# Patient Record
Sex: MALE | Race: BLACK OR AFRICAN AMERICAN | NOT HISPANIC OR LATINO | URBAN - METROPOLITAN AREA
[De-identification: names, ages, dates, MRNs, and addresses within clinical notes are randomized per-mention and may not be internally consistent; named-entity substitution may affect disease eponyms.]

---

## 2020-07-10 ENCOUNTER — LAB VISIT (OUTPATIENT)
Dept: LAB | Facility: HOSPITAL | Age: 35
End: 2020-07-10
Attending: INTERNAL MEDICINE
Payer: COMMERCIAL

## 2020-07-10 DIAGNOSIS — L03.116 CELLULITIS OF LEFT FOOT: ICD-10-CM

## 2020-07-10 DIAGNOSIS — E11.69 DIABETES MELLITUS ASSOCIATED WITH HORMONAL ETIOLOGY: Primary | ICD-10-CM

## 2020-07-10 DIAGNOSIS — M86.172 ACUTE OSTEOMYELITIS OF LEFT ANKLE OR FOOT: ICD-10-CM

## 2020-07-10 LAB
BASOPHILS # BLD AUTO: 0.04 K/UL (ref 0–0.2)
BASOPHILS NFR BLD: 0.5 % (ref 0–1.9)
DIFFERENTIAL METHOD: ABNORMAL
EOSINOPHIL # BLD AUTO: 0.3 K/UL (ref 0–0.5)
EOSINOPHIL NFR BLD: 3 % (ref 0–8)
ERYTHROCYTE [DISTWIDTH] IN BLOOD BY AUTOMATED COUNT: 13 % (ref 11.5–14.5)
ERYTHROCYTE [SEDIMENTATION RATE] IN BLOOD BY WESTERGREN METHOD: 48 MM/HR (ref 0–10)
HCT VFR BLD AUTO: 33.1 % (ref 40–54)
HGB BLD-MCNC: 10.2 G/DL (ref 14–18)
IMM GRANULOCYTES # BLD AUTO: 0.03 K/UL (ref 0–0.04)
IMM GRANULOCYTES NFR BLD AUTO: 0.4 % (ref 0–0.5)
LYMPHOCYTES # BLD AUTO: 3.1 K/UL (ref 1–4.8)
LYMPHOCYTES NFR BLD: 37.8 % (ref 18–48)
MCH RBC QN AUTO: 27.6 PG (ref 27–31)
MCHC RBC AUTO-ENTMCNC: 30.8 G/DL (ref 32–36)
MCV RBC AUTO: 90 FL (ref 82–98)
MONOCYTES # BLD AUTO: 0.6 K/UL (ref 0.3–1)
MONOCYTES NFR BLD: 7 % (ref 4–15)
NEUTROPHILS # BLD AUTO: 4.3 K/UL (ref 1.8–7.7)
NEUTROPHILS NFR BLD: 51.3 % (ref 38–73)
NRBC BLD-RTO: 0 /100 WBC
PLATELET # BLD AUTO: 261 K/UL (ref 150–350)
PMV BLD AUTO: 10.7 FL (ref 9.2–12.9)
RBC # BLD AUTO: 3.69 M/UL (ref 4.6–6.2)
WBC # BLD AUTO: 8.28 K/UL (ref 3.9–12.7)

## 2020-07-10 PROCEDURE — 85651 RBC SED RATE NONAUTOMATED: CPT | Mod: PO

## 2020-07-10 PROCEDURE — 85025 COMPLETE CBC W/AUTO DIFF WBC: CPT

## 2020-07-10 PROCEDURE — 80053 COMPREHEN METABOLIC PANEL: CPT

## 2020-07-10 PROCEDURE — 86140 C-REACTIVE PROTEIN: CPT

## 2020-07-11 LAB
ALBUMIN SERPL BCP-MCNC: 3.3 G/DL (ref 3.5–5.2)
ALP SERPL-CCNC: 71 U/L (ref 55–135)
ALT SERPL W/O P-5'-P-CCNC: 16 U/L (ref 10–44)
ANION GAP SERPL CALC-SCNC: 9 MMOL/L (ref 8–16)
ANION GAP SERPL CALC-SCNC: 9 MMOL/L (ref 8–16)
AST SERPL-CCNC: 15 U/L (ref 10–40)
BILIRUB SERPL-MCNC: 0.2 MG/DL (ref 0.1–1)
BUN SERPL-MCNC: 38 MG/DL (ref 6–20)
BUN SERPL-MCNC: 38 MG/DL (ref 6–20)
CALCIUM SERPL-MCNC: 9.3 MG/DL (ref 8.7–10.5)
CALCIUM SERPL-MCNC: 9.3 MG/DL (ref 8.7–10.5)
CHLORIDE SERPL-SCNC: 110 MMOL/L (ref 95–110)
CHLORIDE SERPL-SCNC: 110 MMOL/L (ref 95–110)
CO2 SERPL-SCNC: 18 MMOL/L (ref 23–29)
CO2 SERPL-SCNC: 18 MMOL/L (ref 23–29)
CREAT SERPL-MCNC: 2.5 MG/DL (ref 0.5–1.4)
CREAT SERPL-MCNC: 2.5 MG/DL (ref 0.5–1.4)
CRP SERPL-MCNC: 1.5 MG/L (ref 0–8.2)
EST. GFR  (AFRICAN AMERICAN): 36.8 ML/MIN/1.73 M^2
EST. GFR  (AFRICAN AMERICAN): 36.8 ML/MIN/1.73 M^2
EST. GFR  (NON AFRICAN AMERICAN): 31.8 ML/MIN/1.73 M^2
EST. GFR  (NON AFRICAN AMERICAN): 31.8 ML/MIN/1.73 M^2
GLUCOSE SERPL-MCNC: 146 MG/DL (ref 70–110)
GLUCOSE SERPL-MCNC: 146 MG/DL (ref 70–110)
POTASSIUM SERPL-SCNC: 4.1 MMOL/L (ref 3.5–5.1)
POTASSIUM SERPL-SCNC: 4.1 MMOL/L (ref 3.5–5.1)
PROT SERPL-MCNC: 8.3 G/DL (ref 6–8.4)
SODIUM SERPL-SCNC: 137 MMOL/L (ref 136–145)
SODIUM SERPL-SCNC: 137 MMOL/L (ref 136–145)

## 2020-07-14 ENCOUNTER — LAB VISIT (OUTPATIENT)
Dept: LAB | Facility: HOSPITAL | Age: 35
End: 2020-07-14
Attending: INTERNAL MEDICINE
Payer: COMMERCIAL

## 2020-07-14 DIAGNOSIS — E11.69 DIABETES MELLITUS ASSOCIATED WITH HORMONAL ETIOLOGY: Primary | ICD-10-CM

## 2020-07-14 PROCEDURE — 85027 COMPLETE CBC AUTOMATED: CPT

## 2020-07-14 PROCEDURE — 80053 COMPREHEN METABOLIC PANEL: CPT

## 2020-07-14 PROCEDURE — 86140 C-REACTIVE PROTEIN: CPT

## 2020-07-14 PROCEDURE — 85651 RBC SED RATE NONAUTOMATED: CPT | Mod: PO

## 2020-07-15 LAB
ALBUMIN SERPL BCP-MCNC: 3.3 G/DL (ref 3.5–5.2)
ALP SERPL-CCNC: 76 U/L (ref 55–135)
ALT SERPL W/O P-5'-P-CCNC: 17 U/L (ref 10–44)
ANION GAP SERPL CALC-SCNC: 8 MMOL/L (ref 8–16)
AST SERPL-CCNC: 18 U/L (ref 10–40)
BILIRUB SERPL-MCNC: 0.3 MG/DL (ref 0.1–1)
BUN SERPL-MCNC: 28 MG/DL (ref 6–20)
CALCIUM SERPL-MCNC: 9 MG/DL (ref 8.7–10.5)
CHLORIDE SERPL-SCNC: 110 MMOL/L (ref 95–110)
CO2 SERPL-SCNC: 20 MMOL/L (ref 23–29)
CREAT SERPL-MCNC: 2.4 MG/DL (ref 0.5–1.4)
CRP SERPL-MCNC: 1.3 MG/L (ref 0–8.2)
ERYTHROCYTE [DISTWIDTH] IN BLOOD BY AUTOMATED COUNT: 13.5 % (ref 11.5–14.5)
ERYTHROCYTE [SEDIMENTATION RATE] IN BLOOD BY WESTERGREN METHOD: 35 MM/HR (ref 0–10)
EST. GFR  (AFRICAN AMERICAN): 38.7 ML/MIN/1.73 M^2
EST. GFR  (NON AFRICAN AMERICAN): 33.4 ML/MIN/1.73 M^2
GLUCOSE SERPL-MCNC: 162 MG/DL (ref 70–110)
HCT VFR BLD AUTO: 35.1 % (ref 40–54)
HGB BLD-MCNC: 10.4 G/DL (ref 14–18)
MCH RBC QN AUTO: 27.6 PG (ref 27–31)
MCHC RBC AUTO-ENTMCNC: 29.6 G/DL (ref 32–36)
MCV RBC AUTO: 93 FL (ref 82–98)
PLATELET # BLD AUTO: 292 K/UL (ref 150–350)
PMV BLD AUTO: 11.8 FL (ref 9.2–12.9)
POTASSIUM SERPL-SCNC: 4.3 MMOL/L (ref 3.5–5.1)
PROT SERPL-MCNC: 8.1 G/DL (ref 6–8.4)
RBC # BLD AUTO: 3.77 M/UL (ref 4.6–6.2)
SODIUM SERPL-SCNC: 138 MMOL/L (ref 136–145)
WBC # BLD AUTO: 8.73 K/UL (ref 3.9–12.7)

## 2020-07-17 ENCOUNTER — LAB VISIT (OUTPATIENT)
Dept: LAB | Facility: HOSPITAL | Age: 35
End: 2020-07-17
Attending: INTERNAL MEDICINE
Payer: COMMERCIAL

## 2020-07-17 DIAGNOSIS — E11.69 DIABETES MELLITUS ASSOCIATED WITH HORMONAL ETIOLOGY: Primary | ICD-10-CM

## 2020-07-17 PROCEDURE — 80048 BASIC METABOLIC PNL TOTAL CA: CPT

## 2020-07-18 LAB
ANION GAP SERPL CALC-SCNC: 8 MMOL/L (ref 8–16)
BUN SERPL-MCNC: 33 MG/DL (ref 6–20)
CALCIUM SERPL-MCNC: 8.1 MG/DL (ref 8.7–10.5)
CHLORIDE SERPL-SCNC: 108 MMOL/L (ref 95–110)
CO2 SERPL-SCNC: 22 MMOL/L (ref 23–29)
CREAT SERPL-MCNC: 2.8 MG/DL (ref 0.5–1.4)
EST. GFR  (AFRICAN AMERICAN): 32.1 ML/MIN/1.73 M^2
EST. GFR  (NON AFRICAN AMERICAN): 27.8 ML/MIN/1.73 M^2
GLUCOSE SERPL-MCNC: 200 MG/DL (ref 70–110)
POTASSIUM SERPL-SCNC: 3.6 MMOL/L (ref 3.5–5.1)
SODIUM SERPL-SCNC: 138 MMOL/L (ref 136–145)

## 2020-07-21 ENCOUNTER — LAB VISIT (OUTPATIENT)
Dept: LAB | Facility: HOSPITAL | Age: 35
End: 2020-07-21
Attending: INTERNAL MEDICINE
Payer: COMMERCIAL

## 2020-07-21 DIAGNOSIS — E11.69 DIABETES MELLITUS ASSOCIATED WITH HORMONAL ETIOLOGY: Primary | ICD-10-CM

## 2020-07-21 LAB
BASOPHILS # BLD AUTO: 0.03 K/UL (ref 0–0.2)
BASOPHILS NFR BLD: 0.4 % (ref 0–1.9)
DIFFERENTIAL METHOD: ABNORMAL
EOSINOPHIL # BLD AUTO: 0.1 K/UL (ref 0–0.5)
EOSINOPHIL NFR BLD: 1.9 % (ref 0–8)
ERYTHROCYTE [DISTWIDTH] IN BLOOD BY AUTOMATED COUNT: 13.4 % (ref 11.5–14.5)
ERYTHROCYTE [SEDIMENTATION RATE] IN BLOOD BY WESTERGREN METHOD: 17 MM/HR (ref 0–10)
HCT VFR BLD AUTO: 28.5 % (ref 40–54)
HGB BLD-MCNC: 9.4 G/DL (ref 14–18)
IMM GRANULOCYTES # BLD AUTO: 0.09 K/UL (ref 0–0.04)
IMM GRANULOCYTES NFR BLD AUTO: 1.2 % (ref 0–0.5)
LYMPHOCYTES # BLD AUTO: 2.9 K/UL (ref 1–4.8)
LYMPHOCYTES NFR BLD: 39.3 % (ref 18–48)
MCH RBC QN AUTO: 28.8 PG (ref 27–31)
MCHC RBC AUTO-ENTMCNC: 33 G/DL (ref 32–36)
MCV RBC AUTO: 87 FL (ref 82–98)
MONOCYTES # BLD AUTO: 0.6 K/UL (ref 0.3–1)
MONOCYTES NFR BLD: 8.3 % (ref 4–15)
NEUTROPHILS # BLD AUTO: 3.6 K/UL (ref 1.8–7.7)
NEUTROPHILS NFR BLD: 48.9 % (ref 38–73)
NRBC BLD-RTO: 0 /100 WBC
PLATELET # BLD AUTO: 249 K/UL (ref 150–350)
PMV BLD AUTO: 10.6 FL (ref 9.2–12.9)
RBC # BLD AUTO: 3.26 M/UL (ref 4.6–6.2)
WBC # BLD AUTO: 7.26 K/UL (ref 3.9–12.7)

## 2020-07-21 PROCEDURE — 80053 COMPREHEN METABOLIC PANEL: CPT

## 2020-07-21 PROCEDURE — 85025 COMPLETE CBC W/AUTO DIFF WBC: CPT | Mod: PO

## 2020-07-21 PROCEDURE — 86140 C-REACTIVE PROTEIN: CPT

## 2020-07-21 PROCEDURE — 85651 RBC SED RATE NONAUTOMATED: CPT | Mod: PO

## 2020-07-22 LAB
ALBUMIN SERPL BCP-MCNC: 3 G/DL (ref 3.5–5.2)
ALP SERPL-CCNC: 70 U/L (ref 55–135)
ALT SERPL W/O P-5'-P-CCNC: 19 U/L (ref 10–44)
ANION GAP SERPL CALC-SCNC: 9 MMOL/L (ref 8–16)
AST SERPL-CCNC: 18 U/L (ref 10–40)
BILIRUB SERPL-MCNC: 0.3 MG/DL (ref 0.1–1)
BUN SERPL-MCNC: 26 MG/DL (ref 6–20)
CALCIUM SERPL-MCNC: 8.5 MG/DL (ref 8.7–10.5)
CHLORIDE SERPL-SCNC: 107 MMOL/L (ref 95–110)
CO2 SERPL-SCNC: 22 MMOL/L (ref 23–29)
CREAT SERPL-MCNC: 2.4 MG/DL (ref 0.5–1.4)
CRP SERPL-MCNC: 1 MG/L (ref 0–8.2)
EST. GFR  (AFRICAN AMERICAN): 38.7 ML/MIN/1.73 M^2
EST. GFR  (NON AFRICAN AMERICAN): 33.4 ML/MIN/1.73 M^2
GLUCOSE SERPL-MCNC: 270 MG/DL (ref 70–110)
POTASSIUM SERPL-SCNC: 3.4 MMOL/L (ref 3.5–5.1)
PROT SERPL-MCNC: 6.9 G/DL (ref 6–8.4)
SODIUM SERPL-SCNC: 138 MMOL/L (ref 136–145)

## 2020-07-28 ENCOUNTER — LAB VISIT (OUTPATIENT)
Dept: LAB | Facility: HOSPITAL | Age: 35
End: 2020-07-28
Attending: INTERNAL MEDICINE
Payer: COMMERCIAL

## 2020-07-28 DIAGNOSIS — E11.69 DIABETES MELLITUS ASSOCIATED WITH HORMONAL ETIOLOGY: Primary | ICD-10-CM

## 2020-07-28 LAB
ERYTHROCYTE [DISTWIDTH] IN BLOOD BY AUTOMATED COUNT: 13.5 % (ref 11.5–14.5)
ERYTHROCYTE [SEDIMENTATION RATE] IN BLOOD BY WESTERGREN METHOD: 15 MM/HR (ref 0–10)
HCT VFR BLD AUTO: 30.5 % (ref 40–54)
HGB BLD-MCNC: 9.8 G/DL (ref 14–18)
MCH RBC QN AUTO: 28.4 PG (ref 27–31)
MCHC RBC AUTO-ENTMCNC: 32.1 G/DL (ref 32–36)
MCV RBC AUTO: 88 FL (ref 82–98)
PLATELET # BLD AUTO: 242 K/UL (ref 150–350)
PMV BLD AUTO: 11.5 FL (ref 9.2–12.9)
RBC # BLD AUTO: 3.45 M/UL (ref 4.6–6.2)
WBC # BLD AUTO: 7.36 K/UL (ref 3.9–12.7)

## 2020-07-28 PROCEDURE — 80053 COMPREHEN METABOLIC PANEL: CPT

## 2020-07-28 PROCEDURE — 85651 RBC SED RATE NONAUTOMATED: CPT | Mod: PO

## 2020-07-28 PROCEDURE — 85027 COMPLETE CBC AUTOMATED: CPT | Mod: PO

## 2020-07-28 PROCEDURE — 86140 C-REACTIVE PROTEIN: CPT

## 2020-07-29 LAB
ALBUMIN SERPL BCP-MCNC: 3.1 G/DL (ref 3.5–5.2)
ALP SERPL-CCNC: 76 U/L (ref 55–135)
ALT SERPL W/O P-5'-P-CCNC: 26 U/L (ref 10–44)
ANION GAP SERPL CALC-SCNC: 14 MMOL/L (ref 8–16)
AST SERPL-CCNC: 22 U/L (ref 10–40)
BILIRUB SERPL-MCNC: 0.4 MG/DL (ref 0.1–1)
BUN SERPL-MCNC: 27 MG/DL (ref 6–20)
CALCIUM SERPL-MCNC: 7.9 MG/DL (ref 8.7–10.5)
CHLORIDE SERPL-SCNC: 105 MMOL/L (ref 95–110)
CO2 SERPL-SCNC: 19 MMOL/L (ref 23–29)
CREAT SERPL-MCNC: 2.4 MG/DL (ref 0.5–1.4)
CRP SERPL-MCNC: 2.2 MG/L (ref 0–8.2)
EST. GFR  (AFRICAN AMERICAN): 38.7 ML/MIN/1.73 M^2
EST. GFR  (NON AFRICAN AMERICAN): 33.4 ML/MIN/1.73 M^2
GLUCOSE SERPL-MCNC: 258 MG/DL (ref 70–110)
POTASSIUM SERPL-SCNC: 3.9 MMOL/L (ref 3.5–5.1)
PROT SERPL-MCNC: 7.2 G/DL (ref 6–8.4)
SODIUM SERPL-SCNC: 138 MMOL/L (ref 136–145)

## 2020-07-31 ENCOUNTER — LAB VISIT (OUTPATIENT)
Dept: LAB | Facility: HOSPITAL | Age: 35
End: 2020-07-31
Attending: INTERNAL MEDICINE
Payer: COMMERCIAL

## 2020-07-31 DIAGNOSIS — M86.172 ACUTE OSTEOMYELITIS OF LEFT ANKLE OR FOOT: ICD-10-CM

## 2020-07-31 DIAGNOSIS — E11.69 DIABETES MELLITUS ASSOCIATED WITH HORMONAL ETIOLOGY: Primary | ICD-10-CM

## 2020-07-31 LAB
ANION GAP SERPL CALC-SCNC: 8 MMOL/L (ref 8–16)
BUN SERPL-MCNC: 23 MG/DL (ref 6–20)
CALCIUM SERPL-MCNC: 7.8 MG/DL (ref 8.7–10.5)
CHLORIDE SERPL-SCNC: 109 MMOL/L (ref 95–110)
CO2 SERPL-SCNC: 22 MMOL/L (ref 23–29)
CREAT SERPL-MCNC: 2.8 MG/DL (ref 0.5–1.4)
EST. GFR  (AFRICAN AMERICAN): 32.1 ML/MIN/1.73 M^2
EST. GFR  (NON AFRICAN AMERICAN): 27.8 ML/MIN/1.73 M^2
GLUCOSE SERPL-MCNC: 165 MG/DL (ref 70–110)
POTASSIUM SERPL-SCNC: 4.2 MMOL/L (ref 3.5–5.1)
SODIUM SERPL-SCNC: 139 MMOL/L (ref 136–145)

## 2020-07-31 PROCEDURE — 80048 BASIC METABOLIC PNL TOTAL CA: CPT

## 2020-08-04 ENCOUNTER — LAB VISIT (OUTPATIENT)
Dept: LAB | Facility: HOSPITAL | Age: 35
End: 2020-08-04
Attending: INTERNAL MEDICINE
Payer: COMMERCIAL

## 2020-08-04 DIAGNOSIS — E11.69 DIABETES MELLITUS ASSOCIATED WITH HORMONAL ETIOLOGY: Primary | ICD-10-CM

## 2020-08-04 LAB
ERYTHROCYTE [DISTWIDTH] IN BLOOD BY AUTOMATED COUNT: 13.8 % (ref 11.5–14.5)
ERYTHROCYTE [SEDIMENTATION RATE] IN BLOOD BY WESTERGREN METHOD: 23 MM/HR (ref 0–10)
HCT VFR BLD AUTO: 30.3 % (ref 40–54)
HGB BLD-MCNC: 9.3 G/DL (ref 14–18)
MCH RBC QN AUTO: 28.1 PG (ref 27–31)
MCHC RBC AUTO-ENTMCNC: 30.7 G/DL (ref 32–36)
MCV RBC AUTO: 92 FL (ref 82–98)
PLATELET # BLD AUTO: 216 K/UL (ref 150–350)
PMV BLD AUTO: 11.3 FL (ref 9.2–12.9)
RBC # BLD AUTO: 3.31 M/UL (ref 4.6–6.2)
WBC # BLD AUTO: 8 K/UL (ref 3.9–12.7)

## 2020-08-04 PROCEDURE — 80053 COMPREHEN METABOLIC PANEL: CPT

## 2020-08-04 PROCEDURE — 85651 RBC SED RATE NONAUTOMATED: CPT | Mod: PO

## 2020-08-04 PROCEDURE — 86140 C-REACTIVE PROTEIN: CPT

## 2020-08-04 PROCEDURE — 85027 COMPLETE CBC AUTOMATED: CPT

## 2020-08-05 LAB
ALBUMIN SERPL BCP-MCNC: 2.9 G/DL (ref 3.5–5.2)
ALP SERPL-CCNC: 82 U/L (ref 55–135)
ALT SERPL W/O P-5'-P-CCNC: 22 U/L (ref 10–44)
ANION GAP SERPL CALC-SCNC: 9 MMOL/L (ref 8–16)
AST SERPL-CCNC: 16 U/L (ref 10–40)
BILIRUB SERPL-MCNC: 0.4 MG/DL (ref 0.1–1)
BUN SERPL-MCNC: 28 MG/DL (ref 6–20)
CALCIUM SERPL-MCNC: 8.4 MG/DL (ref 8.7–10.5)
CHLORIDE SERPL-SCNC: 105 MMOL/L (ref 95–110)
CO2 SERPL-SCNC: 21 MMOL/L (ref 23–29)
CREAT SERPL-MCNC: 3.1 MG/DL (ref 0.5–1.4)
CRP SERPL-MCNC: 2.4 MG/L (ref 0–8.2)
EST. GFR  (AFRICAN AMERICAN): 28.4 ML/MIN/1.73 M^2
EST. GFR  (NON AFRICAN AMERICAN): 24.5 ML/MIN/1.73 M^2
GLUCOSE SERPL-MCNC: 341 MG/DL (ref 70–110)
POTASSIUM SERPL-SCNC: 3.8 MMOL/L (ref 3.5–5.1)
PROT SERPL-MCNC: 6.9 G/DL (ref 6–8.4)
SODIUM SERPL-SCNC: 135 MMOL/L (ref 136–145)

## 2020-08-07 ENCOUNTER — LAB VISIT (OUTPATIENT)
Dept: LAB | Facility: HOSPITAL | Age: 35
End: 2020-08-07
Attending: INTERNAL MEDICINE
Payer: COMMERCIAL

## 2020-08-07 DIAGNOSIS — E11.69 DIABETES MELLITUS ASSOCIATED WITH HORMONAL ETIOLOGY: Primary | ICD-10-CM

## 2020-08-07 LAB
ANION GAP SERPL CALC-SCNC: 12 MMOL/L (ref 8–16)
BUN SERPL-MCNC: 34 MG/DL (ref 6–20)
CALCIUM SERPL-MCNC: 8.4 MG/DL (ref 8.7–10.5)
CHLORIDE SERPL-SCNC: 108 MMOL/L (ref 95–110)
CO2 SERPL-SCNC: 18 MMOL/L (ref 23–29)
CREAT SERPL-MCNC: 3.3 MG/DL (ref 0.5–1.4)
EST. GFR  (AFRICAN AMERICAN): 26.3 ML/MIN/1.73 M^2
EST. GFR  (NON AFRICAN AMERICAN): 22.8 ML/MIN/1.73 M^2
GLUCOSE SERPL-MCNC: 319 MG/DL (ref 70–110)
POTASSIUM SERPL-SCNC: 4.2 MMOL/L (ref 3.5–5.1)
SODIUM SERPL-SCNC: 138 MMOL/L (ref 136–145)

## 2020-08-07 PROCEDURE — 80048 BASIC METABOLIC PNL TOTAL CA: CPT

## 2020-08-14 ENCOUNTER — DOCUMENT SCAN (OUTPATIENT)
Dept: HOME HEALTH SERVICES | Facility: HOSPITAL | Age: 35
End: 2020-08-14

## 2020-08-18 ENCOUNTER — DOCUMENT SCAN (OUTPATIENT)
Dept: HOME HEALTH SERVICES | Facility: HOSPITAL | Age: 35
End: 2020-08-18

## 2020-08-27 ENCOUNTER — DOCUMENT SCAN (OUTPATIENT)
Dept: HOME HEALTH SERVICES | Facility: HOSPITAL | Age: 35
End: 2020-08-27

## 2021-02-23 ENCOUNTER — TELEPHONE (OUTPATIENT)
Dept: PODIATRY | Facility: CLINIC | Age: 36
End: 2021-02-23

## 2022-12-27 ENCOUNTER — CLINICAL SUPPORT (OUTPATIENT)
Dept: PRIMARY CARE CLINIC | Facility: CLINIC | Age: 37
End: 2022-12-27

## 2022-12-27 DIAGNOSIS — Z02.4 ENCOUNTER FOR COMMERCIAL DRIVING LICENSE (CDL) EXAM: Primary | ICD-10-CM

## 2022-12-27 PROCEDURE — 99499 UNLISTED E&M SERVICE: CPT | Mod: ,,, | Performed by: NURSE PRACTITIONER

## 2022-12-27 PROCEDURE — 99499 PR PHYSICAL - DOT/CDL: ICD-10-PCS | Mod: ,,, | Performed by: NURSE PRACTITIONER

## 2024-09-01 ENCOUNTER — HOSPITAL ENCOUNTER (INPATIENT)
Facility: HOSPITAL | Age: 39
LOS: 1 days | Discharge: HOME | DRG: 304 | End: 2024-09-02
Attending: EMERGENCY MEDICINE | Admitting: EMERGENCY MEDICINE
Payer: MEDICARE

## 2024-09-01 DIAGNOSIS — I16.1 HYPERTENSIVE EMERGENCY: ICD-10-CM

## 2024-09-01 DIAGNOSIS — J90 BILATERAL PLEURAL EFFUSION: ICD-10-CM

## 2024-09-01 DIAGNOSIS — R06.02 SHORTNESS OF BREATH: Primary | ICD-10-CM

## 2024-09-01 DIAGNOSIS — E87.5 HYPERKALEMIA: ICD-10-CM

## 2024-09-01 DIAGNOSIS — R09.02 HYPOXIA: ICD-10-CM

## 2024-09-01 DIAGNOSIS — R79.89 ELEVATED TROPONIN: ICD-10-CM

## 2024-09-01 DIAGNOSIS — S91.309A WOUND OF FOOT: ICD-10-CM

## 2024-09-01 DIAGNOSIS — I16.0 HYPERTENSIVE URGENCY: ICD-10-CM

## 2024-09-01 DIAGNOSIS — N18.6 ESRD ON HEMODIALYSIS: ICD-10-CM

## 2024-09-01 DIAGNOSIS — Z99.2 ESRD ON HEMODIALYSIS: ICD-10-CM

## 2024-09-01 DIAGNOSIS — J81.0 ACUTE PULMONARY EDEMA: ICD-10-CM

## 2024-09-01 DIAGNOSIS — E87.70 HYPERVOLEMIA, UNSPECIFIED HYPERVOLEMIA TYPE: ICD-10-CM

## 2024-09-01 PROBLEM — N18.9 ANEMIA IN CHRONIC KIDNEY DISEASE: Chronic | Status: ACTIVE | Noted: 2021-03-12

## 2024-09-01 PROBLEM — I10 ESSENTIAL HYPERTENSION: Status: ACTIVE | Noted: 2021-01-27

## 2024-09-01 PROBLEM — R06.03 ACUTE RESPIRATORY DISTRESS: Status: ACTIVE | Noted: 2024-09-01

## 2024-09-01 PROBLEM — E11.621 TYPE 2 DIABETES MELLITUS WITH FOOT ULCER: Status: ACTIVE | Noted: 2024-06-25

## 2024-09-01 PROBLEM — L97.509 FOOT ULCER: Chronic | Status: ACTIVE | Noted: 2024-09-01

## 2024-09-01 PROBLEM — D63.1 ANEMIA IN CHRONIC KIDNEY DISEASE: Chronic | Status: ACTIVE | Noted: 2021-03-12

## 2024-09-01 PROBLEM — L97.509 TYPE 2 DIABETES MELLITUS WITH FOOT ULCER: Status: ACTIVE | Noted: 2024-06-25

## 2024-09-01 LAB
ABO + RH BLD: NORMAL
ALBUMIN SERPL BCP-MCNC: 3.8 G/DL (ref 3.5–5.2)
ALP SERPL-CCNC: 64 U/L (ref 55–135)
ALT SERPL W/O P-5'-P-CCNC: 28 U/L (ref 10–44)
ANION GAP SERPL CALC-SCNC: 12 MMOL/L (ref 8–16)
ANION GAP SERPL CALC-SCNC: 18 MMOL/L (ref 8–16)
APTT PPP: 27.3 SEC (ref 21–32)
AST SERPL-CCNC: 26 U/L (ref 10–40)
B-OH-BUTYR BLD STRIP-SCNC: 0.1 MMOL/L (ref 0–0.5)
BASOPHILS # BLD AUTO: 0.04 K/UL (ref 0–0.2)
BASOPHILS NFR BLD: 0.4 % (ref 0–1.9)
BILIRUB SERPL-MCNC: 0.6 MG/DL (ref 0.1–1)
BLD GP AB SCN CELLS X3 SERPL QL: NORMAL
BNP SERPL-MCNC: 1884 PG/ML (ref 0–99)
BUN SERPL-MCNC: 47 MG/DL (ref 6–20)
BUN SERPL-MCNC: 94 MG/DL (ref 6–20)
CALCIUM SERPL-MCNC: 8.9 MG/DL (ref 8.7–10.5)
CALCIUM SERPL-MCNC: 9.5 MG/DL (ref 8.7–10.5)
CHLORIDE SERPL-SCNC: 98 MMOL/L (ref 95–110)
CHLORIDE SERPL-SCNC: 99 MMOL/L (ref 95–110)
CO2 SERPL-SCNC: 21 MMOL/L (ref 23–29)
CO2 SERPL-SCNC: 25 MMOL/L (ref 23–29)
CREAT SERPL-MCNC: 11.2 MG/DL (ref 0.5–1.4)
CREAT SERPL-MCNC: 18.2 MG/DL (ref 0.5–1.4)
CRP SERPL-MCNC: 5 MG/L (ref 0–8.2)
DIFFERENTIAL METHOD BLD: ABNORMAL
EOSINOPHIL # BLD AUTO: 0.3 K/UL (ref 0–0.5)
EOSINOPHIL NFR BLD: 2.9 % (ref 0–8)
ERYTHROCYTE [DISTWIDTH] IN BLOOD BY AUTOMATED COUNT: 17 % (ref 11.5–14.5)
ERYTHROCYTE [SEDIMENTATION RATE] IN BLOOD BY PHOTOMETRIC METHOD: 30 MM/HR (ref 0–23)
EST. GFR  (NO RACE VARIABLE): 3 ML/MIN/1.73 M^2
EST. GFR  (NO RACE VARIABLE): 5 ML/MIN/1.73 M^2
GLUCOSE SERPL-MCNC: 87 MG/DL (ref 70–110)
GLUCOSE SERPL-MCNC: 90 MG/DL (ref 70–110)
HCT VFR BLD AUTO: 27.4 % (ref 40–54)
HGB BLD-MCNC: 8.8 G/DL (ref 14–18)
IMM GRANULOCYTES # BLD AUTO: 0.03 K/UL (ref 0–0.04)
IMM GRANULOCYTES NFR BLD AUTO: 0.3 % (ref 0–0.5)
INR PPP: 1 (ref 0.8–1.2)
LYMPHOCYTES # BLD AUTO: 2.4 K/UL (ref 1–4.8)
LYMPHOCYTES NFR BLD: 23.2 % (ref 18–48)
MAGNESIUM SERPL-MCNC: 2 MG/DL (ref 1.6–2.6)
MCH RBC QN AUTO: 28.9 PG (ref 27–31)
MCHC RBC AUTO-ENTMCNC: 32.1 G/DL (ref 32–36)
MCV RBC AUTO: 90 FL (ref 82–98)
MONOCYTES # BLD AUTO: 1.1 K/UL (ref 0.3–1)
MONOCYTES NFR BLD: 10.3 % (ref 4–15)
NEUTROPHILS # BLD AUTO: 6.4 K/UL (ref 1.8–7.7)
NEUTROPHILS NFR BLD: 62.9 % (ref 38–73)
NRBC BLD-RTO: 0 /100 WBC
PHOSPHATE SERPL-MCNC: 4.7 MG/DL (ref 2.7–4.5)
PLATELET # BLD AUTO: 235 K/UL (ref 150–450)
PMV BLD AUTO: 9.9 FL (ref 9.2–12.9)
POCT GLUCOSE: 57 MG/DL (ref 70–110)
POCT GLUCOSE: 89 MG/DL (ref 70–110)
POCT GLUCOSE: 91 MG/DL (ref 70–110)
POCT GLUCOSE: 91 MG/DL (ref 70–110)
POCT GLUCOSE: 93 MG/DL (ref 70–110)
POCT GLUCOSE: 96 MG/DL (ref 70–110)
POCT GLUCOSE: 96 MG/DL (ref 70–110)
POTASSIUM SERPL-SCNC: 6.2 MMOL/L (ref 3.5–5.1)
POTASSIUM SERPL-SCNC: 6.5 MMOL/L (ref 3.5–5.1)
PROT SERPL-MCNC: 8.3 G/DL (ref 6–8.4)
PROTHROMBIN TIME: 11.3 SEC (ref 9–12.5)
RBC # BLD AUTO: 3.05 M/UL (ref 4.6–6.2)
SODIUM SERPL-SCNC: 135 MMOL/L (ref 136–145)
SODIUM SERPL-SCNC: 138 MMOL/L (ref 136–145)
SPECIMEN OUTDATE: NORMAL
TROPONIN I SERPL DL<=0.01 NG/ML-MCNC: 0.12 NG/ML (ref 0–0.03)
TROPONIN I SERPL DL<=0.01 NG/ML-MCNC: 0.18 NG/ML (ref 0–0.03)
WBC # BLD AUTO: 10.22 K/UL (ref 3.9–12.7)

## 2024-09-01 PROCEDURE — 99285 EMERGENCY DEPT VISIT HI MDM: CPT | Mod: 25

## 2024-09-01 PROCEDURE — 86900 BLOOD TYPING SEROLOGIC ABO: CPT | Performed by: EMERGENCY MEDICINE

## 2024-09-01 PROCEDURE — 36415 COLL VENOUS BLD VENIPUNCTURE: CPT | Performed by: STUDENT IN AN ORGANIZED HEALTH CARE EDUCATION/TRAINING PROGRAM

## 2024-09-01 PROCEDURE — 93010 ELECTROCARDIOGRAM REPORT: CPT | Mod: ,,, | Performed by: INTERNAL MEDICINE

## 2024-09-01 PROCEDURE — 84484 ASSAY OF TROPONIN QUANT: CPT | Mod: 91 | Performed by: STUDENT IN AN ORGANIZED HEALTH CARE EDUCATION/TRAINING PROGRAM

## 2024-09-01 PROCEDURE — 83880 ASSAY OF NATRIURETIC PEPTIDE: CPT | Performed by: PHYSICIAN ASSISTANT

## 2024-09-01 PROCEDURE — 83735 ASSAY OF MAGNESIUM: CPT | Performed by: PHYSICIAN ASSISTANT

## 2024-09-01 PROCEDURE — 25000003 PHARM REV CODE 250: Performed by: STUDENT IN AN ORGANIZED HEALTH CARE EDUCATION/TRAINING PROGRAM

## 2024-09-01 PROCEDURE — 85025 COMPLETE CBC W/AUTO DIFF WBC: CPT | Performed by: PHYSICIAN ASSISTANT

## 2024-09-01 PROCEDURE — 99291 CRITICAL CARE FIRST HOUR: CPT | Mod: ,,, | Performed by: INTERNAL MEDICINE

## 2024-09-01 PROCEDURE — 93005 ELECTROCARDIOGRAM TRACING: CPT

## 2024-09-01 PROCEDURE — 11000001 HC ACUTE MED/SURG PRIVATE ROOM

## 2024-09-01 PROCEDURE — 25000003 PHARM REV CODE 250: Performed by: EMERGENCY MEDICINE

## 2024-09-01 PROCEDURE — 96374 THER/PROPH/DIAG INJ IV PUSH: CPT

## 2024-09-01 PROCEDURE — 84100 ASSAY OF PHOSPHORUS: CPT | Performed by: PHYSICIAN ASSISTANT

## 2024-09-01 PROCEDURE — 63600175 PHARM REV CODE 636 W HCPCS: Performed by: PHYSICIAN ASSISTANT

## 2024-09-01 PROCEDURE — 80100014 HC HEMODIALYSIS 1:1

## 2024-09-01 PROCEDURE — 96375 TX/PRO/DX INJ NEW DRUG ADDON: CPT

## 2024-09-01 PROCEDURE — 80048 BASIC METABOLIC PNL TOTAL CA: CPT | Mod: XB | Performed by: STUDENT IN AN ORGANIZED HEALTH CARE EDUCATION/TRAINING PROGRAM

## 2024-09-01 PROCEDURE — 85610 PROTHROMBIN TIME: CPT | Performed by: PHYSICIAN ASSISTANT

## 2024-09-01 PROCEDURE — 99900035 HC TECH TIME PER 15 MIN (STAT)

## 2024-09-01 PROCEDURE — 85652 RBC SED RATE AUTOMATED: CPT | Performed by: EMERGENCY MEDICINE

## 2024-09-01 PROCEDURE — 94640 AIRWAY INHALATION TREATMENT: CPT

## 2024-09-01 PROCEDURE — 84484 ASSAY OF TROPONIN QUANT: CPT | Performed by: PHYSICIAN ASSISTANT

## 2024-09-01 PROCEDURE — 85730 THROMBOPLASTIN TIME PARTIAL: CPT | Performed by: PHYSICIAN ASSISTANT

## 2024-09-01 PROCEDURE — 86901 BLOOD TYPING SEROLOGIC RH(D): CPT | Performed by: EMERGENCY MEDICINE

## 2024-09-01 PROCEDURE — 80053 COMPREHEN METABOLIC PANEL: CPT | Performed by: PHYSICIAN ASSISTANT

## 2024-09-01 PROCEDURE — 99223 1ST HOSP IP/OBS HIGH 75: CPT | Mod: ,,, | Performed by: PODIATRIST

## 2024-09-01 PROCEDURE — 82010 KETONE BODYS QUAN: CPT | Performed by: EMERGENCY MEDICINE

## 2024-09-01 PROCEDURE — 63600175 PHARM REV CODE 636 W HCPCS: Mod: JZ,JG | Performed by: EMERGENCY MEDICINE

## 2024-09-01 PROCEDURE — 82962 GLUCOSE BLOOD TEST: CPT

## 2024-09-01 PROCEDURE — 86140 C-REACTIVE PROTEIN: CPT | Performed by: EMERGENCY MEDICINE

## 2024-09-01 PROCEDURE — 5A1D70Z PERFORMANCE OF URINARY FILTRATION, INTERMITTENT, LESS THAN 6 HOURS PER DAY: ICD-10-PCS | Performed by: INTERNAL MEDICINE

## 2024-09-01 PROCEDURE — 63600175 PHARM REV CODE 636 W HCPCS: Performed by: STUDENT IN AN ORGANIZED HEALTH CARE EDUCATION/TRAINING PROGRAM

## 2024-09-01 PROCEDURE — 25000242 PHARM REV CODE 250 ALT 637 W/ HCPCS: Performed by: EMERGENCY MEDICINE

## 2024-09-01 PROCEDURE — 94761 N-INVAS EAR/PLS OXIMETRY MLT: CPT

## 2024-09-01 RX ORDER — IBUPROFEN 200 MG
24 TABLET ORAL
Status: DISCONTINUED | OUTPATIENT
Start: 2024-09-01 | End: 2024-09-02 | Stop reason: HOSPADM

## 2024-09-01 RX ORDER — INSULIN ASPART 100 [IU]/ML
1-10 INJECTION, SOLUTION INTRAVENOUS; SUBCUTANEOUS
Status: DISCONTINUED | OUTPATIENT
Start: 2024-09-01 | End: 2024-09-02 | Stop reason: HOSPADM

## 2024-09-01 RX ORDER — BENZONATATE 100 MG/1
200 CAPSULE ORAL 3 TIMES DAILY PRN
Status: DISCONTINUED | OUTPATIENT
Start: 2024-09-01 | End: 2024-09-02 | Stop reason: HOSPADM

## 2024-09-01 RX ORDER — GUAIFENESIN 100 MG/5ML
200 SOLUTION ORAL EVERY 4 HOURS PRN
Status: DISCONTINUED | OUTPATIENT
Start: 2024-09-01 | End: 2024-09-02 | Stop reason: HOSPADM

## 2024-09-01 RX ORDER — NICARDIPINE HYDROCHLORIDE 0.2 MG/ML
0-15 INJECTION INTRAVENOUS CONTINUOUS
Status: DISCONTINUED | OUTPATIENT
Start: 2024-09-01 | End: 2024-09-01

## 2024-09-01 RX ORDER — CARVEDILOL 25 MG/1
1 TABLET ORAL 2 TIMES DAILY
COMMUNITY
Start: 2024-04-25

## 2024-09-01 RX ORDER — IPRATROPIUM BROMIDE AND ALBUTEROL SULFATE 2.5; .5 MG/3ML; MG/3ML
3 SOLUTION RESPIRATORY (INHALATION) EVERY 4 HOURS PRN
Status: DISCONTINUED | OUTPATIENT
Start: 2024-09-01 | End: 2024-09-02 | Stop reason: HOSPADM

## 2024-09-01 RX ORDER — ONDANSETRON 8 MG/1
8 TABLET, ORALLY DISINTEGRATING ORAL EVERY 8 HOURS PRN
Status: DISCONTINUED | OUTPATIENT
Start: 2024-09-01 | End: 2024-09-02 | Stop reason: HOSPADM

## 2024-09-01 RX ORDER — NIFEDIPINE 30 MG/1
90 TABLET, EXTENDED RELEASE ORAL DAILY
Status: DISCONTINUED | OUTPATIENT
Start: 2024-09-01 | End: 2024-09-02 | Stop reason: HOSPADM

## 2024-09-01 RX ORDER — GLUCAGON 1 MG
1 KIT INJECTION
Status: DISCONTINUED | OUTPATIENT
Start: 2024-09-01 | End: 2024-09-02 | Stop reason: HOSPADM

## 2024-09-01 RX ORDER — DEXTROSE 50 % IN WATER (D50W) INTRAVENOUS SYRINGE
25
Status: COMPLETED | OUTPATIENT
Start: 2024-09-01 | End: 2024-09-01

## 2024-09-01 RX ORDER — ACETAMINOPHEN 325 MG/1
650 TABLET ORAL EVERY 4 HOURS PRN
Status: DISCONTINUED | OUTPATIENT
Start: 2024-09-01 | End: 2024-09-02 | Stop reason: HOSPADM

## 2024-09-01 RX ORDER — SODIUM CHLORIDE 0.9 % (FLUSH) 0.9 %
10 SYRINGE (ML) INJECTION
Status: DISCONTINUED | OUTPATIENT
Start: 2024-09-01 | End: 2024-09-02 | Stop reason: HOSPADM

## 2024-09-01 RX ORDER — FUROSEMIDE 10 MG/ML
80 INJECTION INTRAMUSCULAR; INTRAVENOUS
Status: COMPLETED | OUTPATIENT
Start: 2024-09-01 | End: 2024-09-01

## 2024-09-01 RX ORDER — CALCIUM GLUCONATE 98 MG/ML
1 INJECTION, SOLUTION INTRAVENOUS
Status: COMPLETED | OUTPATIENT
Start: 2024-09-01 | End: 2024-09-01

## 2024-09-01 RX ORDER — LISINOPRIL 20 MG/1
20 TABLET ORAL DAILY
Status: ON HOLD | COMMUNITY
End: 2024-09-02 | Stop reason: HOSPADM

## 2024-09-01 RX ORDER — IBUPROFEN 200 MG
16 TABLET ORAL
Status: DISCONTINUED | OUTPATIENT
Start: 2024-09-01 | End: 2024-09-02 | Stop reason: HOSPADM

## 2024-09-01 RX ORDER — CARVEDILOL 12.5 MG/1
25 TABLET ORAL 2 TIMES DAILY
Status: DISCONTINUED | OUTPATIENT
Start: 2024-09-01 | End: 2024-09-02 | Stop reason: HOSPADM

## 2024-09-01 RX ORDER — HYDRALAZINE HYDROCHLORIDE 20 MG/ML
10 INJECTION INTRAMUSCULAR; INTRAVENOUS EVERY 6 HOURS PRN
Status: DISCONTINUED | OUTPATIENT
Start: 2024-09-01 | End: 2024-09-02 | Stop reason: HOSPADM

## 2024-09-01 RX ORDER — HYDRALAZINE HYDROCHLORIDE 20 MG/ML
10 INJECTION INTRAMUSCULAR; INTRAVENOUS
Status: COMPLETED | OUTPATIENT
Start: 2024-09-01 | End: 2024-09-01

## 2024-09-01 RX ORDER — HYDRALAZINE HYDROCHLORIDE 50 MG/1
1 TABLET, FILM COATED ORAL 3 TIMES DAILY
COMMUNITY
Start: 2024-02-01

## 2024-09-01 RX ORDER — INDOMETHACIN 25 MG/1
50 CAPSULE ORAL
Status: COMPLETED | OUTPATIENT
Start: 2024-09-01 | End: 2024-09-01

## 2024-09-01 RX ORDER — HYDRALAZINE HYDROCHLORIDE 25 MG/1
50 TABLET, FILM COATED ORAL 3 TIMES DAILY
Status: DISCONTINUED | OUTPATIENT
Start: 2024-09-01 | End: 2024-09-02 | Stop reason: HOSPADM

## 2024-09-01 RX ORDER — ALBUTEROL SULFATE 2.5 MG/.5ML
10 SOLUTION RESPIRATORY (INHALATION)
Status: COMPLETED | OUTPATIENT
Start: 2024-09-01 | End: 2024-09-01

## 2024-09-01 RX ORDER — NIFEDIPINE 90 MG/1
90 TABLET, EXTENDED RELEASE ORAL DAILY
COMMUNITY
Start: 2024-07-30

## 2024-09-01 RX ORDER — ALBUTEROL SULFATE 2.5 MG/.5ML
SOLUTION RESPIRATORY (INHALATION)
Status: DISPENSED
Start: 2024-09-01 | End: 2024-09-01

## 2024-09-01 RX ORDER — SODIUM CHLORIDE 9 MG/ML
INJECTION, SOLUTION INTRAVENOUS
Status: CANCELLED | OUTPATIENT
Start: 2024-09-01

## 2024-09-01 RX ORDER — MUPIROCIN 20 MG/G
OINTMENT TOPICAL 2 TIMES DAILY
Status: DISCONTINUED | OUTPATIENT
Start: 2024-09-01 | End: 2024-09-02 | Stop reason: HOSPADM

## 2024-09-01 RX ADMIN — HYDRALAZINE HYDROCHLORIDE 50 MG: 25 TABLET ORAL at 02:09

## 2024-09-01 RX ADMIN — NICARDIPINE HYDROCHLORIDE 2.5 MG/HR: 0.2 INJECTION, SOLUTION INTRAVENOUS at 07:09

## 2024-09-01 RX ADMIN — HYDRALAZINE HYDROCHLORIDE 50 MG: 25 TABLET ORAL at 08:09

## 2024-09-01 RX ADMIN — INSULIN HUMAN 10 UNITS: 100 INJECTION, SOLUTION PARENTERAL at 06:09

## 2024-09-01 RX ADMIN — MUPIROCIN: 20 OINTMENT TOPICAL at 08:09

## 2024-09-01 RX ADMIN — GUAIFENESIN 200 MG: 200 SOLUTION ORAL at 08:09

## 2024-09-01 RX ADMIN — CALCIUM GLUCONATE 1 G: 98 INJECTION, SOLUTION INTRAVENOUS at 06:09

## 2024-09-01 RX ADMIN — ONDANSETRON 8 MG: 8 TABLET, ORALLY DISINTEGRATING ORAL at 07:09

## 2024-09-01 RX ADMIN — FUROSEMIDE 80 MG: 10 INJECTION, SOLUTION INTRAMUSCULAR; INTRAVENOUS at 05:09

## 2024-09-01 RX ADMIN — SODIUM ZIRCONIUM CYCLOSILICATE 10 G: 10 POWDER, FOR SUSPENSION ORAL at 06:09

## 2024-09-01 RX ADMIN — ALBUTEROL SULFATE 10 MG: 2.5 SOLUTION RESPIRATORY (INHALATION) at 06:09

## 2024-09-01 RX ADMIN — HYDRALAZINE HYDROCHLORIDE 10 MG: 20 INJECTION INTRAMUSCULAR; INTRAVENOUS at 06:09

## 2024-09-01 RX ADMIN — CARVEDILOL 25 MG: 12.5 TABLET, FILM COATED ORAL at 08:09

## 2024-09-01 RX ADMIN — CARVEDILOL 25 MG: 12.5 TABLET, FILM COATED ORAL at 12:09

## 2024-09-01 RX ADMIN — SODIUM BICARBONATE 50 MEQ: 84 INJECTION, SOLUTION INTRAVENOUS at 06:09

## 2024-09-01 RX ADMIN — HYDRALAZINE HYDROCHLORIDE 10 MG: 20 INJECTION INTRAMUSCULAR; INTRAVENOUS at 05:09

## 2024-09-01 RX ADMIN — BENZONATATE 200 MG: 100 CAPSULE ORAL at 08:09

## 2024-09-01 RX ADMIN — NIFEDIPINE 90 MG: 30 TABLET, FILM COATED, EXTENDED RELEASE ORAL at 02:09

## 2024-09-01 RX ADMIN — GUAIFENESIN 200 MG: 200 SOLUTION ORAL at 04:09

## 2024-09-01 RX ADMIN — DEXTROSE MONOHYDRATE 25 G: 25 INJECTION, SOLUTION INTRAVENOUS at 06:09

## 2024-09-01 NOTE — ASSESSMENT & PLAN NOTE
Patient's FSGs are controlled on current medication regimen.  Last A1c reviewed-   Lab Results   Component Value Date    HGBA1C 4.9 08/20/2024     Most recent fingerstick glucose reviewed-   Recent Labs   Lab 09/01/24  0547   POCTGLUCOSE 96     Current correctional scale  Medium  Maintain anti-hyperglycemic dose as follows-   Antihyperglycemics (From admission, onward)      Start     Stop Route Frequency Ordered    09/01/24 0828  insulin aspart U-100 pen 1-10 Units         -- SubQ Before meals & nightly PRN 09/01/24 0728          Hold Oral hypoglycemics while patient is in the hospital.

## 2024-09-01 NOTE — ASSESSMENT & PLAN NOTE
Patient has a current diagnosis of hypertensive urgency (without evidence of end organ damage) which is uncontrolled.  Latest blood pressure and vitals reviewed-   Temp:  [98.9 °F (37.2 °C)]   Pulse:  [105-122]   Resp:  [20-23]   BP: (207-243)/(115-156)   SpO2:  [92 %-100 %] .   Patient currently on IV antihypertensives.   Home meds for hypertension were reviewed and noted below.   Hypertension Medications               carvediloL (COREG) 25 MG tablet Take 1 tablet by mouth 2 (two) times daily.    hydrALAZINE (APRESOLINE) 50 MG tablet Take 1 tablet by mouth 3 (three) times daily.    NIFEdipine (PROCARDIA-XL) 90 MG (OSM) 24 hr tablet Take 90 mg by mouth once daily.    lisinopriL (PRINIVIL,ZESTRIL) 20 MG tablet Take 20 mg by mouth once daily.            Medication adjustment for hospital antihypertensives is as follows-   Restart nifedipine, carvedilol and hydralazine while on IV cardene. Will titrate off as tolerated. Goal is  or less for now.    Will aim for controlled BP reduction by medications noted above. Monitor and mitigate end organ damage as indicated.

## 2024-09-01 NOTE — SUBJECTIVE & OBJECTIVE
Past Medical History:   Diagnosis Date    Diabetes mellitus     Hypertension     Renal disorder        Past Surgical History:   Procedure Laterality Date    FOOT AMPUTATION Right        Review of patient's allergies indicates:  No Known Allergies    No current facility-administered medications on file prior to encounter.     Current Outpatient Medications on File Prior to Encounter   Medication Sig    carvediloL (COREG) 25 MG tablet Take 1 tablet by mouth 2 (two) times daily.    hydrALAZINE (APRESOLINE) 50 MG tablet Take 1 tablet by mouth 3 (three) times daily.    NIFEdipine (PROCARDIA-XL) 90 MG (OSM) 24 hr tablet Take 90 mg by mouth once daily.    lisinopriL (PRINIVIL,ZESTRIL) 20 MG tablet Take 20 mg by mouth once daily.     Family History    None       Tobacco Use    Smoking status: Never    Smokeless tobacco: Never   Substance and Sexual Activity    Alcohol use: Never    Drug use: Never    Sexual activity: Not on file     Review of Systems  Objective:     Vital Signs (Most Recent):  Temp: 98.9 °F (37.2 °C) (09/01/24 0504)  Pulse: (!) 116 (09/01/24 0703)  Resp: 20 (09/01/24 0634)  BP: (!) 215/115 (09/01/24 0703)  SpO2: 100 % (09/01/24 0703) Vital Signs (24h Range):  Temp:  [98.9 °F (37.2 °C)] 98.9 °F (37.2 °C)  Pulse:  [105-116] 116  Resp:  [20-23] 20  SpO2:  [92 %-100 %] 100 %  BP: (207-243)/(115-156) 215/115     Weight: 102.1 kg (225 lb)  Body mass index is 28.89 kg/m².     Physical Exam  Vitals and nursing note reviewed.   Constitutional:       Appearance: He is ill-appearing.   Pulmonary:      Effort: Respiratory distress present.      Breath sounds: No wheezing or rales.   Abdominal:      General: Bowel sounds are normal. There is no distension.   Musculoskeletal:         General: No swelling.      Comments: Foot amputation noted on the right, left foot ulcer   Neurological:      General: No focal deficit present.      Mental Status: He is alert and oriented to person, place, and time.   Psychiatric:          Mood and Affect: Mood normal.         Thought Content: Thought content normal.                Significant Labs: All pertinent labs within the past 24 hours have been reviewed.    Significant Imaging: I have reviewed all pertinent imaging results/findings within the past 24 hours.   : Yes

## 2024-09-01 NOTE — CONSULTS
[facility] - Intensive Care  Nephrology  Consult Note    Patient Name: Emmanuel Morgan  MRN: [id number]  Admission Date: 9/1/2024  Hospital Length of Stay: 0 days  Attending Provider: Prince Madrigal MD   Primary Care Physician: Laurence, Primary Doctor  Principal Problem:Hypertensive urgency    Inpatient consult to Nephrology  Consult performed by: Laurie Root MD  Consult ordered by: Su Hylton MD  Reason for consult: ESRD        Subjective:     HPI: Mr. Morgan is a 40 yo male with HTN, T2DM, and ESRD who presented to the ED this morning with SOB after missing dialysis on Thursday. Workup consistent with hypervolemia and hyperkalemia. Nephrology consulted for emergent dialysis. Prior records obtained and reviewed. Patient receives nocturnal hemodialysis via [facility] on Tues-Thurs-Sun at [facility] under the c/o Dr. Orozco. Treatment duration: 5 hours. His SpO2 was 87% on arrival with SBP > 220. He was started on cardene gtt. He received shifting agents and Lokelma for his hyperkalemia. Emergent dialysis was ordered. Patient seen and examined during hemodialysis this afternoon. He reports to be feeling significantly improved.     Past Medical History:   Diagnosis Date    Diabetes mellitus     Hypertension     Renal disorder        Past Surgical History:   Procedure Laterality Date    FOOT AMPUTATION Right        Review of patient's allergies indicates:  No Known Allergies  Current Facility-Administered Medications   Medication Frequency    acetaminophen tablet 650 mg Q4H PRN    albuterol sulfate 2.5 mg/0.5 mL nebulizer solution     carvediloL tablet 25 mg BID    dextrose 10% bolus 125 mL 125 mL PRN    dextrose 10% bolus 250 mL 250 mL PRN    glucagon (human recombinant) injection 1 mg PRN    glucose chewable tablet 16 g PRN    glucose chewable tablet 24 g PRN    hydrALAZINE tablet 50 mg TID    insulin aspart U-100 pen 1-10 Units QID (AC + HS) PRN    niCARdipine 40 mg/200 mL (0.2 mg/mL) infusion  Continuous    NIFEdipine 24 hr tablet 90 mg Daily    ondansetron disintegrating tablet 8 mg Q8H PRN    sodium chloride 0.9% flush 10 mL PRN     Family History    None       Tobacco Use    Smoking status: Never    Smokeless tobacco: Never   Substance and Sexual Activity    Alcohol use: Never    Drug use: Never    Sexual activity: Not on file     Review of Systems   All other systems reviewed and are negative.    Objective:     Vital Signs (Most Recent):  Temp: 97.9 °F (36.6 °C) (09/01/24 1115)  Pulse: 94 (09/01/24 1320)  Resp: (!) 30 (09/01/24 1320)  BP: (!) 142/82 (09/01/24 1320)  SpO2: 100 % (09/01/24 1320) Vital Signs (24h Range):  Temp:  [97.9 °F (36.6 °C)-98.9 °F (37.2 °C)] 97.9 °F (36.6 °C)  Pulse:  [] 94  Resp:  [18-36] 30  SpO2:  [92 %-100 %] 100 %  BP: (141-243)/() 142/82     Weight: 99.2 kg (218 lb 11.1 oz) (09/01/24 0830)  Body mass index is 28.08 kg/m².  Body surface area is 2.28 meters squared.    No intake/output data recorded.     Physical Exam  Vitals and nursing note reviewed.   Constitutional:       General: He is awake. He is not in acute distress.     Appearance: Normal appearance. He is well-developed.   HENT:      Head: Normocephalic and atraumatic.      Nose: Nose normal.      Mouth/Throat:      Mouth: Mucous membranes are moist.   Eyes:      Extraocular Movements: Extraocular movements intact.      Conjunctiva/sclera: Conjunctivae normal.   Cardiovascular:      Rate and Rhythm: Normal rate and regular rhythm.   Pulmonary:      Effort: Pulmonary effort is normal.   Abdominal:      General: There is no distension.      Palpations: Abdomen is soft.   Musculoskeletal:         General: No tenderness or signs of injury.      Right lower leg: No edema.      Left lower leg: No edema.   Skin:     General: Skin is warm and dry.      Findings: No erythema or rash.   Neurological:      General: No focal deficit present.      Mental Status: He is alert. Mental status is at baseline.    Psychiatric:         Mood and Affect: Mood normal.         Behavior: Behavior normal.          Significant Labs:  CBC:   Recent Labs   Lab 09/01/24  0536   WBC 10.22   RBC 3.05*   HGB 8.8*   HCT 27.4*      MCV 90   MCH 28.9   MCHC 32.1     CMP:   Recent Labs   Lab 09/01/24  0536   GLU 87   CALCIUM 9.5   ALBUMIN 3.8   PROT 8.3      K 6.5*   CO2 21*   CL 99   BUN 94*   CREATININE 18.2*   ALKPHOS 64   ALT 28   AST 26   BILITOT 0.6     All labs within the past 24 hours have been reviewed.    Significant Imaging:  Labs: Reviewed  ECG: Reviewed    Assessment/Plan:     ESRD  - receives nocturnal HD [facility] at [facility]  - presented this AM in respiratory distress and with hyperkalemia  - emergent dialysis ordered. HD x 4 hours; UF goal 4L  - will reassess HD needs tomorrow; though next HD tentatively planned for Tuesday    Hyperkalemia  - K 6.5 on arrival  - due to missed HD + dual RAAS blockade  - recommend stopping lisinopril  - okay to restart olmesartan once K < 5    Anemia of CKD  - hgb below goal  - holding IKKI for now in setting of hypertensive emergency    Secondary HPTH  - CCa and phos normal  - renal diet    Critical care time spent on the evaluation and treatment of severe organ dysfunction, review of pertinent labs and imaging studies, discussions with Dr. Hylton, Dr. Madrigal, and nursing staff, and discussions with patient/family: 40 minutes.      Thank you for your consult. I will follow-up with patient. Please contact us if you have any additional questions.    Laurie Root MD  Nephrology  [facility] - [place], [place] - Intensive Care

## 2024-09-01 NOTE — PROGRESS NOTES
Pt arrived to ICU from ER AAOx4. No c/o pain @ this time. Cardene drip infusing @ this time. Sacral skin intact. L plantar foot with wound. R foot old partial amputation, prosthesis @ bs. Call light in hand, bed in low position. SR up x2. No distress noted. Will continue to monitor.

## 2024-09-01 NOTE — ED PROVIDER NOTES
"Encounter Date: 9/1/2024       History     Chief Complaint   Patient presents with    Shortness of Breath     Pt goes to dialysis Tues, Thurs, & Sun but missed Thurs treatment due to bad weather; pt c/o SOB due to "fluid build up" starting last night; pt reports his dialysis isn't until 5pm in [place] and he doesn't think he can wait that long; pt appears short winded with exertion and initial Sp02 87% when first sitting down, now 92%     39-year-old male with ESRD on HD presents via sister to [facility] ER with acute severe shortness of breath acute onset last night several hours prior to presentation.  Patient goes to dialysis Tuesday Thursday Sunday in [place] where he works.  Patient missed Thursday dialysis due to bad weather.  His dialysis is scheduled for today Sunday 9/1/24 at 5:00 p.m. (about 12 hours from ER presentation) but patient does not think he can wait that long.  No chest pain.    Patient's dialysis access is a left chest wall tunneled IJ.      Patient still makes some urine.      Patient was previously on insulin, but has not been taking it regularly since he started dialysis in March 2024, about 6 months ago.    Patient also has a wound to his left foot which has been present for some time.  He is status post right midfoot amputation.    Patient's only meds are Nifedipine and Lisinopril, but he has not taken them in 2 days.        Review of patient's allergies indicates:  No Known Allergies  Past Medical History:   Diagnosis Date    Diabetes mellitus     Hypertension     Renal disorder      Past Surgical History:   Procedure Laterality Date    FOOT AMPUTATION Right      No family history on file.  Social History     Tobacco Use    Smoking status: Never    Smokeless tobacco: Never   Substance Use Topics    Alcohol use: Never    Drug use: Never     Review of Systems   Constitutional:  Negative for diaphoresis.   HENT:  Negative for sore throat.    Eyes:  Negative for photophobia. "   Respiratory:  Positive for shortness of breath.    Cardiovascular:  Negative for chest pain and leg swelling.   Gastrointestinal:  Negative for abdominal pain.   Genitourinary:  Positive for difficulty urinating (still makes some urine).   Musculoskeletal:  Negative for neck stiffness.   Skin:  Positive for wound (left foot).   Neurological:  Negative for syncope.       Physical Exam     Initial Vitals [09/01/24 0504]   BP Pulse Resp Temp SpO2   (!) 231/135 108 (!) 22 98.9 °F (37.2 °C) (!) 92 %      MAP       --         Physical Exam    Nursing note and vitals reviewed.  Constitutional: He appears well-developed and well-nourished. He is not diaphoretic. He appears distressed.   Awake, alert, ill-appearing, looks older than stated age, tachypneic, tripoding, able to speak in short sentences.   HENT:   Head: Normocephalic and atraumatic.   Eyes: Conjunctivae and EOM are normal. Pupils are equal, round, and reactive to light.   Neck: Neck supple.   Normal range of motion.  Cardiovascular:  Normal rate, regular rhythm and intact distal pulses.           Pulmonary/Chest: He is in respiratory distress. He has no wheezes. He has no rhonchi. He has rales.   Left chest wall tunneled IJ catheter.  Dressing is displaced.  No active purulence at site.   Abdominal: Abdomen is soft. There is no abdominal tenderness.   Musculoskeletal:         General: No tenderness. Normal range of motion.      Cervical back: Normal range of motion and neck supple.      Comments: S/p R midfoot amputation.  S/p partial amputation of L 4th and 5th toes.     Neurological: He is alert and oriented to person, place, and time.   Moving all extremities   Skin: Skin is warm and dry. There is pallor.   3-4 cm circular wound to plantar surface of left foot overlying 1st MTP joint.   Psychiatric: He has a normal mood and affect.         ED Course   Critical Care    Date/Time: 9/1/2024 7:00 AM    Performed by: Su Hylton MD  Authorized by:  Su Hylton MD  Direct patient critical care time: 15 minutes  Additional history critical care time: 8 minutes  Ordering / reviewing critical care time: 7 minutes  Documentation critical care time: 8 minutes  Consulting other physicians critical care time: 8 minutes  Total critical care time (exclusive of procedural time) : 46 minutes        Labs Reviewed   CBC W/ AUTO DIFFERENTIAL - Abnormal       Result Value    WBC 10.22      RBC 3.05 (*)     Hemoglobin 8.8 (*)     Hematocrit 27.4 (*)     MCV 90      MCH 28.9      MCHC 32.1      RDW 17.0 (*)     Platelets 235      MPV 9.9      Immature Granulocytes 0.3      Gran # (ANC) 6.4      Immature Grans (Abs) 0.03      Lymph # 2.4      Mono # 1.1 (*)     Eos # 0.3      Baso # 0.04      nRBC 0      Gran % 62.9      Lymph % 23.2      Mono % 10.3      Eosinophil % 2.9      Basophil % 0.4      Differential Method Automated     COMPREHENSIVE METABOLIC PANEL - Abnormal    Sodium 138      Potassium 6.5 (*)     Chloride 99      CO2 21 (*)     Glucose 87      BUN 94 (*)     Creatinine 18.2 (*)     Calcium 9.5      Total Protein 8.3      Albumin 3.8      Total Bilirubin 0.6      Alkaline Phosphatase 64      AST 26      ALT 28      eGFR 3 (*)     Anion Gap 18 (*)     Narrative:     Potassium critical result(s) called and verbal readback obtained from   Bel Arias @6:22Am; 9/1/24 by HOSSEIN 09/01/2024 06:22   TROPONIN I - Abnormal    Troponin I 0.119 (*)    B-TYPE NATRIURETIC PEPTIDE - Abnormal    BNP 1,884 (*)    PHOSPHORUS - Abnormal    Phosphorus 4.7 (*)    SEDIMENTATION RATE - Abnormal    Sed Rate 30 (*)    MAGNESIUM    Magnesium 2.0     PROTIME-INR    Prothrombin Time 11.3      INR 1.0     APTT    aPTT 27.3     BETA - HYDROXYBUTYRATE, SERUM    Beta-Hydroxybutyrate 0.1     C-REACTIVE PROTEIN    CRP 5.0     TYPE & SCREEN    Group & Rh B POS      Indirect Alexadnra NEG      Specimen Outdate 09/04/2024 23:59     POCT GLUCOSE    POCT Glucose 96     POCT GLUCOSE MONITORING  "CONTINUOUS   POCT GLUCOSE MONITORING CONTINUOUS     EKG Readings: (Independently Interpreted)   05:43: Sinus tach, . Normal axis. TWI in I, aVL. Mild ST depression in I, aVL, II. Mildly peaked T waves. No ectopy. No STEMI.        Imaging Results              X-Ray Chest AP Portable (Final result)  Result time 09/01/24 07:09:58      Final result by Delfin Reed MD (09/01/24 07:09:58)                   Impression:      Borderline cardiomegaly with loculated right pleural effusion and probable small left pleural effusion.  Superimposed bibasilar ground-glass opacities suggestive of passive atelectasis versus interstitial edema or infectious/inflammatory process.  Further evaluation/follow-up as warranted clinically.      Electronically signed by: Delfin Reed MD  Date:    09/01/2024  Time:    07:09               Narrative:    EXAMINATION:  XR CHEST AP PORTABLE    CLINICAL HISTORY:  Provided history is "SOB;  ".    TECHNIQUE:  One view of the chest.    COMPARISON:  None.    FINDINGS:  Cardiac wires overlie the chest.  Left-sided central venous catheter overlies the cavoatrial junction.  Cardiac silhouette is magnified by portable technique and likely at the upper limit of normal in size.  Bilateral interstitial and ground-glass opacities in the mid and lower lung zones, potentially passive atelectasis.  Probable loculated right-sided pleural effusion and small left pleural effusion.  Ground-glass densities in the lung bases could be related to passive atelectasis versus influx/inflammatory process or interstitial edema.  Upper lungs are relatively clear.  No distinct pneumothorax.  Calcified hilar lymph nodes suggestive of prior granulomatous exposure.                                       X-Ray Foot Complete Left (Final result)  Result time 09/01/24 07:28:34      Final result by Delfin Reed MD (09/01/24 07:28:34)                   Impression:      Abnormal appearance of the left foot with soft " tissue edema, possible subcutaneous emphysema/open wound, and abnormal appearance of the bones in the forefoot.  Recent or remote osteomyelitis would be difficult to exclude without prior imaging for comparison.  Consider MRI follow-up if there is concern for osteomyelitis.    Additional findings discussed in the body of the report.      Electronically signed by: Delfin Reed MD  Date:    09/01/2024  Time:    07:28               Narrative:    EXAMINATION:  XR FOOT COMPLETE 3 VIEW LEFT    CLINICAL HISTORY:  Unspecified open wound, unspecified foot, initial encounter    TECHNIQUE:  Three views of the left foot.    COMPARISON:  None    FINDINGS:  Absence of prior imaging limits evaluation.  Multifocal irregularity involving the left toes including partial absence of the 4th and 5th toes with presumed inferior subluxation of the 5th phalanges with respect to the metatarsal bone.  Probable hammertoe deformities involving the 2nd and 3rd toes.  Destructive and erosive changes involving the 1st metatarsophalangeal and interphalangeal joint, age indeterminate.  Partial fusion of the joints in the 1st toe.  There is deformity of the 2nd metatarsophalangeal joint with abnormal alignment.  Abnormal cortical thickening and irregular appearance of the 2nd metatarsal possibly related to prior infection or injury.  Borderline widening of the space between the 1st and 2nd toes, which could be chronic.  Extensive soft tissue edema along the dorsal and plantar aspect of the forefoot with possible underlying subcutaneous emphysema in this patient with history of open wound.  Underlying acute or chronic osteomyelitis in the metatarsals and phalanges would be difficult to exclude.  Consider short-term MRI follow-up.  Plantar calcaneal spur.  No unexpected radiopaque foreign body.                                       Medications   albuterol sulfate 2.5 mg/0.5 mL nebulizer solution (has no administration in time range)   niCARdipine  40 mg/200 mL (0.2 mg/mL) infusion (2.5 mg/hr Intravenous Verify Only 9/1/24 0819)   sodium chloride 0.9% flush 10 mL (has no administration in time range)   acetaminophen tablet 650 mg (has no administration in time range)   ondansetron disintegrating tablet 8 mg (has no administration in time range)   carvediloL tablet 25 mg (has no administration in time range)   hydrALAZINE tablet 50 mg (has no administration in time range)   NIFEdipine 24 hr tablet 90 mg (has no administration in time range)   insulin aspart U-100 pen 1-10 Units (has no administration in time range)   glucose chewable tablet 16 g (has no administration in time range)   glucose chewable tablet 24 g (has no administration in time range)   glucagon (human recombinant) injection 1 mg (has no administration in time range)   dextrose 10% bolus 125 mL 125 mL (has no administration in time range)   dextrose 10% bolus 250 mL 250 mL (has no administration in time range)   hydrALAZINE injection 10 mg (10 mg Intravenous Given 9/1/24 0537)   furosemide injection 80 mg (80 mg Intravenous Given 9/1/24 0537)   hydrALAZINE injection 10 mg (10 mg Intravenous Given 9/1/24 0645)   albuterol sulfate nebulizer solution 10 mg (10 mg Nebulization Given 9/1/24 0634)   calcium gluconate 100 mg/mL (10%) injection 1 g (1 g Intravenous Given 9/1/24 0638)   insulin regular injection 10 Units 0.1 mL (10 Units Intravenous Given 9/1/24 0638)   dextrose 50 % in water (D50W) injection 25 g (25 g Intravenous Given 9/1/24 0638)   sodium bicarbonate solution 50 mEq (50 mEq Intravenous Given 9/1/24 0638)   sodium zirconium cyclosilicate packet 10 g (10 g Oral Given 9/1/24 0647)     Medical Decision Making  39-year-old male with ESRD on HD now with acute severe shortness of breath status post 1 missed HD.    Differential includes fluid overload, hypertensive emergency, symptomatic anemia, ACS, other.    EKG shows sinus tach, some ST depression, and mildly peaked T waves.  No STEMI.   "    CXR: "Borderline cardiomegaly with loculated right pleural effusion and probable small left pleural effusion.  Superimposed bibasilar ground-glass opacities suggestive of passive atelectasis versus interstitial edema or infectious/inflammatory process."    L foot XR abnormal as well -- see report.  Osteomyelitis difficult to exclude.  Significant degenerative changes at 1st MTP joint which is location of plantar wound.    Labs: Hgb 8.8; was 7.6 on 8/27 at outside facility.  K 6.5, no hemolysis.  BUN/creatinine c/w ESRD.  Troponin 0.119.  BNP 1884.     I suspect elevated troponin and ST depressions on EKG are related to ESRD + HTN emergency rather than ACS.      In ER patient received IV hydralazine 10mg x 2 and IV furosemide 80mg x 1, though BP remained significantly elevated.  He received nebulized albuterol 10mg, IV calcium gluconate 1g, IV insulin 10u, IV D50 25g, IV sodium bicarb 50mEq, and PO sodium zirconium 10g for hyperkalemia.    Patient requires ICU admission for close monitoring of respiratory status.  He declined BIPAP in ER, stating that the mask made him anxious; his sats were reassuring on NC at 4L and later on room air, though he remained tachypneic and visibly short of breath.  He will require emergent dialysis.  He will also require evaluation by podiatry for left foot wound.    I discussed patient via [employer] Secure Chat with hospitalist Dr. Prince Madrigal who accepted patient onto her service.  I also discussed patient with nephrologist on call Dr. Jenny Root who will arrange for emergent HD.    I updated patient as to diagnosis and plan.      Amount and/or Complexity of Data Reviewed  Labs: ordered.  Radiology: ordered.    Risk  OTC drugs.  Prescription drug management.  Decision regarding hospitalization.                                      Clinical Impression:  Final diagnoses:  [R06.02] Shortness of breath (Primary)  [S91.309A] Wound of foot  [E87.5] Hyperkalemia  [N18.6, Z99.2] ESRD on " hemodialysis  [E87.70] Hypervolemia, unspecified hypervolemia type  [J81.0] Acute pulmonary edema  [J90] Bilateral pleural effusion  [R09.02] Hypoxia  [I16.1] Hypertensive emergency  [R79.89] Elevated troponin          ED Disposition Condition    Admit Stable                Su Hylton MD  09/01/24 0840       Su Hylton MD  09/01/24 0841

## 2024-09-01 NOTE — ED TRIAGE NOTES
Emmanuel Morgan is a 39 y.o male with PMHx of DM, HTN, ESRD, dialysis pt (Tu,Th,and Su).  Last dialyzed Tuesday 8/27/24.  Missed dialysis on Thursday 8/29/24 due to rainy weather.  C/o SOB x1 day.  He is scheduled for dialysis at 1700 today in [place].  Increased work of breathing,  tachypnea and sitting in tripod position. RA O2 sat 92%.  Denies any current pain.

## 2024-09-01 NOTE — ED NOTES
Pt requesting to use restroom.  Urinal and bedside commode provided.  Pt refusing to use commode.  Removed oxygen and monitoring equipment and walked to restroom.

## 2024-09-01 NOTE — H&P
"  [facility] Medicine  History & Physical    Patient Name: Emmanuel Morgan  MRN: [id number]  Patient Class: IP- Inpatient  Admission Date: 9/1/2024  Attending Physician: Prince Madrigal MD   Primary Care Provider: Laurence Primary Doctor         Patient information was obtained from patient, EMS personnel, past medical records, and ER records.     Subjective:     Principal Problem:Hypertensive urgency    Chief Complaint:   Chief Complaint   Patient presents with    Shortness of Breath     Pt goes to dialysis Tues, Thurs, & Sun but missed Thurs treatment due to bad weather; pt c/o SOB due to "fluid build up" starting last night; pt reports his dialysis isn't until 5pm in [place] and he doesn't think he can wait that long; pt appears short winded with exertion and initial Sp02 87% when first sitting down, now 92%        HPI: Mr. Morgan is a 39-year-old male with past medical history of ESRD on HD, malignant hypertension, right foot amputation who presents to the emergency department for shortness of breath.  He reports it got worse last night and he was unable to asleep.  He was unable to lie flat due to shortness of breath.  He was on dialysis Tuesday, Thursday and Saturday.  His last session was this past Tuesday as he missed this last Thursday session.  He takes multiple blood pressure medications but did not take any this morning as he was in the ER.  He denies chest pain.  In the emergency department, he had increased work of breathing with a blood pressure of 231/135 saturating 92% on room air.  Afebrile with a pulse of 108.  Labs remarkable for a potassium of 6.5 and creatinine of 18.2.  BNP 1000 884 and troponin 0.119.  He was given multiple doses of IV blood pressure control and IV Lasix.  Nephrology consulted for urgent dialysis.  We will start p.o. home medications as well as Cardene drip to help with blood pressure control at this time.  He will be admitted to ICU for further " management.    Past Medical History:   Diagnosis Date    Diabetes mellitus     Hypertension     Renal disorder        Past Surgical History:   Procedure Laterality Date    FOOT AMPUTATION Right        Review of patient's allergies indicates:  No Known Allergies    No current facility-administered medications on file prior to encounter.     Current Outpatient Medications on File Prior to Encounter   Medication Sig    carvediloL (COREG) 25 MG tablet Take 1 tablet by mouth 2 (two) times daily.    hydrALAZINE (APRESOLINE) 50 MG tablet Take 1 tablet by mouth 3 (three) times daily.    NIFEdipine (PROCARDIA-XL) 90 MG (OSM) 24 hr tablet Take 90 mg by mouth once daily.    lisinopriL (PRINIVIL,ZESTRIL) 20 MG tablet Take 20 mg by mouth once daily.     Family History    None       Tobacco Use    Smoking status: Never    Smokeless tobacco: Never   Substance and Sexual Activity    Alcohol use: Never    Drug use: Never    Sexual activity: Not on file     Review of Systems  Objective:     Vital Signs (Most Recent):  Temp: 98.9 °F (37.2 °C) (09/01/24 0504)  Pulse: (!) 116 (09/01/24 0703)  Resp: 20 (09/01/24 0634)  BP: (!) 215/115 (09/01/24 0703)  SpO2: 100 % (09/01/24 0703) Vital Signs (24h Range):  Temp:  [98.9 °F (37.2 °C)] 98.9 °F (37.2 °C)  Pulse:  [105-116] 116  Resp:  [20-23] 20  SpO2:  [92 %-100 %] 100 %  BP: (207-243)/(115-156) 215/115     Weight: 102.1 kg (225 lb)  Body mass index is 28.89 kg/m².     Physical Exam  Vitals and nursing note reviewed.   Constitutional:       Appearance: He is ill-appearing.   Pulmonary:      Effort: Respiratory distress present.      Breath sounds: No wheezing or rales.   Abdominal:      General: Bowel sounds are normal. There is no distension.   Musculoskeletal:         General: No swelling.      Comments: Foot amputation noted on the right, left foot ulcer   Neurological:      General: No focal deficit present.      Mental Status: He is alert and oriented to person, place, and time.    Psychiatric:         Mood and Affect: Mood normal.         Thought Content: Thought content normal.                Significant Labs: All pertinent labs within the past 24 hours have been reviewed.    Significant Imaging: I have reviewed all pertinent imaging results/findings within the past 24 hours.  Assessment/Plan:     * Hypertensive urgency  Patient has a current diagnosis of hypertensive urgency (without evidence of end organ damage) which is uncontrolled.  Latest blood pressure and vitals reviewed-   Temp:  [98.9 °F (37.2 °C)]   Pulse:  [105-122]   Resp:  [20-23]   BP: (207-243)/(115-156)   SpO2:  [92 %-100 %] .   Patient currently on IV antihypertensives.   Home meds for hypertension were reviewed and noted below.   Hypertension Medications               carvediloL (COREG) 25 MG tablet Take 1 tablet by mouth 2 (two) times daily.    hydrALAZINE (APRESOLINE) 50 MG tablet Take 1 tablet by mouth 3 (three) times daily.    NIFEdipine (PROCARDIA-XL) 90 MG (OSM) 24 hr tablet Take 90 mg by mouth once daily.    lisinopriL (PRINIVIL,ZESTRIL) 20 MG tablet Take 20 mg by mouth once daily.            Medication adjustment for hospital antihypertensives is as follows-   Restart nifedipine, carvedilol and hydralazine while on IV cardene. Will titrate off as tolerated. Goal is  or less for now.    Will aim for controlled BP reduction by medications noted above. Monitor and mitigate end organ damage as indicated.    Acute respiratory distress  Presents with shortness of breath, tachycardia and tachypnea due to volume overload/uncontrolled hypertension. He is not severely hypoxic, but he is tachpneic. Reports feeling better. Refuses bipap for support. Treat underlying causes.      Type 2 diabetes mellitus with foot ulcer  Patient's FSGs are controlled on current medication regimen.  Last A1c reviewed-   Lab Results   Component Value Date    HGBA1C 4.9 08/20/2024     Most recent fingerstick glucose reviewed-   Recent  Labs   Lab 09/01/24  0547   POCTGLUCOSE 96     Current correctional scale  Medium  Maintain anti-hyperglycemic dose as follows-   Antihyperglycemics (From admission, onward)      Start     Stop Route Frequency Ordered    09/01/24 0828  insulin aspart U-100 pen 1-10 Units         -- SubQ Before meals & nightly PRN 09/01/24 0728          Hold Oral hypoglycemics while patient is in the hospital.    Hyperkalemia  Hyperkalemia is likely due to ESRD.The patients most recent potassium results are listed below.  Recent Labs     09/01/24  0536   K 6.5*     Plan  - Monitor for arrhythmias with EKG and/or continuous telemetry.   - Treat the hyperkalemia with Potassium Binders, Calcium gluconate, Nebulized albuterol sulfate, Furosemide, and HD orders placed .   - Monitor potassium: Daily  - The patient's hyperkalemia is stable            Essential hypertension  See hypertensive urgency      End-stage renal disease  ESRD on HD. Missed last HD session and presents with severe hypertension and shortness of breath. Refused bipap. Nephrology consulted. - Urgent HD orders placed.    Anemia in chronic kidney disease  Anemia is likely due to chronic disease due to ESRD. Most recent hemoglobin and hematocrit are listed below.  Recent Labs     09/01/24  0536   HGB 8.8*   HCT 27.4*     Plan  - Monitor serial CBC: Daily  - Transfuse PRBC if patient becomes hemodynamically unstable, symptomatic or H/H drops below 7/21.  - Patient has not received any PRBC transfusions to date  - Patient's anemia is currently stable        VTE Risk Mitigation (From admission, onward)           Ordered     Place sequential compression device  Until discontinued         09/01/24 0705     IP VTE LOW RISK PATIENT  Once         09/01/24 0705                                    Prince Madrigal MD  Department of Hospital Medicine  [facility] - Emergency Dept

## 2024-09-01 NOTE — SUBJECTIVE & OBJECTIVE
Past Medical History:   Diagnosis Date    Diabetes mellitus     Hypertension     Renal disorder        Past Surgical History:   Procedure Laterality Date    FOOT AMPUTATION Right        Review of patient's allergies indicates:  No Known Allergies  Current Facility-Administered Medications   Medication Frequency    acetaminophen tablet 650 mg Q4H PRN    albuterol sulfate 2.5 mg/0.5 mL nebulizer solution     carvediloL tablet 25 mg BID    dextrose 10% bolus 125 mL 125 mL PRN    dextrose 10% bolus 250 mL 250 mL PRN    glucagon (human recombinant) injection 1 mg PRN    glucose chewable tablet 16 g PRN    glucose chewable tablet 24 g PRN    hydrALAZINE tablet 50 mg TID    insulin aspart U-100 pen 1-10 Units QID (AC + HS) PRN    niCARdipine 40 mg/200 mL (0.2 mg/mL) infusion Continuous    NIFEdipine 24 hr tablet 90 mg Daily    ondansetron disintegrating tablet 8 mg Q8H PRN    sodium chloride 0.9% flush 10 mL PRN     Family History    None       Tobacco Use    Smoking status: Never    Smokeless tobacco: Never   Substance and Sexual Activity    Alcohol use: Never    Drug use: Never    Sexual activity: Not on file     Review of Systems   All other systems reviewed and are negative.    Objective:     Vital Signs (Most Recent):  Temp: 97.9 °F (36.6 °C) (09/01/24 1115)  Pulse: 94 (09/01/24 1320)  Resp: (!) 30 (09/01/24 1320)  BP: (!) 142/82 (09/01/24 1320)  SpO2: 100 % (09/01/24 1320) Vital Signs (24h Range):  Temp:  [97.9 °F (36.6 °C)-98.9 °F (37.2 °C)] 97.9 °F (36.6 °C)  Pulse:  [] 94  Resp:  [18-36] 30  SpO2:  [92 %-100 %] 100 %  BP: (141-243)/() 142/82     Weight: 99.2 kg (218 lb 11.1 oz) (09/01/24 0830)  Body mass index is 28.08 kg/m².  Body surface area is 2.28 meters squared.    No intake/output data recorded.     Physical Exam  Vitals and nursing note reviewed.   Constitutional:       General: He is awake. He is not in acute distress.     Appearance: Normal appearance. He is well-developed.   HENT:      Head:  Normocephalic and atraumatic.      Nose: Nose normal.      Mouth/Throat:      Mouth: Mucous membranes are moist.   Eyes:      Extraocular Movements: Extraocular movements intact.      Conjunctiva/sclera: Conjunctivae normal.   Cardiovascular:      Rate and Rhythm: Normal rate and regular rhythm.   Pulmonary:      Effort: Pulmonary effort is normal.   Abdominal:      General: There is no distension.      Palpations: Abdomen is soft.   Musculoskeletal:         General: No tenderness or signs of injury.      Right lower leg: No edema.      Left lower leg: No edema.   Skin:     General: Skin is warm and dry.      Findings: No erythema or rash.   Neurological:      General: No focal deficit present.      Mental Status: He is alert. Mental status is at baseline.   Psychiatric:         Mood and Affect: Mood normal.         Behavior: Behavior normal.          Significant Labs:  CBC:   Recent Labs   Lab 09/01/24  0536   WBC 10.22   RBC 3.05*   HGB 8.8*   HCT 27.4*      MCV 90   MCH 28.9   MCHC 32.1     CMP:   Recent Labs   Lab 09/01/24  0536   GLU 87   CALCIUM 9.5   ALBUMIN 3.8   PROT 8.3      K 6.5*   CO2 21*   CL 99   BUN 94*   CREATININE 18.2*   ALKPHOS 64   ALT 28   AST 26   BILITOT 0.6     All labs within the past 24 hours have been reviewed.    Significant Imaging:  Labs: Reviewed  ECG: Reviewed

## 2024-09-01 NOTE — HPI
Mr. Morgan is a 39-year-old male with past medical history of ESRD on HD, malignant hypertension, right foot amputation who presents to the emergency department for shortness of breath.  He reports it got worse last night and he was unable to asleep.  He was unable to lie flat due to shortness of breath.  He was on dialysis Tuesday, Thursday and Saturday.  His last session was this past Tuesday as he missed this last Thursday session.  He takes multiple blood pressure medications but did not take any this morning as he was in the ER.  He denies chest pain.  In the emergency department, he had increased work of breathing with a blood pressure of 231/135 saturating 92% on room air.  Afebrile with a pulse of 108.  Labs remarkable for a potassium of 6.5 and creatinine of 18.2.  BNP 1000 884 and troponin 0.119.  He was given multiple doses of IV blood pressure control and IV Lasix.  Nephrology consulted for urgent dialysis.  We will start p.o. home medications as well as Cardene drip to help with blood pressure control at this time.  He will be admitted to ICU for further management.

## 2024-09-01 NOTE — PLAN OF CARE
Case Management Assessment     [facility]   Pharmacy:   [id number]  [place] - [address] [place] [address] [address] AT [facility]  [address] [facility] [place] [address]  Phone: [phone] Fax: [phone]       Patient Arrived From: Home  Existing Help at Home: Pt is independent.     Barriers to Discharge: None    Discharge Plan:    A. Home    B. Home       CM met with pt at bedside to discuss discharge planning. Pt resides alone and does not use any DME. Pt attends [employer].     Pt will drive home upon discharge.      09/01/24 1234   Discharge Assessment   Assessment Type Discharge Planning Assessment   Confirmed/corrected address, phone number and insurance Yes   Confirmed Demographics Correct on Facesheet   Source of Information patient   When was your last doctors appointment? 03/28/24   Communicated LULÚ with patient/caregiver Date not available/Unable to determine   Reason For Admission Hypertensive Urgency   People in Home alone   Facility Arrived From: Home   Do you expect to return to your current living situation? Yes   Do you have help at home or someone to help you manage your care at home? No   Prior to hospitilization cognitive status: Alert/Oriented   Current cognitive status: Alert/Oriented   Walking or Climbing Stairs Difficulty no   Dressing/Bathing Difficulty no   Equipment Currently Used at Home none   Readmission within 30 days? No   Patient currently being followed by outpatient case management? No   Do you currently have service(s) that help you manage your care at home? No   Do you take prescription medications? Yes   Do you have prescription coverage? Yes   Coverage Medicare Part A & B   Do you have any problems affording any of your prescribed medications? No   Is the patient taking medications as prescribed? yes   Who is going to help you get home at discharge? Pt stated that he will drive home.   How do you get to  doctors appointments? car, drives self   Are you on dialysis? Yes   Dialysis Name and Scheduled days [facility]   Do you take coumadin? No   Discharge Plan A Home   Discharge Plan B Home   DME Needed Upon Discharge  none   Discharge Plan discussed with: Patient   Transition of Care Barriers None   SDOH   (None)   Physical Activity   On average, how many days per week do you engage in moderate to strenuous exercise (like a brisk walk)? 3 days   On average, how many minutes do you engage in exercise at this level? 60 min   Financial Resource Strain   How hard is it for you to pay for the very basics like food, housing, medical care, and heating? Not hard   Housing Stability   In the last 12 months, was there a time when you were not able to pay the mortgage or rent on time? N   At any time in the past 12 months, were you homeless or living in a shelter (including now)? N   Transportation Needs   Has the lack of transportation kept you from medical appointments, meetings, work or from getting things needed for daily living? No   Food Insecurity   Within the past 12 months, you worried that your food would run out before you got the money to buy more. Never true   Within the past 12 months, the food you bought just didn't last and you didn't have money to get more. Never true   Stress   Do you feel stress - tense, restless, nervous, or anxious, or unable to sleep at night because your mind is troubled all the time - these days? Not at all   Social Isolation   How often do you feel lonely or isolated from those around you?  Never   Alcohol Use   Q1: How often do you have a drink containing alcohol? Never   Q2: How many drinks containing alcohol do you have on a typical day when you are drinking? None   Q3: How often do you have six or more drinks on one occasion? Never   Utilities   In the past 12 months has the electric, gas, oil, or water company threatened to shut off services in your home? No    Health Literacy   How often do you need to have someone help you when you read instructions, pamphlets, or other written material from your doctor or pharmacy? Never

## 2024-09-01 NOTE — Clinical Note
Diagnosis: Hyperkalemia [833513]   Future Attending Provider: KEV SMITH [[id number]]   Reason for IP Medical Treatment  (Clinical interventions that can only be accomplished in the IP setting? ) :: hyperkalemia, fluid overload, HTN emergency, L foot wound   Plans for Post-Acute care--if anticipated (pick the single best option):: A. No post acute care anticipated at this time   Special Needs:: Dialysis - Hemodialysis [12]

## 2024-09-01 NOTE — ASSESSMENT & PLAN NOTE
Hyperkalemia is likely due to ESRD.The patients most recent potassium results are listed below.  Recent Labs     09/01/24  0536   K 6.5*     Plan  - Monitor for arrhythmias with EKG and/or continuous telemetry.   - Treat the hyperkalemia with Potassium Binders, Calcium gluconate, Nebulized albuterol sulfate, Furosemide, and HD orders placed .   - Monitor potassium: Daily  - The patient's hyperkalemia is stable

## 2024-09-01 NOTE — ASSESSMENT & PLAN NOTE
Anemia is likely due to chronic disease due to ESRD. Most recent hemoglobin and hematocrit are listed below.  Recent Labs     09/01/24  0536   HGB 8.8*   HCT 27.4*     Plan  - Monitor serial CBC: Daily  - Transfuse PRBC if patient becomes hemodynamically unstable, symptomatic or H/H drops below 7/21.  - Patient has not received any PRBC transfusions to date  - Patient's anemia is currently stable

## 2024-09-01 NOTE — PLAN OF CARE
Problem: Adult Inpatient Plan of Care  Goal: Plan of Care Review  Outcome: Progressing  Goal: Patient-Specific Goal (Individualized)  Outcome: Progressing  Goal: Absence of Hospital-Acquired Illness or Injury  Outcome: Progressing  Goal: Optimal Comfort and Wellbeing  Outcome: Progressing  Goal: Readiness for Transition of Care  Outcome: Progressing     Problem: Hemodialysis  Goal: Safe, Effective Therapy Delivery  Outcome: Progressing  Goal: Effective Tissue Perfusion  Outcome: Progressing  Goal: Absence of Infection Signs and Symptoms  Outcome: Progressing     Problem: Diabetes Comorbidity  Goal: Blood Glucose Level Within Targeted Range  Outcome: Progressing

## 2024-09-01 NOTE — ASSESSMENT & PLAN NOTE
ESRD on HD. Missed last HD session and presents with severe hypertension and shortness of breath. Refused bipap. Nephrology consulted. - Urgent HD orders placed.

## 2024-09-01 NOTE — PROGRESS NOTES
[facility] [place]  Nurses Note -- 4 Eyes      9/1/2024       Skin assessed on: Admit      [x] No Pressure Injuries Present    [x]Prevention Measures Documented    [] Yes LDA  for Pressure Injury Previously documented     [] Yes New Pressure Injury Discovered   [] LDA for New Pressure Injury Added      Attending RN:  Melba Robb RN     Second RN:  britton Bethea        Sacral skin intact. Plantar L foot with wound present.

## 2024-09-01 NOTE — HPI
Mr. Morgan is a 40 yo male with HTN, T2DM, and ESRD who presented to the ED this morning with SOB after missing dialysis on Thursday. Workup consistent with hypervolemia and hyperkalemia. Nephrology consulted for emergent dialysis. Prior records obtained and reviewed. Patient receives nocturnal hemodialysis via [facility] on Tues-Thurs-Sun at [facility] under the c/o Dr. Orozco. Treatment duration: 5 hours. His SpO2 was 87% on arrival with SBP > 220. He was started on cardene gtt. He received shifting agents and Lokelma for his hyperkalemia. Emergent dialysis was ordered. Patient seen and examined during hemodialysis this afternoon. He reports to be feeling significantly improved.

## 2024-09-01 NOTE — ASSESSMENT & PLAN NOTE
Presents with shortness of breath, tachycardia and tachypnea due to volume overload/uncontrolled hypertension. He is not severely hypoxic, but he is tachpneic. Reports feeling better. Refuses bipap for support. Treat underlying causes.

## 2024-09-02 VITALS
HEART RATE: 87 BPM | OXYGEN SATURATION: 98 % | RESPIRATION RATE: 20 BRPM | HEIGHT: 74 IN | BODY MASS INDEX: 27.22 KG/M2 | TEMPERATURE: 98 F | DIASTOLIC BLOOD PRESSURE: 79 MMHG | SYSTOLIC BLOOD PRESSURE: 127 MMHG | WEIGHT: 212.06 LBS

## 2024-09-02 LAB
ALBUMIN SERPL BCP-MCNC: 3.3 G/DL (ref 3.5–5.2)
ALP SERPL-CCNC: 51 U/L (ref 55–135)
ALT SERPL W/O P-5'-P-CCNC: 18 U/L (ref 10–44)
ANION GAP SERPL CALC-SCNC: 12 MMOL/L (ref 8–16)
AST SERPL-CCNC: 14 U/L (ref 10–40)
BASOPHILS # BLD AUTO: 0.04 K/UL (ref 0–0.2)
BASOPHILS NFR BLD: 0.6 % (ref 0–1.9)
BILIRUB SERPL-MCNC: 0.6 MG/DL (ref 0.1–1)
BUN SERPL-MCNC: 51 MG/DL (ref 6–20)
CALCIUM SERPL-MCNC: 8.8 MG/DL (ref 8.7–10.5)
CHLORIDE SERPL-SCNC: 97 MMOL/L (ref 95–110)
CO2 SERPL-SCNC: 26 MMOL/L (ref 23–29)
CREAT SERPL-MCNC: 11.9 MG/DL (ref 0.5–1.4)
DIFFERENTIAL METHOD BLD: ABNORMAL
EOSINOPHIL # BLD AUTO: 0.2 K/UL (ref 0–0.5)
EOSINOPHIL NFR BLD: 3.5 % (ref 0–8)
ERYTHROCYTE [DISTWIDTH] IN BLOOD BY AUTOMATED COUNT: 16.9 % (ref 11.5–14.5)
EST. GFR  (NO RACE VARIABLE): 5 ML/MIN/1.73 M^2
GLUCOSE SERPL-MCNC: 83 MG/DL (ref 70–110)
HCT VFR BLD AUTO: 28.9 % (ref 40–54)
HGB BLD-MCNC: 9.3 G/DL (ref 14–18)
IMM GRANULOCYTES # BLD AUTO: 0.01 K/UL (ref 0–0.04)
IMM GRANULOCYTES NFR BLD AUTO: 0.2 % (ref 0–0.5)
LYMPHOCYTES # BLD AUTO: 2.1 K/UL (ref 1–4.8)
LYMPHOCYTES NFR BLD: 33.4 % (ref 18–48)
MAGNESIUM SERPL-MCNC: 1.9 MG/DL (ref 1.6–2.6)
MCH RBC QN AUTO: 28.9 PG (ref 27–31)
MCHC RBC AUTO-ENTMCNC: 32.2 G/DL (ref 32–36)
MCV RBC AUTO: 90 FL (ref 82–98)
MONOCYTES # BLD AUTO: 0.8 K/UL (ref 0.3–1)
MONOCYTES NFR BLD: 11.8 % (ref 4–15)
NEUTROPHILS # BLD AUTO: 3.2 K/UL (ref 1.8–7.7)
NEUTROPHILS NFR BLD: 50.5 % (ref 38–73)
NRBC BLD-RTO: 0 /100 WBC
PHOSPHATE SERPL-MCNC: 4.4 MG/DL (ref 2.7–4.5)
PLATELET # BLD AUTO: 243 K/UL (ref 150–450)
PMV BLD AUTO: 10.2 FL (ref 9.2–12.9)
POCT GLUCOSE: 85 MG/DL (ref 70–110)
POCT GLUCOSE: 94 MG/DL (ref 70–110)
POTASSIUM SERPL-SCNC: 6.1 MMOL/L (ref 3.5–5.1)
PROT SERPL-MCNC: 7.4 G/DL (ref 6–8.4)
RBC # BLD AUTO: 3.22 M/UL (ref 4.6–6.2)
SODIUM SERPL-SCNC: 135 MMOL/L (ref 136–145)
WBC # BLD AUTO: 6.37 K/UL (ref 3.9–12.7)

## 2024-09-02 PROCEDURE — 83735 ASSAY OF MAGNESIUM: CPT | Performed by: STUDENT IN AN ORGANIZED HEALTH CARE EDUCATION/TRAINING PROGRAM

## 2024-09-02 PROCEDURE — 99900035 HC TECH TIME PER 15 MIN (STAT)

## 2024-09-02 PROCEDURE — 85025 COMPLETE CBC W/AUTO DIFF WBC: CPT | Performed by: INTERNAL MEDICINE

## 2024-09-02 PROCEDURE — 80053 COMPREHEN METABOLIC PANEL: CPT | Performed by: STUDENT IN AN ORGANIZED HEALTH CARE EDUCATION/TRAINING PROGRAM

## 2024-09-02 PROCEDURE — 94761 N-INVAS EAR/PLS OXIMETRY MLT: CPT

## 2024-09-02 PROCEDURE — 84100 ASSAY OF PHOSPHORUS: CPT | Performed by: STUDENT IN AN ORGANIZED HEALTH CARE EDUCATION/TRAINING PROGRAM

## 2024-09-02 PROCEDURE — 90935 HEMODIALYSIS ONE EVALUATION: CPT

## 2024-09-02 PROCEDURE — 36415 COLL VENOUS BLD VENIPUNCTURE: CPT | Performed by: STUDENT IN AN ORGANIZED HEALTH CARE EDUCATION/TRAINING PROGRAM

## 2024-09-02 PROCEDURE — 99232 SBSQ HOSP IP/OBS MODERATE 35: CPT | Mod: ,,, | Performed by: INTERNAL MEDICINE

## 2024-09-02 PROCEDURE — 25000003 PHARM REV CODE 250: Performed by: STUDENT IN AN ORGANIZED HEALTH CARE EDUCATION/TRAINING PROGRAM

## 2024-09-02 RX ADMIN — NIFEDIPINE 90 MG: 30 TABLET, FILM COATED, EXTENDED RELEASE ORAL at 09:09

## 2024-09-02 RX ADMIN — CARVEDILOL 25 MG: 12.5 TABLET, FILM COATED ORAL at 09:09

## 2024-09-02 RX ADMIN — HYDRALAZINE HYDROCHLORIDE 50 MG: 25 TABLET ORAL at 09:09

## 2024-09-02 RX ADMIN — SODIUM ZIRCONIUM CYCLOSILICATE 10 G: 10 POWDER, FOR SUSPENSION ORAL at 02:09

## 2024-09-02 RX ADMIN — MUPIROCIN: 20 OINTMENT TOPICAL at 09:09

## 2024-09-02 NOTE — NURSING TRANSFER
Nursing Transfer Note      9/1/2024   11:09 PM    Nurse giving handoff:Mahi  Nurse receiving handoff:Brenda    Reason patient is being transferred: Patient requires less critical care.    Transfer To: 332    Transfer via wheelchair    Transfer with cardiac monitoring    Transported by ICU RN and Transportation    Transfer Vital Signs:  Blood Pressure:134/75   Heart Rate:98  O2:96  Temperature:99.1  Respirations:19    Telemetry: Rhythm NSR  Order for Tele Monitor? Yes      Medicines sent: None    Any special needs or follow-up needed: 9/1    Patient belongings transferred with patient: Yes    Chart send with patient: Yes    Patient reassessed at: 9/1/2024, 2313    Upon arrival to floor: patient oriented to room, call bell in reach, and bed in lowest position with personal belongings at bedside including cell phone, cell phone , wireless ear buds, and prosthesis.

## 2024-09-02 NOTE — ASSESSMENT & PLAN NOTE
Mr. Morgan Emmanuel 40 y/o Male with ESRD admitted for acute SOB. Incidental finding of Chronic Left foot ulceration. Wound is stable, no cardinal signs of infection. WBC wnl. Afebrile.    Plan:  - No need for surgical intervention at this time.   - Wound care initiated: Dressed with betadine, gauze, kerlix, ace wrap. Recommend every other day wound dressing change  - Patient to follow up with podiatry outpatient for routine wound care for Chronic ulceration  - Podiatry will sign off at this time  - Patient seen and discussed with podiatry staff

## 2024-09-02 NOTE — PLAN OF CARE
Problem: Adult Inpatient Plan of Care  Goal: Plan of Care Review  Outcome: Progressing  Goal: Patient-Specific Goal (Individualized)  Outcome: Progressing  Goal: Absence of Hospital-Acquired Illness or Injury  Outcome: Progressing  Goal: Optimal Comfort and Wellbeing  Outcome: Progressing  Goal: Readiness for Transition of Care  Outcome: Progressing     Problem: Hemodialysis  Goal: Safe, Effective Therapy Delivery  Outcome: Progressing  Goal: Effective Tissue Perfusion  Outcome: Progressing  Goal: Absence of Infection Signs and Symptoms  Outcome: Progressing     Problem: Diabetes Comorbidity  Goal: Blood Glucose Level Within Targeted Range  Outcome: Progressing     Problem: Wound  Goal: Optimal Coping  Outcome: Progressing  Goal: Optimal Functional Ability  Outcome: Progressing  Goal: Absence of Infection Signs and Symptoms  Outcome: Progressing  Goal: Improved Oral Intake  Outcome: Progressing  Goal: Optimal Pain Control and Function  Outcome: Progressing  Goal: Skin Health and Integrity  Outcome: Progressing  Goal: Optimal Wound Healing  Outcome: Progressing

## 2024-09-02 NOTE — SUBJECTIVE & OBJECTIVE
Scheduled Meds:   carvediloL  25 mg Oral BID    hydrALAZINE  50 mg Oral TID    mupirocin   Nasal BID    NIFEdipine  90 mg Oral Daily     Continuous Infusions:  PRN Meds:  Current Facility-Administered Medications:     acetaminophen, 650 mg, Oral, Q4H PRN    dextrose 10%, 12.5 g, Intravenous, PRN    dextrose 10%, 25 g, Intravenous, PRN    glucagon (human recombinant), 1 mg, Intramuscular, PRN    glucose, 16 g, Oral, PRN    glucose, 24 g, Oral, PRN    guaiFENesin 100 mg/5 ml, 200 mg, Oral, Q4H PRN    hydrALAZINE, 10 mg, Intravenous, Q6H PRN    insulin aspart U-100, 1-10 Units, Subcutaneous, QID (AC + HS) PRN    ondansetron, 8 mg, Oral, Q8H PRN    sodium chloride 0.9%, 10 mL, Intravenous, PRN    Review of patient's allergies indicates:  No Known Allergies     Past Medical History:   Diagnosis Date    Diabetes mellitus     Hypertension     Renal disorder      Past Surgical History:   Procedure Laterality Date    FOOT AMPUTATION Right        Family History    None       Tobacco Use    Smoking status: Never    Smokeless tobacco: Never   Substance and Sexual Activity    Alcohol use: Never    Drug use: Never    Sexual activity: Not on file     Review of Systems   Constitutional:  Negative for chills and fever.   Respiratory:  Positive for shortness of breath (per HPI).    Cardiovascular:  Negative for chest pain.   Gastrointestinal:  Negative for diarrhea, nausea and vomiting.   Skin:  Positive for wound.   Neurological:  Negative for headaches.     Objective:     Vital Signs (Most Recent):  Temp: 99.1 °F (37.3 °C) (09/01/24 1901)  Pulse: 92 (09/01/24 1800)  Resp: 20 (09/01/24 1800)  BP: (!) 154/92 (09/01/24 1901)  SpO2: 96 % (09/01/24 1901) Vital Signs (24h Range):  Temp:  [97.9 °F (36.6 °C)-99.1 °F (37.3 °C)] 99.1 °F (37.3 °C)  Pulse:  [] 92  Resp:  [18-36] 20  SpO2:  [92 %-100 %] 96 %  BP: (141-243)/() 154/92     Weight: 99.2 kg (218 lb 11.1 oz)  Body mass index is 28.08 kg/m².    Foot  "Exam    General  Orientation: alert and oriented to person, place, and time   Affect: appropriate       Right Foot/Ankle     Inspection and Palpation  Ecchymosis: none  Tenderness: none   Swelling: none   Skin Exam: skin intact; no maceration, no cellulitis, no fissure and no ulcer     Neurovascular  Posterior tibial: 2+  Saphenous nerve sensation: diminished  Tibial nerve sensation: diminished  Superficial peroneal nerve sensation: diminished  Sural nerve sensation: diminished    Comments  Right foot with TMA. No wounds or ulcerations noted.     Left Foot/Ankle      Inspection and Palpation  Ecchymosis: none  Skin Exam: callus, drainage and ulcer; skin not intact     Neurovascular  Dorsalis pedis: 1+  Posterior tibial: 1+  Saphenous nerve sensation: diminished  Tibial nerve sensation: diminished  Superficial peroneal nerve sensation: diminished  Deep peroneal nerve sensation: diminished  Sural nerve sensation: diminished    Muscle Strength  Ankle dorsiflexion: 5  Ankle plantar flexion: 5  Ankle inversion: 5  Ankle eversion: 5  Great toe extension: 5  Great toe flexion: 5    Comments  Left plantar sub 1st met head ulceration with healthy granular wound base, with dariel wound callus. Without slough, eschar, bleeding, drainage, purulence, malodor. Does not Probe to bone.       Laboratory:  BMP:   Recent Labs   Lab 09/01/24  0536   GLU 87      K 6.5*   CL 99   CO2 21*   BUN 94*   CREATININE 18.2*   CALCIUM 9.5   MG 2.0     CBC:   Recent Labs   Lab 09/01/24  0536   WBC 10.22   RBC 3.05*   HGB 8.8*   HCT 27.4*      MCV 90   MCH 28.9   MCHC 32.1     CRP:   Recent Labs   Lab 09/01/24  0536   CRP 5.0     Wound Cultures: No results for input(s): "LABAERO" in the last 4320 hours.    Diagnostic Results:  I have reviewed all pertinent imaging results/findings within the past 24 hours. Multifocal Radiograph of Left foot: Abnormal appearance of the left foot with soft tissue edema, possible subcutaneous emphysema/open " wound, and abnormal appearance of the bones in the forefoot.  Recent or remote osteomyelitis would be difficult to exclude without prior imaging for comparison.  Consider MRI follow-up if there is concern for osteomyelitis.     Clinical Findings:  There was moderate an ulceration at the plantar sub 1st metatarsal head.

## 2024-09-02 NOTE — HOSPITAL COURSE
Mr. Morgan is a 39-year-old male who was admitted for hypertensive urgency, acute volume overload with shortness of breath due to missed dialysis sessions.  He was also found to be hyperkalemic.  He was started on Cardene drip and placed in ICU.  Refused BiPAP.  Nephrology consulted for urgent dialysis.  He underwent dialysis with improvement of his symptoms.  Restarted on his home blood pressure medications.  Repeat labs in the morning showed persistent hyperkalemia.  He underwent dialysis once again.  Blood pressure well controlled.  Stable for discharge after dialysis session.  He will continue his usual regimen of Tuesday Thursday Saturday starting tomorrow.  He will need to follow up with his home nephrologist.  Of note, he was left plantar foot wound that he will need to follow up with outpatient Podiatry.

## 2024-09-02 NOTE — PLAN OF CARE
Case Management Final Discharge Note      Discharge Disposition: home     New DME ordered / company name: none    Relevant SDOH / Transition of Care Barriers:  none    Primary Caretaker and contact information: Olivier Estrella (Brother)  [phone] (Mobile)     Scheduled followup appointment: Patient to schedule hospital follow-up with his PCP    Referrals placed: none    Transportation: family    Patient and family educated on discharge services and updated on DC plan. Bedside RN notified, patient clear to discharge from Case Management Perspective.      09/02/24 1607   Final Note   Assessment Type Final Discharge Note   Anticipated Discharge Disposition Home   What phone number can be called within the next 1-3 days to see how you are doing after discharge? [phone]   Hospital Resources/Appts/Education Provided Post-Acute resouces added to AVS;Appointments scheduled and added to AVS

## 2024-09-02 NOTE — HPI
"Mr. Morgan 39-year-old male with ESRD on HD admitted for severe shortness of breath acute onset last night. Podiatry consulted for incidental finding of Left forefoot ulceration plantar L 1st metatarsal. States the ulcer is chronic, and that it "comes and goes". With this new episode of ulceration occurring 2 months ago. Regularly followed a vascular surgeon who has done re vascularization and wound debridements on L foot, but does not f/u anymore due to moving cities, last seen 3 months ago. Has prior Hx of Right foot TMA and osteomyelitis. Denies pain, drainage, bleeding from L foot ulceration. Denies N/V/F/C. Denies recent trauma. Patient would like to establish care with a podiatrist in [facility].   "

## 2024-09-02 NOTE — CONSULTS
"[facility] - Intensive Care  Podiatry  Consult Note    Patient Name: Emmanuel Morgan  MRN: [id number]  Admission Date: 9/1/2024  Hospital Length of Stay: 0 days  Attending Physician: Prince Madrigal MD  Primary Care Provider: Laurence, Primary Doctor     Inpatient consult to Podiatry  Consult performed by: Jana Jesus MD  Consult ordered by: Su Hylton MD        Subjective:     History of Present Illness:  Mr. Morgan 39-year-old male with ESRD on HD admitted for severe shortness of breath acute onset last night. Podiatry consulted for incidental finding of Left forefoot ulceration plantar L 1st metatarsal. States the ulcer is chronic, and that it "comes and goes". With this new episode of ulceration occurring 2 months ago. Regularly followed a vascular surgeon who has done re vascularization and wound debridements on L foot, but does not f/u anymore due to moving cities, last seen 3 months ago. Has prior Hx of Right foot TMA. Denies pain, drainage, bleeding from L foot ulceration. Denies N/V/F/C. Denies recent trauma. Patient would like to establish care with a podiatrist in [facility].     Scheduled Meds:   carvediloL  25 mg Oral BID    hydrALAZINE  50 mg Oral TID    mupirocin   Nasal BID    NIFEdipine  90 mg Oral Daily     Continuous Infusions:  PRN Meds:  Current Facility-Administered Medications:     acetaminophen, 650 mg, Oral, Q4H PRN    dextrose 10%, 12.5 g, Intravenous, PRN    dextrose 10%, 25 g, Intravenous, PRN    glucagon (human recombinant), 1 mg, Intramuscular, PRN    glucose, 16 g, Oral, PRN    glucose, 24 g, Oral, PRN    guaiFENesin 100 mg/5 ml, 200 mg, Oral, Q4H PRN    hydrALAZINE, 10 mg, Intravenous, Q6H PRN    insulin aspart U-100, 1-10 Units, Subcutaneous, QID (AC + HS) PRN    ondansetron, 8 mg, Oral, Q8H PRN    sodium chloride 0.9%, 10 mL, Intravenous, PRN    Review of patient's allergies indicates:  No Known Allergies     Past Medical History:   Diagnosis Date    Diabetes " mellitus     Hypertension     Renal disorder      Past Surgical History:   Procedure Laterality Date    FOOT AMPUTATION Right        Family History    None       Tobacco Use    Smoking status: Never    Smokeless tobacco: Never   Substance and Sexual Activity    Alcohol use: Never    Drug use: Never    Sexual activity: Not on file     Review of Systems   Constitutional:  Negative for chills and fever.   Respiratory:  Positive for shortness of breath (per HPI).    Cardiovascular:  Negative for chest pain.   Gastrointestinal:  Negative for diarrhea, nausea and vomiting.   Skin:  Positive for wound.   Neurological:  Negative for headaches.     Objective:     Vital Signs (Most Recent):  Temp: 99.1 °F (37.3 °C) (09/01/24 1901)  Pulse: 92 (09/01/24 1800)  Resp: 20 (09/01/24 1800)  BP: (!) 154/92 (09/01/24 1901)  SpO2: 96 % (09/01/24 1901) Vital Signs (24h Range):  Temp:  [97.9 °F (36.6 °C)-99.1 °F (37.3 °C)] 99.1 °F (37.3 °C)  Pulse:  [] 92  Resp:  [18-36] 20  SpO2:  [92 %-100 %] 96 %  BP: (141-243)/() 154/92     Weight: 99.2 kg (218 lb 11.1 oz)  Body mass index is 28.08 kg/m².    Foot Exam    General  Orientation: alert and oriented to person, place, and time   Affect: appropriate       Right Foot/Ankle     Inspection and Palpation  Ecchymosis: none  Tenderness: none   Swelling: none   Skin Exam: skin intact; no maceration, no cellulitis, no fissure and no ulcer     Neurovascular  Posterior tibial: 2+  Saphenous nerve sensation: diminished  Tibial nerve sensation: diminished  Superficial peroneal nerve sensation: diminished  Sural nerve sensation: diminished    Comments  Right foot with TMA. No wounds or ulcerations noted.     Left Foot/Ankle      Inspection and Palpation  Ecchymosis: none  Skin Exam: callus, drainage and ulcer; skin not intact     Neurovascular  Dorsalis pedis: 1+  Posterior tibial: 1+  Saphenous nerve sensation: diminished  Tibial nerve sensation: diminished  Superficial peroneal nerve  "sensation: diminished  Deep peroneal nerve sensation: diminished  Sural nerve sensation: diminished    Muscle Strength  Ankle dorsiflexion: 5  Ankle plantar flexion: 5  Ankle inversion: 5  Ankle eversion: 5  Great toe extension: 5  Great toe flexion: 5    Comments  Left plantar sub 1st met head ulceration with healthy granular wound base, with dariel wound callus. Without slough, eschar, bleeding, drainage, purulence, malodor. Does not Probe to bone. 1.0 x 1.0 x 0.4 cm.     Clinical media:    Left foot:              Right foot:            Laboratory:  BMP:   Recent Labs   Lab 09/01/24  0536   GLU 87      K 6.5*   CL 99   CO2 21*   BUN 94*   CREATININE 18.2*   CALCIUM 9.5   MG 2.0     CBC:   Recent Labs   Lab 09/01/24  0536   WBC 10.22   RBC 3.05*   HGB 8.8*   HCT 27.4*      MCV 90   MCH 28.9   MCHC 32.1     CRP:   Recent Labs   Lab 09/01/24 0536   CRP 5.0     Wound Cultures: No results for input(s): "LABAERO" in the last 4320 hours.    Diagnostic Results:  I have reviewed all pertinent imaging results/findings within the past 24 hours. Multifocal Radiograph of Left foot: Abnormal appearance of the left foot with soft tissue edema, possible subcutaneous emphysema/open wound, and abnormal appearance of the bones in the forefoot.  Recent or remote osteomyelitis would be difficult to exclude without prior imaging for comparison.  Consider MRI follow-up if there is concern for osteomyelitis.     Clinical Findings:  There was moderate an ulceration at the plantar sub 1st metatarsal head.  Assessment/Plan:     Orthopedic  Foot ulcer  Emmanuel Mehta 40 y/o Male with ESRD admitted for acute SOB. Incidental finding of Chronic Left foot ulceration. Wound is stable, no cardinal signs of infection. WBC wnl. Afebrile.    Plan:  - No need for surgical intervention at this time.   - Wound care initiated: Dressed with betadine, gauze, kerlix, ace wrap. Recommend every other day wound dressing change  - Patient to " follow up with podiatry outpatient for routine wound care for Chronic ulceration  - Ordered Darco wedge shoe for off loading  - Podiatry will sign off at this time  - Patient seen and discussed with podiatry staff        Thank you for your consult. I will sign off. Please contact us if you have any additional questions.    Jana Jesus MD  Podiatry  [facility] - Intensive Care

## 2024-09-02 NOTE — PROGRESS NOTES
[facility] [place] [facility]  Nephrology  Progress Note    Patient Name: Emmanuel Morgan  MRN: [id number]  Admission Date: 9/1/2024  Hospital Length of Stay: 1 days  Attending Provider: Prince Madrigal MD   Primary Care Physician: Laurence, Primary Doctor  Principal Problem:Hypertensive urgency    Subjective:     HPI: Mr. Morgan is a 40 yo male with HTN, T2DM, and ESRD who presented to the ED this morning with SOB after missing dialysis on Thursday. Workup consistent with hypervolemia and hyperkalemia. Nephrology consulted for emergent dialysis. Prior records obtained and reviewed. Patient receives nocturnal hemodialysis via [facility] on Tues-Thurs-Sun at [facility] under the c/o Dr. Orozco. Treatment duration: 5 hours. His SpO2 was 87% on arrival with SBP > 220. He was started on cardene gtt. He received shifting agents and Lokelma for his hyperkalemia. Emergent dialysis was ordered. Patient seen and examined during hemodialysis this afternoon. He reports to be feeling significantly improved.     Interval History: +hypoxia overnight; improved with supplemental O2. No events overnight. Doing well this afternoon. On room air. Eating lunch. Denies intake of any high K foods. Agreeable to additional HD treatment today prior to discharge.      Review of patient's allergies indicates:  No Known Allergies  Current Facility-Administered Medications   Medication Frequency    acetaminophen tablet 650 mg Q4H PRN    albuterol-ipratropium 2.5 mg-0.5 mg/3 mL nebulizer solution 3 mL Q4H PRN    benzonatate capsule 200 mg TID PRN    carvediloL tablet 25 mg BID    dextrose 10% bolus 125 mL 125 mL PRN    dextrose 10% bolus 250 mL 250 mL PRN    glucagon (human recombinant) injection 1 mg PRN    glucose chewable tablet 16 g PRN    glucose chewable tablet 24 g PRN    guaiFENesin 100 mg/5 ml syrup 200 mg Q4H PRN    hydrALAZINE injection 10 mg Q6H PRN    hydrALAZINE tablet 50 mg TID    insulin aspart U-100 pen 1-10 Units QID (AC + HS) PRN     mupirocin 2 % ointment BID    NIFEdipine 24 hr tablet 90 mg Daily    ondansetron disintegrating tablet 8 mg Q8H PRN    sodium chloride 0.9% flush 10 mL PRN       Objective:     Vital Signs (Most Recent):  Temp: 98 °F (36.7 °C) (09/02/24 1108)  Pulse: 72 (09/02/24 1108)  Resp: 19 (09/02/24 1108)  BP: 131/84 (09/02/24 1108)  SpO2: 98 % (09/02/24 1112) Vital Signs (24h Range):  Temp:  [98 °F (36.7 °C)-99.1 °F (37.3 °C)] 98 °F (36.7 °C)  Pulse:  [] 72  Resp:  [18-29] 19  SpO2:  [87 %-100 %] 98 %  BP: (121-188)/() 131/84     Weight: 96.2 kg (212 lb 1.3 oz) (09/01/24 2313)  Body mass index is 27.23 kg/m².  Body surface area is 2.24 meters squared.    I/O last 3 completed shifts:  In: 1405.1 [P.O.:800; I.V.:105.1; Other:500]  Out: 4900 [Urine:400; Other:4500]     Physical Exam  Vitals and nursing note reviewed.   Constitutional:       General: He is awake. He is not in acute distress.     Appearance: Normal appearance. He is well-developed.   HENT:      Head: Normocephalic and atraumatic.      Nose: Nose normal.      Mouth/Throat:      Mouth: Mucous membranes are moist.   Eyes:      Extraocular Movements: Extraocular movements intact.      Conjunctiva/sclera: Conjunctivae normal.   Cardiovascular:      Rate and Rhythm: Normal rate and regular rhythm.      Comments: [facility]  Pulmonary:      Effort: Pulmonary effort is normal.      Breath sounds: Normal breath sounds. No rales.   Abdominal:      General: There is no distension.      Palpations: Abdomen is soft.   Musculoskeletal:         General: No tenderness or signs of injury.      Right lower leg: No edema.      Left lower leg: No edema.   Skin:     General: Skin is warm and dry.      Findings: No erythema or rash.   Neurological:      General: No focal deficit present.      Mental Status: He is alert. Mental status is at baseline.   Psychiatric:         Mood and Affect: Mood normal.         Behavior: Behavior normal.          Significant Labs:  CBC:   Recent  Labs   Lab 09/02/24  0510   WBC 6.37   RBC 3.22*   HGB 9.3*   HCT 28.9*      MCV 90   MCH 28.9   MCHC 32.2     CMP:   Recent Labs   Lab 09/02/24  0510   GLU 83   CALCIUM 8.8   ALBUMIN 3.3*   PROT 7.4   *   K 6.1*   CO2 26   CL 97   BUN 51*   CREATININE 11.9*   ALKPHOS 51*   ALT 18   AST 14   BILITOT 0.6     All labs within the past 24 hours have been reviewed.     Significant Imaging:  Labs: Reviewed    Assessment/Plan:     ESRD  - receives nocturnal HD [place] [facility]  - admitted 9/1/24 with respiratory distress  - s/p HD yesterday with 4L removed  - remains with hyperkalemia this AM. HD today x 4 hours with UF goal 3-4L as tolerated   - okay to discharge home after HD    Hyperkalemia  - K 6.5 on arrival --> only improved to 6.2 after dialysis yesterday. Lack of improvement possibly related to K-shifting agents that were given prior to dialysis; and K later shifted back out  - K 6.1 today  - HD x 4 hours  - discontinue RAAS blockade for now; can be restarted outpatient by his nephrologist    Anemia of CKD  - hgb below goal  - holding KIKI for now in setting of hypertensive emergency; can be resumed outpatient    Secondary HPTH  - CCa and phos normal  - renal diet    Thank you for your consult. I will follow-up with patient. Please contact us if you have any additional questions.    Laurie Root MD  Nephrology  [facility] - Observation

## 2024-09-02 NOTE — DISCHARGE SUMMARY
[facility] Medicine  Discharge Summary      Patient Name: Emmanuel Morgan  MRN: [id number]  [facility]: [id number]  Patient Class: IP- Inpatient  Admission Date: 9/1/2024  Hospital Length of Stay: 1 days  Discharge Date and Time: No discharge date for patient encounter.  Attending Physician: Prince Madrigal MD   Discharging Provider: Prince Madrigal MD  Primary Care Provider: Laurence, Primary Doctor    Primary Care Team: Networked reference to record PCT     HPI:   Mr. Morgan is a 39-year-old male with past medical history of ESRD on HD, malignant hypertension, right foot amputation who presents to the emergency department for shortness of breath.  He reports it got worse last night and he was unable to asleep.  He was unable to lie flat due to shortness of breath.  He was on dialysis Tuesday, Thursday and Saturday.  His last session was this past Tuesday as he missed this last Thursday session.  He takes multiple blood pressure medications but did not take any this morning as he was in the ER.  He denies chest pain.  In the emergency department, he had increased work of breathing with a blood pressure of 231/135 saturating 92% on room air.  Afebrile with a pulse of 108.  Labs remarkable for a potassium of 6.5 and creatinine of 18.2.  BNP 1000 884 and troponin 0.119.  He was given multiple doses of IV blood pressure control and IV Lasix.  Nephrology consulted for urgent dialysis.  We will start p.o. home medications as well as Cardene drip to help with blood pressure control at this time.  He will be admitted to ICU for further management.    * No surgery found *      Hospital Course:   Mr. Moragn is a 39-year-old male who was admitted for hypertensive urgency, acute volume overload with shortness of breath due to missed dialysis sessions.  He was also found to be hyperkalemic.  He was started on Cardene drip and placed in ICU.  Refused BiPAP.  Nephrology consulted for urgent dialysis.  He underwent  dialysis with improvement of his symptoms.  Restarted on his home blood pressure medications.  Repeat labs in the morning showed persistent hyperkalemia.  He underwent dialysis once again.  Blood pressure well controlled.  Stable for discharge after dialysis session.  He will continue his usual regimen of Tuesday Thursday Saturday starting tomorrow.  He will need to follow up with his home nephrologist.  Of note, he was left plantar foot wound that he will need to follow up with outpatient Podiatry.     Goals of Care Treatment Preferences:  Code Status: Full Code      SDOH Screening:  The patient was screened for utility difficulties, food insecurity, transport difficulties, housing insecurity, and interpersonal safety and there were no concerns identified this admission.     Consults:   Consults (From admission, onward)          Status Ordering Provider     Inpatient consult to Podiatry  Once        Provider:  Jana Jesus MD    Completed RILEY NELSON     Inpatient consult to Nephrology  Once        Provider:  Laurie Root MD    Completed RILEY NELSON            No new Assessment & Plan notes have been filed under this hospital service since the last note was generated.  Service: Hospital Medicine    Final Active Diagnoses:    Diagnosis Date Noted POA    PRINCIPAL PROBLEM:  Hypertensive urgency [I16.0] 09/01/2024 Yes    Acute respiratory distress [R06.03] 09/01/2024 Yes    Foot ulcer [L97.509] 09/01/2024 Yes     Chronic    Type 2 diabetes mellitus with foot ulcer [E11.621, L97.509] 06/25/2024 Yes    End-stage renal disease [N18.6] 03/21/2024 Yes    Hyperkalemia [E87.5] 07/19/2021 Yes     Chronic    Anemia in chronic kidney disease [N18.9, D63.1] 03/12/2021 Yes     Chronic    Essential hypertension [I10] 01/27/2021 Yes      Problems Resolved During this Admission:       Discharged Condition: stable    Disposition: Home or Self Care    Follow Up:   Follow-up Information       Eliezer EDWARDS  [facility] Follow up.    Why: Resume dialysis  Contact information:  [address] [place] [address] [phone]             No, Primary Doctor. Schedule an appointment as soon as possible for a visit in 1 week(s).                           Patient Instructions:      Diet renal     Notify your health care provider if you experience any of the following:  temperature >100.4     Notify your health care provider if you experience any of the following:  persistent nausea and vomiting or diarrhea     Notify your health care provider if you experience any of the following:  severe uncontrolled pain     Notify your health care provider if you experience any of the following:  difficulty breathing or increased cough     Notify your health care provider if you experience any of the following:  severe persistent headache     Notify your health care provider if you experience any of the following:  worsening rash     Notify your health care provider if you experience any of the following:  persistent dizziness, light-headedness, or visual disturbances     Notify your health care provider if you experience any of the following:  increased confusion or weakness     Activity as tolerated       Significant Diagnostic Studies: Labs: All labs within the past 24 hours have been reviewed    Pending Diagnostic Studies:       Procedure Component Value Units Date/Time    Hepatitis B surface antibody [[id number]]     Order Status: Sent Lab Status: No result     Specimen: Blood     Hepatitis B surface antigen [[id number]]     Order Status: Sent Lab Status: No result     Specimen: Blood            Medications:  Reconciled Home Medications:      Medication List        CONTINUE taking these medications      carvediloL 25 MG tablet  Commonly known as: COREG  Take 1 tablet by mouth 2 (two) times daily.     hydrALAZINE 50 MG tablet  Commonly known as: APRESOLINE  Take 1 tablet by mouth 3 (three) times daily.      NIFEdipine 90 MG (OSM) 24 hr tablet  Commonly known as: PROCARDIA-XL  Take 90 mg by mouth once daily.            STOP taking these medications      lisinopriL 20 MG tablet  Commonly known as: PRINIVIL,ZESTRIL              Indwelling Lines/Drains at time of discharge:   Lines/Drains/Airways       None                   Time spent on the discharge of patient: >35 minutes         Prince Madrigal MD  Department of Hospital Medicine  [facility] - Observation

## 2024-09-02 NOTE — PROGRESS NOTES
Pt arrive to the unit, accompanied by ICU nurse Mahi, assisted pt to bed. VS obtained and recorded. Tele monitoring initiated.  Admit assessment initiated.  Pt is AAO X 4.  NAD noted. Denies any pain at this time.      Bed placed low in ht, wheels locked, side rails up X 3, call light with in reach.  Pt's prosthesis placed next to the bed and informed to call for assistance, pt verbalize understanding.  Plan of care reviewed with the pt.        [facility]  Nurses Note -- 4 Eyes      9/2/2024       Skin assessed on: Transfer      [] No Pressure Injuries Present    [x]Prevention Measures Documented  Pt has a left foot wound, covered in a bandage    [] Yes LDA  for Pressure Injury Previously documented     [x] Yes New Pressure Injury Discovered   [x] LDA for New Pressure Injury Added      Attending RN:  Brenda Ceballos, ALPHONSE     Second RN:  ALPHONSE Carr

## 2024-09-02 NOTE — PROGRESS NOTES
[facility], [facility]  Nurses Note -- 4 Eyes      9/2/2024       Skin assessed on: Q Shift      [x] No Pressure Injuries Present    []Prevention Measures Documented    [] Yes LDA  for Pressure Injury Previously documented     [] Yes New Pressure Injury Discovered   [] LDA for New Pressure Injury Added      Attending RN:  Saurav Redd RN     Second RN:  Olga Guerra RN

## 2024-09-02 NOTE — SUBJECTIVE & OBJECTIVE
Interval History: +hypoxia overnight; improved with supplemental O2. No events overnight. Doing well this afternoon. On room air. Eating lunch. Denies intake of any high K foods. Agreeable to additional HD treatment today prior to discharge.      Review of patient's allergies indicates:  No Known Allergies  Current Facility-Administered Medications   Medication Frequency    acetaminophen tablet 650 mg Q4H PRN    albuterol-ipratropium 2.5 mg-0.5 mg/3 mL nebulizer solution 3 mL Q4H PRN    benzonatate capsule 200 mg TID PRN    carvediloL tablet 25 mg BID    dextrose 10% bolus 125 mL 125 mL PRN    dextrose 10% bolus 250 mL 250 mL PRN    glucagon (human recombinant) injection 1 mg PRN    glucose chewable tablet 16 g PRN    glucose chewable tablet 24 g PRN    guaiFENesin 100 mg/5 ml syrup 200 mg Q4H PRN    hydrALAZINE injection 10 mg Q6H PRN    hydrALAZINE tablet 50 mg TID    insulin aspart U-100 pen 1-10 Units QID (AC + HS) PRN    mupirocin 2 % ointment BID    NIFEdipine 24 hr tablet 90 mg Daily    ondansetron disintegrating tablet 8 mg Q8H PRN    sodium chloride 0.9% flush 10 mL PRN       Objective:     Vital Signs (Most Recent):  Temp: 98 °F (36.7 °C) (09/02/24 1108)  Pulse: 72 (09/02/24 1108)  Resp: 19 (09/02/24 1108)  BP: 131/84 (09/02/24 1108)  SpO2: 98 % (09/02/24 1112) Vital Signs (24h Range):  Temp:  [98 °F (36.7 °C)-99.1 °F (37.3 °C)] 98 °F (36.7 °C)  Pulse:  [] 72  Resp:  [18-29] 19  SpO2:  [87 %-100 %] 98 %  BP: (121-188)/() 131/84     Weight: 96.2 kg (212 lb 1.3 oz) (09/01/24 2313)  Body mass index is 27.23 kg/m².  Body surface area is 2.24 meters squared.    I/O last 3 completed shifts:  In: 1405.1 [P.O.:800; I.V.:105.1; Other:500]  Out: 4900 [Urine:400; Other:4500]     Physical Exam  Vitals and nursing note reviewed.   Constitutional:       General: He is awake. He is not in acute distress.     Appearance: Normal appearance. He is well-developed.   HENT:      Head: Normocephalic and atraumatic.       Nose: Nose normal.      Mouth/Throat:      Mouth: Mucous membranes are moist.   Eyes:      Extraocular Movements: Extraocular movements intact.      Conjunctiva/sclera: Conjunctivae normal.   Cardiovascular:      Rate and Rhythm: Normal rate and regular rhythm.      Comments: J TDC  Pulmonary:      Effort: Pulmonary effort is normal.      Breath sounds: Normal breath sounds. No rales.   Abdominal:      General: There is no distension.      Palpations: Abdomen is soft.   Musculoskeletal:         General: No tenderness or signs of injury.      Right lower leg: No edema.      Left lower leg: No edema.   Skin:     General: Skin is warm and dry.      Findings: No erythema or rash.   Neurological:      General: No focal deficit present.      Mental Status: He is alert. Mental status is at baseline.   Psychiatric:         Mood and Affect: Mood normal.         Behavior: Behavior normal.          Significant Labs:  CBC:   Recent Labs   Lab 09/02/24  0510   WBC 6.37   RBC 3.22*   HGB 9.3*   HCT 28.9*      MCV 90   MCH 28.9   MCHC 32.2     CMP:   Recent Labs   Lab 09/02/24  0510   GLU 83   CALCIUM 8.8   ALBUMIN 3.3*   PROT 7.4   *   K 6.1*   CO2 26   CL 97   BUN 51*   CREATININE 11.9*   ALKPHOS 51*   ALT 18   AST 14   BILITOT 0.6     All labs within the past 24 hours have been reviewed.     Significant Imaging:  Labs: Reviewed

## 2024-09-02 NOTE — NURSING
[facility]  Nurses Note -- 4 Eyes      9/1/2024       Skin assessed on: Q Shift      [x] No Pressure Injuries Present    [x]Prevention Measures Documented    [] Yes LDA  for Pressure Injury Previously documented     [] Yes New Pressure Injury Discovered   [] LDA for New Pressure Injury Added      Attending RN:  Mahi Clark RN     Second RN:  Melba Robb RN

## 2024-09-02 NOTE — PROGRESS NOTES
Tele bunker notified nurse about SPO2 being low to 88- 87% on monitor.  On assessment, pt sleeping, easy to arouse, NAD noted but coughing in between, denies SOB, distress at this time.  Pt placed on 2L O2 via NC, SATs 95%  Pt resting well.  Plan of care ongoing.

## 2024-09-03 LAB
OHS QRS DURATION: 112 MS
OHS QTC CALCULATION: 509 MS

## 2024-09-06 DIAGNOSIS — L97.509 ULCER OF FOOT, UNSPECIFIED LATERALITY, UNSPECIFIED ULCER STAGE: Primary | Chronic | ICD-10-CM

## 2024-09-24 ENCOUNTER — TELEPHONE (OUTPATIENT)
Dept: WOUND CARE | Facility: HOSPITAL | Age: 39
End: 2024-09-24
Payer: MEDICARE

## 2024-09-24 NOTE — TELEPHONE ENCOUNTER
Called pt to discuss 8 AM appointment time. LVM advising patient to call back to reschedule appointment due to tardiness or to talk about what time he could be seen.

## 2024-10-07 ENCOUNTER — TELEPHONE (OUTPATIENT)
Dept: WOUND CARE | Facility: HOSPITAL | Age: 39
End: 2024-10-07
Payer: MEDICARE

## 2024-10-07 NOTE — TELEPHONE ENCOUNTER
Call placed to Mr. Beckett about his missed  wound care appointment this am. Spoke with him who states he forgot about appointment and wound like to reschedule his appointment.

## 2024-10-15 ENCOUNTER — TELEPHONE (OUTPATIENT)
Dept: WOUND CARE | Facility: HOSPITAL | Age: 39
End: 2024-10-15
Payer: MEDICARE

## 2025-03-31 ENCOUNTER — HOSPITAL ENCOUNTER (INPATIENT)
Facility: HOSPITAL | Age: 40
LOS: 15 days | Discharge: HOME | DRG: 623 | End: 2025-04-15
Attending: EMERGENCY MEDICINE | Admitting: STUDENT IN AN ORGANIZED HEALTH CARE EDUCATION/TRAINING PROGRAM
Payer: MEDICARE

## 2025-03-31 DIAGNOSIS — L08.9 FOOT INFECTION: Primary | ICD-10-CM

## 2025-03-31 DIAGNOSIS — L98.499 INFECTED ULCER OF SKIN: ICD-10-CM

## 2025-03-31 DIAGNOSIS — R05.3 CHRONIC COUGH: ICD-10-CM

## 2025-03-31 DIAGNOSIS — M79.673 FOOT PAIN: ICD-10-CM

## 2025-03-31 DIAGNOSIS — N18.6 ESRD (END STAGE RENAL DISEASE) ON DIALYSIS: ICD-10-CM

## 2025-03-31 DIAGNOSIS — R07.9 CHEST PAIN: ICD-10-CM

## 2025-03-31 DIAGNOSIS — S91.301D OPEN WOUND OF FOOT, RIGHT, SUBSEQUENT ENCOUNTER: ICD-10-CM

## 2025-03-31 DIAGNOSIS — R05.3 PERSISTENT COUGH: ICD-10-CM

## 2025-03-31 DIAGNOSIS — E87.5 HYPERKALEMIA: ICD-10-CM

## 2025-03-31 DIAGNOSIS — M86.172 OTHER ACUTE OSTEOMYELITIS OF LEFT FOOT: ICD-10-CM

## 2025-03-31 DIAGNOSIS — L08.9 INFECTED ULCER OF SKIN: ICD-10-CM

## 2025-03-31 DIAGNOSIS — L97.502 FOOT ULCER WITH FAT LAYER EXPOSED, UNSPECIFIED LATERALITY: ICD-10-CM

## 2025-03-31 DIAGNOSIS — N18.6 END STAGE RENAL DISEASE: ICD-10-CM

## 2025-03-31 DIAGNOSIS — Z99.2 ESRD (END STAGE RENAL DISEASE) ON DIALYSIS: ICD-10-CM

## 2025-03-31 LAB
ABSOLUTE EOSINOPHIL (OHS): 0.11 K/UL
ABSOLUTE MONOCYTE (OHS): 3 K/UL (ref 0.3–1)
ABSOLUTE NEUTROPHIL COUNT (OHS): 16.63 K/UL (ref 1.8–7.7)
ALBUMIN SERPL BCP-MCNC: 3.5 G/DL (ref 3.5–5.2)
ALP SERPL-CCNC: 57 UNIT/L (ref 40–150)
ALT SERPL W/O P-5'-P-CCNC: 9 UNIT/L (ref 10–44)
ANION GAP (OHS): 18 MMOL/L (ref 8–16)
ANION GAP (OHS): 18 MMOL/L (ref 8–16)
AST SERPL-CCNC: 11 UNIT/L (ref 11–45)
BASOPHILS # BLD AUTO: 0.04 K/UL
BASOPHILS NFR BLD AUTO: 0.2 %
BILIRUB SERPL-MCNC: 0.5 MG/DL (ref 0.1–1)
BUN SERPL-MCNC: 73 MG/DL (ref 6–20)
BUN SERPL-MCNC: 76 MG/DL (ref 6–20)
CALCIUM SERPL-MCNC: 10.2 MG/DL (ref 8.7–10.5)
CALCIUM SERPL-MCNC: 9.7 MG/DL (ref 8.7–10.5)
CHLORIDE SERPL-SCNC: 90 MMOL/L (ref 95–110)
CHLORIDE SERPL-SCNC: 91 MMOL/L (ref 95–110)
CO2 SERPL-SCNC: 25 MMOL/L (ref 23–29)
CO2 SERPL-SCNC: 26 MMOL/L (ref 23–29)
CREAT SERPL-MCNC: 17.4 MG/DL (ref 0.5–1.4)
CREAT SERPL-MCNC: 17.9 MG/DL (ref 0.5–1.4)
CRP SERPL-MCNC: 153.3 MG/L
ERYTHROCYTE [DISTWIDTH] IN BLOOD BY AUTOMATED COUNT: 16.7 % (ref 11.5–14.5)
ERYTHROCYTE [SEDIMENTATION RATE] IN BLOOD BY PHOTOMETRIC METHOD: 120 MM/HR
GFR SERPLBLD CREATININE-BSD FMLA CKD-EPI: 3 ML/MIN/1.73/M2
GFR SERPLBLD CREATININE-BSD FMLA CKD-EPI: 3 ML/MIN/1.73/M2
GLUCOSE SERPL-MCNC: 95 MG/DL (ref 70–110)
GLUCOSE SERPL-MCNC: 96 MG/DL (ref 70–110)
HCT VFR BLD AUTO: 35.2 % (ref 40–54)
HGB BLD-MCNC: 11 GM/DL (ref 14–18)
IMM GRANULOCYTES # BLD AUTO: 0.16 K/UL (ref 0–0.04)
IMM GRANULOCYTES NFR BLD AUTO: 0.7 % (ref 0–0.5)
LYMPHOCYTES # BLD AUTO: 2.58 K/UL (ref 1–4.8)
MCH RBC QN AUTO: 28.9 PG (ref 27–31)
MCHC RBC AUTO-ENTMCNC: 31.3 G/DL (ref 32–36)
MCV RBC AUTO: 93 FL (ref 82–98)
NUCLEATED RBC (/100WBC) (OHS): 0 /100 WBC
PLATELET # BLD AUTO: 335 K/UL (ref 150–450)
PMV BLD AUTO: 10.5 FL (ref 9.2–12.9)
POCT GLUCOSE: 120 MG/DL (ref 70–110)
POTASSIUM SERPL-SCNC: 5.2 MMOL/L (ref 3.5–5.1)
POTASSIUM SERPL-SCNC: 5.6 MMOL/L (ref 3.5–5.1)
PROT SERPL-MCNC: 9.6 GM/DL (ref 6–8.4)
RBC # BLD AUTO: 3.8 M/UL (ref 4.6–6.2)
RELATIVE EOSINOPHIL (OHS): 0.5 %
RELATIVE LYMPHOCYTE (OHS): 11.5 % (ref 18–48)
RELATIVE MONOCYTE (OHS): 13.3 % (ref 4–15)
RELATIVE NEUTROPHIL (OHS): 73.8 % (ref 38–73)
SODIUM SERPL-SCNC: 133 MMOL/L (ref 136–145)
SODIUM SERPL-SCNC: 135 MMOL/L (ref 136–145)
WBC # BLD AUTO: 22.52 K/UL (ref 3.9–12.7)

## 2025-03-31 PROCEDURE — 82962 GLUCOSE BLOOD TEST: CPT

## 2025-03-31 PROCEDURE — 86140 C-REACTIVE PROTEIN: CPT

## 2025-03-31 PROCEDURE — 80053 COMPREHEN METABOLIC PANEL: CPT

## 2025-03-31 PROCEDURE — 25000003 PHARM REV CODE 250: Performed by: INTERNAL MEDICINE

## 2025-03-31 PROCEDURE — 85025 COMPLETE CBC W/AUTO DIFF WBC: CPT

## 2025-03-31 PROCEDURE — 96365 THER/PROPH/DIAG IV INF INIT: CPT

## 2025-03-31 PROCEDURE — 87040 BLOOD CULTURE FOR BACTERIA: CPT

## 2025-03-31 PROCEDURE — 63600175 PHARM REV CODE 636 W HCPCS

## 2025-03-31 PROCEDURE — 99291 CRITICAL CARE FIRST HOUR: CPT

## 2025-03-31 PROCEDURE — 93005 ELECTROCARDIOGRAM TRACING: CPT

## 2025-03-31 PROCEDURE — 36415 COLL VENOUS BLD VENIPUNCTURE: CPT | Performed by: INTERNAL MEDICINE

## 2025-03-31 PROCEDURE — 93010 ELECTROCARDIOGRAM REPORT: CPT | Mod: ,,, | Performed by: INTERNAL MEDICINE

## 2025-03-31 PROCEDURE — 85652 RBC SED RATE AUTOMATED: CPT

## 2025-03-31 PROCEDURE — 12000002 HC ACUTE/MED SURGE SEMI-PRIVATE ROOM

## 2025-03-31 PROCEDURE — 82310 ASSAY OF CALCIUM: CPT | Performed by: INTERNAL MEDICINE

## 2025-03-31 PROCEDURE — 25000003 PHARM REV CODE 250

## 2025-03-31 RX ORDER — NALOXONE HCL 0.4 MG/ML
0.02 VIAL (ML) INJECTION
Status: DISCONTINUED | OUTPATIENT
Start: 2025-03-31 | End: 2025-04-15 | Stop reason: HOSPADM

## 2025-03-31 RX ORDER — ONDANSETRON HYDROCHLORIDE 2 MG/ML
4 INJECTION, SOLUTION INTRAVENOUS EVERY 6 HOURS PRN
Status: DISCONTINUED | OUTPATIENT
Start: 2025-03-31 | End: 2025-04-15 | Stop reason: HOSPADM

## 2025-03-31 RX ORDER — INSULIN ASPART 100 [IU]/ML
0-5 INJECTION, SOLUTION INTRAVENOUS; SUBCUTANEOUS
Status: DISCONTINUED | OUTPATIENT
Start: 2025-03-31 | End: 2025-04-15 | Stop reason: HOSPADM

## 2025-03-31 RX ORDER — METRONIDAZOLE 500 MG/100ML
500 INJECTION, SOLUTION INTRAVENOUS
Status: DISCONTINUED | OUTPATIENT
Start: 2025-04-01 | End: 2025-04-05

## 2025-03-31 RX ORDER — ACETAMINOPHEN 500 MG
1000 TABLET ORAL
Status: COMPLETED | OUTPATIENT
Start: 2025-03-31 | End: 2025-03-31

## 2025-03-31 RX ORDER — TALC
6 POWDER (GRAM) TOPICAL NIGHTLY PRN
Status: DISCONTINUED | OUTPATIENT
Start: 2025-03-31 | End: 2025-04-15 | Stop reason: HOSPADM

## 2025-03-31 RX ORDER — CEFEPIME HYDROCHLORIDE 1 G/1
1 INJECTION, POWDER, FOR SOLUTION INTRAMUSCULAR; INTRAVENOUS DAILY
Status: DISCONTINUED | OUTPATIENT
Start: 2025-04-01 | End: 2025-04-02

## 2025-03-31 RX ORDER — ACETAMINOPHEN 325 MG/1
650 TABLET ORAL EVERY 8 HOURS PRN
Status: DISCONTINUED | OUTPATIENT
Start: 2025-03-31 | End: 2025-04-03

## 2025-03-31 RX ORDER — ALUMINUM HYDROXIDE, MAGNESIUM HYDROXIDE, AND SIMETHICONE 1200; 120; 1200 MG/30ML; MG/30ML; MG/30ML
30 SUSPENSION ORAL 4 TIMES DAILY PRN
Status: DISCONTINUED | OUTPATIENT
Start: 2025-03-31 | End: 2025-04-15 | Stop reason: HOSPADM

## 2025-03-31 RX ORDER — ACETAMINOPHEN 325 MG/1
650 TABLET ORAL EVERY 4 HOURS PRN
Status: DISCONTINUED | OUTPATIENT
Start: 2025-03-31 | End: 2025-04-03

## 2025-03-31 RX ORDER — HYDRALAZINE HYDROCHLORIDE 25 MG/1
50 TABLET, FILM COATED ORAL 3 TIMES DAILY
Status: DISCONTINUED | OUTPATIENT
Start: 2025-04-01 | End: 2025-04-06

## 2025-03-31 RX ORDER — GLUCAGON 1 MG
1 KIT INJECTION
Status: DISCONTINUED | OUTPATIENT
Start: 2025-03-31 | End: 2025-04-15 | Stop reason: HOSPADM

## 2025-03-31 RX ORDER — AMOXICILLIN 250 MG
1 CAPSULE ORAL 2 TIMES DAILY PRN
Status: DISCONTINUED | OUTPATIENT
Start: 2025-03-31 | End: 2025-04-15 | Stop reason: HOSPADM

## 2025-03-31 RX ORDER — LISINOPRIL 40 MG/1
1 TABLET ORAL DAILY
Status: ON HOLD | COMMUNITY
Start: 2024-12-08 | End: 2025-04-14 | Stop reason: HOSPADM

## 2025-03-31 RX ORDER — IBUPROFEN 200 MG
16 TABLET ORAL
Status: DISCONTINUED | OUTPATIENT
Start: 2025-03-31 | End: 2025-04-15 | Stop reason: HOSPADM

## 2025-03-31 RX ORDER — NIFEDIPINE 30 MG/1
90 TABLET, EXTENDED RELEASE ORAL DAILY
Status: DISCONTINUED | OUTPATIENT
Start: 2025-04-01 | End: 2025-04-15 | Stop reason: HOSPADM

## 2025-03-31 RX ORDER — IBUPROFEN 200 MG
24 TABLET ORAL
Status: DISCONTINUED | OUTPATIENT
Start: 2025-03-31 | End: 2025-04-15 | Stop reason: HOSPADM

## 2025-03-31 RX ORDER — CARVEDILOL 12.5 MG/1
25 TABLET ORAL 2 TIMES DAILY
Status: DISCONTINUED | OUTPATIENT
Start: 2025-03-31 | End: 2025-04-06

## 2025-03-31 RX ORDER — OXYCODONE HYDROCHLORIDE 5 MG/1
5 TABLET ORAL EVERY 4 HOURS PRN
Refills: 0 | Status: DISCONTINUED | OUTPATIENT
Start: 2025-03-31 | End: 2025-04-03

## 2025-03-31 RX ADMIN — PIPERACILLIN SODIUM AND TAZOBACTAM SODIUM 4.5 G: 4; .5 INJECTION, POWDER, FOR SOLUTION INTRAVENOUS at 07:03

## 2025-03-31 RX ADMIN — CARVEDILOL 25 MG: 12.5 TABLET, FILM COATED ORAL at 11:03

## 2025-03-31 RX ADMIN — SODIUM ZIRCONIUM CYCLOSILICATE 10 G: 10 POWDER, FOR SUSPENSION ORAL at 10:03

## 2025-03-31 RX ADMIN — VANCOMYCIN HYDROCHLORIDE 2000 MG: 2 INJECTION, POWDER, LYOPHILIZED, FOR SOLUTION INTRAVENOUS at 08:03

## 2025-03-31 RX ADMIN — SODIUM CHLORIDE 500 ML: 9 INJECTION, SOLUTION INTRAVENOUS at 07:03

## 2025-03-31 RX ADMIN — ACETAMINOPHEN 1000 MG: 500 TABLET ORAL at 07:03

## 2025-03-31 NOTE — ED PROVIDER NOTES
Encounter Date: 3/31/2025       History     Chief Complaint   Patient presents with    Leg Pain     Sent from [facility] for increased pain to right foot     Emmanuel Morgan is a 39-year-old male with past medical history of ESRD on dialysis MWF (missed dialysis today due to being in the emergency department), hypertension, and type 2 diabetes complicated by osteomyelitis with bilateral foot amputations including amputation of the right foot to the mid foot who presents to the emergency department for evaluation of right foot wound.  Patient continues to walk on what remains of his 2 ft.  Has had a pressure injury on the right lower extremity that has been followed by wound care.  Wound care referred him to the emergency department today due to purulent discharge and worsening appearance of the wound.  Patient reports mild pain.  He denies fever, fatigue, nausea, vomiting, malaise.    The history is provided by the patient and medical records. No  was used.     Review of patient's allergies indicates:  No Known Allergies  Past Medical History:   Diagnosis Date    Diabetes mellitus     Hypertension     Renal disorder      Past Surgical History:   Procedure Laterality Date    FOOT AMPUTATION Right      No family history on file.  Social History[1]  Review of Systems   Constitutional:  Negative for appetite change, chills, fatigue and fever.   HENT:  Negative for sore throat.    Respiratory:  Negative for shortness of breath.    Cardiovascular:  Negative for chest pain.   Gastrointestinal:  Negative for abdominal pain, nausea and vomiting.   Genitourinary:  Negative for dysuria.   Musculoskeletal:  Negative for back pain.   Skin:  Positive for wound. Negative for rash.   Neurological:  Negative for weakness and headaches.   Hematological:  Does not bruise/bleed easily.       Physical Exam     Initial Vitals [03/31/25 1238]   BP Pulse Resp Temp SpO2   134/81 (!) 125 20 99.6 °F (37.6 °C) 97 %      MAP        --         Physical Exam    Nursing note and vitals reviewed.  Constitutional: Vital signs are normal. He appears well-developed and well-nourished. He is cooperative. He does not appear ill. No distress.   Well-appearing.  No acute distress.  Alert and interactive.   HENT:   Head: Normocephalic and atraumatic.   Right Ear: External ear normal.   Left Ear: External ear normal.   Nose: Nose normal.   Eyes: Conjunctivae and EOM are normal.   Neck: Phonation normal.   Normal range of motion.  Cardiovascular:  Normal rate, regular rhythm, S1 normal, S2 normal and normal heart sounds.           No murmur heard.  Tachycardic, heart rate 106-109 beats per minute on the heart monitor during my exam.  No murmur or friction rub.  Warm and well-perfused.   Pulmonary/Chest: Effort normal. No respiratory distress.   Respirations even and unlabored.  No adventitious sounds of breathing.   Abdominal: Abdomen is flat. He exhibits no distension. There is no abdominal tenderness.   Musculoskeletal:      Cervical back: Normal range of motion.      Comments: Wound of the right lower extremity as photographed below.  Malodorous.  Surrounding erythema, edema, and warmth.  Warmth extends up the leg.     Neurological: He is alert and oriented to person, place, and time. GCS eye subscore is 4. GCS verbal subscore is 5. GCS motor subscore is 6.   Skin: Skin is warm and dry. Capillary refill takes less than 2 seconds. No rash noted.               ED Course   Procedures  Labs Reviewed   COMPREHENSIVE METABOLIC PANEL - Abnormal       Result Value    Sodium 133 (*)     Potassium 5.6 (*)     Chloride 90 (*)     CO2 25      Glucose 95      BUN 73 (*)     Creatinine 17.4 (*)     Calcium 10.2      Protein Total 9.6 (*)     Albumin 3.5      Bilirubin Total 0.5      ALP 57      AST 11      ALT 9 (*)     Anion Gap 18 (*)     eGFR 3 (*)    C-REACTIVE PROTEIN - Abnormal    .3 (*)    CBC WITH DIFFERENTIAL - Abnormal    WBC 22.52 (*)     RBC 3.80  (*)     HGB 11.0 (*)     HCT 35.2 (*)     MCV 93      MCH 28.9      MCHC 31.3 (*)     RDW 16.7 (*)     Platelet Count 335      MPV 10.5      Nucleated RBC 0      Neut % 73.8 (*)     Lymph % 11.5 (*)     Mono % 13.3      Eos % 0.5      Basophil % 0.2      Imm Grans % 0.7 (*)     Neut # 16.63 (*)     Lymph # 2.58      Mono # 3.00 (*)     Eos # 0.11      Baso # 0.04      Imm Grans # 0.16 (*)    SEDIMENTATION RATE - Abnormal    Sed Rate 120 (*)    POCT GLUCOSE - Abnormal    POCT Glucose 120 (*)    CULTURE, BLOOD   CULTURE, BLOOD   CBC W/ AUTO DIFFERENTIAL    Narrative:     The following orders were created for panel order CBC auto differential.  Procedure                               Abnormality         Status                     ---------                               -----------         ------                     CBC with Differential[[id number]]       Abnormal            Final result                 Please view results for these tests on the individual orders.   POCT GLUCOSE MONITORING CONTINUOUS     EKG Readings: (Independently Interpreted)   EKG with sinus tachycardia, ventricular rate 108 beats per minute.  Intervals are within normal limits.  T-wave inversion in aVL.  Question ST depression in lead II.  No STEMI.  When compared with most recent EKG dated September 1, 2024, heart rate has remained the same.       Imaging Results              US Lower Extremity Arteries Right (In process)                      X-Ray Foot Complete Right (Final result)  Result time 03/31/25 13:14:02      Final result by Alli Henriquez MD (03/31/25 13:14:02)                   Impression:      Please see above.      Electronically signed by: Alli Henriquez  Date:    03/31/2025  Time:    13:14               Narrative:    EXAMINATION:  XR FOOT COMPLETE 3 VIEW RIGHT    CLINICAL HISTORY:  . Pain in unspecified foot    TECHNIQUE:  AP, lateral, and oblique views of the right foot were performed.    COMPARISON:  None.    FINDINGS:  Extensive  postoperative change including amputation to the level of the midfoot structures.  Remainder of the visualized osseous structures of the mid and hindfoot demonstrate diffuse degenerative change in osteopenia with surrounding diffuse soft tissue swelling.  Additional diffuse osseous destruction and degenerative change about the partially visualized ankle.  Scattered vascular calcification as well as plantar calcaneal spur.  No obvious significant osseous erosive change, noting limitations from postoperative change and surrounding soft tissue swelling.  If there is continued clinical concern for osteomyelitis, consider MRI of the foot for further evaluation.                                       X-Ray Chest AP Portable (Final result)  Result time 03/31/25 13:15:24      Final result by Alli Henriquez MD (03/31/25 13:15:24)                   Impression:      Please see above.      Electronically signed by: Alli Henriquez  Date:    03/31/2025  Time:    13:15               Narrative:    EXAMINATION:  XR CHEST AP PORTABLE    CLINICAL HISTORY:  Chronic cough    TECHNIQUE:  Single frontal view of the chest was performed.    COMPARISON:  Chest radiograph 09/01/2024    FINDINGS:  Left-sided central venous catheter with tip overlying the in expected region of the cavoatrial junction.  Cardiomediastinal silhouette is unchanged in size.  Patient is rotated, limiting evaluation of the mediastinal structures.    Chronic right effusion with probable superimposed volume loss about the right lung base.  Superimposed infectious or non infectious inflammatory infiltrate cannot be excluded.  Remainder of the aerated portions of the lungs are clear.  No pneumothorax.    Osseous structures demonstrate no evidence for acute fracture or osseous destructive lesion.  Soft tissues are unremarkable.                                       Medications   vancomycin 2,000 mg in 0.9% NaCl 500 mL IVPB (admixture device) (has no administration in time  range)   sodium zirconium cyclosilicate packet 10 g (has no administration in time range)   piperacillin-tazobactam (ZOSYN) 4.5 g in D5W 100 mL IVPB (MB+) (4.5 g Intravenous New Bag 3/31/25 1931)   acetaminophen tablet 1,000 mg (1,000 mg Oral Given 3/31/25 1930)   sodium chloride 0.9% bolus 500 mL 500 mL (500 mLs Intravenous New Bag 3/31/25 1930)     Medical Decision Making  39-year-old male with history of type 2 diabetes (complications including bilateral foot amputations), hypertension, end-stage renal disease on MWF dialysis (missed dialysis today because he was checked into the ER) who presents to the emergency department from Wound Care for evaluation of worsening right lower extremity wound.  Patient reports mild pain.  On exam, he is well-appearing and in no acute distress.  He is tachycardic, heart rate in the high 100s, low 110s.  Temperature on the high end of the normal range.  Right lower extremity wound as photographed above.  Surrounding soft tissue is warm to the touch with warmth extending up the leg, edematous, and erythematous.  The remainder of the physical exam is without concerning findings.      Differential diagnosis includes but is not limited to osteomyelitis, cellulitis, abscess, electrolyte abnormality, ESRD.      CBC with leukocytosis of 22.5 K.  Hemoglobin 11, hematocrit 35.2%.  CMP with creatinine 17.4, potassium 5.6, anion gap of 18.  .  .  Point of care glucose 120.  X-ray of the right foot with chronic changes, unable to exclude any acute changes. Given patient's history, most concerning for osteomyelitis.  MRI ordered.  Discussed with podiatry over secure chat, they recommend arterial ultrasound of the affected extremity as well.  This was ordered.  500 mL IV fluids, vanc, Zosyn, Tylenol administered in the emergency department.  [facility] for hyperkalemia.  No peaked T-waves on EKG.  No STEMI.    Patient would benefit from admission to the hospital for IV antibiotics,  MRI, evaluation by Podiatry and Nephrology for dialysis.  Discussed this with Nephrology PA Aby Nelson recommends Lokelma t.i.d. and dialysis tomorrow morning.    Accepted for admission to the hospital by Dr. Felton.  Dr. Jude Pham, future attending physician.    Patient was also discussed with Dr. Rosas, ED attending physician who agrees with this plan.    Please put in 35 minutes of critical care due to patient having a high risk of metabolic failure due to end-stage renal disease, circulatory failure secondary to sepsis.   Separate from teaching and exclusive of procedure and ekg time  Includes:  Time at bedside  Time reviewing test results  Time discussing case with staff  Time documenting the medical record  Time spent with family members  Time spent with consults  Management     Amount and/or Complexity of Data Reviewed  Radiology: ordered.    Risk  OTC drugs.  Prescription drug management.  Decision regarding hospitalization.               ED Course as of 03/31/25 2034   Mon Mar 31, 2025   1950 Pt with ESRD, DM, and osteo, here with worsening wound to rt foot (stump)  +warm, swollen. Pulses intact. []   2001 Independent interpretation of cardiac monitoring.  Heart rate 108 beats per minute on the monitor.  Sinus tachycardia.  No ectopy.  No arrhythmia. [TL]      ED Course User Index  [MH] Nile Rosas MD  [TL] Abran Resterpo, PA-C                           Clinical Impression:  Final diagnoses:  [M79.673] Foot pain  [R05.3] Persistent cough  [S91.301D] Open wound of foot, right, subsequent encounter  [E87.5] Hyperkalemia  [N18.6] End stage renal disease          ED Disposition Condition    Admit                   [1]   Social History  Tobacco Use    Smoking status: Never    Smokeless tobacco: Never   Substance Use Topics    Alcohol use: Never    Drug use: Never        Abran Restrepo, SUELLEN  03/31/25 2034

## 2025-03-31 NOTE — Clinical Note
Left: Neck.   Scrubbed with Chlorhexidine/Alcohol.    Hair: N/A.  Skin prep dry before draping.  Prepped by: Lindsay Santos NP 4/14/2025 3:41 PM.

## 2025-03-31 NOTE — ED TRIAGE NOTES
Pt presents to ED via POV with c/o pain and wound to RIGHT foot. Was seen by wound care today and instructed to come to ED for further eval. Denies fever. No current. Pt states he is supposed to see wound care weekly for BLE wounds, but has not been in quite some time. Pt is dialysis M,W,F. Was last dialyzed Friday.

## 2025-04-01 ENCOUNTER — ANESTHESIA (OUTPATIENT)
Dept: SURGERY | Facility: HOSPITAL | Age: 40
End: 2025-04-01
Payer: MEDICARE

## 2025-04-01 ENCOUNTER — ANESTHESIA EVENT (OUTPATIENT)
Dept: SURGERY | Facility: HOSPITAL | Age: 40
End: 2025-04-01
Payer: MEDICARE

## 2025-04-01 PROBLEM — L97.502: Status: ACTIVE | Noted: 2024-09-01

## 2025-04-01 LAB
ABSOLUTE EOSINOPHIL (OHS): 0.44 K/UL
ABSOLUTE MONOCYTE (OHS): 2.51 K/UL (ref 0.3–1)
ABSOLUTE NEUTROPHIL COUNT (OHS): 13.81 K/UL (ref 1.8–7.7)
ALBUMIN SERPL BCP-MCNC: 2.9 G/DL (ref 3.5–5.2)
ALP SERPL-CCNC: 52 UNIT/L (ref 40–150)
ALT SERPL W/O P-5'-P-CCNC: 7 UNIT/L (ref 10–44)
ANION GAP (OHS): 16 MMOL/L (ref 8–16)
AST SERPL-CCNC: 10 UNIT/L (ref 11–45)
BASOPHILS # BLD AUTO: 0.05 K/UL
BASOPHILS NFR BLD AUTO: 0.3 %
BILIRUB SERPL-MCNC: 0.4 MG/DL (ref 0.1–1)
BUN SERPL-MCNC: 82 MG/DL (ref 6–20)
CALCIUM SERPL-MCNC: 9.5 MG/DL (ref 8.7–10.5)
CHLORIDE SERPL-SCNC: 92 MMOL/L (ref 95–110)
CO2 SERPL-SCNC: 26 MMOL/L (ref 23–29)
CREAT SERPL-MCNC: 18.1 MG/DL (ref 0.5–1.4)
ERYTHROCYTE [DISTWIDTH] IN BLOOD BY AUTOMATED COUNT: 16.4 % (ref 11.5–14.5)
GFR SERPLBLD CREATININE-BSD FMLA CKD-EPI: 3 ML/MIN/1.73/M2
GLUCOSE SERPL-MCNC: 103 MG/DL (ref 70–110)
HCT VFR BLD AUTO: 32.3 % (ref 40–54)
HGB BLD-MCNC: 10.5 GM/DL (ref 14–18)
IMM GRANULOCYTES # BLD AUTO: 0.08 K/UL (ref 0–0.04)
IMM GRANULOCYTES NFR BLD AUTO: 0.4 % (ref 0–0.5)
LYMPHOCYTES # BLD AUTO: 2.04 K/UL (ref 1–4.8)
MAGNESIUM SERPL-MCNC: 2.1 MG/DL (ref 1.6–2.6)
MCH RBC QN AUTO: 29.7 PG (ref 27–31)
MCHC RBC AUTO-ENTMCNC: 32.5 G/DL (ref 32–36)
MCV RBC AUTO: 92 FL (ref 82–98)
NUCLEATED RBC (/100WBC) (OHS): 0 /100 WBC
OHS QRS DURATION: 86 MS
OHS QTC CALCULATION: 458 MS
PHOSPHATE SERPL-MCNC: 5.5 MG/DL (ref 2.7–4.5)
PLATELET # BLD AUTO: 308 K/UL (ref 150–450)
PMV BLD AUTO: 9.6 FL (ref 9.2–12.9)
POCT GLUCOSE: 109 MG/DL (ref 70–110)
POCT GLUCOSE: 129 MG/DL (ref 70–110)
POCT GLUCOSE: 95 MG/DL (ref 70–110)
POCT GLUCOSE: 97 MG/DL (ref 70–110)
POTASSIUM SERPL-SCNC: 5.4 MMOL/L (ref 3.5–5.1)
PROT SERPL-MCNC: 8.1 GM/DL (ref 6–8.4)
RBC # BLD AUTO: 3.53 M/UL (ref 4.6–6.2)
RELATIVE EOSINOPHIL (OHS): 2.3 %
RELATIVE LYMPHOCYTE (OHS): 10.8 % (ref 18–48)
RELATIVE MONOCYTE (OHS): 13.3 % (ref 4–15)
RELATIVE NEUTROPHIL (OHS): 72.9 % (ref 38–73)
SODIUM SERPL-SCNC: 134 MMOL/L (ref 136–145)
VANCOMYCIN SERPL-MCNC: 32.1 UG/ML (ref ?–80)
WBC # BLD AUTO: 18.93 K/UL (ref 3.9–12.7)

## 2025-04-01 PROCEDURE — 25000003 PHARM REV CODE 250: Performed by: INTERNAL MEDICINE

## 2025-04-01 PROCEDURE — 99223 1ST HOSP IP/OBS HIGH 75: CPT | Mod: ,,, | Performed by: STUDENT IN AN ORGANIZED HEALTH CARE EDUCATION/TRAINING PROGRAM

## 2025-04-01 PROCEDURE — 36000707: Performed by: PODIATRIST

## 2025-04-01 PROCEDURE — 20700 MNL PREP&INSJ DP RX DLVR DEV: CPT | Mod: XS,,, | Performed by: PODIATRIST

## 2025-04-01 PROCEDURE — 11046 DBRDMT MUSC&/FSCA EA ADDL: CPT | Mod: ,,, | Performed by: PODIATRIST

## 2025-04-01 PROCEDURE — 90935 HEMODIALYSIS ONE EVALUATION: CPT

## 2025-04-01 PROCEDURE — 99223 1ST HOSP IP/OBS HIGH 75: CPT | Mod: 57,,, | Performed by: PODIATRIST

## 2025-04-01 PROCEDURE — 88311 DECALCIFY TISSUE: CPT | Mod: TC | Performed by: PODIATRIST

## 2025-04-01 PROCEDURE — 25000003 PHARM REV CODE 250: Performed by: PODIATRIST

## 2025-04-01 PROCEDURE — 80053 COMPREHEN METABOLIC PANEL: CPT | Performed by: INTERNAL MEDICINE

## 2025-04-01 PROCEDURE — 37000008 HC ANESTHESIA 1ST 15 MINUTES: Performed by: PODIATRIST

## 2025-04-01 PROCEDURE — 87070 CULTURE OTHR SPECIMN AEROBIC: CPT | Performed by: PODIATRIST

## 2025-04-01 PROCEDURE — 37000009 HC ANESTHESIA EA ADD 15 MINS: Performed by: PODIATRIST

## 2025-04-01 PROCEDURE — 63600175 PHARM REV CODE 636 W HCPCS: Performed by: PODIATRIST

## 2025-04-01 PROCEDURE — 25000003 PHARM REV CODE 250: Performed by: HOSPITALIST

## 2025-04-01 PROCEDURE — 11000001 HC ACUTE MED/SURG PRIVATE ROOM

## 2025-04-01 PROCEDURE — 63600175 PHARM REV CODE 636 W HCPCS: Performed by: INTERNAL MEDICINE

## 2025-04-01 PROCEDURE — 63600175 PHARM REV CODE 636 W HCPCS: Performed by: NURSE ANESTHETIST, CERTIFIED REGISTERED

## 2025-04-01 PROCEDURE — 5A1D70Z PERFORMANCE OF URINARY FILTRATION, INTERMITTENT, LESS THAN 6 HOURS PER DAY: ICD-10-PCS | Performed by: STUDENT IN AN ORGANIZED HEALTH CARE EDUCATION/TRAINING PROGRAM

## 2025-04-01 PROCEDURE — 25000003 PHARM REV CODE 250: Performed by: NURSE ANESTHETIST, CERTIFIED REGISTERED

## 2025-04-01 PROCEDURE — 87205 SMEAR GRAM STAIN: CPT | Performed by: PODIATRIST

## 2025-04-01 PROCEDURE — 88311 DECALCIFY TISSUE: CPT | Mod: 26,,, | Performed by: PATHOLOGY

## 2025-04-01 PROCEDURE — 11043 DBRDMT MUSC&/FSCA 1ST 20/<: CPT | Mod: ,,, | Performed by: PODIATRIST

## 2025-04-01 PROCEDURE — 27201423 OPTIME MED/SURG SUP & DEVICES STERILE SUPPLY: Performed by: PODIATRIST

## 2025-04-01 PROCEDURE — 87116 MYCOBACTERIA CULTURE: CPT | Performed by: PODIATRIST

## 2025-04-01 PROCEDURE — 36415 COLL VENOUS BLD VENIPUNCTURE: CPT | Performed by: INTERNAL MEDICINE

## 2025-04-01 PROCEDURE — 85025 COMPLETE CBC W/AUTO DIFF WBC: CPT | Performed by: INTERNAL MEDICINE

## 2025-04-01 PROCEDURE — 71000033 HC RECOVERY, INTIAL HOUR: Performed by: PODIATRIST

## 2025-04-01 PROCEDURE — 84100 ASSAY OF PHOSPHORUS: CPT | Performed by: INTERNAL MEDICINE

## 2025-04-01 PROCEDURE — 80202 ASSAY OF VANCOMYCIN: CPT | Performed by: EMERGENCY MEDICINE

## 2025-04-01 PROCEDURE — 36000706: Performed by: PODIATRIST

## 2025-04-01 PROCEDURE — 83735 ASSAY OF MAGNESIUM: CPT | Performed by: INTERNAL MEDICINE

## 2025-04-01 PROCEDURE — 11042 DBRDMT SUBQ TIS 1ST 20SQCM/<: CPT | Mod: XS,,, | Performed by: PODIATRIST

## 2025-04-01 PROCEDURE — 87102 FUNGUS ISOLATION CULTURE: CPT | Performed by: PODIATRIST

## 2025-04-01 PROCEDURE — 87206 SMEAR FLUORESCENT/ACID STAI: CPT | Performed by: PODIATRIST

## 2025-04-01 PROCEDURE — 0JBR0ZZ EXCISION OF LEFT FOOT SUBCUTANEOUS TISSUE AND FASCIA, OPEN APPROACH: ICD-10-PCS | Performed by: PODIATRIST

## 2025-04-01 PROCEDURE — 87075 CULTR BACTERIA EXCEPT BLOOD: CPT | Performed by: PODIATRIST

## 2025-04-01 PROCEDURE — 0QBP0ZX EXCISION OF LEFT METATARSAL, OPEN APPROACH, DIAGNOSTIC: ICD-10-PCS | Performed by: PODIATRIST

## 2025-04-01 PROCEDURE — C1713 ANCHOR/SCREW BN/BN,TIS/BN: HCPCS | Performed by: PODIATRIST

## 2025-04-01 PROCEDURE — 0LBV0ZZ EXCISION OF RIGHT FOOT TENDON, OPEN APPROACH: ICD-10-PCS | Performed by: PODIATRIST

## 2025-04-01 PROCEDURE — 3E0102A INTRODUCTION OF ANTI-INFECTIVE ENVELOPE INTO SUBCUTANEOUS TISSUE, OPEN APPROACH: ICD-10-PCS | Performed by: PODIATRIST

## 2025-04-01 PROCEDURE — 88307 TISSUE EXAM BY PATHOLOGIST: CPT | Mod: 26,,, | Performed by: PATHOLOGY

## 2025-04-01 DEVICE — KIT STIMULAN RAPID CURE 5CC: Type: IMPLANTABLE DEVICE | Site: FOOT | Status: FUNCTIONAL

## 2025-04-01 RX ORDER — MUPIROCIN 20 MG/G
OINTMENT TOPICAL 2 TIMES DAILY
Status: DISCONTINUED | OUTPATIENT
Start: 2025-04-01 | End: 2025-04-02

## 2025-04-01 RX ORDER — SODIUM CHLORIDE 9 MG/ML
INJECTION, SOLUTION INTRAVENOUS ONCE
Status: DISCONTINUED | OUTPATIENT
Start: 2025-04-01 | End: 2025-04-03

## 2025-04-01 RX ORDER — SODIUM CHLORIDE 0.9 % (FLUSH) 0.9 %
10 SYRINGE (ML) INJECTION
Status: DISCONTINUED | OUTPATIENT
Start: 2025-04-01 | End: 2025-04-01 | Stop reason: HOSPADM

## 2025-04-01 RX ORDER — BUPIVACAINE HYDROCHLORIDE 5 MG/ML
INJECTION, SOLUTION PERINEURAL
Status: DISCONTINUED | OUTPATIENT
Start: 2025-04-01 | End: 2025-04-01 | Stop reason: HOSPADM

## 2025-04-01 RX ORDER — HEPARIN SODIUM 1000 [USP'U]/ML
1000 INJECTION, SOLUTION INTRAVENOUS; SUBCUTANEOUS
Status: DISCONTINUED | OUTPATIENT
Start: 2025-04-01 | End: 2025-04-15 | Stop reason: HOSPADM

## 2025-04-01 RX ORDER — GLUCAGON 1 MG
1 KIT INJECTION
Status: DISCONTINUED | OUTPATIENT
Start: 2025-04-01 | End: 2025-04-01 | Stop reason: HOSPADM

## 2025-04-01 RX ORDER — FENTANYL CITRATE 50 UG/ML
INJECTION, SOLUTION INTRAMUSCULAR; INTRAVENOUS
Status: DISCONTINUED | OUTPATIENT
Start: 2025-04-01 | End: 2025-04-01

## 2025-04-01 RX ORDER — ONDANSETRON HYDROCHLORIDE 2 MG/ML
4 INJECTION, SOLUTION INTRAVENOUS DAILY PRN
Status: DISCONTINUED | OUTPATIENT
Start: 2025-04-01 | End: 2025-04-01 | Stop reason: HOSPADM

## 2025-04-01 RX ORDER — MUPIROCIN 20 MG/G
OINTMENT TOPICAL 2 TIMES DAILY
Status: DISPENSED | OUTPATIENT
Start: 2025-04-01 | End: 2025-04-06

## 2025-04-01 RX ORDER — SODIUM CHLORIDE 9 MG/ML
INJECTION, SOLUTION INTRAVENOUS ONCE
Status: DISCONTINUED | OUTPATIENT
Start: 2025-04-02 | End: 2025-04-03

## 2025-04-01 RX ORDER — MIDAZOLAM HYDROCHLORIDE 1 MG/ML
INJECTION INTRAMUSCULAR; INTRAVENOUS
Status: DISCONTINUED | OUTPATIENT
Start: 2025-04-01 | End: 2025-04-01

## 2025-04-01 RX ORDER — SEVELAMER CARBONATE 800 MG/1
800 TABLET, FILM COATED ORAL
Status: DISCONTINUED | OUTPATIENT
Start: 2025-04-01 | End: 2025-04-15 | Stop reason: HOSPADM

## 2025-04-01 RX ORDER — HYDROMORPHONE HYDROCHLORIDE 2 MG/ML
0.2 INJECTION, SOLUTION INTRAMUSCULAR; INTRAVENOUS; SUBCUTANEOUS EVERY 5 MIN PRN
Status: DISCONTINUED | OUTPATIENT
Start: 2025-04-01 | End: 2025-04-01 | Stop reason: HOSPADM

## 2025-04-01 RX ORDER — FENTANYL CITRATE 50 UG/ML
25 INJECTION, SOLUTION INTRAMUSCULAR; INTRAVENOUS EVERY 5 MIN PRN
Status: DISCONTINUED | OUTPATIENT
Start: 2025-04-01 | End: 2025-04-01 | Stop reason: HOSPADM

## 2025-04-01 RX ORDER — LIDOCAINE HYDROCHLORIDE 20 MG/ML
INJECTION, SOLUTION INFILTRATION; PERINEURAL
Status: DISCONTINUED | OUTPATIENT
Start: 2025-04-01 | End: 2025-04-01 | Stop reason: HOSPADM

## 2025-04-01 RX ORDER — KETAMINE HYDROCHLORIDE 100 MG/ML
INJECTION, SOLUTION INTRAMUSCULAR; INTRAVENOUS
Status: DISCONTINUED | OUTPATIENT
Start: 2025-04-01 | End: 2025-04-01

## 2025-04-01 RX ORDER — PROPOFOL 10 MG/ML
VIAL (ML) INTRAVENOUS CONTINUOUS PRN
Status: DISCONTINUED | OUTPATIENT
Start: 2025-04-01 | End: 2025-04-01

## 2025-04-01 RX ORDER — ACETAMINOPHEN 10 MG/ML
1000 INJECTION, SOLUTION INTRAVENOUS ONCE
Status: DISCONTINUED | OUTPATIENT
Start: 2025-04-01 | End: 2025-04-01 | Stop reason: HOSPADM

## 2025-04-01 RX ADMIN — MIDAZOLAM HYDROCHLORIDE 2 MG: 1 INJECTION INTRAMUSCULAR; INTRAVENOUS at 03:04

## 2025-04-01 RX ADMIN — KETAMINE HYDROCHLORIDE 20 MG: 100 INJECTION, SOLUTION, CONCENTRATE INTRAMUSCULAR; INTRAVENOUS at 03:04

## 2025-04-01 RX ADMIN — FENTANYL CITRATE 50 MCG: 50 INJECTION, SOLUTION INTRAMUSCULAR; INTRAVENOUS at 04:04

## 2025-04-01 RX ADMIN — SODIUM CHLORIDE: 0.9 INJECTION, SOLUTION INTRAVENOUS at 03:04

## 2025-04-01 RX ADMIN — METRONIDAZOLE 500 MG: 500 INJECTION, SOLUTION INTRAVENOUS at 08:04

## 2025-04-01 RX ADMIN — SEVELAMER CARBONATE 800 MG: 800 TABLET, FILM COATED ORAL at 06:04

## 2025-04-01 RX ADMIN — CEFEPIME 1 G: 1 INJECTION, POWDER, FOR SOLUTION INTRAMUSCULAR; INTRAVENOUS at 08:04

## 2025-04-01 RX ADMIN — MUPIROCIN: 20 OINTMENT TOPICAL at 01:04

## 2025-04-01 RX ADMIN — HYDRALAZINE HYDROCHLORIDE 50 MG: 25 TABLET ORAL at 08:04

## 2025-04-01 RX ADMIN — CARVEDILOL 25 MG: 12.5 TABLET, FILM COATED ORAL at 08:04

## 2025-04-01 RX ADMIN — FENTANYL CITRATE 50 MCG: 50 INJECTION, SOLUTION INTRAMUSCULAR; INTRAVENOUS at 03:04

## 2025-04-01 RX ADMIN — SODIUM ZIRCONIUM CYCLOSILICATE 10 G: 10 POWDER, FOR SUSPENSION ORAL at 10:04

## 2025-04-01 RX ADMIN — NIFEDIPINE 90 MG: 30 TABLET, FILM COATED, EXTENDED RELEASE ORAL at 08:04

## 2025-04-01 RX ADMIN — SODIUM ZIRCONIUM CYCLOSILICATE 10 G: 10 POWDER, FOR SUSPENSION ORAL at 05:04

## 2025-04-01 RX ADMIN — PROPOFOL 25 MCG/KG/MIN: 10 INJECTION, EMULSION INTRAVENOUS at 03:04

## 2025-04-01 RX ADMIN — METRONIDAZOLE 500 MG: 500 INJECTION, SOLUTION INTRAVENOUS at 04:04

## 2025-04-01 NOTE — ASSESSMENT & PLAN NOTE
Patient's blood pressure range in the last 24 hours was: BP  Min: 126/76  Max: 160/93.The patient's inpatient anti-hypertensive regimen is listed below:  Current Antihypertensives  , Daily, Oral  carvediloL tablet 25 mg, 2 times daily, Oral  hydrALAZINE tablet 50 mg, 3 times daily, Oral  NIFEdipine 24 hr tablet 90 mg, Daily, Oral    Plan  - BP is controlled, no changes needed to their regimen  - lisinopril held due to hyperkalemia.

## 2025-04-01 NOTE — CONSULTS
[facility] Emergency Dept  Podiatry  Consult Note    Patient Name: Emmanuel Morgan  MRN: [id number]  Admission Date: 3/31/2025  Hospital Length of Stay: 1 days  Attending Physician: Eliecer Posey MD  Primary Care Provider: Laurence, Primary Doctor     Inpatient consult to Podiatry  Consult performed by: Bety Faria DPM  Consult ordered by: Negra Felton MD  Reason for consult: b/l foot ulcers, infected, possible osteo.        Subjective:     History of Present Illness: Emmanuel Morgan is a 39 y.o. male with  has a past medical history of Diabetes mellitus, Hypertension, and Renal disorder.  Consult to Podiatry for evaluation and treatment of bilateral foot wounds, most significant to the right Lisfranc stump.  He relates that the left foot wound is chronic in nature and the ulceration to the right foot occurred several weeks ago likely secondary to trauma versus ambulation is inappropriate shoe.  All foot surgeries surgeries done in outside facilities.  He presented to the emergency department on the advice of his home health nurse due to progression of wounds and suspicion of osteomyelitis.      Chief Complaint   Patient presents with    Leg Pain     Sent from [facility] for increased pain to right foot     Scheduled Meds:   carvediloL  25 mg Oral BID    ceFEPime IV (PEDS and ADULTS)  1 g Intravenous Daily    hydrALAZINE  50 mg Oral TID    metroNIDAZOLE IV (PEDS and ADULTS)  500 mg Intravenous Q8H    NIFEdipine  90 mg Oral Daily    sodium zirconium cyclosilicate  10 g Oral TID     Continuous Infusions:  PRN Meds:  Current Facility-Administered Medications:     acetaminophen, 650 mg, Oral, Q8H PRN    acetaminophen, 650 mg, Oral, Q4H PRN    aluminum-magnesium hydroxide-simethicone, 30 mL, Oral, QID PRN    dextrose 50%, 12.5 g, Intravenous, PRN    dextrose 50%, 25 g, Intravenous, PRN    glucagon (human recombinant), 1 mg, Intramuscular, PRN    glucose, 16 g, Oral, PRN    glucose, 24 g, Oral, PRN    insulin aspart  U-100, 0-5 Units, Subcutaneous, QID (AC + HS) PRN    melatonin, 6 mg, Oral, Nightly PRN    naloxone, 0.02 mg, Intravenous, PRN    ondansetron, 4 mg, Intravenous, Q6H PRN    oxyCODONE, 5 mg, Oral, Q4H PRN    senna-docusate 8.6-50 mg, 1 tablet, Oral, BID PRN    Pharmacy to dose Vancomycin consult, , , Once **AND** vancomycin - pharmacy to dose, , Intravenous, pharmacy to manage frequency    Review of patient's allergies indicates:  No Known Allergies     Past Medical History:   Diagnosis Date    Diabetes mellitus     Hypertension     Renal disorder      Past Surgical History:   Procedure Laterality Date    FOOT AMPUTATION Right        Family History    None       Tobacco Use    Smoking status: Never    Smokeless tobacco: Never   Substance and Sexual Activity    Alcohol use: Never    Drug use: Never    Sexual activity: Not on file     Review of Systems   Constitutional:  Negative for appetite change, chills, fatigue and fever.   Respiratory:  Positive for cough. Negative for shortness of breath.    Cardiovascular:  Negative for chest pain and leg swelling.   Gastrointestinal:  Negative for diarrhea, nausea and vomiting.   Musculoskeletal:  Positive for arthralgias and myalgias.   Skin:  Positive for color change and wound.   Neurological:  Positive for numbness. Negative for weakness.        + paresthesia      Objective:     Vital Signs (Most Recent):  Temp: 99 °F (37.2 °C) (03/31/25 1853)  Pulse: 90 (04/01/25 0600)  Resp: 20 (03/31/25 1729)  BP: 127/79 (03/31/25 2300)  SpO2: 97 % (04/01/25 0600) Vital Signs (24h Range):  Temp:  [98.9 °F (37.2 °C)-99.6 °F (37.6 °C)] 99 °F (37.2 °C)  Pulse:  [] 90  Resp:  [20] 20  SpO2:  [92 %-99 %] 97 %  BP: (126-160)/(76-93) 127/79     Weight: 104.3 kg (230 lb)  Body mass index is 29.53 kg/m².    Physical Exam  Vitals and nursing note reviewed.   Constitutional:       General: He is not in acute distress.     Appearance: He is well-developed. He is not toxic-appearing or  diaphoretic.      Comments: alert and oriented x 3.    Cardiovascular:      Pulses:           Dorsalis pedis pulses are 1+ on the right side and 1+ on the left side.        Posterior tibial pulses are 1+ on the left side.   Pulmonary:      Effort: No respiratory distress.   Musculoskeletal:      Right ankle: No tenderness. No lateral malleolus, medial malleolus, AITF ligament, CF ligament or posterior TF ligament tenderness.      Right Achilles Tendon: No defects. Clemetns's test negative.      Left ankle: No tenderness. No lateral malleolus, medial malleolus, AITF ligament, CF ligament or posterior TF ligament tenderness.      Left Achilles Tendon: No defects. Clements's test negative.      Right foot: Swelling and tenderness present. No bony tenderness.      Left foot: Deformity (Appearance of short 4th metatarsal secondary to previous surgery.) and tenderness present. No bony tenderness.      Comments: Muscle strength is 5/5 in all groups bilaterally.              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 (midfoot amp)  Feet:      Right foot:      Skin integrity: Ulcer present.      Left foot:      Skin integrity: Ulcer present.   Lymphadenopathy:      Comments: No lymphatic streaking     Skin:     General: Skin is warm and dry.      Coloration: Skin is not pale.      Findings: No rash.      Nails: There is no clubbing.   Neurological:      Sensory: No sensory deficit.      Motor: No atrophy.      Comments: Light touch present     Psychiatric:         Attention and Perception: He is attentive.         Mood and Affect: Mood is not anxious. Affect is not inappropriate.         Speech: He is communicative. Speech is not slurred.         Behavior: Behavior is not combative.           04/01/2025    Right plantar lateral midfoot with periwound bulla formation and localized edema and erythema.  Exquisitely tender on palpation.  Fibro necrotic wound bed         Left sub 3rd metatarsophalangeal joint with  "deep probe to bone, fibrogranular wound bed with periwound callus.          Laboratory:  A1C: No results for input(s): "HGBA1C" in the last 4320 hours.  CBC:   Recent Labs   Lab 04/01/25 0443   WBC 18.93*   RBC 3.53*   HGB 10.5*   HCT 32.3*      MCV 92   MCH 29.7   MCHC 32.5     CMP:   Recent Labs   Lab 04/01/25 0443   CALCIUM 9.5   ALBUMIN 2.9*   *   K 5.4*   CO2 26   CL 92*   BUN 82*   CREATININE 18.1*   ALKPHOS 52   ALT 7*   AST 10*   BILITOT 0.4     CRP:   Recent Labs   Lab 03/31/25 1827   .3*     ESR:   Recent Labs   Lab 03/31/25 1827   SEDRATE 120*     Microbiology Results (last 7 days)       Procedure Component Value Units Date/Time    Blood Culture #1 **CANNOT BE ORDERED STAT** [[id number]]  (Normal) Collected: 03/31/25 1827    Order Status: Completed Specimen: Blood from Peripheral, Antecubital, Left Updated: 04/01/25 0203     Blood Culture No Growth After 6 Hours    Blood Culture #2 **CANNOT BE ORDERED STAT** [[id number]]  (Normal) Collected: 03/31/25 1845    Order Status: Completed Specimen: Blood from Peripheral, Hand, Left Updated: 04/01/25 0203     Blood Culture No Growth After 6 Hours            Diagnostic Results:  Imaging Results              MRI Hindfoot WO Contrast LT (In process)                      MRI Forefoot WO Contrast LT (In process)  Result time 03/31/25 21:51:48   Procedure changed from MRI Foot Toes WO Contrast DANY                    MRI Hindfoot WO Contrast RT (In process)                      US Lower Extremity Arteries Right (Final result)  Result time 03/31/25 21:32:36      Final result by Michelle Leon MD (03/31/25 21:32:36)                   Impression:      Normal triphasic waveforms throughout the right lower extremity.  No occlusion or stenosis detected.      Electronically signed by: Michelle Leon  Date:    03/31/2025  Time:    21:32               Narrative:    EXAMINATION:  US LOWER EXTREMITY ARTERIES RIGHT    CLINICAL HISTORY:  Unspecified " open wound, right foot, subsequent encounter    TECHNIQUE:  Duplex and color flow Doppler evaluation and graded compression of the right lower extremity arteries was performed.    COMPARISON:  None    FINDINGS:  Peak systolic velocities in the right lower extremity arteries (cm/sec):    CFA: 127, triphasic    DFA: 74, triphasic    Proximal SFA: 103, triphasic    Mid SFA: 120, triphasic    Distal SFA: 109, triphasic    Proximal pop: 84, triphasic    Distal pop: 98, triphasic    LAUREL: 80, triphasic    PTA: 86, triphasic    Peroneal: 111, triphasic    DPA: 75, triphasic                                       X-Ray Foot Complete Right (Final result)  Result time 03/31/25 13:14:02      Final result by Alli Henriquez MD (03/31/25 13:14:02)                   Impression:      Please see above.      Electronically signed by: Alli Henriquez  Date:    03/31/2025  Time:    13:14               Narrative:    EXAMINATION:  XR FOOT COMPLETE 3 VIEW RIGHT    CLINICAL HISTORY:  . Pain in unspecified foot    TECHNIQUE:  AP, lateral, and oblique views of the right foot were performed.    COMPARISON:  None.    FINDINGS:  Extensive postoperative change including amputation to the level of the midfoot structures.  Remainder of the visualized osseous structures of the mid and hindfoot demonstrate diffuse degenerative change in osteopenia with surrounding diffuse soft tissue swelling.  Additional diffuse osseous destruction and degenerative change about the partially visualized ankle.  Scattered vascular calcification as well as plantar calcaneal spur.  No obvious significant osseous erosive change, noting limitations from postoperative change and surrounding soft tissue swelling.  If there is continued clinical concern for osteomyelitis, consider MRI of the foot for further evaluation.                                       X-Ray Chest AP Portable (Final result)  Result time 03/31/25 13:15:24      Final result by Alli Henriquez MD (03/31/25  13:15:24)                   Impression:      Please see above.      Electronically signed by: Alli Florence  Date:    03/31/2025  Time:    13:15               Narrative:    EXAMINATION:  XR CHEST AP PORTABLE    CLINICAL HISTORY:  Chronic cough    TECHNIQUE:  Single frontal view of the chest was performed.    COMPARISON:  Chest radiograph 09/01/2024    FINDINGS:  Left-sided central venous catheter with tip overlying the in expected region of the cavoatrial junction.  Cardiomediastinal silhouette is unchanged in size.  Patient is rotated, limiting evaluation of the mediastinal structures.    Chronic right effusion with probable superimposed volume loss about the right lung base.  Superimposed infectious or non infectious inflammatory infiltrate cannot be excluded.  Remainder of the aerated portions of the lungs are clear.  No pneumothorax.    Osseous structures demonstrate no evidence for acute fracture or osseous destructive lesion.  Soft tissues are unremarkable.                                      Assessment/Plan:     Active Diagnoses:    Diagnosis Date Noted POA    PRINCIPAL PROBLEM:  Infected ulcer of skin [L98.499, L08.9] 03/31/2025 Unknown    Type 2 diabetes mellitus with foot ulcer [E11.621, L97.509] 06/25/2024 Yes    End-stage renal disease [N18.6] 03/21/2024 Yes    Hyperkalemia [E87.5] 07/19/2021 Yes     Chronic    Essential hypertension [I10] 01/27/2021 Yes      Problems Resolved During this Admission:       Education about the prevention of limb loss.    Discussed wound healing cycle, skin integrity, ways to care for skin.Counseled patient on the effects of biomechanical pressure and deformity as well as blood glucose on healing. He verbalizes understanding that it can increase the chances of delayed healing and this prolonged exposure leads to infection or progression of infection which subsequently can result in loss of limb.    Adequate vitamin supplementation, protein intake, and hydration - discussed  with patient    Wounds are cleansed as much as possible and assessed    Imaging ordered to rule out bone involvement or gas in the soft tissues.  MRI read pending at the time of our morning encounter however I have high clinical suspicion of osteomyelitis to the left foot and abscess to the right foot.  Discussed my clinical suspicions in conjunction with his elevated inflammatory markers and in my opinion patient needs to go to surgery for source control.    Discussed options for treatment of suspected osteomyelitis.  Options were discussed with possible side effects of each.  If patient is found to have osteomyelitis to the right foot amputation stump it would be below-knee amputation wears to the left we will be transmetatarsal amputation vs bone biopsy for C&S IV abx for six weeks with aggressive wound care for several months with no guarantee of wound resolution or PO abx (if indicated based on culture) for six weeks with aggressive wound care for several months with no guarantee of wound resolution (discussed regular lab work involved with abx options).      Patient states that he does not want an amputation at any level and would like to attempt salvage of all remaining toes in limb.    Recommend patient be seen by ID for further evaluation and discussion on if he is a candidate for long term abx therapy or if amputation will be more appropriate.     Patient consents to surgery and case placed for this afternoon for irrigation and debridement of bilateral foot ulcers with trocar biopsies of the left foot.    Short-term goals include maintaining good offloading and minimizing bioburden, promoting granulation and epithelialization to healing.  Long-term goals include keeping the wound healed by good offloading and medical management under the direction of internist.      We will continue to follow and work in partnership with treatment team on how to best treat patient concern and diagnosis.        Thank you  for your consult. I will follow-up with patient. Please contact us if you have any additional questions.    Bety Faria DPM  Podiatry  [facility] - Emergency Dept

## 2025-04-01 NOTE — ASSESSMENT & PLAN NOTE
Hyperkalemia is likely due to ESRD.The patients most recent potassium results are listed below.  Recent Labs     03/31/25  1827 03/31/25  2249 04/01/25  0443   K 5.6* 5.2* 5.4*   Treated with Lokelma.  Stable.  Nephrology consulted for HD.

## 2025-04-01 NOTE — ASSESSMENT & PLAN NOTE
Patient's FSGs are controlled on current medication regimen.  Last A1c reviewed-   Lab Results   Component Value Date    HGBA1C 4.9 08/20/2024     Most recent fingerstick glucose reviewed-   Recent Labs   Lab 04/01/25  0807 04/01/25  1224   POCTGLUCOSE 109 97     Current correctional scale  Low  Maintain anti-hyperglycemic dose as follows-   Antihyperglycemics (From admission, onward)      Start     Stop Route Frequency Ordered    03/31/25 2317  insulin aspart U-100 pen 0-5 Units         -- SubQ Before meals & nightly PRN 03/31/25 2218          Hold Oral hypoglycemics while patient is in the hospital.

## 2025-04-01 NOTE — HPI
39-year-old male with ESRD on HD, diabetes not on insulin, prior osteomyelitis requiring right midfoot amputation with a long standing left foot ulcer (months) and a right foot ulcer of his stump which has been present for weeks who was sent to the ER by home health due to progression of wounds with skin and soft tissue infections of possibly both ulcers and possible osteomyelitis, he has been followed up by outpatient wound care agency, and was sent here by home health nurse due to nonhealing of the wound, noted with worsening erythema and purulent discharge.  Missed hemodialysis session on the day of presentation due to presentation in the emergency room.

## 2025-04-01 NOTE — ASSESSMENT & PLAN NOTE
-right stump and left foot ulcers both potentially with skin and soft tissue infection.  Both potentially with osteomyelitis.    -vanco, cefepime, Flagyl ordered.    -MRI ordered   -podiatry consulted.  Wound Care consulted    -NPO at midnight.

## 2025-04-01 NOTE — ASSESSMENT & PLAN NOTE
Patient's FSGs are controlled on current medication regimen.  Last A1c reviewed-   Lab Results   Component Value Date    HGBA1C 4.9 08/20/2024     Most recent fingerstick glucose reviewed-   Recent Labs   Lab 03/31/25  1238   POCTGLUCOSE 120*     Current correctional scale  Low  Maintain anti-hyperglycemic dose as follows-   Antihyperglycemics (From admission, onward)      Start     Stop Route Frequency Ordered    03/31/25 2317  insulin aspart U-100 pen 0-5 Units         -- SubQ Before meals & nightly PRN 03/31/25 2218          Hold Oral hypoglycemics while patient is in the hospital.

## 2025-04-01 NOTE — ASSESSMENT & PLAN NOTE
Patient's blood pressure range in the last 24 hours was: BP  Min: 102/55  Max: 160/93.The patient's inpatient anti-hypertensive regimen is listed below:  Current Antihypertensives  , Daily, Oral  carvediloL tablet 25 mg, 2 times daily, Oral  hydrALAZINE tablet 50 mg, 3 times daily, Oral  NIFEdipine 24 hr tablet 90 mg, Daily, Oral    Plan  - BP is controlled, no changes needed to their regimen  - lisinopril held due to hyperkalemia.

## 2025-04-01 NOTE — ASSESSMENT & PLAN NOTE
Hyperkalemia is likely due to ESRD.The patients most recent potassium results are listed below.  Recent Labs     03/31/25  1827   K 5.6*     Plan  - Monitor for arrhythmias with EKG and/or continuous telemetry.   - Treat the hyperkalemia with Potassium Binders.   - Monitor potassium: Every 6 hours  - The patient's hyperkalemia is stable

## 2025-04-01 NOTE — PLAN OF CARE
Longstanding on Flomax, IPSS score 21 (severe LUTS)  -PVR 2 cc in office today  -Patient reports we will discuss this in the future, medication seems to be working okay for him currently   Patient to Return to floor with orders to ice and elevate surgical limb for the next 24 hours.     He will be nonweightbearing.  When patient does require transfer, he  should have CAM boot to limb with pressure concentrated to the heel.    He will elevate his feet and rest.  Podiatry will round on him tomorrow to change dressing. Dressings are to be left clean, dry, and intact until this time.

## 2025-04-01 NOTE — CONSULTS
[facility] [place] - Surgery  Wound Care  [facility]    Patient Name:  Emmanuel Morgan   MRN:  [id number]  Date: 4/1/2025  Diagnosis: Infected ulcer of skin    History:     Past Medical History:   Diagnosis Date    Diabetes mellitus     Hypertension     Renal disorder        Social History[1]    Precautions:     Allergies as of 03/31/2025    (No Known Allergies)       [facility] Assessment Details/Treatment     Active Problem List with Overview Notes    Diagnosis Date Noted    Infected ulcer of skin 03/31/2025    Hypertensive urgency 09/01/2024    Acute respiratory distress 09/01/2024    Foot ulcer with fat layer exposed, unspecified laterality 09/01/2024    Type 2 diabetes mellitus with foot ulcer 06/25/2024    End-stage renal disease 03/21/2024    Hyperkalemia 07/19/2021    Anemia in chronic kidney disease 03/12/2021    Essential hypertension 01/27/2021      Consulted for bilateral foot ulcers, possibly infected. Podiatry consulted.  A 39 year old male admitted 3/31/25 from home with complaint of leg pain. Sent to ED by  nurse for progression of wounds and suspicion of osteomyelitis.   4/1 WBC 18.93 Hgb 10.5 Hct 32.3 Alb 2.9 Weight 14.3 kg   8/20/24 A1C 4.9  On Isoflex mattress; Raymond score - not available  No 4 Eyes Skin Assessment since admission  4/1 S/P Irrigation and debridement bilateral foot per Dr. Faria  Foot wounds being managed by Podiatry.   Reconsult [facility] nurse as needed.   Recommendations made to primary team. Orders placed.     04/01/2025       [1]   Social History  Socioeconomic History    Marital status: Unknown   Tobacco Use    Smoking status: Never    Smokeless tobacco: Never   Substance and Sexual Activity    Alcohol use: Never    Drug use: Never     Social Drivers of Health     Financial Resource Strain: Low Risk  (9/1/2024)    Overall Financial Resource Strain (CARDIA)     Difficulty of Paying Living Expenses: Not hard at all   Food Insecurity: No Food Insecurity (9/1/2024)    Hunger Vital Sign     Worried  About Running Out of Food in the Last Year: Never true     Ran Out of Food in the Last Year: Never true   Transportation Needs: No Transportation Needs (9/1/2024)    TRANSPORTATION NEEDS     Transportation : No   Physical Activity: Sufficiently Active (9/1/2024)    Exercise Vital Sign     Days of Exercise per Week: 3 days     Minutes of Exercise per Session: 60 min   Stress: No Stress Concern Present (9/1/2024)    [employer] - Occupational Stress Questionnaire     Feeling of Stress : Not at all   Housing Stability: Unknown (9/1/2024)    Housing Stability Vital Sign     Unable to Pay for Housing in the Last Year: No     Homeless in the Last Year: No

## 2025-04-01 NOTE — PROGRESS NOTES
Vancomycin consult follow-up:    Patient reviewed, dialysis not scheduled but Mon, Wed, Fri normally, no new levels, no dose today; Next levels due: random due 4/2/2025 at 0600

## 2025-04-01 NOTE — TRANSFER OF CARE
Anesthesia Transfer of Care Note    Patient: Emmanuel Morgan    Procedure(s) Performed: Procedure(s) (LRB):  IRRIGATION AND DEBRIDEMENT (Bilateral)    Patient location: PACU    Anesthesia Type: general    Transport from OR: Transported from OR on room air with adequate spontaneous ventilation    Post pain: adequate analgesia    Post assessment: no apparent anesthetic complications and tolerated procedure well    Post vital signs: stable    Level of consciousness: awake, alert and oriented    Nausea/Vomiting: no nausea/vomiting    Complications: none    Transfer of care protocol was followed      Last vitals: Visit Vitals  /76 (BP Location: Left arm, Patient Position: Lying)   Pulse 79   Temp 37.2 °C (99 °F) (Oral)   Resp 18   Wt 104.3 kg (230 lb)   SpO2 100%   BMI 29.53 kg/m²

## 2025-04-01 NOTE — ASSESSMENT & PLAN NOTE
Creatine stable for now. BMP reviewed- noted Estimated Creatinine Clearance: 7.3 mL/min (A) (based on SCr of 17.4 mg/dL (H)). according to latest data. Based on current GFR, CKD stage is end stage.  Monitor UOP and serial BMP and adjust therapy as needed. Renally dose meds. Avoid nephrotoxic medications and procedures.    Nephrology with the patient and of hyperkalemia.  They recommended initiation of Lokelma.  He is to be dialyzed on 04/01.    Trend potassium

## 2025-04-01 NOTE — SUBJECTIVE & OBJECTIVE
Interval History: about the same with no new complaints.    Review of Systems   HENT:  Negative for ear discharge and ear pain.    Eyes:  Negative for discharge and itching.   Endocrine: Negative for cold intolerance and heat intolerance.   Neurological:  Negative for seizures and syncope.     Objective:     Vital Signs (Most Recent):  Temp: 99 °F (37.2 °C) (03/31/25 1853)  Pulse: 79 (04/01/25 1400)  Resp: 18 (04/01/25 1300)  BP: 122/76 (04/01/25 1300)  SpO2: 100 % (04/01/25 1400) Vital Signs (24h Range):  Temp:  [98.9 °F (37.2 °C)-99 °F (37.2 °C)] 99 °F (37.2 °C)  Pulse:  [] 79  Resp:  [18-20] 18  SpO2:  [92 %-100 %] 100 %  BP: (102-160)/(55-93) 122/76     Weight: 104.3 kg (230 lb)  Body mass index is 29.53 kg/m².  No intake or output data in the 24 hours ending 04/01/25 1523      Physical Exam  Constitutional:       Appearance: He is not toxic-appearing or diaphoretic.   HENT:      Head: Normocephalic and atraumatic.      Mouth/Throat:      Pharynx: Oropharynx is clear. No oropharyngeal exudate or posterior oropharyngeal erythema.   Cardiovascular:      Rate and Rhythm: Normal rate and regular rhythm.   Pulmonary:      Breath sounds: Normal breath sounds. No wheezing.   Abdominal:      General: Bowel sounds are normal.      Palpations: Abdomen is soft.   Skin:     Comments: Ulcers to left foot and right foot stump.   Neurological:      Mental Status: He is oriented to person, place, and time.      Cranial Nerves: No cranial nerve deficit.               Significant Labs: All pertinent labs within the past 24 hours have been reviewed.  BMP:   Recent Labs   Lab 04/01/25  0443   *   K 5.4*   CL 92*   CO2 26   BUN 82*   CREATININE 18.1*   CALCIUM 9.5   MG 2.1     CBC:   Recent Labs   Lab 03/31/25  1827 04/01/25  0443   WBC 22.52* 18.93*   HGB 11.0* 10.5*   HCT 35.2* 32.3*    308       Significant Imaging: I have reviewed all pertinent imaging results/findings within the past 24 hours.

## 2025-04-01 NOTE — SUBJECTIVE & OBJECTIVE
Past Medical History:   Diagnosis Date    Diabetes mellitus     Hypertension     Renal disorder        Past Surgical History:   Procedure Laterality Date    FOOT AMPUTATION Right        Review of patient's allergies indicates:  No Known Allergies    No current facility-administered medications on file prior to encounter.     Current Outpatient Medications on File Prior to Encounter   Medication Sig    carvediloL (COREG) 25 MG tablet Take 1 tablet by mouth 2 (two) times daily.    hydrALAZINE (APRESOLINE) 50 MG tablet Take 1 tablet by mouth 3 (three) times daily.    lisinopriL (PRINIVIL,ZESTRIL) 40 MG tablet Take 1 tablet by mouth once daily.    NIFEdipine (PROCARDIA-XL) 90 MG (OSM) 24 hr tablet Take 90 mg by mouth once daily.     Family History    None       Tobacco Use    Smoking status: Never    Smokeless tobacco: Never   Substance and Sexual Activity    Alcohol use: Never    Drug use: Never    Sexual activity: Not on file     Review of Systems   Constitutional:  Negative for fever.   Respiratory:  Negative for cough, chest tightness, shortness of breath and wheezing.    Cardiovascular:  Negative for chest pain, palpitations and leg swelling.   Gastrointestinal:  Negative for abdominal pain, diarrhea, nausea and vomiting.   Skin:  Positive for wound.   Neurological:  Negative for tremors, syncope, speech difficulty and headaches.     Objective:     Vital Signs (Most Recent):  Temp: 99 °F (37.2 °C) (03/31/25 1853)  Pulse: 108 (03/31/25 1900)  Resp: 20 (03/31/25 1729)  BP: (!) 160/93 (03/31/25 1900)  SpO2: 97 % (03/31/25 1729) Vital Signs (24h Range):  Temp:  [98.9 °F (37.2 °C)-99.6 °F (37.6 °C)] 99 °F (37.2 °C)  Pulse:  [108-125] 108  Resp:  [20] 20  SpO2:  [97 %] 97 %  BP: (126-160)/(76-93) 160/93     Weight: 104.3 kg (230 lb)  Body mass index is 29.53 kg/m².     Physical Exam         NAD, A/O 3   RRR  CTAB   Soft nontender nondistended, bowel sounds present   No edema  Right stump ulcer resident without  "significant erythema or purulent drainage  Left foot ulcer with foul-smelling and purulent drainage.  It seems the muscle can be seen  Significant Labs: All pertinent labs within the past 24 hours have been reviewed.  CBC:   Recent Labs   Lab 03/31/25  1827   WBC 22.52*   HGB 11.0*   HCT 35.2*        CMP:   Recent Labs   Lab 03/31/25  1827   *   K 5.6*   CL 90*   CO2 25   BUN 73*   CREATININE 17.4*   CALCIUM 10.2   ALBUMIN 3.5   BILITOT 0.5   ALKPHOS 57   AST 11   ALT 9*   ANIONGAP 18*     Magnesium: No results for input(s): "MG" in the last 48 hours.    Significant Imaging: I have reviewed all pertinent imaging results/findings within the past 24 hours.  "

## 2025-04-01 NOTE — HPI
39 year old man with a history of ESRD on HD, DM, hx of osteomyelitis following with wound care presents to the ED at wound care request for evaluation for possible debridement. Podiatry consulted.    Outpatient ESRD  Hemodialysis  Outpatient nephrologist: Dr. Wade  Outpatient center:  [facility]  Dialysis schedule: [facility]  Last treatment: 3/28/2025  Dry weight: 99kg  Access: right forearm AVF

## 2025-04-01 NOTE — CONSULTS
[facility] Emergency Dept  Nephrology  Consult Note    Patient Name: Emmanuel Morgan  MRN: [id number]  Admission Date: 3/31/2025  Hospital Length of Stay: 1 days  Attending Provider: Eliecer Posey MD   Primary Care Physician: Laurence, Primary Doctor  Principal Problem:Infected ulcer of skin    Inpatient consult to Nephrology  Consult performed by: Everett Hirsch MD  Consult ordered by: Abran Restrepo, SUELLEN        Subjective:     HPI: 39 year old man with a history of ESRD on HD, DM, hx of osteomyelitis following with wound care presents to the ED at wound care request for evaluation for possible debridement. Podiatry consulted.    Outpatient ESRD  Hemodialysis  Outpatient nephrologist: Dr. Wade  Outpatient center:  [facility]  Dialysis schedule: MWF  Last treatment: 3/28/2025  Dry weight: 99kg  Access: right forearm AVF      Past Medical History:   Diagnosis Date    Diabetes mellitus     Hypertension     Renal disorder        Past Surgical History:   Procedure Laterality Date    FOOT AMPUTATION Right        Review of patient's allergies indicates:  No Known Allergies  Current Facility-Administered Medications   Medication Frequency    acetaminophen tablet 650 mg Q8H PRN    acetaminophen tablet 650 mg Q4H PRN    aluminum-magnesium hydroxide-simethicone 200-200-20 mg/5 mL suspension 30 mL QID PRN    carvediloL tablet 25 mg BID    ceFEPIme injection 1 g Daily    dextrose 50% injection 12.5 g PRN    dextrose 50% injection 25 g PRN    glucagon (human recombinant) injection 1 mg PRN    glucose chewable tablet 16 g PRN    glucose chewable tablet 24 g PRN    hydrALAZINE tablet 50 mg TID    insulin aspart U-100 pen 0-5 Units QID (AC + HS) PRN    melatonin tablet 6 mg Nightly PRN    metronidazole IVPB 500 mg Q8H    naloxone 0.4 mg/mL injection 0.02 mg PRN    NIFEdipine 24 hr tablet 90 mg Daily    ondansetron injection 4 mg Q6H PRN    oxyCODONE immediate release tablet 5 mg Q4H PRN    senna-docusate 8.6-50 mg per tablet  1 tablet BID PRN    sodium zirconium cyclosilicate packet 10 g TID    vancomycin - pharmacy to dose pharmacy to manage frequency     Current Outpatient Medications   Medication    carvediloL (COREG) 25 MG tablet    hydrALAZINE (APRESOLINE) 50 MG tablet    lisinopriL (PRINIVIL,ZESTRIL) 40 MG tablet    NIFEdipine (PROCARDIA-XL) 90 MG (OSM) 24 hr tablet     Family History    None       Tobacco Use    Smoking status: Never    Smokeless tobacco: Never   Substance and Sexual Activity    Alcohol use: Never    Drug use: Never    Sexual activity: Not on file     Review of Systems   Constitutional:  Negative for chills and fever.   HENT:  Negative for rhinorrhea and sore throat.    Eyes:  Negative for pain and visual disturbance.   Respiratory:  Negative for cough and shortness of breath.    Cardiovascular:  Negative for chest pain and palpitations.   Gastrointestinal:  Negative for abdominal pain, constipation, diarrhea and nausea.   Endocrine: Negative for cold intolerance, heat intolerance and polyuria.   Genitourinary:  Negative for dysuria and flank pain.   Musculoskeletal:  Positive for gait problem. Negative for back pain and joint swelling.        Foot wound   Allergic/Immunologic: Negative for immunocompromised state.   Neurological:  Negative for light-headedness, numbness and headaches.     Objective:     Vital Signs (Most Recent):  Temp: 99 °F (37.2 °C) (03/31/25 1853)  Pulse: 84 (04/01/25 1000)  Resp: 20 (04/01/25 0728)  BP: 126/76 (04/01/25 1000)  SpO2: 100 % (04/01/25 1000) Vital Signs (24h Range):  Temp:  [98.9 °F (37.2 °C)-99.6 °F (37.6 °C)] 99 °F (37.2 °C)  Pulse:  [] 84  Resp:  [20] 20  SpO2:  [92 %-100 %] 100 %  BP: (102-160)/(55-93) 126/76     Weight: 104.3 kg (230 lb) (03/31/25 1238)  Body mass index is 29.53 kg/m².  Body surface area is 2.33 meters squared.    No intake/output data recorded.     Physical Exam  Constitutional:       Appearance: He is well-developed.   HENT:      Head: Normocephalic  and atraumatic.   Eyes:      Pupils: Pupils are equal, round, and reactive to light.   Cardiovascular:      Rate and Rhythm: Normal rate and regular rhythm.      Heart sounds: Normal heart sounds.      Comments: Right forearm AVF with good thrill and hum  Pulmonary:      Effort: Pulmonary effort is normal.      Breath sounds: Normal breath sounds.   Abdominal:      General: Bowel sounds are normal.      Palpations: Abdomen is soft.      Tenderness: There is no abdominal tenderness.   Musculoskeletal:         General: Signs of injury present. Normal range of motion.      Cervical back: Normal range of motion and neck supple.   Skin:     Capillary Refill: Capillary refill takes less than 2 seconds.   Neurological:      Mental Status: He is alert and oriented to person, place, and time.          Significant Labs:  All labs within the past 24 hours have been reviewed.    Significant Imaging:  Labs: Reviewed  Assessment/Plan:     Renal/  End-stage renal disease  ESRD on HD    Missed session 3/31.    Plan/Recommendation  HD today and tomorrow per his usual schedule MWF  -Keep MAP > 65  -Keep hemoglobin > 7  -Strict ins and outs  -Avoid nephrotoxic agents if possible and renally dose medications  -Avoid drastic hemodynamic changes if possible    #Anemia  HGB   Date Value Ref Range Status   04/01/2025 10.5 (L) 14.0 - 18.0 gm/dL Final   Obtain iron and ferritin levels  Hb within goal range    #Secondary hyperparathyroidism  Calcium   Date Value Ref Range Status   04/01/2025 9.5 8.7 - 10.5 mg/dL Final     Phosphorus Level   Date Value Ref Range Status   04/01/2025 5.5 (H) 2.7 - 4.5 mg/dL Final     PTH, Intact   Date Value Ref Range Status   03/10/2025 322 (H) 16 - 80 pg/mL Final   Renal diet when eating, low K and low phos  Renvella with meals 800 TID        Thank you for your consult. I will follow-up with patient. Please contact us if you have any additional questions.    Everett Hirsch MD  Nephrology  [facility] - Emergency  Dept

## 2025-04-01 NOTE — ASSESSMENT & PLAN NOTE
Creatine stable for now. BMP reviewed- noted Estimated Creatinine Clearance: 7 mL/min (A) (based on SCr of 18.1 mg/dL (H)). according to latest data. Based on current GFR, CKD stage is end stage.  Monitor UOP and serial BMP and adjust therapy as needed. Renally dose meds. Avoid nephrotoxic medications and procedures.    Nephrology with the patient and of hyperkalemia.  They recommended initiation of Lokelma.  He is to be dialyzed today.

## 2025-04-01 NOTE — H&P
[facility] Medicine  History & Physical    Patient Name: Emmanuel Morgan  MRN: [id number]  Patient Class: IP- Inpatient  Admission Date: 3/31/2025  Attending Physician: Nile Rosas MD   Primary Care Provider: Laurence Primary Doctor         Patient information was obtained from patient and ER records.     Subjective:     Principal Problem:Infected ulcer of skin    Chief Complaint:   Chief Complaint   Patient presents with    Leg Pain     Sent from [facility] for increased pain to right foot        HPI: 39-year-old male with ESRD on HD, diabetes not on insulin, prior osteomyelitis requiring right midfoot amputation with a long standing left foot ulcer (months) and a right foot ulcer of his stump which has been present for weeks who was sent to the ER by home health due to progression of wounds with skin and soft tissue infections of possibly both ulcers and possible osteomyelitis of either.     The left foot ulcer has been under the care of of agent wound care for months.  It has progressed in today was debrided by the home health nurse by patient report.  It has developed a foul smell with discharge.  It is not painful.    The right foot ulcer was created weeks ago when he accidentally hit his foot.  It is not painful.  This 1 does not have significant surrounding erythema or purulent drainage.      No fever.    He missed dialysis today due to reporting to the ER.      Past Medical History:   Diagnosis Date    Diabetes mellitus     Hypertension     Renal disorder        Past Surgical History:   Procedure Laterality Date    FOOT AMPUTATION Right        Review of patient's allergies indicates:  No Known Allergies    No current facility-administered medications on file prior to encounter.     Current Outpatient Medications on File Prior to Encounter   Medication Sig    carvediloL (COREG) 25 MG tablet Take 1 tablet by mouth 2 (two) times daily.    hydrALAZINE (APRESOLINE) 50 MG tablet Take 1  tablet by mouth 3 (three) times daily.    lisinopriL (PRINIVIL,ZESTRIL) 40 MG tablet Take 1 tablet by mouth once daily.    NIFEdipine (PROCARDIA-XL) 90 MG (OSM) 24 hr tablet Take 90 mg by mouth once daily.     Family History    None       Tobacco Use    Smoking status: Never    Smokeless tobacco: Never   Substance and Sexual Activity    Alcohol use: Never    Drug use: Never    Sexual activity: Not on file     Review of Systems   Constitutional:  Negative for fever.   Respiratory:  Negative for cough, chest tightness, shortness of breath and wheezing.    Cardiovascular:  Negative for chest pain, palpitations and leg swelling.   Gastrointestinal:  Negative for abdominal pain, diarrhea, nausea and vomiting.   Skin:  Positive for wound.   Neurological:  Negative for tremors, syncope, speech difficulty and headaches.     Objective:     Vital Signs (Most Recent):  Temp: 99 °F (37.2 °C) (03/31/25 1853)  Pulse: 108 (03/31/25 1900)  Resp: 20 (03/31/25 1729)  BP: (!) 160/93 (03/31/25 1900)  SpO2: 97 % (03/31/25 1729) Vital Signs (24h Range):  Temp:  [98.9 °F (37.2 °C)-99.6 °F (37.6 °C)] 99 °F (37.2 °C)  Pulse:  [108-125] 108  Resp:  [20] 20  SpO2:  [97 %] 97 %  BP: (126-160)/(76-93) 160/93     Weight: 104.3 kg (230 lb)  Body mass index is 29.53 kg/m².     Physical Exam         NAD, A/O 3   RRR  CTAB   Soft nontender nondistended, bowel sounds present   No edema  Right stump ulcer resident without significant erythema or purulent drainage  Left foot ulcer with foul-smelling and purulent drainage.  It seems the muscle can be seen  Significant Labs: All pertinent labs within the past 24 hours have been reviewed.  CBC:   Recent Labs   Lab 03/31/25  1827   WBC 22.52*   HGB 11.0*   HCT 35.2*        CMP:   Recent Labs   Lab 03/31/25  1827   *   K 5.6*   CL 90*   CO2 25   BUN 73*   CREATININE 17.4*   CALCIUM 10.2   ALBUMIN 3.5   BILITOT 0.5   ALKPHOS 57   AST 11   ALT 9*   ANIONGAP 18*     Magnesium: No results for  "input(s): "MG" in the last 48 hours.    Significant Imaging: I have reviewed all pertinent imaging results/findings within the past 24 hours.  Assessment/Plan:     Assessment & Plan  Infected ulcer of skin    -right stump and left foot ulcers both potentially with skin and soft tissue infection.  Both potentially with osteomyelitis.    -vanco, cefepime, Flagyl ordered.    -MRI ordered   -podiatry consulted.  Wound Care consulted    -NPO at midnight.    End-stage renal disease  Creatine stable for now. BMP reviewed- noted Estimated Creatinine Clearance: 7.3 mL/min (A) (based on SCr of 17.4 mg/dL (H)). according to latest data. Based on current GFR, CKD stage is end stage.  Monitor UOP and serial BMP and adjust therapy as needed. Renally dose meds. Avoid nephrotoxic medications and procedures.    Nephrology with the patient and of hyperkalemia.  They recommended initiation of Lokelma.  He is to be dialyzed on 04/01.    Trend potassium  Essential hypertension  Patient's blood pressure range in the last 24 hours was: BP  Min: 126/76  Max: 160/93.The patient's inpatient anti-hypertensive regimen is listed below:  Current Antihypertensives  , Daily, Oral  carvediloL tablet 25 mg, 2 times daily, Oral  hydrALAZINE tablet 50 mg, 3 times daily, Oral  NIFEdipine 24 hr tablet 90 mg, Daily, Oral    Plan  - BP is controlled, no changes needed to their regimen  - lisinopril held due to hyperkalemia.  Hyperkalemia  Hyperkalemia is likely due to ESRD.The patients most recent potassium results are listed below.  Recent Labs     03/31/25  1827   K 5.6*     Plan  - Monitor for arrhythmias with EKG and/or continuous telemetry.   - Treat the hyperkalemia with Potassium Binders.   - Monitor potassium: Every 6 hours  - The patient's hyperkalemia is stable          Type 2 diabetes mellitus with foot ulcer  Patient's FSGs are controlled on current medication regimen.  Last A1c reviewed-   Lab Results   Component Value Date    HGBA1C 4.9 " 08/20/2024     Most recent fingerstick glucose reviewed-   Recent Labs   Lab 03/31/25  1238   POCTGLUCOSE 120*     Current correctional scale  Low  Maintain anti-hyperglycemic dose as follows-   Antihyperglycemics (From admission, onward)      Start     Stop Route Frequency Ordered    03/31/25 2317  insulin aspart U-100 pen 0-5 Units         -- SubQ Before meals & nightly PRN 03/31/25 2218          Hold Oral hypoglycemics while patient is in the hospital.  VTE Risk Mitigation (From admission, onward)           Ordered     IP VTE LOW RISK PATIENT  Once         03/31/25 2218     Place sequential compression device  Until discontinued         03/31/25 2218                               Pharmacokinetic Initial Assessment: IV Vancomycin    Assessment/Plan:    Initiate intravenous vancomycin with loading dose of 2000 mg once with subsequent doses when random concentrations are less than 20 mcg/mL  Desired empiric serum trough concentration is 10 to 20 mcg/mL  Draw vancomycin random level on 4/1/25 at 0600.  Pharmacy will continue to follow and monitor vancomycin.      Please contact pharmacy at extension [phone] with any questions regarding this assessment.     Thank you for the consult,   Ava Butt       Patient brief summary:  Emmanuel Morgan is a 39 y.o. male initiated on antimicrobial therapy with IV Vancomycin for treatment of suspected bone/joint infection    Drug Allergies:   Review of patient's allergies indicates:  No Known Allergies    Actual Body Weight:   104.3 kg     Renal Function:   Estimated Creatinine Clearance: 7.3 mL/min (A) (based on SCr of 17.4 mg/dL (H)).,     Dialysis Method (if applicable):  intermittent HD MWF     CBC (last 72 hours):  Recent Labs   Lab Result Units 03/31/25  1827   WBC K/uL 22.52*   HGB gm/dL 11.0*   HCT % 35.2*   Platelet Count K/uL 335   Lymph % % 11.5*   Mono % % 13.3   Eos % % 0.5   Basophil % % 0.2       Metabolic Panel (last 72 hours):  Recent Labs   Lab Result Units  "03/31/25 1827   Sodium mmol/L 133*   Potassium mmol/L 5.6*   Chloride mmol/L 90*   CO2 mmol/L 25   Glucose mg/dL 95   BUN mg/dL 73*   Creatinine mg/dL 17.4*   Albumin g/dL 3.5   Bilirubin Total mg/dL 0.5   ALP unit/L 57   AST unit/L 11   ALT unit/L 9*       Drug levels (last 3 results):  No results for input(s): "VANCOMYCINRA", "VANCORANDOM", "VANCOMYCINPE", "VANCOPEAK", "VANCOMYCINTR", "VANCOTROUGH" in the last 72 hours.    Microbiologic Results:  Microbiology Results (last 7 days)       Procedure Component Value Units Date/Time    Blood Culture #2 **CANNOT BE ORDERED STAT** [[id number]] Collected: 03/31/25 1845    Order Status: Resulted Specimen: Blood from Peripheral, Hand, Left Updated: 03/31/25 1852    Blood Culture #1 **CANNOT BE ORDERED STAT** [[id number]] Collected: 03/31/25 1827    Order Status: Resulted Specimen: Blood from Peripheral, Antecubital, Left Updated: 03/31/25 1852              Nerga Felton MD  Department of Hospital Medicine  [facility] [place] - Emergency Dept          "

## 2025-04-01 NOTE — PROGRESS NOTES
[facility] Medicine  Progress Note    Patient Name: Emmanuel Morgan  MRN: [id number]  Patient Class: IP- Inpatient   Admission Date: 3/31/2025  Length of Stay: 1 days  Attending Physician: Eliecer Posey MD  Primary Care Provider: Laurence, Primary Doctor        Subjective     Principal Problem:Infected ulcer of skin        HPI:  39-year-old male with ESRD on HD, diabetes not on insulin, prior osteomyelitis requiring right midfoot amputation with a long standing left foot ulcer (months) and a right foot ulcer of his stump which has been present for weeks who was sent to the ER by home health due to progression of wounds with skin and soft tissue infections of possibly both ulcers and possible osteomyelitis of either.     The left foot ulcer has been under the care of of agent wound care for months.  It has progressed in today was debrided by the home health nurse by patient report.  It has developed a foul smell with discharge.  It is not painful.    The right foot ulcer was created weeks ago when he accidentally hit his foot.  It is not painful.  This 1 does not have significant surrounding erythema or purulent drainage.      No fever.    He missed dialysis today due to reporting to the ER.      Overview/Hospital Course:  39-year-old male with ESRD on HD, DM, prior osteomyelitis requiring right midfoot amputation with a long standing left foot ulcer (months) and a right foot ulcer of his stump which has been present for weeks who was sent to the ER by home health due to progression of wounds with skin and soft tissue infections of possibly both ulcers and possible osteomyelitis of either.  MRI of left foot showing plantar soft tissue ulcer at the level of the 3rd MTP joint with associated osteomyelitis of the distal half of the metatarsal and base of the proximal phalanx.  On ABX's and Podiatry consulted.       Interval History: about the same with no new complaints.    Review of Systems   HENT:   Negative for ear discharge and ear pain.    Eyes:  Negative for discharge and itching.   Endocrine: Negative for cold intolerance and heat intolerance.   Neurological:  Negative for seizures and syncope.     Objective:     Vital Signs (Most Recent):  Temp: 99 °F (37.2 °C) (03/31/25 1853)  Pulse: 79 (04/01/25 1400)  Resp: 18 (04/01/25 1300)  BP: 122/76 (04/01/25 1300)  SpO2: 100 % (04/01/25 1400) Vital Signs (24h Range):  Temp:  [98.9 °F (37.2 °C)-99 °F (37.2 °C)] 99 °F (37.2 °C)  Pulse:  [] 79  Resp:  [18-20] 18  SpO2:  [92 %-100 %] 100 %  BP: (102-160)/(55-93) 122/76     Weight: 104.3 kg (230 lb)  Body mass index is 29.53 kg/m².  No intake or output data in the 24 hours ending 04/01/25 1523      Physical Exam  Constitutional:       Appearance: He is not toxic-appearing or diaphoretic.   HENT:      Head: Normocephalic and atraumatic.      Mouth/Throat:      Pharynx: Oropharynx is clear. No oropharyngeal exudate or posterior oropharyngeal erythema.   Cardiovascular:      Rate and Rhythm: Normal rate and regular rhythm.   Pulmonary:      Breath sounds: Normal breath sounds. No wheezing.   Abdominal:      General: Bowel sounds are normal.      Palpations: Abdomen is soft.   Skin:     Comments: Ulcers to left foot and right foot stump.   Neurological:      Mental Status: He is oriented to person, place, and time.      Cranial Nerves: No cranial nerve deficit.               Significant Labs: All pertinent labs within the past 24 hours have been reviewed.  BMP:   Recent Labs   Lab 04/01/25  0443   *   K 5.4*   CL 92*   CO2 26   BUN 82*   CREATININE 18.1*   CALCIUM 9.5   MG 2.1     CBC:   Recent Labs   Lab 03/31/25  1827 04/01/25  0443   WBC 22.52* 18.93*   HGB 11.0* 10.5*   HCT 35.2* 32.3*    308       Significant Imaging: I have reviewed all pertinent imaging results/findings within the past 24 hours.      Assessment & Plan  Infected ulcer of skin  Patient presents with ulcers to left foot and right  foot stump.  MRI ordered.  No evidence of osteo on right foot stump.  Left foot MRI showing plantar soft tissue ulcer at the level of the 3rd MTP joint with associated osteomyelitis of the distal half of the metatarsal and base of the proximal phalanx.   Started on ABX's.  Podiatry consulted.  Planning I&D today.    End-stage renal disease  Creatine stable for now. BMP reviewed- noted Estimated Creatinine Clearance: 7 mL/min (A) (based on SCr of 18.1 mg/dL (H)). according to latest data. Based on current GFR, CKD stage is end stage.  Monitor UOP and serial BMP and adjust therapy as needed. Renally dose meds. Avoid nephrotoxic medications and procedures.    Nephrology with the patient and of hyperkalemia.  They recommended initiation of Lokelma.  He is to be dialyzed today.    Essential hypertension  Patient's blood pressure range in the last 24 hours was: BP  Min: 102/55  Max: 160/93.The patient's inpatient anti-hypertensive regimen is listed below:  Current Antihypertensives  , Daily, Oral  carvediloL tablet 25 mg, 2 times daily, Oral  hydrALAZINE tablet 50 mg, 3 times daily, Oral  NIFEdipine 24 hr tablet 90 mg, Daily, Oral    Plan  - BP is controlled, no changes needed to their regimen  - lisinopril held due to hyperkalemia.  Hyperkalemia  Hyperkalemia is likely due to ESRD.The patients most recent potassium results are listed below.  Recent Labs     03/31/25  1827 03/31/25  2249 04/01/25  0443   K 5.6* 5.2* 5.4*   Treated with Lokelma.  Stable.  Nephrology consulted for HD.          Type 2 diabetes mellitus with foot ulcer  Patient's FSGs are controlled on current medication regimen.  Last A1c reviewed-   Lab Results   Component Value Date    HGBA1C 4.9 08/20/2024     Most recent fingerstick glucose reviewed-   Recent Labs   Lab 04/01/25  0807 04/01/25  1224   POCTGLUCOSE 109 97     Current correctional scale  Low  Maintain anti-hyperglycemic dose as follows-   Antihyperglycemics (From admission, onward)       Start     Stop Route Frequency Ordered    03/31/25 2317  insulin aspart U-100 pen 0-5 Units         -- SubQ Before meals & nightly PRN 03/31/25 2218          Hold Oral hypoglycemics while patient is in the hospital.  VTE Risk Mitigation (From admission, onward)           Ordered     IP VTE LOW RISK PATIENT  Once         03/31/25 2218     Place sequential compression device  Until discontinued         03/31/25 2218                    Discharge Planning   LULÚ:      Code Status: Full Code   Medical Readiness for Discharge Date:                            Eliecer Posey MD  Department of [facility] Medicine   [facility]

## 2025-04-01 NOTE — ASSESSMENT & PLAN NOTE
ESRD on HD    Missed session 3/31.    Plan/Recommendation  HD today and tomorrow per his usual schedule MWF  -Keep MAP > 65  -Keep hemoglobin > 7  -Strict ins and outs  -Avoid nephrotoxic agents if possible and renally dose medications  -Avoid drastic hemodynamic changes if possible    #Anemia  HGB   Date Value Ref Range Status   04/01/2025 10.5 (L) 14.0 - 18.0 gm/dL Final   Obtain iron and ferritin levels  Hb within goal range    #Secondary hyperparathyroidism  Calcium   Date Value Ref Range Status   04/01/2025 9.5 8.7 - 10.5 mg/dL Final     Phosphorus Level   Date Value Ref Range Status   04/01/2025 5.5 (H) 2.7 - 4.5 mg/dL Final     PTH, Intact   Date Value Ref Range Status   03/10/2025 322 (H) 16 - 80 pg/mL Final   Renal diet when eating, low K and low phos  Renvella with meals 800 TID

## 2025-04-01 NOTE — ASSESSMENT & PLAN NOTE
Patient presents with ulcers to left foot and right foot stump.  MRI ordered.  No evidence of osteo on right foot stump.  Left foot MRI showing plantar soft tissue ulcer at the level of the 3rd MTP joint with associated osteomyelitis of the distal half of the metatarsal and base of the proximal phalanx.   Started on ABX's.  Podiatry consulted.  Planning I&D today.

## 2025-04-01 NOTE — NURSING
Report received from Ute in PACU. Patient to come to room 339. I called the dialysis unit, dialysis will be later this evening.

## 2025-04-01 NOTE — PROGRESS NOTES
"Pharmacokinetic Initial Assessment: IV Vancomycin    Assessment/Plan:    Initiate intravenous vancomycin with loading dose of 2000 mg once with subsequent doses when random concentrations are less than 20 mcg/mL  Desired empiric serum trough concentration is 10 to 20 mcg/mL  Draw vancomycin random level on 4/1/25 at 0600.  Pharmacy will continue to follow and monitor vancomycin.      Please contact pharmacy at extension [phone] with any questions regarding this assessment.     Thank you for the consult,   Ava Butt       Patient brief summary:  Emmanuel Morgan is a 39 y.o. male initiated on antimicrobial therapy with IV Vancomycin for treatment of suspected bone/joint infection    Drug Allergies:   Review of patient's allergies indicates:  No Known Allergies    Actual Body Weight:   104.3 kg     Renal Function:   Estimated Creatinine Clearance: 7.3 mL/min (A) (based on SCr of 17.4 mg/dL (H)).,     Dialysis Method (if applicable):  intermittent HD MWF     CBC (last 72 hours):  Recent Labs   Lab Result Units 03/31/25  1827   WBC K/uL 22.52*   HGB gm/dL 11.0*   HCT % 35.2*   Platelet Count K/uL 335   Lymph % % 11.5*   Mono % % 13.3   Eos % % 0.5   Basophil % % 0.2       Metabolic Panel (last 72 hours):  Recent Labs   Lab Result Units 03/31/25  1827   Sodium mmol/L 133*   Potassium mmol/L 5.6*   Chloride mmol/L 90*   CO2 mmol/L 25   Glucose mg/dL 95   BUN mg/dL 73*   Creatinine mg/dL 17.4*   Albumin g/dL 3.5   Bilirubin Total mg/dL 0.5   ALP unit/L 57   AST unit/L 11   ALT unit/L 9*       Drug levels (last 3 results):  No results for input(s): "VANCOMYCINRA", "VANCORANDOM", "VANCOMYCINPE", "VANCOPEAK", "VANCOMYCINTR", "VANCOTROUGH" in the last 72 hours.    Microbiologic Results:  Microbiology Results (last 7 days)       Procedure Component Value Units Date/Time    Blood Culture #2 **CANNOT BE ORDERED STAT** [[id number]] Collected: 03/31/25 3501    Order Status: Resulted Specimen: Blood from Peripheral, Hand, Left " Updated: 03/31/25 1852    Blood Culture #1 **CANNOT BE ORDERED STAT** [[id number]] Collected: 03/31/25 1827    Order Status: Resulted Specimen: Blood from Peripheral, Antecubital, Left Updated: 03/31/25 1852

## 2025-04-01 NOTE — ANESTHESIA PREPROCEDURE EVALUATION
04/01/2025  Emmanuel Morgan is a 39 y.o., male.      Pre-op Assessment    I have reviewed the Patient Summary Reports.     I have reviewed the Nursing Notes. I have reviewed the NPO Status.   I have reviewed the Medications.     Review of Systems  Anesthesia Hx:  No problems with previous Anesthesia                Social:  Non-Smoker, No Alcohol Use       Hematology/Oncology:       -- Anemia:               Hematology Comments: K 5.4                    Cardiovascular:     Hypertension                                          Pulmonary:  Pulmonary Normal                       Renal/:  Chronic Renal Disease, ESRD, Dialysis                Hepatic/GI:  Hepatic/GI Normal                    Musculoskeletal:     Bilateral feet ulcers            Neurological:  Neurology Normal                                      Endocrine:  Diabetes               Physical Exam  General: Well nourished, Cooperative, Alert and Oriented    Airway:  Mallampati: I   Mouth Opening: Normal  TM Distance: Normal  Tongue: Normal  Neck ROM: Normal ROM    Dental:  Periodontal disease    Chest/Lungs:  Normal Respiratory Rate    Heart:  Rate: Normal  Rhythm: Regular Rhythm      Wt Readings from Last 3 Encounters:   03/31/25 104.3 kg (230 lb)   09/01/24 96.2 kg (212 lb 1.3 oz)     Temp Readings from Last 3 Encounters:   03/31/25 37.2 °C (99 °F) (Oral)   09/02/24 36.7 °C (98 °F)     BP Readings from Last 3 Encounters:   04/01/25 (!) 104/58   09/02/24 127/79     Pulse Readings from Last 3 Encounters:   04/01/25 86   09/02/24 87     Lab Results   Component Value Date    WBC 18.93 (H) 04/01/2025    HGB 10.5 (L) 04/01/2025    HCT 32.3 (L) 04/01/2025    MCV 92 04/01/2025     04/01/2025       CMP  Sodium   Date Value Ref Range Status   04/01/2025 134 (L) 136 - 145 mmol/L Final   09/02/2024 135 (L) 136 - 145 mmol/L Final     Potassium   Date  Value Ref Range Status   04/01/2025 5.4 (H) 3.5 - 5.1 mmol/L Final   11/18/2024 5.3 (H) 3.6 - 5.2 mmol/L Final   09/02/2024 6.1 (H) 3.5 - 5.1 mmol/L Final     Comment:     No visible hemolysis     Chloride   Date Value Ref Range Status   04/01/2025 92 (L) 95 - 110 mmol/L Final   09/02/2024 97 95 - 110 mmol/L Final     CO2   Date Value Ref Range Status   04/01/2025 26 23 - 29 mmol/L Final   09/02/2024 26 23 - 29 mmol/L Final     Glucose   Date Value Ref Range Status   09/02/2024 83 70 - 110 mg/dL Final     BUN   Date Value Ref Range Status   04/01/2025 82 (H) 6 - 20 mg/dL Final     Creatinine   Date Value Ref Range Status   04/01/2025 18.1 (H) 0.5 - 1.4 mg/dL Final     Calcium   Date Value Ref Range Status   04/01/2025 9.5 8.7 - 10.5 mg/dL Final   09/02/2024 8.8 8.7 - 10.5 mg/dL Final     Total Protein   Date Value Ref Range Status   09/02/2024 7.4 6.0 - 8.4 g/dL Final     Albumin   Date Value Ref Range Status   04/01/2025 2.9 (L) 3.5 - 5.2 g/dL Final   09/02/2024 3.3 (L) 3.5 - 5.2 g/dL Final     Total Bilirubin   Date Value Ref Range Status   09/02/2024 0.6 0.1 - 1.0 mg/dL Final     Comment:     For infants and newborns, interpretation of results should be based  on gestational age, weight and in agreement with clinical  observations.    Premature Infant recommended reference ranges:  Up to 24 hours.............<8.0 mg/dL  Up to 48 hours............<12.0 mg/dL  3-5 days..................<15.0 mg/dL  6-29 days.................<15.0 mg/dL       Bilirubin Total   Date Value Ref Range Status   04/01/2025 0.4 0.1 - 1.0 mg/dL Final     Comment:     For infants and newborns, interpretation of results should be based   on gestational age, weight and in agreement with clinical   observations.    Premature Infant recommended reference ranges:   0-24 hours:  <8.0 mg/dL   24-48 hours: <12.0 mg/dL   3-5 days:    <15.0 mg/dL   6-29 days:   <15.0 mg/dL     Alkaline Phosphatase   Date Value Ref Range Status   09/02/2024 51 (L) 55 -  135 U/L Final     ALP   Date Value Ref Range Status   04/01/2025 52 40 - 150 unit/L Final     AST   Date Value Ref Range Status   04/01/2025 10 (L) 11 - 45 unit/L Final   09/02/2024 14 10 - 40 U/L Final     ALT   Date Value Ref Range Status   04/01/2025 7 (L) 10 - 44 unit/L Final   09/02/2024 18 10 - 44 U/L Final     Anion Gap   Date Value Ref Range Status   04/01/2025 16 8 - 16 mmol/L Final     eGFR   Date Value Ref Range Status   04/01/2025 3 (L) >60 mL/min/1.73/m2 Final     Comment:     Estimated GFR calculated using the CKD-EPI creatinine (2021) equation.   09/02/2024 5 (A) >60 mL/min/1.73 m^2 Final         Anesthesia Plan  Type of Anesthesia, risks & benefits discussed:    Anesthesia Type: Gen Natural Airway, MAC, Gen ETT  Intra-op Monitoring Plan: Standard ASA Monitors  Post Op Pain Control Plan: multimodal analgesia  Induction:  IV  Informed Consent: Informed consent signed with the Patient and all parties understand the risks and agree with anesthesia plan.  All questions answered. Patient consented to blood products? No  ASA Score: 3    Ready For Surgery From Anesthesia Perspective.     .

## 2025-04-01 NOTE — SUBJECTIVE & OBJECTIVE
Past Medical History:   Diagnosis Date    Diabetes mellitus     Hypertension     Renal disorder        Past Surgical History:   Procedure Laterality Date    FOOT AMPUTATION Right        Review of patient's allergies indicates:  No Known Allergies  Current Facility-Administered Medications   Medication Frequency    acetaminophen tablet 650 mg Q8H PRN    acetaminophen tablet 650 mg Q4H PRN    aluminum-magnesium hydroxide-simethicone 200-200-20 mg/5 mL suspension 30 mL QID PRN    carvediloL tablet 25 mg BID    ceFEPIme injection 1 g Daily    dextrose 50% injection 12.5 g PRN    dextrose 50% injection 25 g PRN    glucagon (human recombinant) injection 1 mg PRN    glucose chewable tablet 16 g PRN    glucose chewable tablet 24 g PRN    hydrALAZINE tablet 50 mg TID    insulin aspart U-100 pen 0-5 Units QID (AC + HS) PRN    melatonin tablet 6 mg Nightly PRN    metronidazole IVPB 500 mg Q8H    naloxone 0.4 mg/mL injection 0.02 mg PRN    NIFEdipine 24 hr tablet 90 mg Daily    ondansetron injection 4 mg Q6H PRN    oxyCODONE immediate release tablet 5 mg Q4H PRN    senna-docusate 8.6-50 mg per tablet 1 tablet BID PRN    sodium zirconium cyclosilicate packet 10 g TID    vancomycin - pharmacy to dose pharmacy to manage frequency     Current Outpatient Medications   Medication    carvediloL (COREG) 25 MG tablet    hydrALAZINE (APRESOLINE) 50 MG tablet    lisinopriL (PRINIVIL,ZESTRIL) 40 MG tablet    NIFEdipine (PROCARDIA-XL) 90 MG (OSM) 24 hr tablet     Family History    None       Tobacco Use    Smoking status: Never    Smokeless tobacco: Never   Substance and Sexual Activity    Alcohol use: Never    Drug use: Never    Sexual activity: Not on file     Review of Systems   Constitutional:  Negative for chills and fever.   HENT:  Negative for rhinorrhea and sore throat.    Eyes:  Negative for pain and visual disturbance.   Respiratory:  Negative for cough and shortness of breath.    Cardiovascular:  Negative for chest pain and  palpitations.   Gastrointestinal:  Negative for abdominal pain, constipation, diarrhea and nausea.   Endocrine: Negative for cold intolerance, heat intolerance and polyuria.   Genitourinary:  Negative for dysuria and flank pain.   Musculoskeletal:  Positive for gait problem. Negative for back pain and joint swelling.        Foot wound   Allergic/Immunologic: Negative for immunocompromised state.   Neurological:  Negative for light-headedness, numbness and headaches.     Objective:     Vital Signs (Most Recent):  Temp: 99 °F (37.2 °C) (03/31/25 1853)  Pulse: 84 (04/01/25 1000)  Resp: 20 (04/01/25 0728)  BP: 126/76 (04/01/25 1000)  SpO2: 100 % (04/01/25 1000) Vital Signs (24h Range):  Temp:  [98.9 °F (37.2 °C)-99.6 °F (37.6 °C)] 99 °F (37.2 °C)  Pulse:  [] 84  Resp:  [20] 20  SpO2:  [92 %-100 %] 100 %  BP: (102-160)/(55-93) 126/76     Weight: 104.3 kg (230 lb) (03/31/25 1238)  Body mass index is 29.53 kg/m².  Body surface area is 2.33 meters squared.    No intake/output data recorded.     Physical Exam  Constitutional:       Appearance: He is well-developed.   HENT:      Head: Normocephalic and atraumatic.   Eyes:      Pupils: Pupils are equal, round, and reactive to light.   Cardiovascular:      Rate and Rhythm: Normal rate and regular rhythm.      Heart sounds: Normal heart sounds.      Comments: Right forearm AVF with good thrill and hum  Pulmonary:      Effort: Pulmonary effort is normal.      Breath sounds: Normal breath sounds.   Abdominal:      General: Bowel sounds are normal.      Palpations: Abdomen is soft.      Tenderness: There is no abdominal tenderness.   Musculoskeletal:         General: Signs of injury present. Normal range of motion.      Cervical back: Normal range of motion and neck supple.   Skin:     Capillary Refill: Capillary refill takes less than 2 seconds.   Neurological:      Mental Status: He is alert and oriented to person, place, and time.          Significant Labs:  All labs  within the past 24 hours have been reviewed.    Significant Imaging:  Labs: Reviewed

## 2025-04-01 NOTE — OP NOTE
Wound Debridement,   Bilateral Foot    Surgery Date: 4/1/2025     Surgeons and Role:     * Bety Faria DPM - Primary    Assisting Surgeon: None    Pre-op Diagnosis:  Foot infection [L08.9]    Post-op Diagnosis:  Post-Op Diagnosis Codes:     * Foot infection [L08.9]    Procedure(s) (LRB):  IRRIGATION AND DEBRIDEMENT (Bilateral)    Anesthesia: Local MAC    Description of the findings of the procedure: gas in the soft tissues of the plantar right foot and deep probe to bone with soft bone of left foot    Estimated Blood Loss: less than 20 mL         Specimens:   Specimen (24h ago, onward)       Start     Ordered    04/01/25 1615  Specimen to Pathology Other (podiatry)  Once        Comments: Trocar biopsy third metatarsal left foot     References:    Click here for ordering Quick Tip   Question Answer Comment   Service Line: Other podiatry   Specimen Source Foot, Left    Specimen Class: Routine/Screening    Procedure Type: Biopsy    Release to patient Immediate        04/01/25 1615                    Hemostasis: Pneumatic ankle tourniquets, not inflated     Procedure in Detail:  The patient was seen in the Holding Room. The risks, benefits, complications, treatment options, and expected outcomes were discussed with the patient. The risks and potential complications of their problem and purposed treatment include but are not limited to infection, nerve injury, vascular injury, pain, potential skin necrosis, deep vein thrombosis, possible pulmonary embolus, complications of the anesthetics and need for further amputation.  The patient concurred with the proposed plan, giving informed consent.  The site of surgery properly noted/marked. The patient was taken to Operating Room #2 identified as Emmanuel Morgan and the procedure verified as irrigation and debridement of both feet with trocar bone biopsy of the left foot. A Time Out was held and the above information confirmed.    The patient was brought to the operating  room, placed on the operating table in a supine position. Following the successful induction of anesthesia, 10 ccs of a 1:1 mixture of 2% Lidocaine plain and 0.5% Bupivicaine plain was injected as an ankle block to the right foot and 10 ccs of a 1:1 mixture of 2% Lidocaine plain and 0.5% Bupivicaine plain was injected as a forefoot block to the left foot. The feet were then scrubbed, prepped, and draped in the usual aseptic manner.        Wound Debridement    Date/Time: 4/1/2025 5:03 PM    Performed by: Bety Faria DPM  Authorized by: Bety Faria DPM      Wound Details:    Location:  Right foot    Location:  Right Midfoot    Type of Debridement:  Excisional       Length (cm):  8       Width (cm):  3.7       Depth (cm):  0.9       Area (sq cm):  23.25       Percent Debrided (%):  100       Total Area Debrided (sq cm):  23.25    Depth of debridement:  Muscle/fascia/tendon    Tissue debrided:  Dermis, Epidermis, Fascia and Subcutaneous    Devitalized tissue debrided:  Biofilm, Callus, Slough, Necrotic/Eschar and Fibrin    Instruments:  Curette, Scissors, Nippers and Rongeur  Bleeding:  Moderate  Hemostasis Achieved: Yes  Method Used:  Pressure    Additional wounds:  1    2nd Wound Details:     Location:  Left foot    Location:  Left 3rd Metatarsal Head    Location:  Left 3rd Metatarsal Head    Type of Debridement:  Excisional       Length (cm):  2.1       Area (sq cm):  2.47       Width (cm):  1.5       Percent Debrided (%):  100       Depth (cm):  0.7       Total Area Debrided (sq cm):  2.47    Depth of debridement:  Subcutaneous tissue    Tissue debrided:  Epidermis, Dermis and Subcutaneous    Devitalized tissue debrided:  Callus and Fibrin    Instruments:  Curette, Scissors and Forceps  Bleeding:  Minimal  Hemostasis Achieved: Yes    Method Used:  Pressure     Significant purulent drainage, necrosis, gas bubbles noted to the plantar right foot wound.  Deep tissue culture obtained with 15 blade and forceps  sent to microbiology        Attention was directed sub 3rd MTPJ of the left foot where a full thickness ulceration with probe to bone noted.  A Jamshidi needle was utilized to take a plug of bone for culture and pathology. No purulent drainage or abscess was found.       Next,  2 L of betadine impregnated sterile saline were instilled into the right foot wound, 1 L into the left foot wound.    On the back table, stimulan antibiotic bead system mixed with vancomycin and tobramycin powder into medium beads.  Once hardened, absorbable beads placed to each wound bed.    The patient tolerated the above anesthesia and surgery without apparent complications. A standard postoperative dressing was applied consisting of betadine soaked adaptic, gauze, 4x4s, Kerlix, Cast padding and coban to each foot. The patient was transported via cart to Postanesthesia Care Unit with vital signs able and vascular status intact to foot. He will be readmitted to the floor where we will continue to follow . Plan is to continue the antibiotics until further ID recommendations.    He will be nonweightbearing.  He will elevate his feet and rest.  Podiatry will round on him tomorrow to change dressing. Dressings are to be left clean, dry, and intact until this time.           Implants: stimulan           Complications:  None; patient tolerated the procedure well.           Disposition: PACU - hemodynamically stable. Then back to floor           Condition: stable

## 2025-04-01 NOTE — HOSPITAL COURSE
39-year-old male with ESRD on HD, DM, prior osteomyelitis requiring right midfoot amputation with a long standing left foot ulcer (months) and a right foot ulcer of his stump which has been present for weeks who was sent to the ER by home health due to progression of wounds with skin and soft tissue infections of possibly both ulcers and possible osteomyelitis of either.  MRI of left foot showing plantar soft tissue ulcer at the level of the 3rd MTP joint with associated osteomyelitis of the distal half of the metatarsal and base of the proximal phalanx.  On ABX's and Podiatry consulted. Patient underwent irrigation and debridement of bilateral LE's.  Nephrology was consulted for HD while inpatient.  Infectious Disease was consulted with antibiotic recommendation.  Currently  is coordinating antibiotics with HD unit.  Still having some purulent discharge and needed repeat I&D in the OR on 4/8 with some improvement in wound.  Repeat blood cultures Gram-negative.  Pulsavac was ordered for 4/12 to 4/14.  With noted improvement in wound.  Patient was discharged on IV Cefepime to be scheduled with HD till 5/13 and oral metronidazole and outpatient wound care;PT and OT.  All patient's questions were answered to his satisfaction and he verbalized understanding. Patient was asked to follow up with ID and primary care upon discharge; he was also asked to return to the hospital in the event of increased pain, fever or lethargy.  Other chronic medical conditions remained stable throughout the course of hospitalization.

## 2025-04-02 PROBLEM — L08.9 FOOT INFECTION: Status: ACTIVE | Noted: 2025-04-02

## 2025-04-02 LAB
ABSOLUTE EOSINOPHIL (OHS): 0.37 K/UL
ABSOLUTE MONOCYTE (OHS): 1.77 K/UL (ref 0.3–1)
ABSOLUTE NEUTROPHIL COUNT (OHS): 8.19 K/UL (ref 1.8–7.7)
ALBUMIN SERPL BCP-MCNC: 2.8 G/DL (ref 3.5–5.2)
ALP SERPL-CCNC: 62 UNIT/L (ref 40–150)
ALT SERPL W/O P-5'-P-CCNC: 9 UNIT/L (ref 10–44)
ANION GAP (OHS): 17 MMOL/L (ref 8–16)
AST SERPL-CCNC: 16 UNIT/L (ref 11–45)
BASOPHILS # BLD AUTO: 0.05 K/UL
BASOPHILS NFR BLD AUTO: 0.4 %
BILIRUB SERPL-MCNC: 0.3 MG/DL (ref 0.1–1)
BUN SERPL-MCNC: 58 MG/DL (ref 6–20)
CALCIUM SERPL-MCNC: 8.8 MG/DL (ref 8.7–10.5)
CHLORIDE SERPL-SCNC: 97 MMOL/L (ref 95–110)
CO2 SERPL-SCNC: 22 MMOL/L (ref 23–29)
CREAT SERPL-MCNC: 13.4 MG/DL (ref 0.5–1.4)
ERYTHROCYTE [DISTWIDTH] IN BLOOD BY AUTOMATED COUNT: 16.4 % (ref 11.5–14.5)
GFR SERPLBLD CREATININE-BSD FMLA CKD-EPI: 4 ML/MIN/1.73/M2
GLUCOSE SERPL-MCNC: 114 MG/DL (ref 70–110)
GRAM STN SPEC: NORMAL
HCT VFR BLD AUTO: 32.1 % (ref 40–54)
HGB BLD-MCNC: 10.1 GM/DL (ref 14–18)
IMM GRANULOCYTES # BLD AUTO: 0.07 K/UL (ref 0–0.04)
IMM GRANULOCYTES NFR BLD AUTO: 0.6 % (ref 0–0.5)
LYMPHOCYTES # BLD AUTO: 1.67 K/UL (ref 1–4.8)
MAGNESIUM SERPL-MCNC: 2 MG/DL (ref 1.6–2.6)
MCH RBC QN AUTO: 28.9 PG (ref 27–31)
MCHC RBC AUTO-ENTMCNC: 31.5 G/DL (ref 32–36)
MCV RBC AUTO: 92 FL (ref 82–98)
NUCLEATED RBC (/100WBC) (OHS): 0 /100 WBC
PHOSPHATE SERPL-MCNC: 4.3 MG/DL (ref 2.7–4.5)
PLATELET # BLD AUTO: 330 K/UL (ref 150–450)
PMV BLD AUTO: 9.8 FL (ref 9.2–12.9)
POCT GLUCOSE: 120 MG/DL (ref 70–110)
POCT GLUCOSE: 128 MG/DL (ref 70–110)
POCT GLUCOSE: 132 MG/DL (ref 70–110)
POCT GLUCOSE: 162 MG/DL (ref 70–110)
POTASSIUM SERPL-SCNC: 5.4 MMOL/L (ref 3.5–5.1)
PROT SERPL-MCNC: 8.2 GM/DL (ref 6–8.4)
RBC # BLD AUTO: 3.5 M/UL (ref 4.6–6.2)
RELATIVE EOSINOPHIL (OHS): 3.1 %
RELATIVE LYMPHOCYTE (OHS): 13.8 % (ref 18–48)
RELATIVE MONOCYTE (OHS): 14.6 % (ref 4–15)
RELATIVE NEUTROPHIL (OHS): 67.5 % (ref 38–73)
SODIUM SERPL-SCNC: 136 MMOL/L (ref 136–145)
VANCOMYCIN SERPL-MCNC: 22.7 UG/ML (ref ?–80)
WBC # BLD AUTO: 12.12 K/UL (ref 3.9–12.7)

## 2025-04-02 PROCEDURE — 25000003 PHARM REV CODE 250: Performed by: INTERNAL MEDICINE

## 2025-04-02 PROCEDURE — 90935 HEMODIALYSIS ONE EVALUATION: CPT | Mod: ,,, | Performed by: STUDENT IN AN ORGANIZED HEALTH CARE EDUCATION/TRAINING PROGRAM

## 2025-04-02 PROCEDURE — 36415 COLL VENOUS BLD VENIPUNCTURE: CPT | Performed by: PODIATRIST

## 2025-04-02 PROCEDURE — 84100 ASSAY OF PHOSPHORUS: CPT | Performed by: PODIATRIST

## 2025-04-02 PROCEDURE — 25000003 PHARM REV CODE 250: Performed by: HOSPITALIST

## 2025-04-02 PROCEDURE — 25000003 PHARM REV CODE 250: Performed by: STUDENT IN AN ORGANIZED HEALTH CARE EDUCATION/TRAINING PROGRAM

## 2025-04-02 PROCEDURE — 99232 SBSQ HOSP IP/OBS MODERATE 35: CPT | Mod: ,,, | Performed by: PODIATRIST

## 2025-04-02 PROCEDURE — 63600175 PHARM REV CODE 636 W HCPCS: Performed by: PODIATRIST

## 2025-04-02 PROCEDURE — 25000003 PHARM REV CODE 250: Performed by: PODIATRIST

## 2025-04-02 PROCEDURE — 80100016 HC MAINTENANCE HEMODIALYSIS

## 2025-04-02 PROCEDURE — 85025 COMPLETE CBC W/AUTO DIFF WBC: CPT | Performed by: PODIATRIST

## 2025-04-02 PROCEDURE — 83735 ASSAY OF MAGNESIUM: CPT | Performed by: PODIATRIST

## 2025-04-02 PROCEDURE — 11000001 HC ACUTE MED/SURG PRIVATE ROOM

## 2025-04-02 PROCEDURE — 80202 ASSAY OF VANCOMYCIN: CPT | Performed by: PODIATRIST

## 2025-04-02 PROCEDURE — 63600175 PHARM REV CODE 636 W HCPCS: Performed by: HOSPITALIST

## 2025-04-02 PROCEDURE — 80053 COMPREHEN METABOLIC PANEL: CPT | Performed by: PODIATRIST

## 2025-04-02 RX ORDER — BENZONATATE 100 MG/1
100 CAPSULE ORAL 3 TIMES DAILY PRN
Status: DISCONTINUED | OUTPATIENT
Start: 2025-04-02 | End: 2025-04-10

## 2025-04-02 RX ORDER — DIPHENHYDRAMINE HCL 25 MG
25 CAPSULE ORAL EVERY 6 HOURS PRN
Status: DISCONTINUED | OUTPATIENT
Start: 2025-04-02 | End: 2025-04-15 | Stop reason: HOSPADM

## 2025-04-02 RX ORDER — GUAIFENESIN AND DEXTROMETHORPHAN HYDROBROMIDE 10; 100 MG/5ML; MG/5ML
5 SYRUP ORAL EVERY 6 HOURS PRN
Status: DISCONTINUED | OUTPATIENT
Start: 2025-04-02 | End: 2025-04-15 | Stop reason: HOSPADM

## 2025-04-02 RX ORDER — CEFEPIME HYDROCHLORIDE 1 G/1
1 INJECTION, POWDER, FOR SOLUTION INTRAMUSCULAR; INTRAVENOUS DAILY
Status: DISCONTINUED | OUTPATIENT
Start: 2025-04-02 | End: 2025-04-15 | Stop reason: HOSPADM

## 2025-04-02 RX ADMIN — GUAIFENESIN AND DEXTROMETHORPHAN 5 ML: 100; 10 SYRUP ORAL at 09:04

## 2025-04-02 RX ADMIN — CARVEDILOL 25 MG: 12.5 TABLET, FILM COATED ORAL at 09:04

## 2025-04-02 RX ADMIN — METRONIDAZOLE 500 MG: 500 INJECTION, SOLUTION INTRAVENOUS at 02:04

## 2025-04-02 RX ADMIN — MUPIROCIN: 20 OINTMENT TOPICAL at 09:04

## 2025-04-02 RX ADMIN — BENZONATATE 100 MG: 100 CAPSULE ORAL at 10:04

## 2025-04-02 RX ADMIN — SEVELAMER CARBONATE 800 MG: 800 TABLET, FILM COATED ORAL at 02:04

## 2025-04-02 RX ADMIN — METRONIDAZOLE 500 MG: 500 INJECTION, SOLUTION INTRAVENOUS at 03:04

## 2025-04-02 RX ADMIN — DIPHENHYDRAMINE HYDROCHLORIDE 25 MG: 25 CAPSULE ORAL at 02:04

## 2025-04-02 RX ADMIN — CEFEPIME 1 G: 1 INJECTION, POWDER, FOR SOLUTION INTRAMUSCULAR; INTRAVENOUS at 02:04

## 2025-04-02 RX ADMIN — GUAIFENESIN AND DEXTROMETHORPHAN 5 ML: 100; 10 SYRUP ORAL at 05:04

## 2025-04-02 RX ADMIN — METRONIDAZOLE 500 MG: 500 INJECTION, SOLUTION INTRAVENOUS at 09:04

## 2025-04-02 NOTE — ANESTHESIA POSTPROCEDURE EVALUATION
Anesthesia Post Evaluation    Patient: Emmanuel Morgan    Procedure(s) Performed: Procedure(s) (LRB):  IRRIGATION AND DEBRIDEMENT (Bilateral)    Final Anesthesia Type: general      Patient location during evaluation: PACU  Patient participation: Yes- Able to Participate  Level of consciousness: awake and alert and oriented  Post-procedure vital signs: reviewed and stable  Pain management: adequate  Airway patency: patent    PONV status at discharge: No PONV  Anesthetic complications: no      Cardiovascular status: blood pressure returned to baseline and hemodynamically stable  Respiratory status: unassisted, spontaneous ventilation and room air  Hydration status: euvolemic  Follow-up not needed.              Vitals Value Taken Time   /72 04/02/25 02:00   Temp 36.8 °C (98.3 °F) 04/01/25 20:55   Pulse 87 04/02/25 02:00   Resp 18 04/02/25 02:00   SpO2 93 % 04/01/25 20:55         Event Time   Out of Recovery 04/01/2025 18:11:16         Pain/Rodger Score: Rodger Score: 10 (4/1/2025  5:15 PM)

## 2025-04-02 NOTE — ASSESSMENT & PLAN NOTE
Hyperkalemia is likely due to ESRD.The patients most recent potassium results are listed below.  Recent Labs     03/31/25  2249 04/01/25  0443 04/02/25  0658   K 5.2* 5.4* 5.4*   Treated with Lokelma.  Nichloas.  Nephrology consulted for HD.  Treat with HD

## 2025-04-02 NOTE — NURSING
[facility] [place]  Nurses Note -- 4 Eyes      4/2/2025       Skin assessed on: Q Shift      [x] No Pressure Injuries Present    [x]Prevention Measures Documented    [] Yes LDA  for Pressure Injury Previously documented     [] Yes New Pressure Injury Discovered   [] LDA for New Pressure Injury Added      Attending RN:  Gabbie Robb RN     Second RN:  Belen STANLEY RN

## 2025-04-02 NOTE — ASSESSMENT & PLAN NOTE
Patient's FSGs are controlled on current medication regimen.  Last A1c reviewed-   Lab Results   Component Value Date    HGBA1C 4.9 08/20/2024     Most recent fingerstick glucose reviewed-   Recent Labs   Lab 04/01/25  1706 04/01/25 2056 04/02/25  0723   POCTGLUCOSE 95 129* 132*     Current correctional scale  Low  Maintain anti-hyperglycemic dose as follows-   Antihyperglycemics (From admission, onward)      Start     Stop Route Frequency Ordered    03/31/25 2317  insulin aspart U-100 pen 0-5 Units         -- SubQ Before meals & nightly PRN 03/31/25 2218          Hold Oral hypoglycemics while patient is in the hospital.

## 2025-04-02 NOTE — PROGRESS NOTES
[facility]  Progress Note    Patient Name: Emmanuel Morgan  MRN: [id number]  Patient Class: IP- Inpatient   Admission Date: 3/31/2025  Length of Stay: 2 days  Attending Physician: Eliecer Posey MD  Primary Care Provider: Laurence, Primary Doctor        Subjective     Principal Problem:Infected ulcer of skin        HPI:  39-year-old male with ESRD on HD, diabetes not on insulin, prior osteomyelitis requiring right midfoot amputation with a long standing left foot ulcer (months) and a right foot ulcer of his stump which has been present for weeks who was sent to the ER by home health due to progression of wounds with skin and soft tissue infections of possibly both ulcers and possible osteomyelitis of either.     The left foot ulcer has been under the care of of agent wound care for months.  It has progressed in today was debrided by the home health nurse by patient report.  It has developed a foul smell with discharge.  It is not painful.    The right foot ulcer was created weeks ago when he accidentally hit his foot.  It is not painful.  This 1 does not have significant surrounding erythema or purulent drainage.      No fever.    He missed dialysis today due to reporting to the ER.      Overview/Hospital Course:  39-year-old male with ESRD on HD, DM, prior osteomyelitis requiring right midfoot amputation with a long standing left foot ulcer (months) and a right foot ulcer of his stump which has been present for weeks who was sent to the ER by home health due to progression of wounds with skin and soft tissue infections of possibly both ulcers and possible osteomyelitis of either.  MRI of left foot showing plantar soft tissue ulcer at the level of the 3rd MTP joint with associated osteomyelitis of the distal half of the metatarsal and base of the proximal phalanx.  On ABX's and Podiatry consulted.  Patient underwent irrigation and debridement of bilateral LE's.       Interval History:  feeling well with pain well controlled.    Review of Systems   HENT:  Negative for ear discharge and ear pain.    Eyes:  Negative for discharge and itching.   Endocrine: Negative for cold intolerance and heat intolerance.   Neurological:  Negative for seizures and syncope.     Objective:     Vital Signs (Most Recent):  Temp: 98.4 °F (36.9 °C) (04/02/25 0724)  Pulse: 95 (04/02/25 0945)  Resp: 19 (04/02/25 0724)  BP: 126/74 (04/02/25 0945)  SpO2: 96 % (04/02/25 0724) Vital Signs (24h Range):  Temp:  [97.6 °F (36.4 °C)-98.4 °F (36.9 °C)] 98.4 °F (36.9 °C)  Pulse:  [78-95] 95  Resp:  [12-20] 19  SpO2:  [90 %-100 %] 96 %  BP: (109-128)/(62-79) 126/74     Weight: 104.3 kg (229 lb 15 oz)  Body mass index is 29.52 kg/m².    Intake/Output Summary (Last 24 hours) at 4/2/2025 1249  Last data filed at 4/2/2025 0136  Gross per 24 hour   Intake --   Output 2000 ml   Net -2000 ml         Physical Exam  Constitutional:       Appearance: He is not toxic-appearing or diaphoretic.   HENT:      Head: Normocephalic and atraumatic.      Mouth/Throat:      Pharynx: Oropharynx is clear. No oropharyngeal exudate or posterior oropharyngeal erythema.   Cardiovascular:      Rate and Rhythm: Normal rate and regular rhythm.   Pulmonary:      Breath sounds: Normal breath sounds. No wheezing.   Abdominal:      General: Bowel sounds are normal.      Palpations: Abdomen is soft.   Skin:     Comments: Both feet dressed.   Neurological:      Mental Status: He is oriented to person, place, and time.      Cranial Nerves: No cranial nerve deficit.               Significant Labs: All pertinent labs within the past 24 hours have been reviewed.  BMP:   Recent Labs   Lab 04/02/25  0658      K 5.4*   CL 97   CO2 22*   BUN 58*   CREATININE 13.4*   CALCIUM 8.8   MG 2.0     CBC:   Recent Labs   Lab 03/31/25  1827 04/01/25  0443 04/02/25  0658   WBC 22.52* 18.93* 12.12   HGB 11.0* 10.5* 10.1*   HCT 35.2* 32.3* 32.1*    308 330       Significant  Imaging: I have reviewed all pertinent imaging results/findings within the past 24 hours.      Assessment & Plan  Infected ulcer of skin  Patient presents with ulcers to left foot and right foot stump.  MRI ordered.  No evidence of osteo on right foot stump.  Left foot MRI showing plantar soft tissue ulcer at the level of the 3rd MTP joint with associated osteomyelitis of the distal half of the metatarsal and base of the proximal phalanx.   Started on ABX's.  Podiatry consulted. Underwent bilateral irrigation and debridement.  Continue ABX's and await cultures.  ID consult    End-stage renal disease  Creatine stable for now. BMP reviewed- noted Estimated Creatinine Clearance: 9.4 mL/min (A) (based on SCr of 13.4 mg/dL (H)). according to latest data. Based on current GFR, CKD stage is end stage.  Monitor UOP and serial BMP and adjust therapy as needed. Renally dose meds. Avoid nephrotoxic medications and procedures.  Nephrology consulted for HD.    Essential hypertension  Patient's blood pressure range in the last 24 hours was: BP  Min: 109/72  Max: 128/72.The patient's inpatient anti-hypertensive regimen is listed below:  Current Antihypertensives  , Daily, Oral  carvediloL tablet 25 mg, 2 times daily, Oral  hydrALAZINE tablet 50 mg, 3 times daily, Oral  NIFEdipine 24 hr tablet 90 mg, Daily, Oral    Plan  - BP is controlled, no changes needed to their regimen  - lisinopril held due to hyperkalemia.  Hyperkalemia  Hyperkalemia is likely due to ESRD.The patients most recent potassium results are listed below.  Recent Labs     03/31/25  2249 04/01/25  0443 04/02/25  0658   K 5.2* 5.4* 5.4*   Treated with Lokelma.  Stable.  Nephrology consulted for HD.  Treat with HD          Type 2 diabetes mellitus with foot ulcer  Patient's FSGs are controlled on current medication regimen.  Last A1c reviewed-   Lab Results   Component Value Date    HGBA1C 4.9 08/20/2024     Most recent fingerstick glucose reviewed-   Recent Labs    Lab 04/01/25  1706 04/01/25  2056 04/02/25  0723   POCTGLUCOSE 95 129* 132*     Current correctional scale  Low  Maintain anti-hyperglycemic dose as follows-   Antihyperglycemics (From admission, onward)      Start     Stop Route Frequency Ordered    03/31/25 2317  insulin aspart U-100 pen 0-5 Units         -- SubQ Before meals & nightly PRN 03/31/25 2218          Hold Oral hypoglycemics while patient is in the hospital.  Foot ulcer with fat layer exposed, unspecified laterality      VTE Risk Mitigation (From admission, onward)           Ordered     heparin (porcine) injection 1,000 Units  As needed (PRN)         04/01/25 1827     IP VTE LOW RISK PATIENT  Once         03/31/25 2218     Place sequential compression device  Until discontinued         03/31/25 2218                    Discharge Planning   LULÚ:      Code Status: Full Code   Medical Readiness for Discharge Date:   Discharge Plan A: Home                        Eliecer Posey MD  Department of Hospital Medicine   [facility]

## 2025-04-02 NOTE — PLAN OF CARE
Problem: Adult Inpatient Plan of Care  Goal: Plan of Care Review  Outcome: Progressing  Goal: Patient-Specific Goal (Individualized)  Outcome: Progressing  Goal: Absence of Hospital-Acquired Illness or Injury  Outcome: Progressing  Goal: Optimal Comfort and Wellbeing  Outcome: Progressing  Goal: Readiness for Transition of Care  Outcome: Progressing     Problem: Hemodialysis  Goal: Safe, Effective Therapy Delivery  Outcome: Progressing  Goal: Effective Tissue Perfusion  Outcome: Progressing  Goal: Absence of Infection Signs and Symptoms  Outcome: Progressing     Problem: Diabetes Comorbidity  Goal: Blood Glucose Level Within Targeted Range  Outcome: Progressing     Problem: Wound  Goal: Optimal Coping  Outcome: Progressing  Goal: Optimal Functional Ability  Outcome: Progressing  Goal: Absence of Infection Signs and Symptoms  Outcome: Progressing  Goal: Improved Oral Intake  Outcome: Progressing  Goal: Optimal Pain Control and Function  Outcome: Progressing  Goal: Skin Health and Integrity  Outcome: Progressing  Goal: Optimal Wound Healing  Outcome: Progressing     Problem: Infection  Goal: Absence of Infection Signs and Symptoms  Outcome: Progressing

## 2025-04-02 NOTE — PLAN OF CARE
Problem: Adult Inpatient Plan of Care  Goal: Optimal Comfort and Wellbeing  Outcome: Progressing     Problem: Diabetes Comorbidity  Goal: Blood Glucose Level Within Targeted Range  Outcome: Progressing  Intervention: Monitor and Manage Glycemia  Flowsheets (Taken 4/2/2025 1517)  Glycemic Management: blood glucose monitored     Problem: Infection  Goal: Absence of Infection Signs and Symptoms  Outcome: Progressing

## 2025-04-02 NOTE — NURSING
Patient to scheduled procedure (Dialysis) as ordered. Patient awake, alert, oriented. No apparent distress noted.

## 2025-04-02 NOTE — ASSESSMENT & PLAN NOTE
Creatine stable for now. BMP reviewed- noted Estimated Creatinine Clearance: 9.4 mL/min (A) (based on SCr of 13.4 mg/dL (H)). according to latest data. Based on current GFR, CKD stage is end stage.  Monitor UOP and serial BMP and adjust therapy as needed. Renally dose meds. Avoid nephrotoxic medications and procedures.  Nephrology consulted for HD.

## 2025-04-02 NOTE — SUBJECTIVE & OBJECTIVE
Interval History: feeling well with pain well controlled.    Review of Systems   HENT:  Negative for ear discharge and ear pain.    Eyes:  Negative for discharge and itching.   Endocrine: Negative for cold intolerance and heat intolerance.   Neurological:  Negative for seizures and syncope.     Objective:     Vital Signs (Most Recent):  Temp: 98.4 °F (36.9 °C) (04/02/25 0724)  Pulse: 95 (04/02/25 0945)  Resp: 19 (04/02/25 0724)  BP: 126/74 (04/02/25 0945)  SpO2: 96 % (04/02/25 0724) Vital Signs (24h Range):  Temp:  [97.6 °F (36.4 °C)-98.4 °F (36.9 °C)] 98.4 °F (36.9 °C)  Pulse:  [78-95] 95  Resp:  [12-20] 19  SpO2:  [90 %-100 %] 96 %  BP: (109-128)/(62-79) 126/74     Weight: 104.3 kg (229 lb 15 oz)  Body mass index is 29.52 kg/m².    Intake/Output Summary (Last 24 hours) at 4/2/2025 1249  Last data filed at 4/2/2025 0136  Gross per 24 hour   Intake --   Output 2000 ml   Net -2000 ml         Physical Exam  Constitutional:       Appearance: He is not toxic-appearing or diaphoretic.   HENT:      Head: Normocephalic and atraumatic.      Mouth/Throat:      Pharynx: Oropharynx is clear. No oropharyngeal exudate or posterior oropharyngeal erythema.   Cardiovascular:      Rate and Rhythm: Normal rate and regular rhythm.   Pulmonary:      Breath sounds: Normal breath sounds. No wheezing.   Abdominal:      General: Bowel sounds are normal.      Palpations: Abdomen is soft.   Skin:     Comments: Both feet dressed.   Neurological:      Mental Status: He is oriented to person, place, and time.      Cranial Nerves: No cranial nerve deficit.               Significant Labs: All pertinent labs within the past 24 hours have been reviewed.  BMP:   Recent Labs   Lab 04/02/25  0658      K 5.4*   CL 97   CO2 22*   BUN 58*   CREATININE 13.4*   CALCIUM 8.8   MG 2.0     CBC:   Recent Labs   Lab 03/31/25  1827 04/01/25  0443 04/02/25  0658   WBC 22.52* 18.93* 12.12   HGB 11.0* 10.5* 10.1*   HCT 35.2* 32.3* 32.1*    308 330        Significant Imaging: I have reviewed all pertinent imaging results/findings within the past 24 hours.

## 2025-04-02 NOTE — ASSESSMENT & PLAN NOTE
Patient presents with ulcers to left foot and right foot stump.  MRI ordered.  No evidence of osteo on right foot stump.  Left foot MRI showing plantar soft tissue ulcer at the level of the 3rd MTP joint with associated osteomyelitis of the distal half of the metatarsal and base of the proximal phalanx.   Started on ABX's.  Podiatry consulted. Underwent bilateral irrigation and debridement.  Continue ABX's and await cultures.  ID consult

## 2025-04-02 NOTE — NURSING
Pt left for dialysis, NAD noted.     1335- Pt returned from dialysis. Report received 1L removed, pt tolerated well. Dialysis site dressing changed, podiatry completed wound care in dialysis.

## 2025-04-02 NOTE — PROGRESS NOTES
[facility]  [place]  Consult Note    Patient Name: Emmanuel Morgan  MRN: [id number]  Admission Date: 3/31/2025  Hospital Length of Stay: 2 days  Attending Physician: Eliecer Posey MD  Primary Care Provider: Laurence, Primary Doctor     Consults  Subjective:     History of Present Illness: Emmanuel Morgan is a 39 y.o. male with  has a past medical history of Diabetes mellitus, Hypertension, and Renal disorder.  Consult to Podiatry for evaluation and treatment of bilateral foot wounds, most significant to the right Lisfranc stump.  He relates that the left foot wound is chronic in nature and the ulceration to the right foot occurred several weeks ago likely secondary to trauma versus ambulation is inappropriate shoe.  All foot surgeries surgeries done in outside facilities.  He presented to the emergency department on the advice of his home health nurse due to progression of wounds and suspicion of osteomyelitis.      4/2/25: S/p one day I&D B/L per Dr. Faria. Bandage intact no strikethrough noted.     Chief Complaint   Patient presents with    Leg Pain     Sent from [facility] for increased pain to right foot     Scheduled Meds:   0.9% NaCl   Intravenous Once    0.9% NaCl   Intravenous Once    carvediloL  25 mg Oral BID    ceFEPime IV (PEDS and ADULTS)  1 g Intravenous Daily    hydrALAZINE  50 mg Oral TID    metroNIDAZOLE IV (PEDS and ADULTS)  500 mg Intravenous Q8H    mupirocin   Nasal BID    NIFEdipine  90 mg Oral Daily    sevelamer carbonate  800 mg Oral TID WM     Continuous Infusions:  PRN Meds:  Current Facility-Administered Medications:     acetaminophen, 650 mg, Oral, Q8H PRN    acetaminophen, 650 mg, Oral, Q4H PRN    aluminum-magnesium hydroxide-simethicone, 30 mL, Oral, QID PRN    dextromethorphan-guaiFENesin  mg/5 ml, 5 mL, Oral, Q6H PRN    dextrose 50%, 12.5 g, Intravenous, PRN    dextrose 50%, 25 g, Intravenous, PRN    diphenhydrAMINE, 25 mg, Oral, Q6H PRN    glucagon (human recombinant), 1  mg, Intramuscular, PRN    glucose, 16 g, Oral, PRN    glucose, 24 g, Oral, PRN    heparin (porcine), 1,000 Units, Intra-Catheter, PRN    insulin aspart U-100, 0-5 Units, Subcutaneous, QID (AC + HS) PRN    melatonin, 6 mg, Oral, Nightly PRN    naloxone, 0.02 mg, Intravenous, PRN    ondansetron, 4 mg, Intravenous, Q6H PRN    oxyCODONE, 5 mg, Oral, Q4H PRN    senna-docusate, 1 tablet, Oral, BID PRN    sodium chloride 0.9%, 250 mL, Intravenous, PRN    Pharmacy to dose Vancomycin consult, , , Once **AND** vancomycin - pharmacy to dose, , Intravenous, pharmacy to manage frequency    Review of patient's allergies indicates:  No Known Allergies     Past Medical History:   Diagnosis Date    Diabetes mellitus     Hypertension     Renal disorder      Past Surgical History:   Procedure Laterality Date    FOOT AMPUTATION Right     IRRIGATION AND DEBRIDEMENT Bilateral 4/1/2025    Procedure: IRRIGATION AND DEBRIDEMENT;  Surgeon: Bety Faria DPM;  Location: [facility];  Service: Podiatry;  Laterality: Bilateral;  need jamshidi needle available       Family History    None       Tobacco Use    Smoking status: Never    Smokeless tobacco: Never   Substance and Sexual Activity    Alcohol use: Never    Drug use: Never    Sexual activity: Not on file     Review of Systems   Constitutional:  Negative for appetite change, chills, fatigue and fever.   Respiratory:  Positive for cough. Negative for shortness of breath.    Cardiovascular:  Negative for chest pain and leg swelling.   Gastrointestinal:  Negative for diarrhea, nausea and vomiting.   Musculoskeletal:  Positive for arthralgias and myalgias.   Skin:  Positive for color change and wound.   Neurological:  Positive for numbness. Negative for weakness.        + paresthesia      Objective:     Vital Signs (Most Recent):  Temp: 99.3 °F (37.4 °C) (04/02/25 1335)  Pulse: 97 (04/02/25 1335)  Resp: 19 (04/02/25 1335)  BP: 122/75 (04/02/25 1415)  SpO2: 97 % (04/02/25 1335) Vital Signs (24h  Range):  Temp:  [97.6 °F (36.4 °C)-99.3 °F (37.4 °C)] 99.3 °F (37.4 °C)  Pulse:  [79-97] 97  Resp:  [12-20] 19  SpO2:  [90 %-100 %] 97 %  BP: ()/(57-79) 122/75     Weight: 104.3 kg (229 lb 15 oz)  Body mass index is 29.52 kg/m².    Physical Exam  Vitals and nursing note reviewed.   Constitutional:       General: He is not in acute distress.     Appearance: He is well-developed. He is not toxic-appearing or diaphoretic.      Comments: alert and oriented x 3.    Cardiovascular:      Pulses:           Dorsalis pedis pulses are 1+ on the right side and 1+ on the left side.        Posterior tibial pulses are 1+ on the left side.   Pulmonary:      Effort: No respiratory distress.   Musculoskeletal:      Right ankle: No tenderness. No lateral malleolus, medial malleolus, AITF ligament, CF ligament or posterior TF ligament tenderness.      Right Achilles Tendon: No defects. Clements's test negative.      Left ankle: No tenderness. No lateral malleolus, medial malleolus, AITF ligament, CF ligament or posterior TF ligament tenderness.      Left Achilles Tendon: No defects. Clements's test negative.      Right foot: Swelling and tenderness present. No bony tenderness.      Left foot: Deformity (Appearance of short 4th metatarsal secondary to previous surgery.) and tenderness present. No bony tenderness.      Comments: Muscle strength is 5/5 in all groups bilaterally.              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 (midfoot amp)  Feet:      Right foot:      Skin integrity: Ulcer present.      Left foot:      Skin integrity: Ulcer present.   Lymphadenopathy:      Comments: No lymphatic streaking     Skin:     General: Skin is warm and dry.      Coloration: Skin is not pale.      Findings: No rash.      Nails: There is no clubbing.   Neurological:      Sensory: No sensory deficit.      Motor: No atrophy.      Comments: Light touch present     Psychiatric:         Attention and Perception: He is  "attentive.         Mood and Affect: Mood is not anxious. Affect is not inappropriate.         Speech: He is communicative. Speech is not slurred.         Behavior: Behavior is not combative.       4/2/25:  No purulent drainage noted.             04/01/2025    Right plantar lateral midfoot with periwound bulla formation and localized edema and erythema.  Exquisitely tender on palpation.  Fibro necrotic wound bed         Left sub 3rd metatarsophalangeal joint with deep probe to bone, fibrogranular wound bed with periwound callus.          Laboratory:  A1C: No results for input(s): "HGBA1C" in the last 4320 hours.  CBC:   Recent Labs   Lab 04/02/25  0658   WBC 12.12   RBC 3.50*   HGB 10.1*   HCT 32.1*      MCV 92   MCH 28.9   MCHC 31.5*     CMP:   Recent Labs   Lab 04/02/25  0658   CALCIUM 8.8   ALBUMIN 2.8*      K 5.4*   CO2 22*   CL 97   BUN 58*   CREATININE 13.4*   ALKPHOS 62   ALT 9*   AST 16   BILITOT 0.3     CRP:   Recent Labs   Lab 03/31/25  1827   .3*     ESR:   Recent Labs   Lab 03/31/25  1827   SEDRATE 120*     Microbiology Results (last 7 days)       Procedure Component Value Units Date/Time    Gram stain [[id number]] Collected: 04/01/25 1621    Order Status: Completed Specimen: Biopsy from Foot, Left Updated: 04/02/25 1132     GRAM STAIN Rare WBC seen      No organisms seen    Gram stain [[id number]] Collected: 04/01/25 1557    Order Status: Completed Specimen: Wound from Foot, Right Updated: 04/02/25 1127     GRAM STAIN Many WBC seen      Moderate Gram negative diplococci      Few Gram Negative Rods      Rare Gram positive cocci    Blood Culture #1 **CANNOT BE ORDERED STAT** [[id number]]  (Normal) Collected: 03/31/25 1827    Order Status: Completed Specimen: Blood from Peripheral, Antecubital, Left Updated: 04/02/25 0901     Blood Culture No Growth After 36 Hours    Blood Culture #2 **CANNOT BE ORDERED STAT** [[id number]]  (Normal) Collected: 03/31/25 1845    Order Status: Completed " Specimen: Blood from Peripheral, Hand, Left Updated: 04/02/25 0901     Blood Culture No Growth After 36 Hours    Aerobic culture [[id number]] Collected: 04/01/25 1621    Order Status: Completed Specimen: Biopsy from Foot, Left Updated: 04/02/25 0822     CULTURE, AEROBIC No Growth To Date    Aerobic culture [[id number]]  (Abnormal) Collected: 04/01/25 1557    Order Status: Completed Specimen: Wound from Foot, Right Updated: 04/02/25 0820     CULTURE, AEROBIC Moderate Gram-negative Rods    Culture, Anaerobe [[id number]] Collected: 04/01/25 1621    Order Status: Sent Specimen: Biopsy from Foot, Left Updated: 04/01/25 1654    AFB Culture & Smear [[id number]] Collected: 04/01/25 1621    Order Status: Sent Specimen: Biopsy from Foot, Left Updated: 04/01/25 1654    Fungus culture [[id number]] Collected: 04/01/25 1621    Order Status: Sent Specimen: Biopsy from Foot, Left Updated: 04/01/25 1654    Culture, Anaerobe [[id number]] Collected: 04/01/25 1557    Order Status: Sent Specimen: Wound from Foot, Right Updated: 04/01/25 1653    Fungus culture [[id number]] Collected: 04/01/25 1557    Order Status: Sent Specimen: Wound from Foot, Right Updated: 04/01/25 1653    AFB Culture & Smear [[id number]] Collected: 04/01/25 1557    Order Status: Sent Specimen: Wound from Foot, Right Updated: 04/01/25 1653            Diagnostic Results:  Imaging Results               MRI Hindfoot WO Contrast LT (Final result)  Result time 04/01/25 07:47:56      Final result by Seamus Hays MD (04/01/25 07:47:56)                   Impression:      1. Plantar soft tissue ulcer at the level of the 3rd MTP joint with associated osteomyelitis of the distal half of the metatarsal and base of the proximal phalanx.  No abscess.  2. Sequela of chronic osteomyelitis involving the 1st, 2nd and 5th metatarsals and proximal phalanges.  3. Postoperative change of transmetatarsal amputation of the 4th digit.  This report was flagged in Epic as  abnormal.      Electronically signed by: Seamus Hays MD  Date:    04/01/2025  Time:    07:47               Narrative:    EXAMINATION:  MRI HINDFOOT WO CONTRAST LT; MRI FOREFOOT WO CONTRAST LT    CLINICAL HISTORY:  b/l ulcers concern for osteo;; b/l ulcers. Concern for osteo;    TECHNIQUE:  Routine MRI evaluation of the left ankle and forefoot performed without contrast.    COMPARISON:  Radiographs 09/01/2024.    FINDINGS:  Examination degraded by patient motion artifact.    BONES: Postsurgical change of transmetatarsal amputation of the 4th digit.  Osseous erosive changes of the 3rd metatarsal head and proximal phalanx base with associated confluent marrow edema involving the distal half of the metatarsal and proximal half of the proximal phalanx.  There is corresponding T1 medullary hypointensity throughout the distal half of the metatarsal and base of the proximal phalanx.  Chronic osseous erosive changes of the 1st, 2nd and 5th metatarsal heads and proximal phalanges with grossly preserved marrow signal intensity.  Solid periosteal reaction throughout the 2nd metatarsal shaft.  Linear area of edema signal at the posterior subchondral region of the tibial plafond, favored to be degenerative in nature.  No definite acute fractures.  Remote healed fracture of the lateral malleolus.  No avascular necrosis.  No marrow infiltrative process.    JOINTS: 3rd MTP complex joint effusion with surrounding synovitis.  No dislocation.    LIGAMENTS: Chronic sprains of the anterior talofibular, superficial deltoid and deep deltoid ligaments.  Lisfranc ligament appears intact.    TENDONS: Ulceration and disruption of the 3rd flexor tendon at the level of the MTP joint.  Chronic ulceration of the 1st, 2nd and 5th flexor tendons at the level of the MTP joints.  Postoperative changes of the 4th flexor tendon.  Remaining tendons appear grossly intact.    MUSCLES: Edema and severe fatty degeneration of the visualized musculature.   No intramuscular fluid collection.    MISCELLANEOUS: Plantar soft tissue ulcer at the level of the 3rd MTP joint with adjacent fluid and surrounding subcutaneous edema.  No focal fluid collection.  Probable adventitial bursa at the plantar aspect of the hallux MTP joint.  Focal area of signal hypointensity at the level of the heel pad, possibly sequela of scarring.                                        MRI Forefoot WO Contrast LT (Final result)  Result time 04/01/25 07:47:56   Procedure changed from MRI Foot Toes WO Contrast DANY     Final result by Seamus Hays MD (04/01/25 07:47:56)                   Impression:      1. Plantar soft tissue ulcer at the level of the 3rd MTP joint with associated osteomyelitis of the distal half of the metatarsal and base of the proximal phalanx.  No abscess.  2. Sequela of chronic osteomyelitis involving the 1st, 2nd and 5th metatarsals and proximal phalanges.  3. Postoperative change of transmetatarsal amputation of the 4th digit.  This report was flagged in Epic as abnormal.      Electronically signed by: Seamus Hays MD  Date:    04/01/2025  Time:    07:47               Narrative:    EXAMINATION:  MRI HINDFOOT WO CONTRAST LT; MRI FOREFOOT WO CONTRAST LT    CLINICAL HISTORY:  b/l ulcers concern for osteo;; b/l ulcers. Concern for osteo;    TECHNIQUE:  Routine MRI evaluation of the left ankle and forefoot performed without contrast.    COMPARISON:  Radiographs 09/01/2024.    FINDINGS:  Examination degraded by patient motion artifact.    BONES: Postsurgical change of transmetatarsal amputation of the 4th digit.  Osseous erosive changes of the 3rd metatarsal head and proximal phalanx base with associated confluent marrow edema involving the distal half of the metatarsal and proximal half of the proximal phalanx.  There is corresponding T1 medullary hypointensity throughout the distal half of the metatarsal and base of the proximal phalanx.  Chronic osseous erosive changes  of the 1st, 2nd and 5th metatarsal heads and proximal phalanges with grossly preserved marrow signal intensity.  Solid periosteal reaction throughout the 2nd metatarsal shaft.  Linear area of edema signal at the posterior subchondral region of the tibial plafond, favored to be degenerative in nature.  No definite acute fractures.  Remote healed fracture of the lateral malleolus.  No avascular necrosis.  No marrow infiltrative process.    JOINTS: 3rd MTP complex joint effusion with surrounding synovitis.  No dislocation.    LIGAMENTS: Chronic sprains of the anterior talofibular, superficial deltoid and deep deltoid ligaments.  Lisfranc ligament appears intact.    TENDONS: Ulceration and disruption of the 3rd flexor tendon at the level of the MTP joint.  Chronic ulceration of the 1st, 2nd and 5th flexor tendons at the level of the MTP joints.  Postoperative changes of the 4th flexor tendon.  Remaining tendons appear grossly intact.    MUSCLES: Edema and severe fatty degeneration of the visualized musculature.  No intramuscular fluid collection.    MISCELLANEOUS: Plantar soft tissue ulcer at the level of the 3rd MTP joint with adjacent fluid and surrounding subcutaneous edema.  No focal fluid collection.  Probable adventitial bursa at the plantar aspect of the hallux MTP joint.  Focal area of signal hypointensity at the level of the heel pad, possibly sequela of scarring.                                       MRI Hindfoot WO Contrast RT (Final result)  Result time 04/01/25 07:19:00      Final result by Seamus Hays MD (04/01/25 07:19:00)                   Impression:      1. Plantar soft tissue ulcer at the level of the calcaneocuboid joint with surrounding subcutaneous edema and adjacent blistering.  No osteomyelitis.  No abscess.  2. Advanced neuropathic arthropathy of the tibiotalar, subtalar and residual midtarsal joints.      Electronically signed by: Seamus Hays MD  Date:    04/01/2025  Time:    07:19                Narrative:    EXAMINATION:  MRI HINDFOOT WO CONTRAST RT    CLINICAL HISTORY:  Foot swelling, diabetic, osteomyelitis suspected, xray done;    TECHNIQUE:  Routine MRI evaluation of the right ankle performed without contrast.    COMPARISON:  Radiographs 03/31/2025.    FINDINGS:  BONES/JOINTS: Postsurgical changes of midfoot amputation.  Chronic osseous erosive changes centered about the tibiotalar, subtalar and residual midtarsal joints, likely sequela of chronic neuropathic arthropathy.  Intraosseous cyst at the lateral aspect of the distal tibia.  Circumferential solid periosteal reaction about the distal tibia and distal fibula.  No marrow signal abnormality to suggest an infiltrative process.  No significant joint effusion.  No dislocation.    TENDONS: Achilles tendon demonstrates normal morphology and signal intensity.  Residual posterior tibial, flexor digitorum longus, flexor hallucis longus, peroneus longus, peroneus brevis and extensor tendons are intact.    MUSCLES: Edema and fatty degeneration of the visualized musculature.  No intramuscular fluid collection.    MISCELLANEOUS: Plantar soft tissue ulcer at the level of the calcaneocuboid joint with involvement of the plantar aponeurosis, surrounding subcutaneous edema and adjacent blistering.  No fluid collection.                                       US Lower Extremity Arteries Right (Final result)  Result time 03/31/25 21:32:36      Final result by Michelle Leon MD (03/31/25 21:32:36)                   Impression:      Normal triphasic waveforms throughout the right lower extremity.  No occlusion or stenosis detected.      Electronically signed by: Michelle Leon  Date:    03/31/2025  Time:    21:32               Narrative:    EXAMINATION:  US LOWER EXTREMITY ARTERIES RIGHT    CLINICAL HISTORY:  Unspecified open wound, right foot, subsequent encounter    TECHNIQUE:  Duplex and color flow Doppler evaluation and graded compression of  the right lower extremity arteries was performed.    COMPARISON:  None    FINDINGS:  Peak systolic velocities in the right lower extremity arteries (cm/sec):    CFA: 127, triphasic    DFA: 74, triphasic    Proximal SFA: 103, triphasic    Mid SFA: 120, triphasic    Distal SFA: 109, triphasic    Proximal pop: 84, triphasic    Distal pop: 98, triphasic    LAUREL: 80, triphasic    PTA: 86, triphasic    Peroneal: 111, triphasic    DPA: 75, triphasic                                       X-Ray Foot Complete Right (Final result)  Result time 03/31/25 13:14:02      Final result by Alli Henriquez MD (03/31/25 13:14:02)                   Impression:      Please see above.      Electronically signed by: Alli Henriquez  Date:    03/31/2025  Time:    13:14               Narrative:    EXAMINATION:  XR FOOT COMPLETE 3 VIEW RIGHT    CLINICAL HISTORY:  . Pain in unspecified foot    TECHNIQUE:  AP, lateral, and oblique views of the right foot were performed.    COMPARISON:  None.    FINDINGS:  Extensive postoperative change including amputation to the level of the midfoot structures.  Remainder of the visualized osseous structures of the mid and hindfoot demonstrate diffuse degenerative change in osteopenia with surrounding diffuse soft tissue swelling.  Additional diffuse osseous destruction and degenerative change about the partially visualized ankle.  Scattered vascular calcification as well as plantar calcaneal spur.  No obvious significant osseous erosive change, noting limitations from postoperative change and surrounding soft tissue swelling.  If there is continued clinical concern for osteomyelitis, consider MRI of the foot for further evaluation.                                       X-Ray Chest AP Portable (Final result)  Result time 03/31/25 13:15:24      Final result by Alli Henriquez MD (03/31/25 13:15:24)                   Impression:      Please see above.      Electronically signed by: Alli  Florence  Date:    03/31/2025  Time:    13:15               Narrative:    EXAMINATION:  XR CHEST AP PORTABLE    CLINICAL HISTORY:  Chronic cough    TECHNIQUE:  Single frontal view of the chest was performed.    COMPARISON:  Chest radiograph 09/01/2024    FINDINGS:  Left-sided central venous catheter with tip overlying the in expected region of the cavoatrial junction.  Cardiomediastinal silhouette is unchanged in size.  Patient is rotated, limiting evaluation of the mediastinal structures.    Chronic right effusion with probable superimposed volume loss about the right lung base.  Superimposed infectious or non infectious inflammatory infiltrate cannot be excluded.  Remainder of the aerated portions of the lungs are clear.  No pneumothorax.    Osseous structures demonstrate no evidence for acute fracture or osseous destructive lesion.  Soft tissues are unremarkable.                                      Assessment/Plan:     Active Diagnoses:    Diagnosis Date Noted POA    PRINCIPAL PROBLEM:  Infected ulcer of skin [L98.499, L08.9] 03/31/2025 Yes    Foot ulcer with fat layer exposed, unspecified laterality [L97.502] 09/01/2024 Yes    Type 2 diabetes mellitus with foot ulcer [E11.621, L97.509] 06/25/2024 Yes    End-stage renal disease [N18.6] 03/21/2024 Yes    Hyperkalemia [E87.5] 07/19/2021 Yes     Chronic    Essential hypertension [I10] 01/27/2021 Yes      Problems Resolved During this Admission:       Education about the prevention of limb loss.    Discussed wound healing cycle, skin integrity, ways to care for skin.Counseled patient on the effects of biomechanical pressure and deformity as well as blood glucose on healing. He verbalizes understanding that it can increase the chances of delayed healing and this prolonged exposure leads to infection or progression of infection which subsequently can result in loss of limb.    Adequate vitamin supplementation, protein intake, and hydration - discussed with  patient    Wounds are cleansed as much as possible and assessed    DSD applied      Abx per ID    S/p one day I&D B/L , wounds stable    F/u [facility] wound care upon discharge (patient is  already established at this wound care center)    Short-term goals include maintaining good offloading and minimizing bioburden, promoting granulation and epithelialization to healing.  Long-term goals include keeping the wound healed by good offloading and medical management under the direction of internist.      We will continue to follow and work in partnership with treatment team on how to best treat patient concern and diagnosis.        Thank you for your consult. I will follow-up with patient. Please contact us if you have any additional questions.    Michelle Brown DPM  Podiatry  [facility] - Emergency Dept

## 2025-04-02 NOTE — ASSESSMENT & PLAN NOTE
Patient's blood pressure range in the last 24 hours was: BP  Min: 109/72  Max: 128/72.The patient's inpatient anti-hypertensive regimen is listed below:  Current Antihypertensives  , Daily, Oral  carvediloL tablet 25 mg, 2 times daily, Oral  hydrALAZINE tablet 50 mg, 3 times daily, Oral  NIFEdipine 24 hr tablet 90 mg, Daily, Oral    Plan  - BP is controlled, no changes needed to their regimen  - lisinopril held due to hyperkalemia.

## 2025-04-02 NOTE — PLAN OF CARE
Case Management Assessment      [facility]   Pharmacy: [facility] [address]     Patient Arrived From: Home  Existing Help at Home: Pt is independent.      Barriers to Discharge: None     Discharge Plan:               A. Home               B. Home     Patient receives dialysis MWF at [facility]     04/01/25 9552   Discharge Assessment   Assessment Type Discharge Planning Assessment   Confirmed/corrected address, phone number and insurance Yes   Confirmed Demographics Correct on Facesheet   Source of Information patient   Communicated LULÚ with patient/caregiver Date not available/Unable to determine   Reason For Admission Infected ulcer of skin   People in Home alone   Do you expect to return to your current living situation? Yes   Do you have help at home or someone to help you manage your care at home? No   Prior to hospitilization cognitive status: Alert/Oriented   Current cognitive status: Alert/Oriented   Equipment Currently Used at Home none   Readmission within 30 days? No   Patient currently being followed by outpatient case management? No   Do you currently have service(s) that help you manage your care at home? No   Do you take prescription medications? Yes   Do you have prescription coverage? Yes   Coverage Medicare   Do you have any problems affording any of your prescribed medications? No   Is the patient taking medications as prescribed? yes   Who is going to help you get home at discharge? Olivier Estrella (Brother)  [phone] (Mobile)   How do you get to doctors appointments? car, drives self   Are you on dialysis? Yes   Dialysis Name and Scheduled days [facility]   Do you take coumadin? No   Discharge Plan A Home   Discharge Plan B Home   DME Needed Upon Discharge  none   Transition of Care Barriers None

## 2025-04-02 NOTE — PROGRESS NOTES
HEMODIALYSIS NOTE    Emmanuel Morgan is a 39 y.o. male currently on hemodialysis for removal of uremic toxins and volume.   Vitals reviewed      Dialysate bath has been adjusted according to the current labs.  There are no symptoms of hypotension, chest pain, dyspnea, nausea or vomiting.    Labs:      Recent Labs   Lab 03/31/25  2249 04/01/25  0443 04/02/25  0658   * 134* 136   K 5.2* 5.4* 5.4*   CL 91* 92* 97   CO2 26 26 22*   BUN 76* 82* 58*   CREATININE 17.9* 18.1* 13.4*   GLUCOSE 96 103 114*   CALCIUM 9.7 9.5 8.8   PHOS  --  5.5* 4.3       Recent Labs   Lab 03/31/25  1827 04/01/25  0443 04/02/25  0658   WBC 22.52* 18.93* 12.12   HGB 11.0* 10.5* 10.1*   HCT 35.2* 32.3* 32.1*    308 330       Assessment/Plan:  I personally examined the patient on hemodialysis, reviewed the labs and medication.    ESRD - tolerating well, questioned why he was having dialysis again today. Reminded him that he missed his Monday session  Diet changed to low K  Anemia - labs reviewed  SHPT - labs reviewed.

## 2025-04-02 NOTE — PROGRESS NOTES
Pharmacokinetic Assessment Follow Up: IV Vancomycin    Vancomycin serum concentration assessment(s):    The random level was drawn correctly and can be used to guide therapy at this time. The measurement is above the desired definitive target range of 10 to 20 mcg/mL.    Vancomycin Regimen Plan:  No dose on 4/2  Re-dose when the random level is less than 20 mcg/mL, next level to be drawn at 0400 on 4/3    Drug levels (last 3 results):  Recent Labs   Lab Result Units 04/01/25 0443 04/02/25  0658   Vancomycin Random ug/ml 32.1 22.7       Pharmacy will continue to follow and monitor vancomycin.    Please contact pharmacy at extension [phone] for questions regarding this assessment.    Thank you for the consult,   Lynda Ayala       Patient brief summary:  Emmanuel Morgan is a 39 y.o. male initiated on antimicrobial therapy with IV Vancomycin for treatment of bone/joint infection    Drug Allergies:   Review of patient's allergies indicates:  No Known Allergies    Actual Body Weight:   104.3    Renal Function:   Estimated Creatinine Clearance: 7 mL/min (A) (based on SCr of 18.1 mg/dL (H)).,     Dialysis Method (if applicable):  intermittent HD    CBC (last 72 hours):  Recent Labs   Lab Result Units 03/31/25 1827 04/01/25 0443 04/02/25  0658   WBC K/uL 22.52* 18.93* 12.12   HGB gm/dL 11.0* 10.5* 10.1*   HCT % 35.2* 32.3* 32.1*   Platelet Count K/uL 335 308 330   Lymph % % 11.5* 10.8* 13.8*   Mono % % 13.3 13.3 14.6   Eos % % 0.5 2.3 3.1   Basophil % % 0.2 0.3 0.4       Metabolic Panel (last 72 hours):  Recent Labs   Lab Result Units 03/31/25 1827 03/31/25  2249 04/01/25 0443 04/02/25  0658   Sodium mmol/L 133* 135* 134*  --    Potassium mmol/L 5.6* 5.2* 5.4*  --    Chloride mmol/L 90* 91* 92*  --    CO2 mmol/L 25 26 26  --    Glucose mg/dL 95 96 103  --    BUN mg/dL 73* 76* 82*  --    Creatinine mg/dL 17.4* 17.9* 18.1*  --    Albumin g/dL 3.5  --  2.9*  --    Bilirubin Total mg/dL 0.5  --  0.4  --    ALP unit/L 57   --  52  --    AST unit/L 11  --  10*  --    ALT unit/L 9*  --  7*  --    Magnesium  mg/dL  --   --  2.1 2.0   Phosphorus Level mg/dL  --   --  5.5* 4.3       Vancomycin Administrations:  vancomycin given in the last 96 hours                     tobramycin (NEBCIN) 240 mg, vancomycin (VANCOCIN) 1,000 mg (mL) 1,240 mL Given 04/01/25 1609    vancomycin 2,000 mg in 0.9% NaCl 500 mL IVPB (admixture device) (mg) 2,000 mg New Bag 03/31/25 2035                    Microbiologic Results:  Microbiology Results (last 7 days)       Procedure Component Value Units Date/Time    Aerobic culture [[id number]] Collected: 04/01/25 1621    Order Status: Completed Specimen: Biopsy from Foot, Left Updated: 04/02/25 0822     CULTURE, AEROBIC No Growth To Date    Aerobic culture [[id number]]  (Abnormal) Collected: 04/01/25 1557    Order Status: Completed Specimen: Wound from Foot, Right Updated: 04/02/25 0820     CULTURE, AEROBIC Moderate Gram-negative Rods    Blood Culture #1 **CANNOT BE ORDERED STAT** [[id number]]  (Normal) Collected: 03/31/25 1827    Order Status: Completed Specimen: Blood from Peripheral, Antecubital, Left Updated: 04/01/25 2001     Blood Culture No Growth After 24 Hours    Blood Culture #2 **CANNOT BE ORDERED STAT** [[id number]]  (Normal) Collected: 03/31/25 1845    Order Status: Completed Specimen: Blood from Peripheral, Hand, Left Updated: 04/01/25 2001     Blood Culture No Growth After 24 Hours    Culture, Anaerobe [[id number]] Collected: 04/01/25 1621    Order Status: Sent Specimen: Biopsy from Foot, Left Updated: 04/01/25 1654    AFB Culture & Smear [[id number]] Collected: 04/01/25 1621    Order Status: Sent Specimen: Biopsy from Foot, Left Updated: 04/01/25 1654    Gram stain [[id number]] Collected: 04/01/25 1621    Order Status: Sent Specimen: Biopsy from Foot, Left Updated: 04/01/25 1654    Fungus culture [[id number]] Collected: 04/01/25 1621    Order Status: Sent Specimen: Biopsy from Foot, Left Updated:  04/01/25 1654    Culture, Anaerobe [[id number]] Collected: 04/01/25 1557    Order Status: Sent Specimen: Wound from Foot, Right Updated: 04/01/25 1653    Fungus culture [[id number]] Collected: 04/01/25 1557    Order Status: Sent Specimen: Wound from Foot, Right Updated: 04/01/25 1653    Gram stain [[id number]] Collected: 04/01/25 1557    Order Status: Sent Specimen: Wound from Foot, Right Updated: 04/01/25 1653    AFB Culture & Smear [[id number]] Collected: 04/01/25 1557    Order Status: Sent Specimen: Wound from Foot, Right Updated: 04/01/25 1653

## 2025-04-02 NOTE — NURSING
Patient returned to unit from Dialysis. Dialysis Nurse informed she pulled 2L of fluid off.      Patient awake, alert, oriented. Patient without c/o discomfort. No apparent distress noted. Loss IV Access. New IV cannulation 3x unsuccessful. Charge Nurse Amos aware, he will inform House sup for ultrasound guided IV cannulation.

## 2025-04-03 PROBLEM — M86.9 PYOGENIC INFLAMMATION OF BONE: Status: ACTIVE | Noted: 2025-04-03

## 2025-04-03 LAB
ABSOLUTE EOSINOPHIL (OHS): 0.33 K/UL
ABSOLUTE MONOCYTE (OHS): 2.04 K/UL (ref 0.3–1)
ABSOLUTE NEUTROPHIL COUNT (OHS): 9.52 K/UL (ref 1.8–7.7)
ALBUMIN SERPL BCP-MCNC: 2.9 G/DL (ref 3.5–5.2)
ALP SERPL-CCNC: 95 UNIT/L (ref 40–150)
ALT SERPL W/O P-5'-P-CCNC: 9 UNIT/L (ref 10–44)
ANION GAP (OHS): 11 MMOL/L (ref 8–16)
AST SERPL-CCNC: 12 UNIT/L (ref 11–45)
BACTERIA SPEC AEROBE CULT: ABNORMAL
BASOPHILS # BLD AUTO: 0.05 K/UL
BASOPHILS NFR BLD AUTO: 0.3 %
BILIRUB SERPL-MCNC: 0.3 MG/DL (ref 0.1–1)
BUN SERPL-MCNC: 41 MG/DL (ref 6–20)
CALCIUM SERPL-MCNC: 9.4 MG/DL (ref 8.7–10.5)
CHLORIDE SERPL-SCNC: 104 MMOL/L (ref 95–110)
CO2 SERPL-SCNC: 21 MMOL/L (ref 23–29)
CREAT SERPL-MCNC: 12.5 MG/DL (ref 0.5–1.4)
ERYTHROCYTE [DISTWIDTH] IN BLOOD BY AUTOMATED COUNT: 16.5 % (ref 11.5–14.5)
ESTROGEN SERPL-MCNC: NORMAL PG/ML
GFR SERPLBLD CREATININE-BSD FMLA CKD-EPI: 5 ML/MIN/1.73/M2
GLUCOSE SERPL-MCNC: 127 MG/DL (ref 70–110)
HCT VFR BLD AUTO: 31.8 % (ref 40–54)
HGB BLD-MCNC: 9.9 GM/DL (ref 14–18)
IMM GRANULOCYTES # BLD AUTO: 0.12 K/UL (ref 0–0.04)
IMM GRANULOCYTES NFR BLD AUTO: 0.8 % (ref 0–0.5)
INSULIN SERPL-ACNC: NORMAL U[IU]/ML
LAB AP GROSS DESCRIPTION: NORMAL
LAB AP PERFORMING LOCATION(S): NORMAL
LAB AP REPORT FOOTNOTES: NORMAL
LYMPHOCYTES # BLD AUTO: 2.25 K/UL (ref 1–4.8)
MAGNESIUM SERPL-MCNC: 2 MG/DL (ref 1.6–2.6)
MCH RBC QN AUTO: 29.2 PG (ref 27–31)
MCHC RBC AUTO-ENTMCNC: 31.1 G/DL (ref 32–36)
MCV RBC AUTO: 94 FL (ref 82–98)
MYCOBACTERIUM SPEC QL CULT: NORMAL
NUCLEATED RBC (/100WBC) (OHS): 0 /100 WBC
PHOSPHATE SERPL-MCNC: 3.8 MG/DL (ref 2.7–4.5)
PLATELET # BLD AUTO: 323 K/UL (ref 150–450)
PMV BLD AUTO: 9.9 FL (ref 9.2–12.9)
POCT GLUCOSE: 119 MG/DL (ref 70–110)
POCT GLUCOSE: 131 MG/DL (ref 70–110)
POCT GLUCOSE: 131 MG/DL (ref 70–110)
POCT GLUCOSE: 133 MG/DL (ref 70–110)
POTASSIUM SERPL-SCNC: 4.7 MMOL/L (ref 3.5–5.1)
PROT SERPL-MCNC: 8.3 GM/DL (ref 6–8.4)
RBC # BLD AUTO: 3.39 M/UL (ref 4.6–6.2)
RELATIVE EOSINOPHIL (OHS): 2.3 %
RELATIVE LYMPHOCYTE (OHS): 15.7 % (ref 18–48)
RELATIVE MONOCYTE (OHS): 14.3 % (ref 4–15)
RELATIVE NEUTROPHIL (OHS): 66.6 % (ref 38–73)
SODIUM SERPL-SCNC: 136 MMOL/L (ref 136–145)
WBC # BLD AUTO: 14.31 K/UL (ref 3.9–12.7)

## 2025-04-03 PROCEDURE — 83735 ASSAY OF MAGNESIUM: CPT | Performed by: PODIATRIST

## 2025-04-03 PROCEDURE — 25000003 PHARM REV CODE 250: Performed by: PODIATRIST

## 2025-04-03 PROCEDURE — 84100 ASSAY OF PHOSPHORUS: CPT | Performed by: PODIATRIST

## 2025-04-03 PROCEDURE — 80053 COMPREHEN METABOLIC PANEL: CPT | Performed by: PODIATRIST

## 2025-04-03 PROCEDURE — 63600175 PHARM REV CODE 636 W HCPCS: Performed by: PODIATRIST

## 2025-04-03 PROCEDURE — 63600175 PHARM REV CODE 636 W HCPCS: Performed by: HOSPITALIST

## 2025-04-03 PROCEDURE — G0545 PR VISIT INHERENT TO INPT OR OBS CARE, INFECTIOUS DISEASE: HCPCS | Mod: ,,, | Performed by: INTERNAL MEDICINE

## 2025-04-03 PROCEDURE — 11000001 HC ACUTE MED/SURG PRIVATE ROOM

## 2025-04-03 PROCEDURE — 36415 COLL VENOUS BLD VENIPUNCTURE: CPT | Performed by: PODIATRIST

## 2025-04-03 PROCEDURE — 85025 COMPLETE CBC W/AUTO DIFF WBC: CPT | Performed by: PODIATRIST

## 2025-04-03 PROCEDURE — 99223 1ST HOSP IP/OBS HIGH 75: CPT | Mod: ,,, | Performed by: INTERNAL MEDICINE

## 2025-04-03 PROCEDURE — 25000003 PHARM REV CODE 250: Performed by: HOSPITALIST

## 2025-04-03 PROCEDURE — 99232 SBSQ HOSP IP/OBS MODERATE 35: CPT | Mod: ,,, | Performed by: INTERNAL MEDICINE

## 2025-04-03 RX ORDER — ACETAMINOPHEN 325 MG/1
650 TABLET ORAL EVERY 4 HOURS PRN
Status: DISCONTINUED | OUTPATIENT
Start: 2025-04-03 | End: 2025-04-15 | Stop reason: HOSPADM

## 2025-04-03 RX ORDER — OXYCODONE AND ACETAMINOPHEN 10; 325 MG/1; MG/1
1 TABLET ORAL EVERY 4 HOURS PRN
Refills: 0 | Status: DISCONTINUED | OUTPATIENT
Start: 2025-04-03 | End: 2025-04-15 | Stop reason: HOSPADM

## 2025-04-03 RX ORDER — MORPHINE SULFATE 4 MG/ML
4 INJECTION, SOLUTION INTRAMUSCULAR; INTRAVENOUS EVERY 4 HOURS PRN
Status: DISCONTINUED | OUTPATIENT
Start: 2025-04-03 | End: 2025-04-15 | Stop reason: HOSPADM

## 2025-04-03 RX ADMIN — OXYCODONE AND ACETAMINOPHEN 1 TABLET: 10; 325 TABLET ORAL at 10:04

## 2025-04-03 RX ADMIN — SEVELAMER CARBONATE 800 MG: 800 TABLET, FILM COATED ORAL at 09:04

## 2025-04-03 RX ADMIN — HYDRALAZINE HYDROCHLORIDE 50 MG: 25 TABLET ORAL at 09:04

## 2025-04-03 RX ADMIN — GUAIFENESIN AND DEXTROMETHORPHAN 5 ML: 100; 10 SYRUP ORAL at 06:04

## 2025-04-03 RX ADMIN — OXYCODONE 5 MG: 5 TABLET ORAL at 05:04

## 2025-04-03 RX ADMIN — CARVEDILOL 25 MG: 12.5 TABLET, FILM COATED ORAL at 09:04

## 2025-04-03 RX ADMIN — CEFEPIME 1 G: 1 INJECTION, POWDER, FOR SOLUTION INTRAMUSCULAR; INTRAVENOUS at 02:04

## 2025-04-03 RX ADMIN — MUPIROCIN: 20 OINTMENT TOPICAL at 09:04

## 2025-04-03 RX ADMIN — SEVELAMER CARBONATE 800 MG: 800 TABLET, FILM COATED ORAL at 11:04

## 2025-04-03 RX ADMIN — NIFEDIPINE 90 MG: 30 TABLET, FILM COATED, EXTENDED RELEASE ORAL at 09:04

## 2025-04-03 RX ADMIN — GUAIFENESIN AND DEXTROMETHORPHAN 5 ML: 100; 10 SYRUP ORAL at 05:04

## 2025-04-03 RX ADMIN — METRONIDAZOLE 500 MG: 500 INJECTION, SOLUTION INTRAVENOUS at 10:04

## 2025-04-03 RX ADMIN — OXYCODONE AND ACETAMINOPHEN 1 TABLET: 10; 325 TABLET ORAL at 06:04

## 2025-04-03 RX ADMIN — SEVELAMER CARBONATE 800 MG: 800 TABLET, FILM COATED ORAL at 04:04

## 2025-04-03 RX ADMIN — METRONIDAZOLE 500 MG: 500 INJECTION, SOLUTION INTRAVENOUS at 05:04

## 2025-04-03 RX ADMIN — METRONIDAZOLE 500 MG: 500 INJECTION, SOLUTION INTRAVENOUS at 02:04

## 2025-04-03 RX ADMIN — Medication 6 MG: at 09:04

## 2025-04-03 NOTE — HPI
Mr. Morgan is a 40y/o M pt with hx of  ESRD on HD, diabetes, prior osteomyelitis requiring right midfoot amputation with a  chronic left foot ulcer and a right foot ulcer of his stump  presented for worsening wounds and was admitted for infected diabetic ulcer and osteo on 3/31.     Has been following with wound care for a fe w months for his left foot ulcer. He reports it developed a foul smell with discharge.  Reports the R foot ulcer has been present for just a few weeks due to foot trauma. Denies signs or symptoms of systemic infection.     In the ED he was afebrile. Labs with leukocytosis and elevated inflammatory markers. MRI L  foot revealed: 1. Plantar soft tissue ulcer at the level of the 3rd MTP joint with associated osteomyelitis of the distal half of the metatarsal and base of the proximal phalanx.  No abscess.   2. Sequela of chronic osteomyelitis involving the 1st, 2nd and 5th metatarsals and proximal phalanges.     ID consulted for: osteo

## 2025-04-03 NOTE — ASSESSMENT & PLAN NOTE
"Left foot osteomyelitis    40y/o M with ESRD on HD, DM with chronic L foot ulcer c/b osteo and infected R foot ulcer now s/p surgical debridement 4/31 with op findings of: "gas in the soft tissues of the plantar right foot and deep probe to bone with soft bone of left foot."   Wound cx from R foot growing p mirabilis.  QTc 458    Recommendations:  Currently on cefepime and flagyl- continue for now pending final culture data  Anticipate 6 wks abx for L foot osteo. Ideally abx can be arranged with outpatient HD center.  Continue local wound care           "

## 2025-04-03 NOTE — SUBJECTIVE & OBJECTIVE
Interval History: pain to feet worse than yesterday.    Review of Systems   HENT:  Negative for ear discharge and ear pain.    Eyes:  Negative for discharge and itching.   Endocrine: Negative for cold intolerance and heat intolerance.   Neurological:  Negative for seizures and syncope.     Objective:     Vital Signs (Most Recent):  Temp: 97.9 °F (36.6 °C) (04/03/25 1104)  Pulse: 85 (04/03/25 1104)  Resp: 18 (04/03/25 1104)  BP: 118/67 (04/03/25 1104)  SpO2: 96 % (04/03/25 1104) Vital Signs (24h Range):  Temp:  [97.9 °F (36.6 °C)-100.1 °F (37.8 °C)] 97.9 °F (36.6 °C)  Pulse:  [85-96] 85  Resp:  [18-20] 18  SpO2:  [96 %-99 %] 96 %  BP: (118-157)/(67-86) 118/67     Weight: 104.3 kg (229 lb 15 oz)  Body mass index is 29.52 kg/m².    Intake/Output Summary (Last 24 hours) at 4/3/2025 1431  Last data filed at 4/3/2025 1306  Gross per 24 hour   Intake 480 ml   Output --   Net 480 ml         Physical Exam  Constitutional:       Appearance: He is not toxic-appearing or diaphoretic.   HENT:      Head: Normocephalic and atraumatic.      Mouth/Throat:      Pharynx: Oropharynx is clear. No oropharyngeal exudate or posterior oropharyngeal erythema.   Cardiovascular:      Rate and Rhythm: Normal rate and regular rhythm.   Pulmonary:      Breath sounds: Normal breath sounds. No wheezing.   Abdominal:      General: Bowel sounds are normal.      Palpations: Abdomen is soft.   Skin:     Comments: Both feet dressed.   Neurological:      Mental Status: He is oriented to person, place, and time.      Cranial Nerves: No cranial nerve deficit.               Significant Labs: All pertinent labs within the past 24 hours have been reviewed.  BMP:   Recent Labs   Lab 04/03/25  0618      K 4.7      CO2 21*   BUN 41*   CREATININE 12.5*   CALCIUM 9.4   MG 2.0     CBC:   Recent Labs   Lab 04/02/25  0658 04/03/25  0618   WBC 12.12 14.31*   HGB 10.1* 9.9*   HCT 32.1* 31.8*    323       Significant Imaging: I have reviewed all  pertinent imaging results/findings within the past 24 hours.

## 2025-04-03 NOTE — ASSESSMENT & PLAN NOTE
Patient presents with ulcers to left foot and right foot stump.  MRI ordered.  No evidence of osteo on right foot stump.  Left foot MRI showing plantar soft tissue ulcer at the level of the 3rd MTP joint with associated osteomyelitis of the distal half of the metatarsal and base of the proximal phalanx.   Started on ABX's.  Podiatry consulted. Underwent bilateral irrigation and debridement.  Continue ABX's and await cultures.  ID consult.

## 2025-04-03 NOTE — PLAN OF CARE
Problem: Skin Injury Risk Increased  Goal: Skin Health and Integrity  Outcome: Progressing  Intervention: Optimize Skin Protection  Flowsheets (Taken 4/3/2025 1832)  Pressure Reduction Devices:   heel offloading device utilized   positioning supports utilized

## 2025-04-03 NOTE — PROGRESS NOTES
[facility]  Progress Note    Patient Name: Emmanuel Morgan  MRN: [id number]  Patient Class: IP- Inpatient   Admission Date: 3/31/2025  Length of Stay: 3 days  Attending Physician: Eliecer Posey MD  Primary Care Provider: Laurence, Primary Doctor        Subjective     Principal Problem:Infected ulcer of skin        HPI:  39-year-old male with ESRD on HD, diabetes not on insulin, prior osteomyelitis requiring right midfoot amputation with a long standing left foot ulcer (months) and a right foot ulcer of his stump which has been present for weeks who was sent to the ER by home health due to progression of wounds with skin and soft tissue infections of possibly both ulcers and possible osteomyelitis of either.     The left foot ulcer has been under the care of of agent wound care for months.  It has progressed in today was debrided by the home health nurse by patient report.  It has developed a foul smell with discharge.  It is not painful.    The right foot ulcer was created weeks ago when he accidentally hit his foot.  It is not painful.  This 1 does not have significant surrounding erythema or purulent drainage.      No fever.    He missed dialysis today due to reporting to the ER.      Overview/Hospital Course:  39-year-old male with ESRD on HD, DM, prior osteomyelitis requiring right midfoot amputation with a long standing left foot ulcer (months) and a right foot ulcer of his stump which has been present for weeks who was sent to the ER by home health due to progression of wounds with skin and soft tissue infections of possibly both ulcers and possible osteomyelitis of either.  MRI of left foot showing plantar soft tissue ulcer at the level of the 3rd MTP joint with associated osteomyelitis of the distal half of the metatarsal and base of the proximal phalanx.  On ABX's and Podiatry consulted.  Patient underwent irrigation and debridement of bilateral LE's.       Interval History: pain  to feet worse than yesterday.    Review of Systems   HENT:  Negative for ear discharge and ear pain.    Eyes:  Negative for discharge and itching.   Endocrine: Negative for cold intolerance and heat intolerance.   Neurological:  Negative for seizures and syncope.     Objective:     Vital Signs (Most Recent):  Temp: 97.9 °F (36.6 °C) (04/03/25 1104)  Pulse: 85 (04/03/25 1104)  Resp: 18 (04/03/25 1104)  BP: 118/67 (04/03/25 1104)  SpO2: 96 % (04/03/25 1104) Vital Signs (24h Range):  Temp:  [97.9 °F (36.6 °C)-100.1 °F (37.8 °C)] 97.9 °F (36.6 °C)  Pulse:  [85-96] 85  Resp:  [18-20] 18  SpO2:  [96 %-99 %] 96 %  BP: (118-157)/(67-86) 118/67     Weight: 104.3 kg (229 lb 15 oz)  Body mass index is 29.52 kg/m².    Intake/Output Summary (Last 24 hours) at 4/3/2025 1431  Last data filed at 4/3/2025 1306  Gross per 24 hour   Intake 480 ml   Output --   Net 480 ml         Physical Exam  Constitutional:       Appearance: He is not toxic-appearing or diaphoretic.   HENT:      Head: Normocephalic and atraumatic.      Mouth/Throat:      Pharynx: Oropharynx is clear. No oropharyngeal exudate or posterior oropharyngeal erythema.   Cardiovascular:      Rate and Rhythm: Normal rate and regular rhythm.   Pulmonary:      Breath sounds: Normal breath sounds. No wheezing.   Abdominal:      General: Bowel sounds are normal.      Palpations: Abdomen is soft.   Skin:     Comments: Both feet dressed.   Neurological:      Mental Status: He is oriented to person, place, and time.      Cranial Nerves: No cranial nerve deficit.               Significant Labs: All pertinent labs within the past 24 hours have been reviewed.  BMP:   Recent Labs   Lab 04/03/25  0618      K 4.7      CO2 21*   BUN 41*   CREATININE 12.5*   CALCIUM 9.4   MG 2.0     CBC:   Recent Labs   Lab 04/02/25  0658 04/03/25  0618   WBC 12.12 14.31*   HGB 10.1* 9.9*   HCT 32.1* 31.8*    323       Significant Imaging: I have reviewed all pertinent imaging  results/findings within the past 24 hours.      Assessment & Plan  Infected ulcer of skin  Patient presents with ulcers to left foot and right foot stump.  MRI ordered.  No evidence of osteo on right foot stump.  Left foot MRI showing plantar soft tissue ulcer at the level of the 3rd MTP joint with associated osteomyelitis of the distal half of the metatarsal and base of the proximal phalanx.   Started on ABX's.  Podiatry consulted. Underwent bilateral irrigation and debridement.  Continue ABX's and await cultures.  ID consult.    End-stage renal disease  Creatine stable for now. BMP reviewed- noted Estimated Creatinine Clearance: 10.1 mL/min (A) (based on SCr of 12.5 mg/dL (H)). according to latest data. Based on current GFR, CKD stage is end stage.  Monitor UOP and serial BMP and adjust therapy as needed. Renally dose meds. Avoid nephrotoxic medications and procedures.  Nephrology consulted for HD.    Essential hypertension  Patient's blood pressure range in the last 24 hours was: BP  Min: 118/67  Max: 157/72.The patient's inpatient anti-hypertensive regimen is listed below:  Current Antihypertensives  , Daily, Oral  carvediloL tablet 25 mg, 2 times daily, Oral  hydrALAZINE tablet 50 mg, 3 times daily, Oral  NIFEdipine 24 hr tablet 90 mg, Daily, Oral    Plan  - BP is controlled, no changes needed to their regimen  - lisinopril held due to hyperkalemia.  Hyperkalemia  Hyperkalemia is likely due to ESRD.The patients most recent potassium results are listed below.  Recent Labs     04/01/25  0443 04/02/25  0658 04/03/25  0618   K 5.4* 5.4* 4.7   Treated with Lokelma.  Stable.  Nephrology consulted for HD.  Treat with HD  Now resolved.          Type 2 diabetes mellitus with foot ulcer  Patient's FSGs are controlled on current medication regimen.  Last A1c reviewed-   Lab Results   Component Value Date    HGBA1C 4.9 08/20/2024     Most recent fingerstick glucose reviewed-   Recent Labs   Lab 04/02/25  4688  04/02/25  1946 04/03/25  0831 04/03/25  1111   POCTGLUCOSE 128* 120* 119* 131*     Current correctional scale  Low  Maintain anti-hyperglycemic dose as follows-   Antihyperglycemics (From admission, onward)      Start     Stop Route Frequency Ordered    03/31/25 2317  insulin aspart U-100 pen 0-5 Units         -- SubQ Before meals & nightly PRN 03/31/25 2218          Hold Oral hypoglycemics while patient is in the hospital.  Foot ulcer with fat layer exposed, unspecified laterality      Foot infection      VTE Risk Mitigation (From admission, onward)           Ordered     heparin (porcine) injection 1,000 Units  As needed (PRN)         04/01/25 1827     IP VTE LOW RISK PATIENT  Once         03/31/25 2218     Place sequential compression device  Until discontinued         03/31/25 2218                    Discharge Planning   LULÚ: 4/7/2025     Code Status: Full Code   Medical Readiness for Discharge Date:   Discharge Plan A: Home                        Eliecer Posey MD  Department of Hospital Medicine   [facility]

## 2025-04-03 NOTE — NURSING
[facility] [place]es [place] -- 4 [place]      4/3/2025       Skin assessed on: Q Shift      [x] No Pressure Injuries Present    []Prevention Measures Documented    [] Yes LDA  for Pressure Injury Previously documented     [] Yes New Pressure Injury Discovered   [] LDA for New Pressure Injury Added      Attending RN:  Umm Brown RN     Second RN:  ALPHONSE Glover

## 2025-04-03 NOTE — ASSESSMENT & PLAN NOTE
Patient's blood pressure range in the last 24 hours was: BP  Min: 118/67  Max: 157/72.The patient's inpatient anti-hypertensive regimen is listed below:  Current Antihypertensives  , Daily, Oral  carvediloL tablet 25 mg, 2 times daily, Oral  hydrALAZINE tablet 50 mg, 3 times daily, Oral  NIFEdipine 24 hr tablet 90 mg, Daily, Oral    Plan  - BP is controlled, no changes needed to their regimen  - lisinopril held due to hyperkalemia.

## 2025-04-03 NOTE — PROGRESS NOTES
Vancomycin consult follow-up:    Patient reviewed, dialysis scheduled every Mon, Wed, Fri, cultures reviewed, no new levels, no dose today; Next levels due: random due 4/4/2025 at 0400

## 2025-04-03 NOTE — PROGRESS NOTES
[facility]  Rapid Response Nurse Intervention/ Task    Date of Visit: 04/03/2025  Time of Visit: 1300       INTERVENTIONS/ TASK Completed:     IV x1

## 2025-04-03 NOTE — ASSESSMENT & PLAN NOTE
Hyperkalemia is likely due to ESRD.The patients most recent potassium results are listed below.  Recent Labs     04/01/25  0443 04/02/25  0658 04/03/25  0618   K 5.4* 5.4* 4.7   Treated with Lokelma.  Stable.  Nephrology consulted for HD.  Treat with HD  Now resolved.

## 2025-04-03 NOTE — SUBJECTIVE & OBJECTIVE
"Past Medical History:   Diagnosis Date    Diabetes mellitus     Hypertension     Renal disorder        Past Surgical History:   Procedure Laterality Date    FOOT AMPUTATION Right     IRRIGATION AND DEBRIDEMENT Bilateral 4/1/2025    Procedure: IRRIGATION AND DEBRIDEMENT;  Surgeon: Bety Faria DPM;  Location: [facility];  Service: Podiatry;  Laterality: Bilateral;  need jamshidi needle available       Review of patient's allergies indicates:  No Known Allergies    Medications:  Medications Prior to Admission   Medication Sig    carvediloL (COREG) 25 MG tablet Take 1 tablet by mouth 2 (two) times daily.    hydrALAZINE (APRESOLINE) 50 MG tablet Take 1 tablet by mouth 3 (three) times daily.    lisinopriL (PRINIVIL,ZESTRIL) 40 MG tablet Take 1 tablet by mouth once daily.    NIFEdipine (PROCARDIA-XL) 90 MG (OSM) 24 hr tablet Take 90 mg by mouth once daily.     Antibiotics (From admission, onward)      Start     Stop Route Frequency Ordered    04/02/25 1430  ceFEPIme injection 1 g         -- IV Daily 04/02/25 1355    04/01/25 1215  mupirocin 2 % ointment         04/06/25 0859 Nasl 2 times daily 04/01/25 1108    04/01/25 0900  metronidazole IVPB 500 mg         -- IV Every 8 hours (non-standard times) 03/31/25 2118 03/31/25 2217  vancomycin - pharmacy to dose  (vancomycin IVPB (PEDS and ADULTS))        Placed in "And" Linked Group    -- IV pharmacy to manage frequency 03/31/25 2117          Antifungals (From admission, onward)      None          Antivirals (From admission, onward)      None               There is no immunization history on file for this patient.    Family History    None       Social History     Socioeconomic History    Marital status: Unknown   Tobacco Use    Smoking status: Never    Smokeless tobacco: Never   Substance and Sexual Activity    Alcohol use: Never    Drug use: Never     Social Drivers of Health     Financial Resource Strain: Low Risk  (4/1/2025)    Overall Financial Resource Strain (CARDIA) "     Difficulty of Paying Living Expenses: Not hard at all   Food Insecurity: No Food Insecurity (4/1/2025)    Hunger Vital Sign     Worried About Running Out of Food in the Last Year: Never true     Ran Out of Food in the Last Year: Never true   Transportation Needs: No Transportation Needs (4/1/2025)    PRAPARE - Transportation     Lack of Transportation (Medical): No     Lack of Transportation (Non-Medical): No   Physical Activity: Sufficiently Active (9/1/2024)    Exercise Vital Sign     Days of Exercise per Week: 3 days     Minutes of Exercise per Session: 60 min   Stress: No Stress Concern Present (4/1/2025)    Citizen of [place] of Occupational Health - Occupational Stress Questionnaire     Feeling of Stress : Not at all   Housing Stability: Low Risk  (4/1/2025)    Housing Stability Vital Sign     Unable to Pay for Housing in the Last Year: No     Number of Times Moved in the Last Year: 0     Homeless in the Last Year: No     Review of Systems   Constitutional:  Negative for chills, diaphoresis, fatigue and fever.   Gastrointestinal:  Negative for abdominal pain and diarrhea.   Skin:  Positive for wound.   All other systems reviewed and are negative.    Objective:     Vital Signs (Most Recent):  Temp: 97.9 °F (36.6 °C) (04/03/25 1104)  Pulse: 85 (04/03/25 1104)  Resp: 18 (04/03/25 1104)  BP: 118/67 (04/03/25 1104)  SpO2: 96 % (04/03/25 1104) Vital Signs (24h Range):  Temp:  [97.9 °F (36.6 °C)-100.1 °F (37.8 °C)] 97.9 °F (36.6 °C)  Pulse:  [85-96] 85  Resp:  [18-20] 18  SpO2:  [96 %-99 %] 96 %  BP: (118-157)/(67-86) 118/67     Weight: 104.3 kg (229 lb 15 oz)  Body mass index is 29.52 kg/m².    Estimated Creatinine Clearance: 10.1 mL/min (A) (based on SCr of 12.5 mg/dL (H)).     Physical Exam  Vitals and nursing note reviewed.   Constitutional:       Appearance: Normal appearance. He is not ill-appearing.   HENT:      Head: Normocephalic and atraumatic.      Nose: Nose normal. No congestion.      Mouth/Throat:     "  Mouth: Mucous membranes are moist.      Pharynx: Oropharynx is clear.   Eyes:      Conjunctiva/sclera: Conjunctivae normal.      Pupils: Pupils are equal, round, and reactive to light.   Cardiovascular:      Rate and Rhythm: Normal rate and regular rhythm.   Pulmonary:      Effort: Pulmonary effort is normal. No respiratory distress.      Breath sounds: Normal breath sounds.   Abdominal:      General: Abdomen is flat. There is no distension.      Palpations: Abdomen is soft.   Musculoskeletal:         General: No swelling. Normal range of motion.      Cervical back: Normal range of motion and neck supple.      Comments: R foot s/p TMA- wound present on plantar aspect- clean borders, no drainage.  L foot in surgical dressing.   Skin:     General: Skin is warm and dry.   Neurological:      General: No focal deficit present.      Mental Status: He is alert and oriented to person, place, and time.   Psychiatric:         Mood and Affect: Mood normal.         Behavior: Behavior normal.          Significant Labs: Blood Culture: No results for input(s): "LABBLOO" in the last 4320 hours.  Wound Culture: No results for input(s): "LABAERO" in the last 4320 hours.  All pertinent labs within the past 24 hours have been reviewed.    Significant Imaging: I have reviewed all pertinent imaging results/findings within the past 24 hours.  "

## 2025-04-03 NOTE — CONSULTS
"[facility] [place] [facility]  Infectious Disease  Consult Note    Patient Name: Emmanuel Morgan  MRN: [id number]  Admission Date: 3/31/2025  Hospital Length of Stay: 3 days  Attending Physician: Eliecer Posey MD  Primary Care Provider: No, Primary Doctor     Isolation Status: No active isolations    Patient information was obtained from patient, past medical records, and ER records.      Inpatient consult to Infectious Diseases  Consult performed by: Neeru Brand MD  Consult ordered by: Eliecer Posey MD        Assessment/Plan:     Orthopedic  * Infected ulcer of skin  Left foot osteomyelitis    40y/o M with ESRD on HD, DM with chronic L foot ulcer c/b osteo and infected R foot ulcer now s/p surgical debridement 4/31 with op findings of: "gas in the soft tissues of the plantar right foot and deep probe to bone with soft bone of left foot."   Wound cx from R foot growing p mirabilis.  QTc 458    Recommendations:  -stop vanc since no mrsa in cx  -continue cefepime and flagyl- continue for now pending final culture data  -Anticipate 6 wks abx for L foot osteo. -Ideally abx can be arranged with outpatient HD center.  -Continue local wound care                 Thank you for your consult. I will follow-up with patient. Please contact us if you have any additional questions.    Neeru Brand MD  Infectious Disease  [facility] [place] [facility]    Subjective:     Principal Problem: Infected ulcer of skin    HPI: Mr. Morgan is a 40y/o M pt with hx of  ESRD on HD, diabetes, prior osteomyelitis requiring right midfoot amputation with a  chronic left foot ulcer and a right foot ulcer of his stump  presented for worsening wounds and was admitted for infected diabetic ulcer and osteo on 3/31.     Has been following with wound care for a fe w months for his left foot ulcer. He reports it developed a foul smell with discharge.  Reports the R foot ulcer has been present for just a few weeks due to foot trauma. " "Denies signs or symptoms of systemic infection.     In the ED he was afebrile. Labs with leukocytosis and elevated inflammatory markers. MRI L  foot revealed: 1. Plantar soft tissue ulcer at the level of the 3rd MTP joint with associated osteomyelitis of the distal half of the metatarsal and base of the proximal phalanx.  No abscess.   2. Sequela of chronic osteomyelitis involving the 1st, 2nd and 5th metatarsals and proximal phalanges.     ID consulted for: osteo      Past Medical History:   Diagnosis Date    Diabetes mellitus     Hypertension     Renal disorder        Past Surgical History:   Procedure Laterality Date    FOOT AMPUTATION Right     IRRIGATION AND DEBRIDEMENT Bilateral 4/1/2025    Procedure: IRRIGATION AND DEBRIDEMENT;  Surgeon: Bety Faria DPM;  Location: [facility] OR;  Service: Podiatry;  Laterality: Bilateral;  need jamshidi needle available       Review of patient's allergies indicates:  No Known Allergies    Medications:  Medications Prior to Admission   Medication Sig    carvediloL (COREG) 25 MG tablet Take 1 tablet by mouth 2 (two) times daily.    hydrALAZINE (APRESOLINE) 50 MG tablet Take 1 tablet by mouth 3 (three) times daily.    lisinopriL (PRINIVIL,ZESTRIL) 40 MG tablet Take 1 tablet by mouth once daily.    NIFEdipine (PROCARDIA-XL) 90 MG (OSM) 24 hr tablet Take 90 mg by mouth once daily.     Antibiotics (From admission, onward)      Start     Stop Route Frequency Ordered    04/02/25 1430  ceFEPIme injection 1 g         -- IV Daily 04/02/25 1355    04/01/25 1215  mupirocin 2 % ointment         04/06/25 0859 Nasl 2 times daily 04/01/25 1108    04/01/25 0900  metronidazole IVPB 500 mg         -- IV Every 8 hours (non-standard times) 03/31/25 2118 03/31/25 2217  vancomycin - pharmacy to dose  (vancomycin IVPB (PEDS and ADULTS))        Placed in "And" Linked Group    -- IV pharmacy to manage frequency 03/31/25 2117          Antifungals (From admission, onward)      None          Antivirals " (From admission, onward)      None               There is no immunization history on file for this patient.    Family History    None       Social History     Socioeconomic History    Marital status: Unknown   Tobacco Use    Smoking status: Never    Smokeless tobacco: Never   Substance and Sexual Activity    Alcohol use: Never    Drug use: Never     Social Drivers of Health     Financial Resource Strain: Low Risk  (4/1/2025)    Overall Financial Resource Strain (CARDIA)     Difficulty of Paying Living Expenses: Not hard at all   Food Insecurity: No Food Insecurity (4/1/2025)    Hunger Vital Sign     Worried About Running Out of Food in the Last Year: Never true     Ran Out of Food in the Last Year: Never true   Transportation Needs: No Transportation Needs (4/1/2025)    PRAPARE - Transportation     Lack of Transportation (Medical): No     Lack of Transportation (Non-Medical): No   Physical Activity: Sufficiently Active (9/1/2024)    Exercise Vital Sign     Days of Exercise per Week: 3 days     Minutes of Exercise per Session: 60 min   Stress: No Stress Concern Present (4/1/2025)    [place] of Occupational Health - Occupational Stress Questionnaire     Feeling of Stress : Not at all   Housing Stability: Low Risk  (4/1/2025)    Housing Stability Vital Sign     Unable to Pay for Housing in the Last Year: No     Number of Times Moved in the Last Year: 0     Homeless in the Last Year: No     Review of Systems   Constitutional:  Negative for chills, diaphoresis, fatigue and fever.   Gastrointestinal:  Negative for abdominal pain and diarrhea.   Skin:  Positive for wound.   All other systems reviewed and are negative.    Objective:     Vital Signs (Most Recent):  Temp: 97.9 °F (36.6 °C) (04/03/25 1104)  Pulse: 85 (04/03/25 1104)  Resp: 18 (04/03/25 1104)  BP: 118/67 (04/03/25 1104)  SpO2: 96 % (04/03/25 1104) Vital Signs (24h Range):  Temp:  [97.9 °F (36.6 °C)-100.1 °F (37.8 °C)] 97.9 °F (36.6 °C)  Pulse:   "[85-96] 85  Resp:  [18-20] 18  SpO2:  [96 %-99 %] 96 %  BP: (118-157)/(67-86) 118/67     Weight: 104.3 kg (229 lb 15 oz)  Body mass index is 29.52 kg/m².    Estimated Creatinine Clearance: 10.1 mL/min (A) (based on SCr of 12.5 mg/dL (H)).     Physical Exam  Vitals and nursing note reviewed.   Constitutional:       Appearance: Normal appearance. He is not ill-appearing.   HENT:      Head: Normocephalic and atraumatic.      Nose: Nose normal. No congestion.      Mouth/Throat:      Mouth: Mucous membranes are moist.      Pharynx: Oropharynx is clear.   Eyes:      Conjunctiva/sclera: Conjunctivae normal.      Pupils: Pupils are equal, round, and reactive to light.   Cardiovascular:      Rate and Rhythm: Normal rate and regular rhythm.   Pulmonary:      Effort: Pulmonary effort is normal. No respiratory distress.      Breath sounds: Normal breath sounds.   Abdominal:      General: Abdomen is flat. There is no distension.      Palpations: Abdomen is soft.   Musculoskeletal:         General: No swelling. Normal range of motion.      Cervical back: Normal range of motion and neck supple.      Comments: R foot s/p TMA- wound present on plantar aspect- clean borders, no drainage.  L foot in surgical dressing.   Skin:     General: Skin is warm and dry.   Neurological:      General: No focal deficit present.      Mental Status: He is alert and oriented to person, place, and time.   Psychiatric:         Mood and Affect: Mood normal.         Behavior: Behavior normal.          Significant Labs: Blood Culture: No results for input(s): "LABBLOO" in the last 4320 hours.  Wound Culture: No results for input(s): "LABAERO" in the last 4320 hours.  All pertinent labs within the past 24 hours have been reviewed.    Significant Imaging: I have reviewed all pertinent imaging results/findings within the past 24 hours.              "

## 2025-04-03 NOTE — NURSING
PER handoff given by Gabbie CUNHA      Pt resting in bed quietly. NAD noted. No c/o pain. Pt asked for cough medicine. PRN tessalon perle given as ordered.      Fall and safety precautions maintained. Bed alarm activated and audible.. Bed locked in lowest position, with side rails up x2. Call bell and personal items within reach    [facility] [place]  Nurses Note -- 4 Eyes      4/2/2025       Skin assessed on: Q Shift      [x] No Pressure Injuries Present    []Prevention Measures Documented    [] Yes LDA  for Pressure Injury Previously documented     [] Yes New Pressure Injury Discovered   [] LDA for New Pressure Injury Added      Attending RN:  Belen Mariano RN     Second RN:  Gabbie Robb RN

## 2025-04-03 NOTE — SUBJECTIVE & OBJECTIVE
Interval History: s/p HD yesterday with 1.5L removed. No events overnight. Doing okay this morning. Denies SOB or leg swelling.       Review of patient's allergies indicates:  No Known Allergies  Current Facility-Administered Medications   Medication Frequency    0.9% NaCl infusion Once    acetaminophen tablet 650 mg Q4H PRN    aluminum-magnesium hydroxide-simethicone 200-200-20 mg/5 mL suspension 30 mL QID PRN    benzonatate capsule 100 mg TID PRN    carvediloL tablet 25 mg BID    ceFEPIme injection 1 g Daily    dextromethorphan-guaiFENesin  mg/5 ml liquid 5 mL Q6H PRN    dextrose 50% injection 12.5 g PRN    dextrose 50% injection 25 g PRN    diphenhydrAMINE capsule 25 mg Q6H PRN    glucagon (human recombinant) injection 1 mg PRN    glucose chewable tablet 16 g PRN    glucose chewable tablet 24 g PRN    heparin (porcine) injection 1,000 Units PRN    hydrALAZINE tablet 50 mg TID    insulin aspart U-100 pen 0-5 Units QID (AC + HS) PRN    melatonin tablet 6 mg Nightly PRN    metronidazole IVPB 500 mg Q8H    morphine injection 4 mg Q4H PRN    mupirocin 2 % ointment BID    naloxone 0.4 mg/mL injection 0.02 mg PRN    NIFEdipine 24 hr tablet 90 mg Daily    ondansetron injection 4 mg Q6H PRN    oxyCODONE-acetaminophen  mg per tablet 1 tablet Q4H PRN    senna-docusate 8.6-50 mg per tablet 1 tablet BID PRN    sevelamer carbonate tablet 800 mg TID WM    sodium chloride 0.9% bolus 250 mL 250 mL PRN    vancomycin - pharmacy to dose pharmacy to manage frequency       Objective:     Vital Signs (Most Recent):  Temp: 98.2 °F (36.8 °C) (04/03/25 1528)  Pulse: 84 (04/03/25 1528)  Resp: 18 (04/03/25 1528)  BP: 123/81 (04/03/25 1528)  SpO2: 98 % (04/03/25 1528) Vital Signs (24h Range):  Temp:  [97.9 °F (36.6 °C)-100.1 °F (37.8 °C)] 98.2 °F (36.8 °C)  Pulse:  [84-96] 84  Resp:  [18-20] 18  SpO2:  [96 %-99 %] 98 %  BP: (118-157)/(67-86) 123/81     Weight: 104.3 kg (229 lb 15 oz) (04/01/25 2045)  Body mass index is 29.52  kg/m².  Body surface area is 2.33 meters squared.    I/O last 3 completed shifts:  In: 600 [P.O.:600]  Out: 3500 [Other:3500]     Physical Exam  Vitals and nursing note reviewed.   Constitutional:       General: He is awake. He is not in acute distress.     Appearance: Normal appearance. He is well-developed.   HENT:      Head: Normocephalic and atraumatic.      Nose: Nose normal.      Mouth/Throat:      Mouth: Mucous membranes are moist.   Eyes:      Extraocular Movements: Extraocular movements intact.      Conjunctiva/sclera: Conjunctivae normal.   Cardiovascular:      Rate and Rhythm: Normal rate and regular rhythm.   Pulmonary:      Effort: Pulmonary effort is normal.   Abdominal:      Palpations: Abdomen is soft.   Musculoskeletal:         General: No swelling, tenderness or signs of injury.   Skin:     General: Skin is warm and dry.      Findings: No erythema or rash.   Neurological:      General: No focal deficit present.      Mental Status: He is alert. Mental status is at baseline.   Psychiatric:         Mood and Affect: Mood normal.         Behavior: Behavior normal.          Significant Labs:  CBC:   Recent Labs   Lab 04/03/25  0618   WBC 14.31*   RBC 3.39*   HGB 9.9*   HCT 31.8*      MCV 94   MCH 29.2   MCHC 31.1*     CMP:   Recent Labs   Lab 04/03/25  0618   CALCIUM 9.4   ALBUMIN 2.9*      K 4.7   CO2 21*      BUN 41*   CREATININE 12.5*   ALKPHOS 95   ALT 9*   AST 12   BILITOT 0.3     All labs within the past 24 hours have been reviewed.     Significant Imaging:  Labs: Reviewed

## 2025-04-03 NOTE — PROGRESS NOTES
[facility]  Nephrology  Progress Note    Patient Name: Emmanuel Morgan  MRN: [id number]  Admission Date: 3/31/2025  Hospital Length of Stay: 3 days  Attending Provider: Eliecer Posey MD   Primary Care Physician: Laurence, Primary Doctor  Principal Problem:Infected ulcer of skin    Subjective:     HPI: 39 year old man with a history of ESRD on HD, DM, hx of osteomyelitis following with wound care presents to the ED at wound care request for evaluation for possible debridement. Podiatry consulted.    Outpatient ESRD  Hemodialysis  Outpatient nephrologist: Dr. Wade  Outpatient center:  [facility]  Dialysis schedule: MWF  Last treatment: 3/28/2025  Dry weight: 99kg  Access: right forearm AVF      Interval History: s/p HD yesterday with 1.5L removed. No events overnight. Doing okay this morning. Denies SOB or leg swelling.       Review of patient's allergies indicates:  No Known Allergies  Current Facility-Administered Medications   Medication Frequency    0.9% NaCl infusion Once    acetaminophen tablet 650 mg Q4H PRN    aluminum-magnesium hydroxide-simethicone 200-200-20 mg/5 mL suspension 30 mL QID PRN    benzonatate capsule 100 mg TID PRN    carvediloL tablet 25 mg BID    ceFEPIme injection 1 g Daily    dextromethorphan-guaiFENesin  mg/5 ml liquid 5 mL Q6H PRN    dextrose 50% injection 12.5 g PRN    dextrose 50% injection 25 g PRN    diphenhydrAMINE capsule 25 mg Q6H PRN    glucagon (human recombinant) injection 1 mg PRN    glucose chewable tablet 16 g PRN    glucose chewable tablet 24 g PRN    heparin (porcine) injection 1,000 Units PRN    hydrALAZINE tablet 50 mg TID    insulin aspart U-100 pen 0-5 Units QID (AC + HS) PRN    melatonin tablet 6 mg Nightly PRN    metronidazole IVPB 500 mg Q8H    morphine injection 4 mg Q4H PRN    mupirocin 2 % ointment BID    naloxone 0.4 mg/mL injection 0.02 mg PRN    NIFEdipine 24 hr tablet 90 mg Daily    ondansetron injection 4 mg Q6H PRN     oxyCODONE-acetaminophen  mg per tablet 1 tablet Q4H PRN    senna-docusate 8.6-50 mg per tablet 1 tablet BID PRN    sevelamer carbonate tablet 800 mg TID WM    sodium chloride 0.9% bolus 250 mL 250 mL PRN    vancomycin - pharmacy to dose pharmacy to manage frequency       Objective:     Vital Signs (Most Recent):  Temp: 98.2 °F (36.8 °C) (04/03/25 1528)  Pulse: 84 (04/03/25 1528)  Resp: 18 (04/03/25 1528)  BP: 123/81 (04/03/25 1528)  SpO2: 98 % (04/03/25 1528) Vital Signs (24h Range):  Temp:  [97.9 °F (36.6 °C)-100.1 °F (37.8 °C)] 98.2 °F (36.8 °C)  Pulse:  [84-96] 84  Resp:  [18-20] 18  SpO2:  [96 %-99 %] 98 %  BP: (118-157)/(67-86) 123/81     Weight: 104.3 kg (229 lb 15 oz) (04/01/25 2045)  Body mass index is 29.52 kg/m².  Body surface area is 2.33 meters squared.    I/O last 3 completed shifts:  In: 600 [P.O.:600]  Out: 3500 [Other:3500]     Physical Exam  Vitals and nursing note reviewed.   Constitutional:       General: He is awake. He is not in acute distress.     Appearance: Normal appearance. He is well-developed.   HENT:      Head: Normocephalic and atraumatic.      Nose: Nose normal.      Mouth/Throat:      Mouth: Mucous membranes are moist.   Eyes:      Extraocular Movements: Extraocular movements intact.      Conjunctiva/sclera: Conjunctivae normal.   Cardiovascular:      Rate and Rhythm: Normal rate and regular rhythm.   Pulmonary:      Effort: Pulmonary effort is normal.   Abdominal:      Palpations: Abdomen is soft.   Musculoskeletal:         General: No swelling, tenderness or signs of injury.   Skin:     General: Skin is warm and dry.      Findings: No erythema or rash.   Neurological:      General: No focal deficit present.      Mental Status: He is alert. Mental status is at baseline.   Psychiatric:         Mood and Affect: Mood normal.         Behavior: Behavior normal.          Significant Labs:  CBC:   Recent Labs   Lab 04/03/25  0618   WBC 14.31*   RBC 3.39*   HGB 9.9*   HCT 31.8*   PLT  323   MCV 94   MCH 29.2   MCHC 31.1*     CMP:   Recent Labs   Lab 04/03/25  0618   CALCIUM 9.4   ALBUMIN 2.9*      K 4.7   CO2 21*      BUN 41*   CREATININE 12.5*   ALKPHOS 95   ALT 9*   AST 12   BILITOT 0.3     All labs within the past 24 hours have been reviewed.     Significant Imaging:  Labs: Reviewed    Assessment/Plan:     ESRD  - receives HD MWF at [facility] under the c/o Dr. Wade  - s/p HD yesterday; tolerated well  - will plan for HD tomorrow  - labs in AM    Anemia of CKD  - hgb 10.1 --> 9.9; close to goal  - will repeat in AM and start KIKI if < 10    Secondary HPTH  - CCa borderline elevated; phos controlled  - continue sevelamer. Consider renal diet    Hyperkalemia  - improving with low K diet      Thank you for your consult. I will follow-up with patient. Please contact us if you have any additional questions.    Laurie Root MD  Nephrology  [facility] [place] - Telemetry

## 2025-04-03 NOTE — ASSESSMENT & PLAN NOTE
Patient's FSGs are controlled on current medication regimen.  Last A1c reviewed-   Lab Results   Component Value Date    HGBA1C 4.9 08/20/2024     Most recent fingerstick glucose reviewed-   Recent Labs   Lab 04/02/25  1609 04/02/25  1946 04/03/25  0831 04/03/25  1111   POCTGLUCOSE 128* 120* 119* 131*     Current correctional scale  Low  Maintain anti-hyperglycemic dose as follows-   Antihyperglycemics (From admission, onward)      Start     Stop Route Frequency Ordered    03/31/25 2317  insulin aspart U-100 pen 0-5 Units         -- SubQ Before meals & nightly PRN 03/31/25 2218          Hold Oral hypoglycemics while patient is in the hospital.

## 2025-04-03 NOTE — ASSESSMENT & PLAN NOTE
Creatine stable for now. BMP reviewed- noted Estimated Creatinine Clearance: 10.1 mL/min (A) (based on SCr of 12.5 mg/dL (H)). according to latest data. Based on current GFR, CKD stage is end stage.  Monitor UOP and serial BMP and adjust therapy as needed. Renally dose meds. Avoid nephrotoxic medications and procedures.  Nephrology consulted for HD.

## 2025-04-03 NOTE — PLAN OF CARE
04/03/25 1423   Medicare Message   Important Message from Medicare regarding Discharge Appeal Rights Given to patient/caregiver;Explained to patient/caregiver;Signed/date by patient/caregiver   Date IMM was signed 04/10/25   Time IMM was signed 1408

## 2025-04-03 NOTE — PLAN OF CARE
Problem: Diabetes Comorbidity  Goal: Blood Glucose Level Within Targeted Range  Outcome: Progressing  Intervention: Monitor and Manage Glycemia  Flowsheets (Taken 4/3/2025 7796)  Glycemic Management: blood glucose monitored     Problem: Wound  Goal: Optimal Functional Ability  Outcome: Progressing  Goal: Optimal Pain Control and Function  Outcome: Progressing

## 2025-04-04 LAB
ABSOLUTE EOSINOPHIL (OHS): 0.61 K/UL
ABSOLUTE MONOCYTE (OHS): 2.07 K/UL (ref 0.3–1)
ABSOLUTE NEUTROPHIL COUNT (OHS): 10.58 K/UL (ref 1.8–7.7)
ACID FAST MOD KINY STN SPEC: NORMAL
ALBUMIN SERPL BCP-MCNC: 2.8 G/DL (ref 3.5–5.2)
ANION GAP (OHS): 11 MMOL/L (ref 8–16)
BASOPHILS # BLD AUTO: 0.06 K/UL
BASOPHILS NFR BLD AUTO: 0.4 %
BUN SERPL-MCNC: 53 MG/DL (ref 6–20)
CALCIUM SERPL-MCNC: 9.8 MG/DL (ref 8.7–10.5)
CHLORIDE SERPL-SCNC: 104 MMOL/L (ref 95–110)
CO2 SERPL-SCNC: 20 MMOL/L (ref 23–29)
CREAT SERPL-MCNC: 15.1 MG/DL (ref 0.5–1.4)
ERYTHROCYTE [DISTWIDTH] IN BLOOD BY AUTOMATED COUNT: 16.5 % (ref 11.5–14.5)
GFR SERPLBLD CREATININE-BSD FMLA CKD-EPI: 4 ML/MIN/1.73/M2
GLUCOSE SERPL-MCNC: 89 MG/DL (ref 70–110)
HCT VFR BLD AUTO: 35.7 % (ref 40–54)
HGB BLD-MCNC: 11.1 GM/DL (ref 14–18)
IMM GRANULOCYTES # BLD AUTO: 0.28 K/UL (ref 0–0.04)
IMM GRANULOCYTES NFR BLD AUTO: 1.8 % (ref 0–0.5)
LYMPHOCYTES # BLD AUTO: 2.4 K/UL (ref 1–4.8)
MCH RBC QN AUTO: 29.1 PG (ref 27–31)
MCHC RBC AUTO-ENTMCNC: 31.1 G/DL (ref 32–36)
MCV RBC AUTO: 94 FL (ref 82–98)
NUCLEATED RBC (/100WBC) (OHS): 0 /100 WBC
PHOSPHATE SERPL-MCNC: 4.5 MG/DL (ref 2.7–4.5)
PLATELET # BLD AUTO: 356 K/UL (ref 150–450)
PMV BLD AUTO: 9.5 FL (ref 9.2–12.9)
POCT GLUCOSE: 112 MG/DL (ref 70–110)
POCT GLUCOSE: 86 MG/DL (ref 70–110)
POCT GLUCOSE: 87 MG/DL (ref 70–110)
POTASSIUM SERPL-SCNC: 5 MMOL/L (ref 3.5–5.1)
RBC # BLD AUTO: 3.81 M/UL (ref 4.6–6.2)
RELATIVE EOSINOPHIL (OHS): 3.8 %
RELATIVE LYMPHOCYTE (OHS): 15 % (ref 18–48)
RELATIVE MONOCYTE (OHS): 12.9 % (ref 4–15)
RELATIVE NEUTROPHIL (OHS): 66.1 % (ref 38–73)
SODIUM SERPL-SCNC: 135 MMOL/L (ref 136–145)
VANCOMYCIN SERPL-MCNC: 16 UG/ML (ref ?–80)
WBC # BLD AUTO: 16 K/UL (ref 3.9–12.7)

## 2025-04-04 PROCEDURE — 63600175 PHARM REV CODE 636 W HCPCS: Performed by: HOSPITALIST

## 2025-04-04 PROCEDURE — 80202 ASSAY OF VANCOMYCIN: CPT | Performed by: HOSPITALIST

## 2025-04-04 PROCEDURE — 25000003 PHARM REV CODE 250: Performed by: HOSPITALIST

## 2025-04-04 PROCEDURE — 99232 SBSQ HOSP IP/OBS MODERATE 35: CPT | Mod: ,,, | Performed by: INTERNAL MEDICINE

## 2025-04-04 PROCEDURE — 80069 RENAL FUNCTION PANEL: CPT | Performed by: INTERNAL MEDICINE

## 2025-04-04 PROCEDURE — 36415 COLL VENOUS BLD VENIPUNCTURE: CPT | Performed by: INTERNAL MEDICINE

## 2025-04-04 PROCEDURE — G0545 PR VISIT INHERENT TO INPT OR OBS CARE, INFECTIOUS DISEASE: HCPCS | Mod: ,,, | Performed by: INTERNAL MEDICINE

## 2025-04-04 PROCEDURE — 87340 HEPATITIS B SURFACE AG IA: CPT | Performed by: INTERNAL MEDICINE

## 2025-04-04 PROCEDURE — 63600175 PHARM REV CODE 636 W HCPCS: Performed by: PODIATRIST

## 2025-04-04 PROCEDURE — 25000003 PHARM REV CODE 250: Performed by: PODIATRIST

## 2025-04-04 PROCEDURE — 85025 COMPLETE CBC W/AUTO DIFF WBC: CPT | Performed by: INTERNAL MEDICINE

## 2025-04-04 PROCEDURE — 25000003 PHARM REV CODE 250: Performed by: INTERNAL MEDICINE

## 2025-04-04 PROCEDURE — 99233 SBSQ HOSP IP/OBS HIGH 50: CPT | Mod: ,,, | Performed by: INTERNAL MEDICINE

## 2025-04-04 PROCEDURE — 80100016 HC MAINTENANCE HEMODIALYSIS

## 2025-04-04 PROCEDURE — 99232 SBSQ HOSP IP/OBS MODERATE 35: CPT | Mod: 25,,, | Performed by: PODIATRIST

## 2025-04-04 PROCEDURE — 11000001 HC ACUTE MED/SURG PRIVATE ROOM

## 2025-04-04 PROCEDURE — 86706 HEP B SURFACE ANTIBODY: CPT | Performed by: INTERNAL MEDICINE

## 2025-04-04 PROCEDURE — 11042 DBRDMT SUBQ TIS 1ST 20SQCM/<: CPT | Mod: ,,, | Performed by: PODIATRIST

## 2025-04-04 RX ADMIN — MORPHINE SULFATE 4 MG: 4 INJECTION INTRAVENOUS at 04:04

## 2025-04-04 RX ADMIN — CEFEPIME 1 G: 1 INJECTION, POWDER, FOR SOLUTION INTRAMUSCULAR; INTRAVENOUS at 02:04

## 2025-04-04 RX ADMIN — CARVEDILOL 25 MG: 12.5 TABLET, FILM COATED ORAL at 09:04

## 2025-04-04 RX ADMIN — METRONIDAZOLE 500 MG: 500 INJECTION, SOLUTION INTRAVENOUS at 10:04

## 2025-04-04 RX ADMIN — MORPHINE SULFATE 4 MG: 4 INJECTION INTRAVENOUS at 12:04

## 2025-04-04 RX ADMIN — MORPHINE SULFATE 4 MG: 4 INJECTION INTRAVENOUS at 11:04

## 2025-04-04 RX ADMIN — SEVELAMER CARBONATE 800 MG: 800 TABLET, FILM COATED ORAL at 12:04

## 2025-04-04 RX ADMIN — METRONIDAZOLE 500 MG: 500 INJECTION, SOLUTION INTRAVENOUS at 02:04

## 2025-04-04 RX ADMIN — OXYCODONE AND ACETAMINOPHEN 1 TABLET: 10; 325 TABLET ORAL at 03:04

## 2025-04-04 RX ADMIN — OXYCODONE AND ACETAMINOPHEN 1 TABLET: 10; 325 TABLET ORAL at 10:04

## 2025-04-04 RX ADMIN — SEVELAMER CARBONATE 800 MG: 800 TABLET, FILM COATED ORAL at 04:04

## 2025-04-04 RX ADMIN — METRONIDAZOLE 500 MG: 500 INJECTION, SOLUTION INTRAVENOUS at 05:04

## 2025-04-04 RX ADMIN — MUPIROCIN: 20 OINTMENT TOPICAL at 09:04

## 2025-04-04 RX ADMIN — OXYCODONE AND ACETAMINOPHEN 1 TABLET: 10; 325 TABLET ORAL at 11:04

## 2025-04-04 RX ADMIN — SEVELAMER CARBONATE 800 MG: 800 TABLET, FILM COATED ORAL at 07:04

## 2025-04-04 RX ADMIN — DIPHENHYDRAMINE HYDROCHLORIDE 25 MG: 25 CAPSULE ORAL at 03:04

## 2025-04-04 RX ADMIN — HYDRALAZINE HYDROCHLORIDE 50 MG: 25 TABLET ORAL at 10:04

## 2025-04-04 RX ADMIN — CARVEDILOL 25 MG: 12.5 TABLET, FILM COATED ORAL at 10:04

## 2025-04-04 NOTE — PROGRESS NOTES
[facility] Medicine  Progress Note    Patient Name: Emmanuel Morgan  MRN: [id number]  Patient Class: IP- Inpatient   Admission Date: 3/31/2025  Length of Stay: 4 days  Attending Physician: Tony Sainz MD  Primary Care Provider: Laurence, Primary Doctor        Subjective     Principal Problem:Infected ulcer of skin        HPI:  39-year-old male with ESRD on HD, diabetes not on insulin, prior osteomyelitis requiring right midfoot amputation with a long standing left foot ulcer (months) and a right foot ulcer of his stump which has been present for weeks who was sent to the ER by home health due to progression of wounds with skin and soft tissue infections of possibly both ulcers and possible osteomyelitis of either.     The left foot ulcer has been under the care of of agent wound care for months.  It has progressed in today was debrided by the home health nurse by patient report.  It has developed a foul smell with discharge.  It is not painful.    The right foot ulcer was created weeks ago when he accidentally hit his foot.  It is not painful.  This 1 does not have significant surrounding erythema or purulent drainage.      No fever.    He missed dialysis today due to reporting to the ER.      Overview/Hospital Course:  39-year-old male with ESRD on HD, DM, prior osteomyelitis requiring right midfoot amputation with a long standing left foot ulcer (months) and a right foot ulcer of his stump which has been present for weeks who was sent to the ER by home health due to progression of wounds with skin and soft tissue infections of possibly both ulcers and possible osteomyelitis of either.  MRI of left foot showing plantar soft tissue ulcer at the level of the 3rd MTP joint with associated osteomyelitis of the distal half of the metatarsal and base of the proximal phalanx.  On ABX's and Podiatry consulted.  Patient underwent irrigation and debridement of bilateral LE's.       Interval History:  No  acute event overnight.  Noted mild elevation of WBC.  Right foot pouring pus; denied fever chills or shortness of breath    Review of Systems   Constitutional:  Negative for activity change, chills and fatigue.   Respiratory:  Negative for cough and shortness of breath.    Cardiovascular:  Positive for leg swelling.     Objective:     Vital Signs (Most Recent):  Temp: 98.6 °F (37 °C) (04/04/25 1108)  Pulse: 81 (04/04/25 1108)  Resp: 18 (04/04/25 1122)  BP: 127/73 (04/04/25 1108)  SpO2: 98 % (04/04/25 1108) Vital Signs (24h Range):  Temp:  [97.7 °F (36.5 °C)-99.4 °F (37.4 °C)] 98.6 °F (37 °C)  Pulse:  [78-91] 81  Resp:  [18] 18  SpO2:  [96 %-98 %] 98 %  BP: (113-136)/(71-83) 127/73     Weight: 104.3 kg (229 lb 15 oz)  Body mass index is 29.52 kg/m².    Intake/Output Summary (Last 24 hours) at 4/4/2025 1210  Last data filed at 4/3/2025 1730  Gross per 24 hour   Intake 480 ml   Output --   Net 480 ml         Physical Exam  Constitutional:       General: He is in acute distress.      Appearance: He is toxic-appearing. He is not ill-appearing or diaphoretic.   Cardiovascular:      Rate and Rhythm: Normal rate and regular rhythm.      Pulses: Normal pulses.      Heart sounds: Normal heart sounds. No murmur heard.     No friction rub.   Pulmonary:      Effort: Pulmonary effort is normal. No respiratory distress.      Breath sounds: No stridor. No wheezing or rhonchi.   Abdominal:      General: Bowel sounds are normal. There is no distension.      Palpations: Abdomen is soft. There is no mass.   Musculoskeletal:      Comments: Clean surgical dressing on bilateral lower extremities   Neurological:      Mental Status: He is oriented to person, place, and time. Mental status is at baseline.               Significant Labs: All pertinent labs within the past 24 hours have been reviewed.  CBC:   Recent Labs   Lab 04/03/25  0618 04/04/25  0745   WBC 14.31* 16.00*   HGB 9.9* 11.1*   HCT 31.8* 35.7*    356     CMP:   Recent  Labs   Lab 04/03/25  0618 04/04/25  0745    135*   K 4.7 5.0    104   CO2 21* 20*   BUN 41* 53*   CREATININE 12.5* 15.1*   CALCIUM 9.4 9.8   ALBUMIN 2.9* 2.8*   BILITOT 0.3  --    ALKPHOS 95  --    AST 12  --    ALT 9*  --    ANIONGAP 11 11       Significant Imaging:   MRI Hindfoot WO Contrast LT   Final Result   Abnormal      1. Plantar soft tissue ulcer at the level of the 3rd MTP joint with associated osteomyelitis of the distal half of the metatarsal and base of the proximal phalanx.  No abscess.   2. Sequela of chronic osteomyelitis involving the 1st, 2nd and 5th metatarsals and proximal phalanges.   3. Postoperative change of transmetatarsal amputation of the 4th digit.   This report was flagged in Epic as abnormal.         Electronically signed by: Saemus Hays MD   Date:    04/01/2025   Time:    07:47      MRI Forefoot WO Contrast LT   Final Result   Abnormal      1. Plantar soft tissue ulcer at the level of the 3rd MTP joint with associated osteomyelitis of the distal half of the metatarsal and base of the proximal phalanx.  No abscess.   2. Sequela of chronic osteomyelitis involving the 1st, 2nd and 5th metatarsals and proximal phalanges.   3. Postoperative change of transmetatarsal amputation of the 4th digit.   This report was flagged in Epic as abnormal.         Electronically signed by: Seamus Hays MD   Date:    04/01/2025   Time:    07:47      MRI Hindfoot WO Contrast RT   Final Result      1. Plantar soft tissue ulcer at the level of the calcaneocuboid joint with surrounding subcutaneous edema and adjacent blistering.  No osteomyelitis.  No abscess.   2. Advanced neuropathic arthropathy of the tibiotalar, subtalar and residual midtarsal joints.         Electronically signed by: Seamus Hays MD   Date:    04/01/2025   Time:    07:19      US Lower Extremity Arteries Right   Final Result      Normal triphasic waveforms throughout the right lower extremity.  No occlusion or stenosis  detected.         Electronically signed by: Michelle Leon   Date:    03/31/2025   Time:    21:32      X-Ray Foot Complete Right   Final Result      Please see above.         Electronically signed by: Alli Henriquez   Date:    03/31/2025   Time:    13:14      X-Ray Chest AP Portable   Final Result      Please see above.         Electronically signed by: Alli Henriquez   Date:    03/31/2025   Time:    13:15              Assessment & Plan  Infected ulcer of skin  Patient presents with ulcers to left foot and right foot stump.  MRI ordered.  No evidence of osteo on right foot stump.  Left foot MRI showing plantar soft tissue ulcer at the level of the 3rd MTP joint with associated osteomyelitis of the distal half of the metatarsal and base of the proximal phalanx.   Started on ABX's (Cefepime and metronidazole-Day 2); vancomycin discontinued yesterday  Wound cultures positive for Proteus mirabilis  S/p Underwent bilateral irrigation and debridement by Podiatry POD 3  ID following; input appreciated        End stage renal disease  Creatine stable for now. BMP reviewed- noted Estimated Creatinine Clearance: 8.4 mL/min (A) (based on SCr of 15.1 mg/dL (H)). according to latest data. Based on current GFR, CKD stage is end stage.  Monitor UOP and serial BMP and adjust therapy as needed. Renally dose meds. Avoid nephrotoxic medications and procedures.  Nephrology consulted for HD    Essential hypertension  Patient's blood pressure range in the last 24 hours was: BP  Min: 113/71  Max: 136/82.The patient's inpatient anti-hypertensive regimen is listed below:  Current Antihypertensives  , Daily, Oral  carvediloL tablet 25 mg, 2 times daily, Oral  hydrALAZINE tablet 50 mg, 3 times daily, Oral  NIFEdipine 24 hr tablet 90 mg, Daily, Oral    Plan  - BP is controlled, no changes needed to their regimen  - lisinopril held due to hyperkalemia.  Hyperkalemia  Hyperkalemia is likely due to ESRD.The patients most recent potassium results are  listed below.  Recent Labs     04/02/25  0658 04/03/25  0618 04/04/25  0745   K 5.4* 4.7 5.0   Treated with Lokelma.  Nicholas.  Nephrology consulted for HD.  Treat with HD  Now resolved.          Type 2 diabetes mellitus with foot ulcer  Patient's FSGs are controlled on current medication regimen.  Last A1c reviewed-   Lab Results   Component Value Date    HGBA1C 4.9 08/20/2024     Most recent fingerstick glucose reviewed-   Recent Labs   Lab 04/03/25  1529 04/03/25  1954 04/04/25  0808 04/04/25  1107   POCTGLUCOSE 131* 133* 86 87     Current correctional scale  Low  Maintain anti-hyperglycemic dose as follows-   Antihyperglycemics (From admission, onward)      Start     Stop Route Frequency Ordered    03/31/25 2317  insulin aspart U-100 pen 0-5 Units         -- SubQ Before meals & nightly PRN 03/31/25 2218          Hold Oral hypoglycemics while patient is in the hospital.  Foot ulcer with fat layer exposed, unspecified laterality      Foot infection      Pyogenic inflammation of bone      VTE Risk Mitigation (From admission, onward)           Ordered     heparin (porcine) injection 1,000 Units  As needed (PRN)         04/01/25 1827     IP VTE LOW RISK PATIENT  Once         03/31/25 2218     Place sequential compression device  Until discontinued         03/31/25 2218                    Discharge Planning   LULÚ: 4/7/2025     Code Status: Full Code   Medical Readiness for Discharge Date:   Discharge Plan A: Home                        Tony Sainz MD  Department of Hospital Medicine   [facility]

## 2025-04-04 NOTE — PROGRESS NOTES
"[facility]  Infectious Disease  Progress Note    Patient Name: Emmanuel Morgan  MRN: [id number]  Admission Date: 3/31/2025  Length of Stay: 4 days  Attending Physician: Tony Sainz MD  Primary Care Provider: Laurence, Primary Doctor    Isolation Status: No active isolations  Assessment/Plan:      Orthopedic  * Infected ulcer of skin  Left foot osteomyelitis  R foot infected diabetic ulcer    38y/o M with ESRD on HD, DM with chronic L foot ulcer c/b osteo and infected R foot ulcer now s/p surgical debridement 4/31 with op findings of: "gas in the soft tissues of the plantar right foot and deep probe to bone with soft bone of left foot."   Wound cx from R foot growing p mirabilis. L foot cx NGTD. Path L foot with chronic osteo.  QTc 458  WBC uptrending with increased drainage form R foot.     Recommendations:  -Currently on cefepime and flagyl- continue for now pending final culture data  -Anticipate 6 wks abx for L foot chronic osteo. Ideally abx can be arranged with outpatient HD center.  -Continue local wound care  -continue to monitor for improvement.  -f/u final culture data  -if febrile or clinically worsening repeat BCx, CXR, resume vanco        A/P discussed with primary team.      Outpatient Antibiotic Therapy Plan:    Please arrange with patient's outpatient hemodialysis center.    1) Infection: R foot infected diabetic ulcer and L foot chronic osteo    2) Discharge Antibiotics:    Intravenous antibiotics:  Cefepime 2g IV q 24 hours after HD on HD days      Oral antibiotics:  Metronidazole 500mg  PO q 12 hours     3) Therapy Duration:  6 wks    Estimated end date of IV antibiotics: 5/13/25    4) Outpatient Weekly Labs:    Order the following labs to be drawn on Mondays:   CBC  CMP   CRP    5) Fax Lab Results to Infectious Diseases Provider: Neeru Brand     [facility] Fax Number: [phone]    6) Outpatient Infectious Diseases Follow-up    Follow-up appointment will be arranged by " the ID clinic and will be found in the patient's appointments tab.    Prior to discharge, please ensure the patient's follow-up has been scheduled.    If there is still no follow-up scheduled prior to discharge, please send an EPIC message to [facility] Clinical Pool or Call Infectious Diseases Dept.                   Anticipated Disposition: tbd    Thank you for your consult. I will follow-up with patient. Please contact us if you have any additional questions.    Neeru Brand MD  Infectious Disease  [facility] [place] - Tele[facility]    Subjective:     Principal Problem:Infected ulcer of skin    HPI: Mr. Morgan is a 40y/o M pt with hx of  ESRD on HD, diabetes, prior osteomyelitis requiring right midfoot amputation with a  chronic left foot ulcer and a right foot ulcer of his stump  presented for worsening wounds and was admitted for infected diabetic ulcer and osteo on 3/31.     Has been following with wound care for a fe w months for his left foot ulcer. He reports it developed a foul smell with discharge.  Reports the R foot ulcer has been present for just a few weeks due to foot trauma. Denies signs or symptoms of systemic infection.     In the ED he was afebrile. Labs with leukocytosis and elevated inflammatory markers. MRI L  foot revealed: 1. Plantar soft tissue ulcer at the level of the 3rd MTP joint with associated osteomyelitis of the distal half of the metatarsal and base of the proximal phalanx.  No abscess.   2. Sequela of chronic osteomyelitis involving the 1st, 2nd and 5th metatarsals and proximal phalanges.     ID consulted for: osteo    Interval History: Complaining of pain to R foot and drainage. WBC uptrending. No new infectious symptoms reported.    Review of Systems   Constitutional:  Negative for chills, diaphoresis, fatigue and fever.   Gastrointestinal:  Negative for abdominal pain and diarrhea.   Skin:  Positive for wound.   All other systems reviewed and are negative.    Objective:     Vital  "Signs (Most Recent):  Temp: 98.6 °F (37 °C) (04/04/25 1108)  Pulse: 81 (04/04/25 1108)  Resp: 18 (04/04/25 1235)  BP: 127/73 (04/04/25 1108)  SpO2: 98 % (04/04/25 1108) Vital Signs (24h Range):  Temp:  [97.7 °F (36.5 °C)-99.4 °F (37.4 °C)] 98.6 °F (37 °C)  Pulse:  [78-91] 81  Resp:  [18] 18  SpO2:  [96 %-98 %] 98 %  BP: (113-136)/(71-83) 127/73     Weight: 104.3 kg (229 lb 15 oz)  Body mass index is 29.52 kg/m².    Estimated Creatinine Clearance: 8.4 mL/min (A) (based on SCr of 15.1 mg/dL (H)).     Physical Exam  Vitals and nursing note reviewed.   Constitutional:       Appearance: Normal appearance. He is not ill-appearing.   HENT:      Head: Normocephalic and atraumatic.      Nose: Nose normal. No congestion.      Mouth/Throat:      Mouth: Mucous membranes are moist.      Pharynx: Oropharynx is clear.   Eyes:      Conjunctiva/sclera: Conjunctivae normal.      Pupils: Pupils are equal, round, and reactive to light.   Cardiovascular:      Rate and Rhythm: Normal rate and regular rhythm.   Pulmonary:      Effort: Pulmonary effort is normal. No respiratory distress.      Breath sounds: Normal breath sounds.   Abdominal:      General: Abdomen is flat. There is no distension.      Palpations: Abdomen is soft.   Musculoskeletal:         General: No swelling. Normal range of motion.      Cervical back: Normal range of motion and neck supple.      Comments: R foot s/p TMA- wound present on plantar aspect- clean borders, no drainage. Now in clean dressing.  L foot in surgical dressing.   Skin:     General: Skin is warm and dry.   Neurological:      General: No focal deficit present.      Mental Status: He is alert and oriented to person, place, and time.   Psychiatric:         Mood and Affect: Mood normal.         Behavior: Behavior normal.          Significant Labs: Blood Culture: No results for input(s): "LABBLOO" in the last 4320 hours.  Wound Culture: No results for input(s): "LABAERO" in the last 4320 hours.  All " pertinent labs within the past 24 hours have been reviewed.    Significant Imaging: I have reviewed all pertinent imaging results/findings within the past 24 hours.

## 2025-04-04 NOTE — NURSING
[facility] [place]  Nurses Note -- 4 Eyes      4/4/2025       Skin assessed on: Q Shift      [x] No Pressure Injuries Present    [x]Prevention Measures Documented    [] Yes LDA  for Pressure Injury Previously documented     [] Yes New Pressure Injury Discovered   [] LDA for New Pressure Injury Added      Attending RN:  Gabbie Robb, RN     Second RN:  Jennifer WINN RN

## 2025-04-04 NOTE — ASSESSMENT & PLAN NOTE
Patient's FSGs are controlled on current medication regimen.  Last A1c reviewed-   Lab Results   Component Value Date    HGBA1C 4.9 08/20/2024     Most recent fingerstick glucose reviewed-   Recent Labs   Lab 04/03/25  1529 04/03/25  1954 04/04/25  0808 04/04/25  1107   POCTGLUCOSE 131* 133* 86 87     Current correctional scale  Low  Maintain anti-hyperglycemic dose as follows-   Antihyperglycemics (From admission, onward)      Start     Stop Route Frequency Ordered    03/31/25 2317  insulin aspart U-100 pen 0-5 Units         -- SubQ Before meals & nightly PRN 03/31/25 2218          Hold Oral hypoglycemics while patient is in the hospital.

## 2025-04-04 NOTE — SUBJECTIVE & OBJECTIVE
Interval History:  Patient seen during wound care, had no new complaint, denied fever, chills or rigors, said no to chest pain, shortness a breath, orthopnea or PND.   currently working on HD unit to discharge on long-term antibiotics.      Review of Systems   Constitutional:  Positive for fatigue. Negative for appetite change, chills and diaphoresis.   HENT:  Negative for congestion, sore throat and tinnitus.    Eyes:  Negative for pain, discharge and itching.   Respiratory:  Negative for cough, chest tightness, shortness of breath and wheezing.    Cardiovascular:  Positive for leg swelling. Negative for chest pain and palpitations.   Gastrointestinal:  Negative for abdominal distention, abdominal pain, blood in stool, nausea and vomiting.   Endocrine: Negative for cold intolerance, heat intolerance and polydipsia.   Genitourinary:  Negative for difficulty urinating, dysuria, flank pain, frequency and hematuria.   Musculoskeletal:  Positive for gait problem, joint swelling and myalgias. Negative for arthralgias, back pain and neck stiffness.   Skin:  Positive for color change and wound (s/p chopart's foot surgery).   Neurological:  Negative for dizziness, seizures, facial asymmetry, light-headedness, numbness and headaches.   Psychiatric/Behavioral:  Negative for agitation, confusion and hallucinations.      Objective:     Vital Signs (Most Recent):  Temp: 98.7 °F (37.1 °C) (04/04/25 1613)  Pulse: 97 (04/04/25 1613)  Resp: 18 (04/04/25 1635)  BP: 125/70 (04/04/25 1613)  SpO2: 98 % (04/04/25 1613) Vital Signs (24h Range):  Temp:  [97.7 °F (36.5 °C)-99.4 °F (37.4 °C)] 98.7 °F (37.1 °C)  Pulse:  [78-97] 97  Resp:  [18] 18  SpO2:  [96 %-98 %] 98 %  BP: (113-136)/(70-83) 125/70     Weight: 104.3 kg (229 lb 15 oz)  Body mass index is 29.52 kg/m².    Intake/Output Summary (Last 24 hours) at 4/4/2025 1659  Last data filed at 4/4/2025 1629  Gross per 24 hour   Intake 1040.13 ml   Output --   Net 1040.13 ml          Physical Exam  Vitals and nursing note reviewed.   Constitutional:       General: He is not in acute distress.     Appearance: Normal appearance. He is not toxic-appearing.   HENT:      Head: Normocephalic and atraumatic.      Nose: Nose normal. No congestion or rhinorrhea.      Mouth/Throat:      Mouth: Mucous membranes are moist.   Eyes:      General: No scleral icterus.     Extraocular Movements: Extraocular movements intact.      Conjunctiva/sclera: Conjunctivae normal.      Pupils: Pupils are equal, round, and reactive to light.   Cardiovascular:      Rate and Rhythm: Normal rate and regular rhythm.      Pulses: Normal pulses.      Heart sounds: Normal heart sounds.   Pulmonary:      Effort: Pulmonary effort is normal.      Breath sounds: Normal breath sounds.   Abdominal:      General: Abdomen is flat. Bowel sounds are normal. There is no distension.      Palpations: Abdomen is soft.      Tenderness: There is no abdominal tenderness. There is no right CVA tenderness, left CVA tenderness, guarding or rebound.   Musculoskeletal:         General: Deformity (s/p right chopart's surgery) and signs of injury present. No swelling.      Cervical back: Neck supple. No tenderness.   Skin:     General: Skin is warm and dry.      Coloration: Skin is not jaundiced or pale.      Findings: No rash.   Neurological:      General: No focal deficit present.      Mental Status: He is alert and oriented to person, place, and time. Mental status is at baseline.      Cranial Nerves: No cranial nerve deficit.      Sensory: No sensory deficit.   Psychiatric:         Mood and Affect: Mood normal.         Behavior: Behavior normal.         Thought Content: Thought content normal.         Judgment: Judgment normal.               Significant Labs:   CBC:   Recent Labs   Lab 04/04/25  0745   WBC 16.00*   HGB 11.1*   HCT 35.7*        CMP:   Recent Labs   Lab 04/04/25  0745   *   K 5.0      CO2 20*   BUN 53*    CREATININE 15.1*   CALCIUM 9.8   ALBUMIN 2.8*   ANIONGAP 11     Significant Imaging: I have reviewed all pertinent imaging results/findings within the past 24 hours.  Imaging Results               MRI Hindfoot WO Contrast LT (Final result)  Result time 04/01/25 07:47:56      Final result by Seamus Hays MD (04/01/25 07:47:56)                   Impression:      1. Plantar soft tissue ulcer at the level of the 3rd MTP joint with associated osteomyelitis of the distal half of the metatarsal and base of the proximal phalanx.  No abscess.  2. Sequela of chronic osteomyelitis involving the 1st, 2nd and 5th metatarsals and proximal phalanges.  3. Postoperative change of transmetatarsal amputation of the 4th digit.  This report was flagged in Epic as abnormal.      Electronically signed by: Seamus Hays MD  Date:    04/01/2025  Time:    07:47               Narrative:    EXAMINATION:  MRI HINDFOOT WO CONTRAST LT; MRI FOREFOOT WO CONTRAST LT    CLINICAL HISTORY:  b/l ulcers concern for osteo;; b/l ulcers. Concern for osteo;    TECHNIQUE:  Routine MRI evaluation of the left ankle and forefoot performed without contrast.    COMPARISON:  Radiographs 09/01/2024.    FINDINGS:  Examination degraded by patient motion artifact.    BONES: Postsurgical change of transmetatarsal amputation of the 4th digit.  Osseous erosive changes of the 3rd metatarsal head and proximal phalanx base with associated confluent marrow edema involving the distal half of the metatarsal and proximal half of the proximal phalanx.  There is corresponding T1 medullary hypointensity throughout the distal half of the metatarsal and base of the proximal phalanx.  Chronic osseous erosive changes of the 1st, 2nd and 5th metatarsal heads and proximal phalanges with grossly preserved marrow signal intensity.  Solid periosteal reaction throughout the 2nd metatarsal shaft.  Linear area of edema signal at the posterior subchondral region of the tibial  plafond, favored to be degenerative in nature.  No definite acute fractures.  Remote healed fracture of the lateral malleolus.  No avascular necrosis.  No marrow infiltrative process.    JOINTS: 3rd MTP complex joint effusion with surrounding synovitis.  No dislocation.    LIGAMENTS: Chronic sprains of the anterior talofibular, superficial deltoid and deep deltoid ligaments.  Lisfranc ligament appears intact.    TENDONS: Ulceration and disruption of the 3rd flexor tendon at the level of the MTP joint.  Chronic ulceration of the 1st, 2nd and 5th flexor tendons at the level of the MTP joints.  Postoperative changes of the 4th flexor tendon.  Remaining tendons appear grossly intact.    MUSCLES: Edema and severe fatty degeneration of the visualized musculature.  No intramuscular fluid collection.    MISCELLANEOUS: Plantar soft tissue ulcer at the level of the 3rd MTP joint with adjacent fluid and surrounding subcutaneous edema.  No focal fluid collection.  Probable adventitial bursa at the plantar aspect of the hallux MTP joint.  Focal area of signal hypointensity at the level of the heel pad, possibly sequela of scarring.                                        MRI Forefoot WO Contrast LT (Final result)  Result time 04/01/25 07:47:56   Procedure changed from MRI Foot Toes WO Contrast DANY     Final result by Seamus Hays MD (04/01/25 07:47:56)                   Impression:      1. Plantar soft tissue ulcer at the level of the 3rd MTP joint with associated osteomyelitis of the distal half of the metatarsal and base of the proximal phalanx.  No abscess.  2. Sequela of chronic osteomyelitis involving the 1st, 2nd and 5th metatarsals and proximal phalanges.  3. Postoperative change of transmetatarsal amputation of the 4th digit.  This report was flagged in Epic as abnormal.      Electronically signed by: Seamus Hays MD  Date:    04/01/2025  Time:    07:47               Narrative:    EXAMINATION:  MRI HINDFOOT WO  CONTRAST LT; MRI FOREFOOT WO CONTRAST LT    CLINICAL HISTORY:  b/l ulcers concern for osteo;; b/l ulcers. Concern for osteo;    TECHNIQUE:  Routine MRI evaluation of the left ankle and forefoot performed without contrast.    COMPARISON:  Radiographs 09/01/2024.    FINDINGS:  Examination degraded by patient motion artifact.    BONES: Postsurgical change of transmetatarsal amputation of the 4th digit.  Osseous erosive changes of the 3rd metatarsal head and proximal phalanx base with associated confluent marrow edema involving the distal half of the metatarsal and proximal half of the proximal phalanx.  There is corresponding T1 medullary hypointensity throughout the distal half of the metatarsal and base of the proximal phalanx.  Chronic osseous erosive changes of the 1st, 2nd and 5th metatarsal heads and proximal phalanges with grossly preserved marrow signal intensity.  Solid periosteal reaction throughout the 2nd metatarsal shaft.  Linear area of edema signal at the posterior subchondral region of the tibial plafond, favored to be degenerative in nature.  No definite acute fractures.  Remote healed fracture of the lateral malleolus.  No avascular necrosis.  No marrow infiltrative process.    JOINTS: 3rd MTP complex joint effusion with surrounding synovitis.  No dislocation.    LIGAMENTS: Chronic sprains of the anterior talofibular, superficial deltoid and deep deltoid ligaments.  Lisfranc ligament appears intact.    TENDONS: Ulceration and disruption of the 3rd flexor tendon at the level of the MTP joint.  Chronic ulceration of the 1st, 2nd and 5th flexor tendons at the level of the MTP joints.  Postoperative changes of the 4th flexor tendon.  Remaining tendons appear grossly intact.    MUSCLES: Edema and severe fatty degeneration of the visualized musculature.  No intramuscular fluid collection.    MISCELLANEOUS: Plantar soft tissue ulcer at the level of the 3rd MTP joint with adjacent fluid and surrounding  subcutaneous edema.  No focal fluid collection.  Probable adventitial bursa at the plantar aspect of the hallux MTP joint.  Focal area of signal hypointensity at the level of the heel pad, possibly sequela of scarring.                                       MRI Hindfoot WO Contrast RT (Final result)  Result time 04/01/25 07:19:00      Final result by Seamus Hays MD (04/01/25 07:19:00)                   Impression:      1. Plantar soft tissue ulcer at the level of the calcaneocuboid joint with surrounding subcutaneous edema and adjacent blistering.  No osteomyelitis.  No abscess.  2. Advanced neuropathic arthropathy of the tibiotalar, subtalar and residual midtarsal joints.      Electronically signed by: Seamus Hays MD  Date:    04/01/2025  Time:    07:19               Narrative:    EXAMINATION:  MRI HINDFOOT WO CONTRAST RT    CLINICAL HISTORY:  Foot swelling, diabetic, osteomyelitis suspected, xray done;    TECHNIQUE:  Routine MRI evaluation of the right ankle performed without contrast.    COMPARISON:  Radiographs 03/31/2025.    FINDINGS:  BONES/JOINTS: Postsurgical changes of midfoot amputation.  Chronic osseous erosive changes centered about the tibiotalar, subtalar and residual midtarsal joints, likely sequela of chronic neuropathic arthropathy.  Intraosseous cyst at the lateral aspect of the distal tibia.  Circumferential solid periosteal reaction about the distal tibia and distal fibula.  No marrow signal abnormality to suggest an infiltrative process.  No significant joint effusion.  No dislocation.    TENDONS: Achilles tendon demonstrates normal morphology and signal intensity.  Residual posterior tibial, flexor digitorum longus, flexor hallucis longus, peroneus longus, peroneus brevis and extensor tendons are intact.    MUSCLES: Edema and fatty degeneration of the visualized musculature.  No intramuscular fluid collection.    MISCELLANEOUS: Plantar soft tissue ulcer at the level of the  calcaneocuboid joint with involvement of the plantar aponeurosis, surrounding subcutaneous edema and adjacent blistering.  No fluid collection.                                       US Lower Extremity Arteries Right (Final result)  Result time 03/31/25 21:32:36      Final result by Michelle Leon MD (03/31/25 21:32:36)                   Impression:      Normal triphasic waveforms throughout the right lower extremity.  No occlusion or stenosis detected.      Electronically signed by: Michelle Leon  Date:    03/31/2025  Time:    21:32               Narrative:    EXAMINATION:  US LOWER EXTREMITY ARTERIES RIGHT    CLINICAL HISTORY:  Unspecified open wound, right foot, subsequent encounter    TECHNIQUE:  Duplex and color flow Doppler evaluation and graded compression of the right lower extremity arteries was performed.    COMPARISON:  None    FINDINGS:  Peak systolic velocities in the right lower extremity arteries (cm/sec):    CFA: 127, triphasic    DFA: 74, triphasic    Proximal SFA: 103, triphasic    Mid SFA: 120, triphasic    Distal SFA: 109, triphasic    Proximal pop: 84, triphasic    Distal pop: 98, triphasic    LAUREL: 80, triphasic    PTA: 86, triphasic    Peroneal: 111, triphasic    DPA: 75, triphasic                                       X-Ray Foot Complete Right (Final result)  Result time 03/31/25 13:14:02      Final result by Alli Henriquez MD (03/31/25 13:14:02)                   Impression:      Please see above.      Electronically signed by: Alli Henriquez  Date:    03/31/2025  Time:    13:14               Narrative:    EXAMINATION:  XR FOOT COMPLETE 3 VIEW RIGHT    CLINICAL HISTORY:  . Pain in unspecified foot    TECHNIQUE:  AP, lateral, and oblique views of the right foot were performed.    COMPARISON:  None.    FINDINGS:  Extensive postoperative change including amputation to the level of the midfoot structures.  Remainder of the visualized osseous structures of the mid and hindfoot demonstrate  diffuse degenerative change in osteopenia with surrounding diffuse soft tissue swelling.  Additional diffuse osseous destruction and degenerative change about the partially visualized ankle.  Scattered vascular calcification as well as plantar calcaneal spur.  No obvious significant osseous erosive change, noting limitations from postoperative change and surrounding soft tissue swelling.  If there is continued clinical concern for osteomyelitis, consider MRI of the foot for further evaluation.                                       X-Ray Chest AP Portable (Final result)  Result time 03/31/25 13:15:24      Final result by Alli Henriquez MD (03/31/25 13:15:24)                   Impression:      Please see above.      Electronically signed by: Alli Henriquez  Date:    03/31/2025  Time:    13:15               Narrative:    EXAMINATION:  XR CHEST AP PORTABLE    CLINICAL HISTORY:  Chronic cough    TECHNIQUE:  Single frontal view of the chest was performed.    COMPARISON:  Chest radiograph 09/01/2024    FINDINGS:  Left-sided central venous catheter with tip overlying the in expected region of the cavoatrial junction.  Cardiomediastinal silhouette is unchanged in size.  Patient is rotated, limiting evaluation of the mediastinal structures.    Chronic right effusion with probable superimposed volume loss about the right lung base.  Superimposed infectious or non infectious inflammatory infiltrate cannot be excluded.  Remainder of the aerated portions of the lungs are clear.  No pneumothorax.    Osseous structures demonstrate no evidence for acute fracture or osseous destructive lesion.  Soft tissues are unremarkable.

## 2025-04-04 NOTE — PLAN OF CARE
Changes in medical condition or discharge plan: Per ID, anticipate 6 weeks of anabiotics for left foot osteo and may receive anabiotics with dialysis      Does patient need new DME? None    Follow up appts needed: TBD    Medically stable: NO    Estimated Discharge Date:  1-2 DAYS    Patient will continue with dialysis at [facility] on MWF.    CM will follow up as needed.     04/04/25 1521   Discharge Reassessment   Did the patient's condition or plan change since previous assessment? No   Discharge Plan discussed with: Patient   Communicated LULÚ with patient/caregiver Yes   Discharge Plan A Home   Discharge Plan B Home with family   DME Needed Upon Discharge  none   Transition of Care Barriers None   Why the patient remains in the hospital Requires continued medical care   Post-Acute Status   Coverage MEDICARE - MEDICARE PART A & B -   Discharge Delays None known at this time

## 2025-04-04 NOTE — PLAN OF CARE
Problem: Adult Inpatient Plan of Care  Goal: Plan of Care Review  Outcome: Progressing  Goal: Patient-Specific Goal (Individualized)  Outcome: Progressing  Goal: Absence of Hospital-Acquired Illness or Injury  Outcome: Progressing  Goal: Optimal Comfort and Wellbeing  Outcome: Progressing  Goal: Readiness for Transition of Care  Outcome: Progressing     Problem: Hemodialysis  Goal: Safe, Effective Therapy Delivery  Outcome: Progressing  Goal: Effective Tissue Perfusion  Outcome: Progressing  Goal: Absence of Infection Signs and Symptoms  Outcome: Progressing     Problem: Diabetes Comorbidity  Goal: Blood Glucose Level Within Targeted Range  Outcome: Progressing     Problem: Wound  Goal: Optimal Coping  Outcome: Progressing  Goal: Optimal Functional Ability  Outcome: Progressing  Goal: Absence of Infection Signs and Symptoms  Outcome: Progressing  Goal: Improved Oral Intake  Outcome: Progressing  Goal: Optimal Pain Control and Function  Outcome: Progressing  Goal: Skin Health and Integrity  Outcome: Progressing  Goal: Optimal Wound Healing  Outcome: Progressing     Problem: Infection  Goal: Absence of Infection Signs and Symptoms  Outcome: Progressing     Problem: Skin Injury Risk Increased  Goal: Skin Health and Integrity  Outcome: Progressing

## 2025-04-04 NOTE — ASSESSMENT & PLAN NOTE
Patient presents with ulcers to left foot and right foot stump.  MRI ordered.  No evidence of osteo on right foot stump.  Left foot MRI showing plantar soft tissue ulcer at the level of the 3rd MTP joint with associated osteomyelitis of the distal half of the metatarsal and base of the proximal phalanx.   Started on ABX's (Cefepime and metronidazole-Day 2); vancomycin discontinued yesterday  Wound cultures positive for Proteus mirabilis  S/p Underwent bilateral irrigation and debridement by Podiatry POD 3  ID following; input appreciated

## 2025-04-04 NOTE — PROGRESS NOTES
[facility] - [place] - Podiatry  Progress Note    Patient Name: Emmanuel Morgan  MRN: [id number]  Admission Date: 3/31/2025  Hospital Length of Stay: 4 days  Attending Physician: Tony Sainz MD  Primary Care Provider: Laurence, Primary Doctor     Subjective:     History of Present Illness: Emmanuel Morgan is a 39 y.o. male with  has a past medical history of Diabetes mellitus, Hypertension, and Renal disorder.  Consult to Podiatry for evaluation and treatment of bilateral foot wounds, most significant to the right Lisfranc stump.  He relates that the left foot wound is chronic in nature and the ulceration to the right foot occurred several weeks ago likely secondary to trauma versus ambulation is inappropriate shoe.  All foot surgeries surgeries done in outside facilities.  He presented to the emergency department on the advice of his home health nurse due to progression of wounds and suspicion of osteomyelitis.      4/2/25: S/p one day I&D B/L per Dr. Faria. Bandage intact no strikethrough noted.    04/04/2025 patient seen at bedside resting comfortably.  By the end of our encounter, his nephew was also present at bedside.  Patient relates that he has occasional discomfort to the right foot especially in the medial midfoot with palpation or any pressure.  He denies any pain to the left foot.  He denies nausea, vomiting, fever, chills.  Patient relates that he is concerned about his chronic but now more persistent cough and would like to discuss this concern further with the primary team.      Chief Complaint   Patient presents with    Leg Pain     Sent from [facility] for increased pain to right foot     Scheduled Meds:   carvediloL  25 mg Oral BID    ceFEPime IV (PEDS and ADULTS)  1 g Intravenous Daily    hydrALAZINE  50 mg Oral TID    metroNIDAZOLE IV (PEDS and ADULTS)  500 mg Intravenous Q8H    mupirocin   Nasal BID    NIFEdipine  90 mg Oral Daily    sevelamer carbonate  800 mg Oral TID WM     Continuous Infusions:  PRN  Meds:  Current Facility-Administered Medications:     acetaminophen, 650 mg, Oral, Q4H PRN    aluminum-magnesium hydroxide-simethicone, 30 mL, Oral, QID PRN    benzonatate, 100 mg, Oral, TID PRN    dextromethorphan-guaiFENesin  mg/5 ml, 5 mL, Oral, Q6H PRN    dextrose 50%, 12.5 g, Intravenous, PRN    dextrose 50%, 25 g, Intravenous, PRN    diphenhydrAMINE, 25 mg, Oral, Q6H PRN    glucagon (human recombinant), 1 mg, Intramuscular, PRN    glucose, 16 g, Oral, PRN    glucose, 24 g, Oral, PRN    heparin (porcine), 1,000 Units, Intra-Catheter, PRN    insulin aspart U-100, 0-5 Units, Subcutaneous, QID (AC + HS) PRN    melatonin, 6 mg, Oral, Nightly PRN    morphine, 4 mg, Intravenous, Q4H PRN    naloxone, 0.02 mg, Intravenous, PRN    ondansetron, 4 mg, Intravenous, Q6H PRN    oxyCODONE-acetaminophen, 1 tablet, Oral, Q4H PRN    senna-docusate, 1 tablet, Oral, BID PRN    sodium chloride 0.9%, 250 mL, Intravenous, PRN    Review of patient's allergies indicates:  No Known Allergies     Past Medical History:   Diagnosis Date    Diabetes mellitus     Hypertension     Renal disorder      Past Surgical History:   Procedure Laterality Date    FOOT AMPUTATION Right     IRRIGATION AND DEBRIDEMENT Bilateral 4/1/2025    Procedure: IRRIGATION AND DEBRIDEMENT;  Surgeon: Bety Faria DPM;  Location: [facility];  Service: Podiatry;  Laterality: Bilateral;  need jamshidi needle available       Family History    None       Tobacco Use    Smoking status: Never    Smokeless tobacco: Never   Substance and Sexual Activity    Alcohol use: Never    Drug use: Never    Sexual activity: Not on file     Review of Systems   Constitutional:  Negative for appetite change, chills, fatigue and fever.   Respiratory:  Positive for cough. Negative for shortness of breath.    Cardiovascular:  Negative for chest pain and leg swelling.   Gastrointestinal:  Negative for diarrhea, nausea and vomiting.   Musculoskeletal:  Positive for arthralgias and  myalgias.   Skin:  Positive for color change and wound.   Neurological:  Positive for numbness. Negative for weakness.        + paresthesia      Objective:     Vital Signs (Most Recent):  Temp: 98.6 °F (37 °C) (04/04/25 1108)  Pulse: 81 (04/04/25 1108)  Resp: 18 (04/04/25 1235)  BP: 127/73 (04/04/25 1108)  SpO2: 98 % (04/04/25 1108) Vital Signs (24h Range):  Temp:  [97.7 °F (36.5 °C)-99.4 °F (37.4 °C)] 98.6 °F (37 °C)  Pulse:  [78-91] 81  Resp:  [18] 18  SpO2:  [96 %-98 %] 98 %  BP: (113-136)/(71-83) 127/73     Weight: 104.3 kg (229 lb 15 oz)  Body mass index is 29.52 kg/m².    Physical Exam  Vitals and nursing note reviewed.   Constitutional:       General: He is not in acute distress.     Appearance: He is well-developed. He is not toxic-appearing or diaphoretic.      Comments: alert and oriented x 3.    Cardiovascular:      Pulses:           Dorsalis pedis pulses are 1+ on the right side and 1+ on the left side.        Posterior tibial pulses are 1+ on the left side.   Pulmonary:      Effort: No respiratory distress.   Musculoskeletal:      Right ankle: No tenderness. No lateral malleolus, medial malleolus, AITF ligament, CF ligament or posterior TF ligament tenderness.      Right Achilles Tendon: No defects. Clements's test negative.      Left ankle: No tenderness. No lateral malleolus, medial malleolus, AITF ligament, CF ligament or posterior TF ligament tenderness.      Left Achilles Tendon: No defects. Clements's test negative.      Right foot: Swelling and tenderness present. No bony tenderness.      Left foot: Deformity (Appearance of short 4th metatarsal secondary to previous surgery.) and tenderness present. No bony tenderness.      Comments: Muscle strength is 5/5 in all groups bilaterally.              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 (midfoot amp)  Feet:      Right foot:      Skin integrity: Ulcer present.      Left foot:      Skin integrity: Ulcer present.   Lymphadenopathy:  "     Comments: No lymphatic streaking     Skin:     General: Skin is warm and dry.      Coloration: Skin is not pale.      Findings: No rash.      Nails: There is no clubbing.   Neurological:      Sensory: No sensory deficit.      Motor: No atrophy.      Comments: Light touch present     Psychiatric:         Attention and Perception: He is attentive.         Mood and Affect: Mood is not anxious. Affect is not inappropriate.         Speech: He is communicative. Speech is not slurred.         Behavior: Behavior is not combative.       04/04/2025     Stable ulceration sub 3rd metatarsophalangeal joint of the left foot without acute signs of infection but with deep probe to bone      Plantar right foot ulcer with significant purulent drainage particularly when milking from the medial midfoot.  Palpation to the medial midfoot is significantly tender.        Post wound debridement with opening of the tract between the 2 plantar wounds            4/2/25:  No purulent drainage noted.             04/01/2025    Right plantar lateral midfoot with periwound bulla formation and localized edema and erythema.  Exquisitely tender on palpation.  Fibro necrotic wound bed         Left sub 3rd metatarsophalangeal joint with deep probe to bone, fibrogranular wound bed with periwound callus.          Laboratory:  A1C: No results for input(s): "HGBA1C" in the last 4320 hours.  CBC:   Recent Labs   Lab 04/04/25  0745   WBC 16.00*   RBC 3.81*   HGB 11.1*   HCT 35.7*      MCV 94   MCH 29.1   MCHC 31.1*     CMP:   Recent Labs   Lab 04/03/25  0618 04/04/25  0745   CALCIUM 9.4 9.8   ALBUMIN 2.9* 2.8*    135*   K 4.7 5.0   CO2 21* 20*    104   BUN 41* 53*   CREATININE 12.5* 15.1*   ALKPHOS 95  --    ALT 9*  --    AST 12  --    BILITOT 0.3  --      CRP:   Recent Labs   Lab 03/31/25  1827   .3*     ESR:   Recent Labs   Lab 03/31/25  1827   SEDRATE 120*     Microbiology Results (last 7 days)       Procedure Component Value " Units Date/Time    AFB Culture & Smear [[id number]] Collected: 04/01/25 1621    Order Status: Completed Specimen: Biopsy from Foot, Left Updated: 04/04/25 0834     CULTURE, AFB  Culture In Progress    Aerobic culture [[id number]] Collected: 04/01/25 1621    Order Status: Completed Specimen: Biopsy from Foot, Left Updated: 04/04/25 0724     CULTURE, AEROBIC No Growth To Date    Blood Culture #1 **CANNOT BE ORDERED STAT** [[id number]]  (Normal) Collected: 03/31/25 1827    Order Status: Completed Specimen: Blood from Peripheral, Antecubital, Left Updated: 04/03/25 2100     Blood Culture No Growth After 72 Hours    Blood Culture #2 **CANNOT BE ORDERED STAT** [[id number]]  (Normal) Collected: 03/31/25 1845    Order Status: Completed Specimen: Blood from Peripheral, Hand, Left Updated: 04/03/25 2001     Blood Culture No Growth After 72 Hours    Culture, Anaerobe [[id number]] Collected: 04/01/25 1557    Order Status: Completed Specimen: Wound from Foot, Right Updated: 04/03/25 0824     Anaerobe Culture Culture In Progress    Culture, Anaerobe [[id number]] Collected: 04/01/25 1621    Order Status: Completed Specimen: Biopsy from Foot, Left Updated: 04/03/25 0814     Anaerobe Culture No Growth To Date    AFB Culture & Smear [[id number]] Collected: 04/01/25 1557    Order Status: Completed Specimen: Wound from Foot, Right Updated: 04/03/25 0805     CULTURE, AFB  Culture In Progress    Aerobic culture [[id number]]  (Abnormal)  (Susceptibility) Collected: 04/01/25 1557    Order Status: Completed Specimen: Wound from Foot, Right Updated: 04/03/25 0539     CULTURE, AEROBIC Moderate Proteus mirabilis    Afb Culture Stain [[id number]] Collected: 04/01/25 1557    Order Status: Resulted Specimen: Wound from Foot, Right Updated: 04/02/25 1614    Afb Culture Stain [[id number]] Collected: 04/01/25 1621    Order Status: Sent Specimen: Biopsy from Foot, Left Updated: 04/02/25 1613    Gram stain [[id number]] Collected: 04/01/25 1621     Order Status: Completed Specimen: Biopsy from Foot, Left Updated: 04/02/25 1132     GRAM STAIN Rare WBC seen      No organisms seen    Gram stain [[id number]] Collected: 04/01/25 1557    Order Status: Completed Specimen: Wound from Foot, Right Updated: 04/02/25 1127     GRAM STAIN Many WBC seen      Moderate Gram negative diplococci      Few Gram Negative Rods      Rare Gram positive cocci    Fungus culture [[id number]] Collected: 04/01/25 1621    Order Status: Sent Specimen: Biopsy from Foot, Left Updated: 04/01/25 1654    Fungus culture [[id number]] Collected: 04/01/25 1557    Order Status: Sent Specimen: Wound from Foot, Right Updated: 04/01/25 1653            Diagnostic Results:  Imaging Results               MRI Hindfoot WO Contrast LT (Final result)  Result time 04/01/25 07:47:56      Final result by Seamus Hays MD (04/01/25 07:47:56)                   Impression:      1. Plantar soft tissue ulcer at the level of the 3rd MTP joint with associated osteomyelitis of the distal half of the metatarsal and base of the proximal phalanx.  No abscess.  2. Sequela of chronic osteomyelitis involving the 1st, 2nd and 5th metatarsals and proximal phalanges.  3. Postoperative change of transmetatarsal amputation of the 4th digit.  This report was flagged in Epic as abnormal.      Electronically signed by: Seamus Hays MD  Date:    04/01/2025  Time:    07:47               Narrative:    EXAMINATION:  MRI HINDFOOT WO CONTRAST LT; MRI FOREFOOT WO CONTRAST LT    CLINICAL HISTORY:  b/l ulcers concern for osteo;; b/l ulcers. Concern for osteo;    TECHNIQUE:  Routine MRI evaluation of the left ankle and forefoot performed without contrast.    COMPARISON:  Radiographs 09/01/2024.    FINDINGS:  Examination degraded by patient motion artifact.    BONES: Postsurgical change of transmetatarsal amputation of the 4th digit.  Osseous erosive changes of the 3rd metatarsal head and proximal phalanx base with associated confluent  marrow edema involving the distal half of the metatarsal and proximal half of the proximal phalanx.  There is corresponding T1 medullary hypointensity throughout the distal half of the metatarsal and base of the proximal phalanx.  Chronic osseous erosive changes of the 1st, 2nd and 5th metatarsal heads and proximal phalanges with grossly preserved marrow signal intensity.  Solid periosteal reaction throughout the 2nd metatarsal shaft.  Linear area of edema signal at the posterior subchondral region of the tibial plafond, favored to be degenerative in nature.  No definite acute fractures.  Remote healed fracture of the lateral malleolus.  No avascular necrosis.  No marrow infiltrative process.    JOINTS: 3rd MTP complex joint effusion with surrounding synovitis.  No dislocation.    LIGAMENTS: Chronic sprains of the anterior talofibular, superficial deltoid and deep deltoid ligaments.  Lisfranc ligament appears intact.    TENDONS: Ulceration and disruption of the 3rd flexor tendon at the level of the MTP joint.  Chronic ulceration of the 1st, 2nd and 5th flexor tendons at the level of the MTP joints.  Postoperative changes of the 4th flexor tendon.  Remaining tendons appear grossly intact.    MUSCLES: Edema and severe fatty degeneration of the visualized musculature.  No intramuscular fluid collection.    MISCELLANEOUS: Plantar soft tissue ulcer at the level of the 3rd MTP joint with adjacent fluid and surrounding subcutaneous edema.  No focal fluid collection.  Probable adventitial bursa at the plantar aspect of the hallux MTP joint.  Focal area of signal hypointensity at the level of the heel pad, possibly sequela of scarring.                                        MRI Forefoot WO Contrast LT (Final result)  Result time 04/01/25 07:47:56   Procedure changed from MRI Foot Toes WO Contrast DANY     Final result by Seamus Hays MD (04/01/25 07:47:56)                   Impression:      1. Plantar soft tissue ulcer  at the level of the 3rd MTP joint with associated osteomyelitis of the distal half of the metatarsal and base of the proximal phalanx.  No abscess.  2. Sequela of chronic osteomyelitis involving the 1st, 2nd and 5th metatarsals and proximal phalanges.  3. Postoperative change of transmetatarsal amputation of the 4th digit.  This report was flagged in Epic as abnormal.      Electronically signed by: Seamus Hays MD  Date:    04/01/2025  Time:    07:47               Narrative:    EXAMINATION:  MRI HINDFOOT WO CONTRAST LT; MRI FOREFOOT WO CONTRAST LT    CLINICAL HISTORY:  b/l ulcers concern for osteo;; b/l ulcers. Concern for osteo;    TECHNIQUE:  Routine MRI evaluation of the left ankle and forefoot performed without contrast.    COMPARISON:  Radiographs 09/01/2024.    FINDINGS:  Examination degraded by patient motion artifact.    BONES: Postsurgical change of transmetatarsal amputation of the 4th digit.  Osseous erosive changes of the 3rd metatarsal head and proximal phalanx base with associated confluent marrow edema involving the distal half of the metatarsal and proximal half of the proximal phalanx.  There is corresponding T1 medullary hypointensity throughout the distal half of the metatarsal and base of the proximal phalanx.  Chronic osseous erosive changes of the 1st, 2nd and 5th metatarsal heads and proximal phalanges with grossly preserved marrow signal intensity.  Solid periosteal reaction throughout the 2nd metatarsal shaft.  Linear area of edema signal at the posterior subchondral region of the tibial plafond, favored to be degenerative in nature.  No definite acute fractures.  Remote healed fracture of the lateral malleolus.  No avascular necrosis.  No marrow infiltrative process.    JOINTS: 3rd MTP complex joint effusion with surrounding synovitis.  No dislocation.    LIGAMENTS: Chronic sprains of the anterior talofibular, superficial deltoid and deep deltoid ligaments.  Lisfranc ligament appears  intact.    TENDONS: Ulceration and disruption of the 3rd flexor tendon at the level of the MTP joint.  Chronic ulceration of the 1st, 2nd and 5th flexor tendons at the level of the MTP joints.  Postoperative changes of the 4th flexor tendon.  Remaining tendons appear grossly intact.    MUSCLES: Edema and severe fatty degeneration of the visualized musculature.  No intramuscular fluid collection.    MISCELLANEOUS: Plantar soft tissue ulcer at the level of the 3rd MTP joint with adjacent fluid and surrounding subcutaneous edema.  No focal fluid collection.  Probable adventitial bursa at the plantar aspect of the hallux MTP joint.  Focal area of signal hypointensity at the level of the heel pad, possibly sequela of scarring.                                       MRI Hindfoot WO Contrast RT (Final result)  Result time 04/01/25 07:19:00      Final result by Seamus Hays MD (04/01/25 07:19:00)                   Impression:      1. Plantar soft tissue ulcer at the level of the calcaneocuboid joint with surrounding subcutaneous edema and adjacent blistering.  No osteomyelitis.  No abscess.  2. Advanced neuropathic arthropathy of the tibiotalar, subtalar and residual midtarsal joints.      Electronically signed by: Seamus Hays MD  Date:    04/01/2025  Time:    07:19               Narrative:    EXAMINATION:  MRI HINDFOOT WO CONTRAST RT    CLINICAL HISTORY:  Foot swelling, diabetic, osteomyelitis suspected, xray done;    TECHNIQUE:  Routine MRI evaluation of the right ankle performed without contrast.    COMPARISON:  Radiographs 03/31/2025.    FINDINGS:  BONES/JOINTS: Postsurgical changes of midfoot amputation.  Chronic osseous erosive changes centered about the tibiotalar, subtalar and residual midtarsal joints, likely sequela of chronic neuropathic arthropathy.  Intraosseous cyst at the lateral aspect of the distal tibia.  Circumferential solid periosteal reaction about the distal tibia and distal fibula.  No  marrow signal abnormality to suggest an infiltrative process.  No significant joint effusion.  No dislocation.    TENDONS: Achilles tendon demonstrates normal morphology and signal intensity.  Residual posterior tibial, flexor digitorum longus, flexor hallucis longus, peroneus longus, peroneus brevis and extensor tendons are intact.    MUSCLES: Edema and fatty degeneration of the visualized musculature.  No intramuscular fluid collection.    MISCELLANEOUS: Plantar soft tissue ulcer at the level of the calcaneocuboid joint with involvement of the plantar aponeurosis, surrounding subcutaneous edema and adjacent blistering.  No fluid collection.                                       US Lower Extremity Arteries Right (Final result)  Result time 03/31/25 21:32:36      Final result by Michelle Leon MD (03/31/25 21:32:36)                   Impression:      Normal triphasic waveforms throughout the right lower extremity.  No occlusion or stenosis detected.      Electronically signed by: Michelle Leon  Date:    03/31/2025  Time:    21:32               Narrative:    EXAMINATION:  US LOWER EXTREMITY ARTERIES RIGHT    CLINICAL HISTORY:  Unspecified open wound, right foot, subsequent encounter    TECHNIQUE:  Duplex and color flow Doppler evaluation and graded compression of the right lower extremity arteries was performed.    COMPARISON:  None    FINDINGS:  Peak systolic velocities in the right lower extremity arteries (cm/sec):    CFA: 127, triphasic    DFA: 74, triphasic    Proximal SFA: 103, triphasic    Mid SFA: 120, triphasic    Distal SFA: 109, triphasic    Proximal pop: 84, triphasic    Distal pop: 98, triphasic    LAUREL: 80, triphasic    PTA: 86, triphasic    Peroneal: 111, triphasic    DPA: 75, triphasic                                       X-Ray Foot Complete Right (Final result)  Result time 03/31/25 13:14:02      Final result by Alli Henriquez MD (03/31/25 13:14:02)                   Impression:      Please  see above.      Electronically signed by: Alli eHnriquez  Date:    03/31/2025  Time:    13:14               Narrative:    EXAMINATION:  XR FOOT COMPLETE 3 VIEW RIGHT    CLINICAL HISTORY:  . Pain in unspecified foot    TECHNIQUE:  AP, lateral, and oblique views of the right foot were performed.    COMPARISON:  None.    FINDINGS:  Extensive postoperative change including amputation to the level of the midfoot structures.  Remainder of the visualized osseous structures of the mid and hindfoot demonstrate diffuse degenerative change in osteopenia with surrounding diffuse soft tissue swelling.  Additional diffuse osseous destruction and degenerative change about the partially visualized ankle.  Scattered vascular calcification as well as plantar calcaneal spur.  No obvious significant osseous erosive change, noting limitations from postoperative change and surrounding soft tissue swelling.  If there is continued clinical concern for osteomyelitis, consider MRI of the foot for further evaluation.                                       X-Ray Chest AP Portable (Final result)  Result time 03/31/25 13:15:24      Final result by Alli Henriquez MD (03/31/25 13:15:24)                   Impression:      Please see above.      Electronically signed by: Alli Henriquez  Date:    03/31/2025  Time:    13:15               Narrative:    EXAMINATION:  XR CHEST AP PORTABLE    CLINICAL HISTORY:  Chronic cough    TECHNIQUE:  Single frontal view of the chest was performed.    COMPARISON:  Chest radiograph 09/01/2024    FINDINGS:  Left-sided central venous catheter with tip overlying the in expected region of the cavoatrial junction.  Cardiomediastinal silhouette is unchanged in size.  Patient is rotated, limiting evaluation of the mediastinal structures.    Chronic right effusion with probable superimposed volume loss about the right lung base.  Superimposed infectious or non infectious inflammatory infiltrate cannot be excluded.  Remainder of the  aerated portions of the lungs are clear.  No pneumothorax.    Osseous structures demonstrate no evidence for acute fracture or osseous destructive lesion.  Soft tissues are unremarkable.                                      Assessment/Plan:     Active Diagnoses:    Diagnosis Date Noted POA    PRINCIPAL PROBLEM:  Infected ulcer of skin [L98.499, L08.9] 03/31/2025 Yes    Pyogenic inflammation of bone [M86.9] 04/03/2025 Unknown    Foot infection [L08.9] 04/02/2025 Yes    Foot ulcer with fat layer exposed, unspecified laterality [L97.502] 09/01/2024 Yes    Type 2 diabetes mellitus with foot ulcer [E11.621, L97.509] 06/25/2024 Yes    End stage renal disease [N18.6] 03/21/2024 Yes    Hyperkalemia [E87.5] 07/19/2021 Yes     Chronic    Essential hypertension [I10] 01/27/2021 Yes      Problems Resolved During this Admission:       Education about the prevention of limb loss.      Postoperative day #3 s/p irrigation and debridement of both feet     Discussed wound healing cycle, skin integrity, ways to care for skin.Counseled patient on the effects of biomechanical pressure and deformity as well as blood glucose on healing. He verbalizes understanding that it can increase the chances of delayed healing and this prolonged exposure leads to infection or progression of infection which subsequently can result in loss of limb.    Wounds are cleansed as much as possible and assessed, I am concerned about the significant amount of purulent drainage that is being expressed from the right foot ulcer.  Informed patient that he is at high-risk of below-knee amputation given the nature of infection to this limb.  Informed patient that if he continues to have this amount of purulent drainage he may need repeat surgical debridement plus or minus incision and drainage.  Physical therapy consulted to see if adding Pulsavac to the wound care regimen we will assist with controlling for purulence as MRI was negative for abscess.    Right foot  debridement performed for optimizing healthy wound healing environment by removing nonviable tissue.     Conservative Sharp Debridement:  Type of debridement:  Excisional   Instrument used: scissors, forceps, and 3 mm curette  Tissue removed:  Dermis, epidermis, subcutaneous tissue from inside the wound margins  Tissue layer exposed:  Subcutaneous tissue  Bleeding: Minimal  Controlled with direct pressure  Pain: Yes - controlled with medication    DSD applied to bilateral lower extremities with care taken to ensure that each foot was dressed separately and aseptically to prevent cross contamination of the wounds.    Abx per ID    Informed primary team of patient's concern with his cough.    F/u [facility] wound care upon discharge (patient is  already established at this wound care center) however on today's encounter patient is now expressing interest in being seen at [facility].    Short-term goals include maintaining good offloading and minimizing bioburden, promoting granulation and epithelialization to healing.  Long-term goals include keeping the wound healed by good offloading and medical management under the direction of internist.      We will continue to follow and work in partnership with treatment team on how to best treat patient concern and diagnosis.        Thank you for your consult. I will follow-up with patient. Please contact us if you have any additional questions.    Bety Faria DPM  Podiatry  [facility] - Emergency Dept

## 2025-04-04 NOTE — ASSESSMENT & PLAN NOTE
Hyperkalemia is likely due to ESRD.The patients most recent potassium results are listed below.  Recent Labs     04/02/25  0658 04/03/25  0618 04/04/25  0745   K 5.4* 4.7 5.0   Treated with Lokelma.  Stable.  Nephrology consulted for HD.  Treat with HD  Now resolved.

## 2025-04-04 NOTE — ASSESSMENT & PLAN NOTE
"Left foot osteomyelitis  R foot infected diabetic ulcer    40y/o M with ESRD on HD, DM with chronic L foot ulcer c/b osteo and infected R foot ulcer now s/p surgical debridement 4/31 with op findings of: "gas in the soft tissues of the plantar right foot and deep probe to bone with soft bone of left foot."   Wound cx from R foot growing p mirabilis. L foot cx NGTD. Path L foot with chronic osteo.  QTc 458  WBC uptrending with increased drainage form R foot.    Recommendations:  -Currently on cefepime and flagyl- continue for now pending final culture data  -Anticipate 6 wks abx for L foot chronic osteo. Ideally abx can be arranged with outpatient HD center.  -Continue local wound care  -continue to monitor for improvement.  -f/u final culture data             "

## 2025-04-04 NOTE — ASSESSMENT & PLAN NOTE
Patient's blood pressure range in the last 24 hours was: BP  Min: 113/71  Max: 136/82.The patient's inpatient anti-hypertensive regimen is listed below:  Current Antihypertensives  , Daily, Oral  carvediloL tablet 25 mg, 2 times daily, Oral  hydrALAZINE tablet 50 mg, 3 times daily, Oral  NIFEdipine 24 hr tablet 90 mg, Daily, Oral    Plan  - BP is controlled, no changes needed to their regimen  - lisinopril held due to hyperkalemia.

## 2025-04-04 NOTE — SUBJECTIVE & OBJECTIVE
Interval History: Complaining of pain to R foot and drainage. WBC uptrending. No new infectious symptoms reported.    Review of Systems   Constitutional:  Negative for chills, diaphoresis, fatigue and fever.   Gastrointestinal:  Negative for abdominal pain and diarrhea.   Skin:  Positive for wound.   All other systems reviewed and are negative.    Objective:     Vital Signs (Most Recent):  Temp: 98.6 °F (37 °C) (04/04/25 1108)  Pulse: 81 (04/04/25 1108)  Resp: 18 (04/04/25 1235)  BP: 127/73 (04/04/25 1108)  SpO2: 98 % (04/04/25 1108) Vital Signs (24h Range):  Temp:  [97.7 °F (36.5 °C)-99.4 °F (37.4 °C)] 98.6 °F (37 °C)  Pulse:  [78-91] 81  Resp:  [18] 18  SpO2:  [96 %-98 %] 98 %  BP: (113-136)/(71-83) 127/73     Weight: 104.3 kg (229 lb 15 oz)  Body mass index is 29.52 kg/m².    Estimated Creatinine Clearance: 8.4 mL/min (A) (based on SCr of 15.1 mg/dL (H)).     Physical Exam  Vitals and nursing note reviewed.   Constitutional:       Appearance: Normal appearance. He is not ill-appearing.   HENT:      Head: Normocephalic and atraumatic.      Nose: Nose normal. No congestion.      Mouth/Throat:      Mouth: Mucous membranes are moist.      Pharynx: Oropharynx is clear.   Eyes:      Conjunctiva/sclera: Conjunctivae normal.      Pupils: Pupils are equal, round, and reactive to light.   Cardiovascular:      Rate and Rhythm: Normal rate and regular rhythm.   Pulmonary:      Effort: Pulmonary effort is normal. No respiratory distress.      Breath sounds: Normal breath sounds.   Abdominal:      General: Abdomen is flat. There is no distension.      Palpations: Abdomen is soft.   Musculoskeletal:         General: No swelling. Normal range of motion.      Cervical back: Normal range of motion and neck supple.      Comments: R foot s/p TMA- wound present on plantar aspect- clean borders, no drainage. Now in clean dressing.  L foot in surgical dressing.   Skin:     General: Skin is warm and dry.   Neurological:      General: No  "focal deficit present.      Mental Status: He is alert and oriented to person, place, and time.   Psychiatric:         Mood and Affect: Mood normal.         Behavior: Behavior normal.          Significant Labs: Blood Culture: No results for input(s): "LABBLOO" in the last 4320 hours.  Wound Culture: No results for input(s): "LABAERO" in the last 4320 hours.  All pertinent labs within the past 24 hours have been reviewed.    Significant Imaging: I have reviewed all pertinent imaging results/findings within the past 24 hours.  "

## 2025-04-04 NOTE — NURSING
[facility]  Nurses Note -- 4 Eyes      4/4/2025       Skin assessed on: Q Shift      [x] No Pressure Injuries Present    [x]Prevention Measures Documented    [] Yes LDA  for Pressure Injury Previously documented     [] Yes New Pressure Injury Discovered   [] LDA for New Pressure Injury Added      Attending RN:  Jennifer Faustin RN     Second RN:  Umm CUNHA

## 2025-04-04 NOTE — SUBJECTIVE & OBJECTIVE
Interval History:  No acute event overnight.  Noted mild elevation of WBC.  Right foot pouring pus; denied fever chills or shortness of breath    Review of Systems   Constitutional:  Negative for activity change, chills and fatigue.   Respiratory:  Negative for cough and shortness of breath.    Cardiovascular:  Positive for leg swelling.     Objective:     Vital Signs (Most Recent):  Temp: 98.6 °F (37 °C) (04/04/25 1108)  Pulse: 81 (04/04/25 1108)  Resp: 18 (04/04/25 1122)  BP: 127/73 (04/04/25 1108)  SpO2: 98 % (04/04/25 1108) Vital Signs (24h Range):  Temp:  [97.7 °F (36.5 °C)-99.4 °F (37.4 °C)] 98.6 °F (37 °C)  Pulse:  [78-91] 81  Resp:  [18] 18  SpO2:  [96 %-98 %] 98 %  BP: (113-136)/(71-83) 127/73     Weight: 104.3 kg (229 lb 15 oz)  Body mass index is 29.52 kg/m².    Intake/Output Summary (Last 24 hours) at 4/4/2025 1210  Last data filed at 4/3/2025 1730  Gross per 24 hour   Intake 480 ml   Output --   Net 480 ml         Physical Exam  Constitutional:       General: He is in acute distress.      Appearance: He is toxic-appearing. He is not ill-appearing or diaphoretic.   Cardiovascular:      Rate and Rhythm: Normal rate and regular rhythm.      Pulses: Normal pulses.      Heart sounds: Normal heart sounds. No murmur heard.     No friction rub.   Pulmonary:      Effort: Pulmonary effort is normal. No respiratory distress.      Breath sounds: No stridor. No wheezing or rhonchi.   Abdominal:      General: Bowel sounds are normal. There is no distension.      Palpations: Abdomen is soft. There is no mass.   Musculoskeletal:      Comments: Clean surgical dressing on bilateral lower extremities   Neurological:      Mental Status: He is oriented to person, place, and time. Mental status is at baseline.               Significant Labs: All pertinent labs within the past 24 hours have been reviewed.  CBC:   Recent Labs   Lab 04/03/25  0618 04/04/25  0745   WBC 14.31* 16.00*   HGB 9.9* 11.1*   HCT 31.8* 35.7*     356     CMP:   Recent Labs   Lab 04/03/25  0618 04/04/25  0745    135*   K 4.7 5.0    104   CO2 21* 20*   BUN 41* 53*   CREATININE 12.5* 15.1*   CALCIUM 9.4 9.8   ALBUMIN 2.9* 2.8*   BILITOT 0.3  --    ALKPHOS 95  --    AST 12  --    ALT 9*  --    ANIONGAP 11 11       Significant Imaging:   MRI Hindfoot WO Contrast LT   Final Result   Abnormal      1. Plantar soft tissue ulcer at the level of the 3rd MTP joint with associated osteomyelitis of the distal half of the metatarsal and base of the proximal phalanx.  No abscess.   2. Sequela of chronic osteomyelitis involving the 1st, 2nd and 5th metatarsals and proximal phalanges.   3. Postoperative change of transmetatarsal amputation of the 4th digit.   This report was flagged in Epic as abnormal.         Electronically signed by: Seamus Hays MD   Date:    04/01/2025   Time:    07:47      MRI Forefoot WO Contrast LT   Final Result   Abnormal      1. Plantar soft tissue ulcer at the level of the 3rd MTP joint with associated osteomyelitis of the distal half of the metatarsal and base of the proximal phalanx.  No abscess.   2. Sequela of chronic osteomyelitis involving the 1st, 2nd and 5th metatarsals and proximal phalanges.   3. Postoperative change of transmetatarsal amputation of the 4th digit.   This report was flagged in Epic as abnormal.         Electronically signed by: Seamus Hays MD   Date:    04/01/2025   Time:    07:47      MRI Hindfoot WO Contrast RT   Final Result      1. Plantar soft tissue ulcer at the level of the calcaneocuboid joint with surrounding subcutaneous edema and adjacent blistering.  No osteomyelitis.  No abscess.   2. Advanced neuropathic arthropathy of the tibiotalar, subtalar and residual midtarsal joints.         Electronically signed by: Seamus Hays MD   Date:    04/01/2025   Time:    07:19      US Lower Extremity Arteries Right   Final Result      Normal triphasic waveforms throughout the right lower extremity.  No  occlusion or stenosis detected.         Electronically signed by: Michelle Leon   Date:    03/31/2025   Time:    21:32      X-Ray Foot Complete Right   Final Result      Please see above.         Electronically signed by: Alli Henriquez   Date:    03/31/2025   Time:    13:14      X-Ray Chest AP Portable   Final Result      Please see above.         Electronically signed by: Alli Henriquez   Date:    03/31/2025   Time:    13:15

## 2025-04-04 NOTE — PROGRESS NOTES
[facility] [place]  Nephrology  Progress Note    Patient Name: Emmanuel Morgan  MRN: [id number]  Admission Date: 3/31/2025  Hospital Length of Stay: 4 days  Attending Provider: Tony Sainz MD   Primary Care Physician: Laurence, Primary Doctor  Principal Problem:Infected ulcer of skin    Subjective:     HPI: 39 year old man with a history of ESRD on HD, DM, hx of osteomyelitis following with wound care presents to the ED at wound care request for evaluation for possible debridement. Podiatry consulted.    Outpatient ESRD  Hemodialysis  Outpatient nephrologist: Dr. Wade  Outpatient center:  [facility]  Dialysis schedule: MWF  Last treatment: 3/28/2025  Dry weight: 99kg  Access: right forearm AVF      Interval History: UOP 2x yesterday. No events overnight. Frustrated by frequent vital checks. Frustrated that food is delivered while he is in procedures and is cold when he returns to room. Otherwise doing okay.     Review of patient's allergies indicates:  No Known Allergies  Current Facility-Administered Medications   Medication Frequency    acetaminophen tablet 650 mg Q4H PRN    aluminum-magnesium hydroxide-simethicone 200-200-20 mg/5 mL suspension 30 mL QID PRN    benzonatate capsule 100 mg TID PRN    carvediloL tablet 25 mg BID    ceFEPIme injection 1 g Daily    dextromethorphan-guaiFENesin  mg/5 ml liquid 5 mL Q6H PRN    dextrose 50% injection 12.5 g PRN    dextrose 50% injection 25 g PRN    diphenhydrAMINE capsule 25 mg Q6H PRN    glucagon (human recombinant) injection 1 mg PRN    glucose chewable tablet 16 g PRN    glucose chewable tablet 24 g PRN    heparin (porcine) injection 1,000 Units PRN    hydrALAZINE tablet 50 mg TID    insulin aspart U-100 pen 0-5 Units QID (AC + HS) PRN    melatonin tablet 6 mg Nightly PRN    metronidazole IVPB 500 mg Q8H    morphine injection 4 mg Q4H PRN    mupirocin 2 % ointment BID    naloxone 0.4 mg/mL injection 0.02 mg PRN    NIFEdipine 24 hr tablet 90 mg Daily     ondansetron injection 4 mg Q6H PRN    oxyCODONE-acetaminophen  mg per tablet 1 tablet Q4H PRN    senna-docusate 8.6-50 mg per tablet 1 tablet BID PRN    sevelamer carbonate tablet 800 mg TID WM    sodium chloride 0.9% bolus 250 mL 250 mL PRN       Objective:     Vital Signs (Most Recent):  Temp: 98.7 °F (37.1 °C) (04/04/25 1613)  Pulse: 97 (04/04/25 1613)  Resp: 18 (04/04/25 1613)  BP: 125/70 (04/04/25 1613)  SpO2: 98 % (04/04/25 1613) Vital Signs (24h Range):  Temp:  [97.7 °F (36.5 °C)-99.4 °F (37.4 °C)] 98.7 °F (37.1 °C)  Pulse:  [78-97] 97  Resp:  [18] 18  SpO2:  [96 %-98 %] 98 %  BP: (113-136)/(70-83) 125/70     Weight: 104.3 kg (229 lb 15 oz) (04/01/25 2045)  Body mass index is 29.52 kg/m².  Body surface area is 2.33 meters squared.    I/O last 3 completed shifts:  In: 720 [P.O.:720]  Out: -      Physical Exam  Vitals and nursing note reviewed.   Constitutional:       General: He is awake. He is not in acute distress.     Appearance: Normal appearance. He is well-developed.   HENT:      Head: Normocephalic and atraumatic.      Nose: Nose normal.      Mouth/Throat:      Mouth: Mucous membranes are moist.   Eyes:      Extraocular Movements: Extraocular movements intact.      Conjunctiva/sclera: Conjunctivae normal.   Cardiovascular:      Rate and Rhythm: Normal rate and regular rhythm.   Pulmonary:      Effort: Pulmonary effort is normal.   Abdominal:      Palpations: Abdomen is soft.   Musculoskeletal:         General: No swelling, tenderness or signs of injury.   Skin:     General: Skin is warm and dry.      Findings: No erythema or rash.   Neurological:      General: No focal deficit present.      Mental Status: He is alert. Mental status is at baseline.   Psychiatric:         Mood and Affect: Mood normal.         Behavior: Behavior normal.          Significant Labs:  CBC:   Recent Labs   Lab 04/04/25  0745   WBC 16.00*   RBC 3.81*   HGB 11.1*   HCT 35.7*      MCV 94   MCH 29.1   MCHC 31.1*      CMP:   Recent Labs   Lab 04/03/25  0618 04/04/25  0745   CALCIUM 9.4 9.8   ALBUMIN 2.9* 2.8*    135*   K 4.7 5.0   CO2 21* 20*    104   BUN 41* 53*   CREATININE 12.5* 15.1*   ALKPHOS 95  --    ALT 9*  --    AST 12  --    BILITOT 0.3  --      All labs within the past 24 hours have been reviewed.     Significant Imaging:  Labs: Reviewed    Assessment/Plan:     ESRD  - receives HD MWF at [facility] under the c/o Dr. Wade  - HD today; continue HD MWF  - labs MWF    Anemia of CKD  - hgb 11.1 today; at goal  - no need for KIKI at this time    Secondary HPTH  - CCa elevated; phos controlled  - continue sevelamer.   - encourage ambulation    Hyperkalemia  - stable; continue low K diet    Thank you for your consult. I will follow-up with patient. Please contact us if you have any additional questions.    Laurie Root MD  Nephrology  [facility] [place] - Telemetry

## 2025-04-04 NOTE — ASSESSMENT & PLAN NOTE
Creatine stable for now. BMP reviewed- noted Estimated Creatinine Clearance: 8.4 mL/min (A) (based on SCr of 15.1 mg/dL (H)). according to latest data. Based on current GFR, CKD stage is end stage.  Monitor UOP and serial BMP and adjust therapy as needed. Renally dose meds. Avoid nephrotoxic medications and procedures.  Nephrology consulted for HD

## 2025-04-04 NOTE — PROGRESS NOTES
Pharmacokinetic Assessment Follow Up: IV Vancomycin    Vancomycin serum concentration assessment(s):    The random level was drawn correctly and can be used to guide therapy at this time. The measurement is within the desired definitive target range of 10 to 20 mcg/mL.    Vancomycin Regimen Plan:    Give 500 mg after dialysis today.  Re-dose when the random level is less than 20 mcg/mL, next level to be drawn at 0400 on 4/7/2025    Drug levels (last 3 results):  Recent Labs   Lab Result Units 04/02/25 0658 04/04/25  0745   Vancomycin Random ug/ml 22.7 16.0       Pharmacy will continue to follow and monitor vancomycin.    Please contact pharmacy at extension [phone] for questions regarding this assessment.    Thank you for the consult,   Jeyson Carrero Jr       Patient brief summary:  Emmanuel Morgan is a 39 y.o. male initiated on antimicrobial therapy with IV Vancomycin for treatment of bone/joint infection    Drug Allergies:   Review of patient's allergies indicates:  No Known Allergies    Actual Body Weight:   104.3 kg    Renal Function:   Estimated Creatinine Clearance: 8.4 mL/min (A) (based on SCr of 15.1 mg/dL (H)).,     Dialysis Method (if applicable):  intermittent HD    CBC (last 72 hours):  Recent Labs   Lab Result Units 04/02/25 0658 04/03/25 0618 04/04/25  0745   WBC K/uL 12.12 14.31* 16.00*   HGB gm/dL 10.1* 9.9* 11.1*   HCT % 32.1* 31.8* 35.7*   Platelet Count K/uL 330 323 356   Lymph % % 13.8* 15.7* 15.0*   Mono % % 14.6 14.3 12.9   Eos % % 3.1 2.3 3.8   Basophil % % 0.4 0.3 0.4       Metabolic Panel (last 72 hours):  Recent Labs   Lab Result Units 04/02/25 0658 04/03/25 0618 04/04/25  0745   Sodium mmol/L 136 136 135*   Potassium mmol/L 5.4* 4.7 5.0   Chloride mmol/L 97 104 104   CO2 mmol/L 22* 21* 20*   Glucose mg/dL 114* 127* 89   BUN mg/dL 58* 41* 53*   Creatinine mg/dL 13.4* 12.5* 15.1*   Albumin g/dL 2.8* 2.9* 2.8*   Bilirubin Total mg/dL 0.3 0.3  --    ALP unit/L 62 95  --    AST unit/L  16 12  --    ALT unit/L 9* 9*  --    Magnesium  mg/dL 2.0 2.0  --    Phosphorus Level mg/dL 4.3 3.8 4.5       Vancomycin Administrations:  vancomycin given in the last 96 hours                     tobramycin (NEBCIN) 240 mg, vancomycin (VANCOCIN) 1,000 mg (mL) 1,240 mL Given 04/01/25 1609    vancomycin 2,000 mg in 0.9% NaCl 500 mL IVPB (admixture device) (mg) 2,000 mg New Bag 03/31/25 2035                    Microbiologic Results:  Microbiology Results (last 7 days)       Procedure Component Value Units Date/Time    AFB Culture & Smear [[id number]] Collected: 04/01/25 1621    Order Status: Completed Specimen: Biopsy from Foot, Left Updated: 04/04/25 0834     CULTURE, AFB  Culture In Progress    Aerobic culture [[id number]] Collected: 04/01/25 1621    Order Status: Completed Specimen: Biopsy from Foot, Left Updated: 04/04/25 0724     CULTURE, AEROBIC No Growth To Date    Blood Culture #1 **CANNOT BE ORDERED STAT** [[id number]]  (Normal) Collected: 03/31/25 1827    Order Status: Completed Specimen: Blood from Peripheral, Antecubital, Left Updated: 04/03/25 2100     Blood Culture No Growth After 72 Hours    Blood Culture #2 **CANNOT BE ORDERED STAT** [[id number]]  (Normal) Collected: 03/31/25 1845    Order Status: Completed Specimen: Blood from Peripheral, Hand, Left Updated: 04/03/25 2001     Blood Culture No Growth After 72 Hours    Culture, Anaerobe [[id number]] Collected: 04/01/25 1557    Order Status: Completed Specimen: Wound from Foot, Right Updated: 04/03/25 0824     Anaerobe Culture Culture In Progress    Culture, Anaerobe [[id number]] Collected: 04/01/25 1621    Order Status: Completed Specimen: Biopsy from Foot, Left Updated: 04/03/25 0814     Anaerobe Culture No Growth To Date    AFB Culture & Smear [[id number]] Collected: 04/01/25 1557    Order Status: Completed Specimen: Wound from Foot, Right Updated: 04/03/25 0805     CULTURE, AFB  Culture In Progress    Aerobic culture [[id number]]  (Abnormal)   (Susceptibility) Collected: 04/01/25 7037    Order Status: Completed Specimen: Wound from Foot, Right Updated: 04/03/25 0539     CULTURE, AEROBIC Moderate Proteus mirabilis    Afb Culture Stain [[id number]] Collected: 04/01/25 1557    Order Status: Resulted Specimen: Wound from Foot, Right Updated: 04/02/25 1614    Afb Culture Stain [[id number]] Collected: 04/01/25 1621    Order Status: Sent Specimen: Biopsy from Foot, Left Updated: 04/02/25 1613    Gram stain [[id number]] Collected: 04/01/25 1621    Order Status: Completed Specimen: Biopsy from Foot, Left Updated: 04/02/25 1132     GRAM STAIN Rare WBC seen      No organisms seen    Gram stain [[id number]] Collected: 04/01/25 1557    Order Status: Completed Specimen: Wound from Foot, Right Updated: 04/02/25 1127     GRAM STAIN Many WBC seen      Moderate Gram negative diplococci      Few Gram Negative Rods      Rare Gram positive cocci    Fungus culture [[id number]] Collected: 04/01/25 1621    Order Status: Sent Specimen: Biopsy from Foot, Left Updated: 04/01/25 1654    Fungus culture [[id number]] Collected: 04/01/25 1557    Order Status: Sent Specimen: Wound from Foot, Right Updated: 04/01/25 1653

## 2025-04-05 LAB
BACTERIA BLD CULT: NORMAL
BACTERIA BLD CULT: NORMAL
BACTERIA SPEC ANAEROBE CULT: NORMAL
BACTERIA SPEC ANAEROBE CULT: NORMAL
HBV SURFACE AG SERPL QL IA: NORMAL
POCT GLUCOSE: 106 MG/DL (ref 70–110)
POCT GLUCOSE: 124 MG/DL (ref 70–110)
POCT GLUCOSE: 127 MG/DL (ref 70–110)
POCT GLUCOSE: 142 MG/DL (ref 70–110)

## 2025-04-05 PROCEDURE — 11000001 HC ACUTE MED/SURG PRIVATE ROOM

## 2025-04-05 PROCEDURE — 63600175 PHARM REV CODE 636 W HCPCS: Performed by: HOSPITALIST

## 2025-04-05 PROCEDURE — 25000003 PHARM REV CODE 250: Performed by: HOSPITALIST

## 2025-04-05 PROCEDURE — 63600175 PHARM REV CODE 636 W HCPCS: Performed by: PODIATRIST

## 2025-04-05 PROCEDURE — 25000003 PHARM REV CODE 250: Performed by: INTERNAL MEDICINE

## 2025-04-05 PROCEDURE — 25000003 PHARM REV CODE 250: Performed by: PODIATRIST

## 2025-04-05 PROCEDURE — 97597 DBRDMT OPN WND 1ST 20 CM/<: CPT

## 2025-04-05 RX ORDER — METRONIDAZOLE 500 MG/1
500 TABLET ORAL EVERY 12 HOURS
Status: DISCONTINUED | OUTPATIENT
Start: 2025-04-05 | End: 2025-04-15 | Stop reason: HOSPADM

## 2025-04-05 RX ADMIN — MUPIROCIN: 20 OINTMENT TOPICAL at 09:04

## 2025-04-05 RX ADMIN — CARVEDILOL 25 MG: 12.5 TABLET, FILM COATED ORAL at 09:04

## 2025-04-05 RX ADMIN — SEVELAMER CARBONATE 800 MG: 800 TABLET, FILM COATED ORAL at 08:04

## 2025-04-05 RX ADMIN — OXYCODONE AND ACETAMINOPHEN 1 TABLET: 10; 325 TABLET ORAL at 09:04

## 2025-04-05 RX ADMIN — CARVEDILOL 25 MG: 12.5 TABLET, FILM COATED ORAL at 08:04

## 2025-04-05 RX ADMIN — SEVELAMER CARBONATE 800 MG: 800 TABLET, FILM COATED ORAL at 04:04

## 2025-04-05 RX ADMIN — CEFEPIME 1 G: 1 INJECTION, POWDER, FOR SOLUTION INTRAMUSCULAR; INTRAVENOUS at 02:04

## 2025-04-05 RX ADMIN — Medication 6 MG: at 09:04

## 2025-04-05 RX ADMIN — METRONIDAZOLE 500 MG: 500 INJECTION, SOLUTION INTRAVENOUS at 06:04

## 2025-04-05 RX ADMIN — MUPIROCIN: 20 OINTMENT TOPICAL at 08:04

## 2025-04-05 RX ADMIN — METRONIDAZOLE 500 MG: 500 TABLET ORAL at 05:04

## 2025-04-05 RX ADMIN — SEVELAMER CARBONATE 800 MG: 800 TABLET, FILM COATED ORAL at 12:04

## 2025-04-05 NOTE — ASSESSMENT & PLAN NOTE
Hyperkalemia is likely due to ESRD.The patients most recent potassium results are listed below.  Recent Labs     04/03/25  0618 04/04/25  0745   K 4.7 5.0   He got some doses of [facility]  Nephrology consulted for HD.  Hyperkalemia has improved following hemodialysis

## 2025-04-05 NOTE — PLAN OF CARE
Anxiety start zoloft.   See me for fup 1 month  Using anti histamine nasal spray and add mucinex.  Update fasting labs.  Discussed seeing counselor.   colonosocopy ordered  Ear exam normal today.   ID consults    Chart reviewed.   See OPAT note from 4/4/25 for outpatient antibiotic plan.  Will arrange f/u in ID clinic.    ID will sign off for now.    Neeru Brand MD  Infectious Disease

## 2025-04-05 NOTE — PROGRESS NOTES
[facility] Medicine  Progress Note    Patient Name: Emmanuel Morgan  MRN: [id number]  Patient Class: IP- Inpatient   Admission Date: 3/31/2025  Length of Stay: 5 days  Attending Physician: Alirio Yin MD  Primary Care Provider: Laurence, Primary Doctor        Subjective     Principal Problem:Infected ulcer of skin        HPI:  39-year-old male with ESRD on HD, diabetes not on insulin, prior osteomyelitis requiring right midfoot amputation with a long standing left foot ulcer (months) and a right foot ulcer of his stump which has been present for weeks who was sent to the ER by home health due to progression of wounds with skin and soft tissue infections of possibly both ulcers and possible osteomyelitis, he has been followed up by outpatient wound care agency, and was sent here by home health nurse due to nonhealing of the wound, noted with worsening erythema and purulent discharge.  Missed hemodialysis session on the day of presentation due to presentation in the emergency room.    Overview/Hospital Course:  39-year-old male with ESRD on HD, DM, prior osteomyelitis requiring right midfoot amputation with a long standing left foot ulcer (months) and a right foot ulcer of his stump which has been present for weeks who was sent to the ER by home health due to progression of wounds with skin and soft tissue infections of possibly both ulcers and possible osteomyelitis of either.  MRI of left foot showing plantar soft tissue ulcer at the level of the 3rd MTP joint with associated osteomyelitis of the distal half of the metatarsal and base of the proximal phalanx.  On ABX's and Podiatry consulted. Patient underwent irrigation and debridement of bilateral LE's.  Currently  is coordinating antibiotics with HD unit.    Interval History:  Patient seen during wound care, had no new complaint, denied fever, chills or rigors, said no to chest pain, shortness a breath, orthopnea or PND.  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      Review of Systems   Constitutional:  Positive for fatigue. Negative for appetite change, chills and diaphoresis.   HENT:  Negative for congestion, sore throat and tinnitus.    Eyes:  Negative for pain, discharge and itching.   Respiratory:  Negative for cough, chest tightness, shortness of breath and wheezing.    Cardiovascular:  Positive for leg swelling. Negative for chest pain and palpitations.   Gastrointestinal:  Negative for abdominal distention, abdominal pain, blood in stool, nausea and vomiting.   Endocrine: Negative for cold intolerance, heat intolerance and polydipsia.   Genitourinary:  Negative for difficulty urinating, dysuria, flank pain, frequency and hematuria.   Musculoskeletal:  Positive for gait problem, joint swelling and myalgias. Negative for arthralgias, back pain and neck stiffness.   Skin:  Positive for color change and wound (s/p chopart's foot surgery).   Neurological:  Negative for dizziness, seizures, facial asymmetry, light-headedness, numbness and headaches.   Psychiatric/Behavioral:  Negative for agitation, confusion and hallucinations.      Objective:     Vital Signs (Most Recent):  Temp: 98.7 °F (37.1 °C) (04/04/25 1613)  Pulse: 97 (04/04/25 1613)  Resp: 18 (04/04/25 1635)  BP: 125/70 (04/04/25 1613)  SpO2: 98 % (04/04/25 1613) Vital Signs (24h Range):  Temp:  [97.7 °F (36.5 °C)-99.4 °F (37.4 °C)] 98.7 °F (37.1 °C)  Pulse:  [78-97] 97  Resp:  [18] 18  SpO2:  [96 %-98 %] 98 %  BP: (113-136)/(70-83) 125/70     Weight: 104.3 kg (229 lb 15 oz)  Body mass index is 29.52 kg/m².    Intake/Output Summary (Last 24 hours) at 4/4/2025 1659  Last data filed at 4/4/2025 1629  Gross per 24 hour   Intake 1040.13 ml   Output --   Net 1040.13 ml         Physical Exam  Vitals and nursing note reviewed.   Constitutional:       General: He is not in acute distress.     Appearance: Normal appearance. He is not  toxic-appearing.   HENT:      Head: Normocephalic and atraumatic.      Nose: Nose normal. No congestion or rhinorrhea.      Mouth/Throat:      Mouth: Mucous membranes are moist.   Eyes:      General: No scleral icterus.     Extraocular Movements: Extraocular movements intact.      Conjunctiva/sclera: Conjunctivae normal.      Pupils: Pupils are equal, round, and reactive to light.   Cardiovascular:      Rate and Rhythm: Normal rate and regular rhythm.      Pulses: Normal pulses.      Heart sounds: Normal heart sounds.   Pulmonary:      Effort: Pulmonary effort is normal.      Breath sounds: Normal breath sounds.   Abdominal:      General: Abdomen is flat. Bowel sounds are normal. There is no distension.      Palpations: Abdomen is soft.      Tenderness: There is no abdominal tenderness. There is no right CVA tenderness, left CVA tenderness, guarding or rebound.   Musculoskeletal:         General: Deformity (s/p right chopart's surgery) and signs of injury present. No swelling.      Cervical back: Neck supple. No tenderness.   Skin:     General: Skin is warm and dry.      Coloration: Skin is not jaundiced or pale.      Findings: No rash.   Neurological:      General: No focal deficit present.      Mental Status: He is alert and oriented to person, place, and time. Mental status is at baseline.      Cranial Nerves: No cranial nerve deficit.      Sensory: No sensory deficit.   Psychiatric:         Mood and Affect: Mood normal.         Behavior: Behavior normal.         Thought Content: Thought content normal.         Judgment: Judgment normal.               Significant Labs:   CBC:   Recent Labs   Lab 04/04/25  0745   WBC 16.00*   HGB 11.1*   HCT 35.7*        CMP:   Recent Labs   Lab 04/04/25  0745   *   K 5.0      CO2 20*   BUN 53*   CREATININE 15.1*   CALCIUM 9.8   ALBUMIN 2.8*   ANIONGAP 11     Significant Imaging: I have reviewed all pertinent imaging results/findings within the past 24  hours.  Imaging Results               MRI Hindfoot WO Contrast LT (Final result)  Result time 04/01/25 07:47:56      Final result by Seamus Hays MD (04/01/25 07:47:56)                   Impression:      1. Plantar soft tissue ulcer at the level of the 3rd MTP joint with associated osteomyelitis of the distal half of the metatarsal and base of the proximal phalanx.  No abscess.  2. Sequela of chronic osteomyelitis involving the 1st, 2nd and 5th metatarsals and proximal phalanges.  3. Postoperative change of transmetatarsal amputation of the 4th digit.  This report was flagged in Epic as abnormal.      Electronically signed by: Seamus Hays MD  Date:    04/01/2025  Time:    07:47               Narrative:    EXAMINATION:  MRI HINDFOOT WO CONTRAST LT; MRI FOREFOOT WO CONTRAST LT    CLINICAL HISTORY:  b/l ulcers concern for osteo;; b/l ulcers. Concern for osteo;    TECHNIQUE:  Routine MRI evaluation of the left ankle and forefoot performed without contrast.    COMPARISON:  Radiographs 09/01/2024.    FINDINGS:  Examination degraded by patient motion artifact.    BONES: Postsurgical change of transmetatarsal amputation of the 4th digit.  Osseous erosive changes of the 3rd metatarsal head and proximal phalanx base with associated confluent marrow edema involving the distal half of the metatarsal and proximal half of the proximal phalanx.  There is corresponding T1 medullary hypointensity throughout the distal half of the metatarsal and base of the proximal phalanx.  Chronic osseous erosive changes of the 1st, 2nd and 5th metatarsal heads and proximal phalanges with grossly preserved marrow signal intensity.  Solid periosteal reaction throughout the 2nd metatarsal shaft.  Linear area of edema signal at the posterior subchondral region of the tibial plafond, favored to be degenerative in nature.  No definite acute fractures.  Remote healed fracture of the lateral malleolus.  No avascular necrosis.  No marrow  infiltrative process.    JOINTS: 3rd MTP complex joint effusion with surrounding synovitis.  No dislocation.    LIGAMENTS: Chronic sprains of the anterior talofibular, superficial deltoid and deep deltoid ligaments.  Lisfranc ligament appears intact.    TENDONS: Ulceration and disruption of the 3rd flexor tendon at the level of the MTP joint.  Chronic ulceration of the 1st, 2nd and 5th flexor tendons at the level of the MTP joints.  Postoperative changes of the 4th flexor tendon.  Remaining tendons appear grossly intact.    MUSCLES: Edema and severe fatty degeneration of the visualized musculature.  No intramuscular fluid collection.    MISCELLANEOUS: Plantar soft tissue ulcer at the level of the 3rd MTP joint with adjacent fluid and surrounding subcutaneous edema.  No focal fluid collection.  Probable adventitial bursa at the plantar aspect of the hallux MTP joint.  Focal area of signal hypointensity at the level of the heel pad, possibly sequela of scarring.                                        MRI Forefoot WO Contrast LT (Final result)  Result time 04/01/25 07:47:56   Procedure changed from MRI Foot Toes WO Contrast DANY     Final result by Seamus Hays MD (04/01/25 07:47:56)                   Impression:      1. Plantar soft tissue ulcer at the level of the 3rd MTP joint with associated osteomyelitis of the distal half of the metatarsal and base of the proximal phalanx.  No abscess.  2. Sequela of chronic osteomyelitis involving the 1st, 2nd and 5th metatarsals and proximal phalanges.  3. Postoperative change of transmetatarsal amputation of the 4th digit.  This report was flagged in Epic as abnormal.      Electronically signed by: Seamus Hays MD  Date:    04/01/2025  Time:    07:47               Narrative:    EXAMINATION:  MRI HINDFOOT WO CONTRAST LT; MRI FOREFOOT WO CONTRAST LT    CLINICAL HISTORY:  b/l ulcers concern for osteo;; b/l ulcers. Concern for osteo;    TECHNIQUE:  Routine MRI evaluation  of the left ankle and forefoot performed without contrast.    COMPARISON:  Radiographs 09/01/2024.    FINDINGS:  Examination degraded by patient motion artifact.    BONES: Postsurgical change of transmetatarsal amputation of the 4th digit.  Osseous erosive changes of the 3rd metatarsal head and proximal phalanx base with associated confluent marrow edema involving the distal half of the metatarsal and proximal half of the proximal phalanx.  There is corresponding T1 medullary hypointensity throughout the distal half of the metatarsal and base of the proximal phalanx.  Chronic osseous erosive changes of the 1st, 2nd and 5th metatarsal heads and proximal phalanges with grossly preserved marrow signal intensity.  Solid periosteal reaction throughout the 2nd metatarsal shaft.  Linear area of edema signal at the posterior subchondral region of the tibial plafond, favored to be degenerative in nature.  No definite acute fractures.  Remote healed fracture of the lateral malleolus.  No avascular necrosis.  No marrow infiltrative process.    JOINTS: 3rd MTP complex joint effusion with surrounding synovitis.  No dislocation.    LIGAMENTS: Chronic sprains of the anterior talofibular, superficial deltoid and deep deltoid ligaments.  Lisfranc ligament appears intact.    TENDONS: Ulceration and disruption of the 3rd flexor tendon at the level of the MTP joint.  Chronic ulceration of the 1st, 2nd and 5th flexor tendons at the level of the MTP joints.  Postoperative changes of the 4th flexor tendon.  Remaining tendons appear grossly intact.    MUSCLES: Edema and severe fatty degeneration of the visualized musculature.  No intramuscular fluid collection.    MISCELLANEOUS: Plantar soft tissue ulcer at the level of the 3rd MTP joint with adjacent fluid and surrounding subcutaneous edema.  No focal fluid collection.  Probable adventitial bursa at the plantar aspect of the hallux MTP joint.  Focal area of signal hypointensity at the  level of the heel pad, possibly sequela of scarring.                                       MRI Hindfoot WO Contrast RT (Final result)  Result time 04/01/25 07:19:00      Final result by Seamus Hays MD (04/01/25 07:19:00)                   Impression:      1. Plantar soft tissue ulcer at the level of the calcaneocuboid joint with surrounding subcutaneous edema and adjacent blistering.  No osteomyelitis.  No abscess.  2. Advanced neuropathic arthropathy of the tibiotalar, subtalar and residual midtarsal joints.      Electronically signed by: Seamus Hays MD  Date:    04/01/2025  Time:    07:19               Narrative:    EXAMINATION:  MRI HINDFOOT WO CONTRAST RT    CLINICAL HISTORY:  Foot swelling, diabetic, osteomyelitis suspected, xray done;    TECHNIQUE:  Routine MRI evaluation of the right ankle performed without contrast.    COMPARISON:  Radiographs 03/31/2025.    FINDINGS:  BONES/JOINTS: Postsurgical changes of midfoot amputation.  Chronic osseous erosive changes centered about the tibiotalar, subtalar and residual midtarsal joints, likely sequela of chronic neuropathic arthropathy.  Intraosseous cyst at the lateral aspect of the distal tibia.  Circumferential solid periosteal reaction about the distal tibia and distal fibula.  No marrow signal abnormality to suggest an infiltrative process.  No significant joint effusion.  No dislocation.    TENDONS: Achilles tendon demonstrates normal morphology and signal intensity.  Residual posterior tibial, flexor digitorum longus, flexor hallucis longus, peroneus longus, peroneus brevis and extensor tendons are intact.    MUSCLES: Edema and fatty degeneration of the visualized musculature.  No intramuscular fluid collection.    MISCELLANEOUS: Plantar soft tissue ulcer at the level of the calcaneocuboid joint with involvement of the plantar aponeurosis, surrounding subcutaneous edema and adjacent blistering.  No fluid collection.                                        US Lower Extremity Arteries Right (Final result)  Result time 03/31/25 21:32:36      Final result by Michelle Leon MD (03/31/25 21:32:36)                   Impression:      Normal triphasic waveforms throughout the right lower extremity.  No occlusion or stenosis detected.      Electronically signed by: Michelle Leon  Date:    03/31/2025  Time:    21:32               Narrative:    EXAMINATION:  US LOWER EXTREMITY ARTERIES RIGHT    CLINICAL HISTORY:  Unspecified open wound, right foot, subsequent encounter    TECHNIQUE:  Duplex and color flow Doppler evaluation and graded compression of the right lower extremity arteries was performed.    COMPARISON:  None    FINDINGS:  Peak systolic velocities in the right lower extremity arteries (cm/sec):    CFA: 127, triphasic    DFA: 74, triphasic    Proximal SFA: 103, triphasic    Mid SFA: 120, triphasic    Distal SFA: 109, triphasic    Proximal pop: 84, triphasic    Distal pop: 98, triphasic    LAUREL: 80, triphasic    PTA: 86, triphasic    Peroneal: 111, triphasic    DPA: 75, triphasic                                       X-Ray Foot Complete Right (Final result)  Result time 03/31/25 13:14:02      Final result by Alli Henriquez MD (03/31/25 13:14:02)                   Impression:      Please see above.      Electronically signed by: Alli Henriquez  Date:    03/31/2025  Time:    13:14               Narrative:    EXAMINATION:  XR FOOT COMPLETE 3 VIEW RIGHT    CLINICAL HISTORY:  . Pain in unspecified foot    TECHNIQUE:  AP, lateral, and oblique views of the right foot were performed.    COMPARISON:  None.    FINDINGS:  Extensive postoperative change including amputation to the level of the midfoot structures.  Remainder of the visualized osseous structures of the mid and hindfoot demonstrate diffuse degenerative change in osteopenia with surrounding diffuse soft tissue swelling.  Additional diffuse osseous destruction and degenerative change about the partially  visualized ankle.  Scattered vascular calcification as well as plantar calcaneal spur.  No obvious significant osseous erosive change, noting limitations from postoperative change and surrounding soft tissue swelling.  If there is continued clinical concern for osteomyelitis, consider MRI of the foot for further evaluation.                                       X-Ray Chest AP Portable (Final result)  Result time 03/31/25 13:15:24      Final result by Alli Henriquez MD (03/31/25 13:15:24)                   Impression:      Please see above.      Electronically signed by: Alli Henriquez  Date:    03/31/2025  Time:    13:15               Narrative:    EXAMINATION:  XR CHEST AP PORTABLE    CLINICAL HISTORY:  Chronic cough    TECHNIQUE:  Single frontal view of the chest was performed.    COMPARISON:  Chest radiograph 09/01/2024    FINDINGS:  Left-sided central venous catheter with tip overlying the in expected region of the cavoatrial junction.  Cardiomediastinal silhouette is unchanged in size.  Patient is rotated, limiting evaluation of the mediastinal structures.    Chronic right effusion with probable superimposed volume loss about the right lung base.  Superimposed infectious or non infectious inflammatory infiltrate cannot be excluded.  Remainder of the aerated portions of the lungs are clear.  No pneumothorax.    Osseous structures demonstrate no evidence for acute fracture or osseous destructive lesion.  Soft tissues are unremarkable.                                          Assessment & Plan  Infected ulcer of skin  Patient presents with ulcers to left foot and right foot stump.  MRI ordered.  No evidence of osteo on right foot stump.  Left foot MRI showing plantar soft tissue ulcer at the level of the 3rd MTP joint with associated osteomyelitis of the distal half of the metatarsal and base of the proximal phalanx.   Started on ABX's (Cefepime and metronidazole-Day 2); vancomycin discontinued yesterday  Wound  cultures positive for Proteus mirabilis  S/p Underwent bilateral irrigation and debridement by Podiatry POD 3  ID following; input appreciated  Blood culture has been negative so far.        End stage renal disease  Creatine stable for now. BMP reviewed- noted Estimated Creatinine Clearance: 8.4 mL/min (A) (based on SCr of 15.1 mg/dL (H)). according to latest data. Based on current GFR, CKD stage is end stage.  Monitor UOP and serial BMP and adjust therapy as needed. Renally dose meds. Avoid nephrotoxic medications and procedures.  Nephrology consulted for HD.  Continue Monday, Wednesday, and Friday hemodialysis schedule.    Essential hypertension  Patient's blood pressure range in the last 24 hours was: BP  Min: 111/69  Max: 146/81.The patient's inpatient anti-hypertensive regimen is listed below:  Current Antihypertensives  , Daily, Oral  carvediloL tablet 25 mg, 2 times daily, Oral  hydrALAZINE tablet 50 mg, 3 times daily, Oral  NIFEdipine 24 hr tablet 90 mg, Daily, Oral    Plan  - BP is controlled, no changes needed to their regimen  - lisinopril held due to hyperkalemia.  Will restart when clinically indicated  Hyperkalemia  Hyperkalemia is likely due to ESRD.The patients most recent potassium results are listed below.  Recent Labs     04/03/25  0618 04/04/25  0745   K 4.7 5.0   He got some doses of [facility]  Nephrology consulted for HD.  Hyperkalemia has improved following hemodialysis          Type 2 diabetes mellitus with foot ulcer  Patient's FSGs are controlled on current medication regimen.  Last A1c reviewed-   Lab Results   Component Value Date    HGBA1C 4.9 08/20/2024     Most recent fingerstick glucose reviewed-   Recent Labs   Lab 04/04/25  1612 04/05/25  0801 04/05/25  1121   POCTGLUCOSE 112* 106 127*     Current correctional scale  Low  Maintain anti-hyperglycemic dose as follows-   Antihyperglycemics (From admission, onward)      Start     Stop Route Frequency Ordered    03/31/25 9537  insulin  aspart U-100 pen 0-5 Units         -- SubQ Before meals & nightly PRN 03/31/25 2218          Hold Oral hypoglycemics while patient is in the hospital.  Foot ulcer with fat layer exposed, unspecified laterality  Podiatry consulted, appreciated recs  Continue current antibiotics, ID consulted.  Continue wound care per podiatrist  Foot infection  See note above    Pyogenic inflammation of bone  Continue antibiotics as described above.    VTE Risk Mitigation (From admission, onward)           Ordered     heparin (porcine) injection 1,000 Units  As needed (PRN)         04/01/25 1827     IP VTE LOW RISK PATIENT  Once         03/31/25 2218     Place sequential compression device  Until discontinued         03/31/25 2218                    Discharge Planning   LULÚ: 4/7/2025     Code Status: Full Code   Medical Readiness for Discharge Date:   Discharge Plan A: Home   Discharge Delays: None known at this time                    Alirio Yin MD  Department of Hospital Medicine   [facility]

## 2025-04-05 NOTE — ASSESSMENT & PLAN NOTE
Creatine stable for now. BMP reviewed- noted Estimated Creatinine Clearance: 8.4 mL/min (A) (based on SCr of 15.1 mg/dL (H)). according to latest data. Based on current GFR, CKD stage is end stage.  Monitor UOP and serial BMP and adjust therapy as needed. Renally dose meds. Avoid nephrotoxic medications and procedures.  Nephrology consulted for HD.  Continue Monday, Wednesday, and Friday hemodialysis schedule.

## 2025-04-05 NOTE — ASSESSMENT & PLAN NOTE
Bilateral foot ulcers in the setting of DM, ESRD, chronic OM L foot. Imaging negative for acute osseous changes or abscess formation bilaterally. Initially infected R foot plantar wound appears improved from previous, L foot plantar wound unchanged from previous.    - sharp excisional debridement of bilateral foot wounds performed at bedside without incident, to the level of subcutaneous tissue, with healthy bleeding observed and controlled with pressure  - bilateral wounds irrigated with Vashe  - bilateral wounds dressed with Vashe soaked gauze, dry gauze, ABD (right only), kerlix, and coban, moderate compression   - continue previously placed wound care orders   - physical therapy arrived at the end of patient encounter today, with plans to apply wound vac to R foot wound  - partial weight bearing RLE with emphasis to heel touch in CAM boot for short distances and transfers only  - abx management per primary / ID  - glucose management per primary / endocrine  - podiatry will follow

## 2025-04-05 NOTE — NURSING
[facility] [place]  Nurses Note -- 4 Eyes      4/5/2025       Skin assessed on: Q Shift  Pt is lying in bed on RA, NAD noted at this time, will continue to monitor    [x] No Pressure Injuries Present    []Prevention Measures Documented    [] Yes LDA  for Pressure Injury Previously documented     [] Yes New Pressure Injury Discovered   [] LDA for New Pressure Injury Added      Attending RN:  Renee Murphy LPN     Second RN:  ALPHONSE Rodriguez

## 2025-04-05 NOTE — ASSESSMENT & PLAN NOTE
Patient's blood pressure range in the last 24 hours was: BP  Min: 111/69  Max: 146/81.The patient's inpatient anti-hypertensive regimen is listed below:  Current Antihypertensives  , Daily, Oral  carvediloL tablet 25 mg, 2 times daily, Oral  hydrALAZINE tablet 50 mg, 3 times daily, Oral  NIFEdipine 24 hr tablet 90 mg, Daily, Oral    Plan  - BP is controlled, no changes needed to their regimen  - lisinopril held due to hyperkalemia.  Will restart when clinically indicated

## 2025-04-05 NOTE — PT/OT/SLP PROGRESS
Rehab Services Wound Care Evaluation    Emmanuel Morgan  [id number]  4/5/2025     Diagnosis:    M.D note on 4/4/25  History of current illness and wound.  Patient is a 39 y.o. male  has a past medical history of Diabetes mellitus, Hypertension, and Renal disorder.  Consult to Podiatry for evaluation and treatment of bilateral foot wounds, most significant to the right Lisfranc stump.  He relates that the left foot wound is chronic in nature and the ulceration to the right foot occurred several weeks ago likely secondary to trauma versus ambulation is inappropriate shoe.  All foot surgeries surgeries done in outside facilities.  He presented to the emergency department on the advice of his home health nurse due to progression of wounds and suspicion of osteomyelitis.       4/2/25: S/p one day I&D B/L per Dr. Faria. Bandage intact no strikethrough noted.     04/04/2025 patient seen at bedside resting comfortably.  By the end of our encounter, his nephew was also present at bedside.  Patient relates that he has occasional discomfort to the right foot especially in the medial midfoot with palpation or any pressure.  He denies any pain to the left foot.  He denies nausea, vomiting, fever, chills.  Patient relates that he is concerned about his chronic but now more persistent cough and would like to discuss this concern further with the primary team.  .    Past Medical History:   Diagnosis Date    Diabetes mellitus     Hypertension     Renal disorder         Social History[1]    Precautions: Standard    Allergies as of 03/31/2025    (No Known Allergies)        Pre-medication: distractions    Subjective: Pt denies any pain today. Pt states Doctor just changed and clean his wound.     Patient reports pain level in wound: 0/10    Objective:   (Picture upload on 4/5/25 by Dr. Cristobal Argueta at 10:18 am)     Treatment:    Clean technique maintained throughout treatment.    Use Pulse Vac to clean wound. After cleaning, wound was  wraps.     Assessment/Need for Treatment:  PT orders ([place] VAC to right foot ulcer for wound bed prep)  Patient tolerated today's treatment without any adverse effects. Dr. Cristobal Argueta just had clean and change the dressing upon PT arrival. Wound looks clean with no drainage or maceration, no malodor, no pain. Nurse was notified.   Patient with diagnosis of R foot will benefit from skilled Rehab Services Wound Care to maximize wound healing potential and to assist wound to heal through appropriate healing phases.  Problems include diabetes    Goals:  Timeframe:  The following goals were discussed with the patient and He agrees.        Plan:  Frequency of treatment at this time will be daily.           [1]   Social History  Socioeconomic History    Marital status: Unknown   Tobacco Use    Smoking status: Never    Smokeless tobacco: Never   Substance and Sexual Activity    Alcohol use: Never    Drug use: Never     Social Drivers of Health     Financial Resource Strain: Low Risk  (4/1/2025)    Overall Financial Resource Strain (CARDIA)     Difficulty of Paying Living Expenses: Not hard at all   Food Insecurity: No Food Insecurity (4/1/2025)    Hunger Vital Sign     Worried About Running Out of Food in the Last Year: Never true     Ran Out of Food in the Last Year: Never true   Transportation Needs: No Transportation Needs (4/1/2025)    PRAPARE - Transportation     Lack of Transportation (Medical): No     Lack of Transportation (Non-Medical): No   Physical Activity: Sufficiently Active (9/1/2024)    Exercise Vital Sign     Days of Exercise per Week: 3 days     Minutes of Exercise per Session: 60 min   Stress: No Stress Concern Present (4/1/2025)    [employer] - Occupational Stress Questionnaire     Feeling of Stress : Not at all   Housing Stability: Low Risk  (4/1/2025)    Housing Stability Vital Sign     Unable to Pay for Housing in the Last Year: No     Number of Times Moved in the Last  Year: 0     Homeless in the Last Year: No

## 2025-04-05 NOTE — ASSESSMENT & PLAN NOTE
Patient's FSGs are controlled on current medication regimen.  Last A1c reviewed-   Lab Results   Component Value Date    HGBA1C 4.9 08/20/2024     Most recent fingerstick glucose reviewed-   Recent Labs   Lab 04/04/25  1612 04/05/25  0801 04/05/25  1121   POCTGLUCOSE 112* 106 127*     Current correctional scale  Low  Maintain anti-hyperglycemic dose as follows-   Antihyperglycemics (From admission, onward)      Start     Stop Route Frequency Ordered    03/31/25 2317  insulin aspart U-100 pen 0-5 Units         -- SubQ Before meals & nightly PRN 03/31/25 2218          Hold Oral hypoglycemics while patient is in the hospital.

## 2025-04-05 NOTE — PROGRESS NOTES
[facility] - Telemetry  Podiatry  Progress Note    Patient Name: Emmanuel Morgan  MRN: [id number]  Admission Date: 3/31/2025  Hospital Length of Stay: 5 days  Attending Physician: Alirio Yin MD  Primary Care Provider: Laurence, Primary Doctor     Subjective:     Interval History: NAEON, VSS, AF, no morning labs collected today, pt endorses continued cough, denies any f/n/v/cp/sob, denies any new pedal complaints.    Follow-up For: Procedure(s) (LRB):  IRRIGATION AND DEBRIDEMENT (Bilateral)    Post-Operative Day: 4 Days Post-Op    Scheduled Meds:   carvediloL  25 mg Oral BID    ceFEPime IV (PEDS and ADULTS)  1 g Intravenous Daily    hydrALAZINE  50 mg Oral TID    metroNIDAZOLE IV (PEDS and ADULTS)  500 mg Intravenous Q8H    mupirocin   Nasal BID    NIFEdipine  90 mg Oral Daily    sevelamer carbonate  800 mg Oral TID WM     Continuous Infusions:  PRN Meds:  Current Facility-Administered Medications:     acetaminophen, 650 mg, Oral, Q4H PRN    aluminum-magnesium hydroxide-simethicone, 30 mL, Oral, QID PRN    benzonatate, 100 mg, Oral, TID PRN    dextromethorphan-guaiFENesin  mg/5 ml, 5 mL, Oral, Q6H PRN    dextrose 50%, 12.5 g, Intravenous, PRN    dextrose 50%, 25 g, Intravenous, PRN    diphenhydrAMINE, 25 mg, Oral, Q6H PRN    glucagon (human recombinant), 1 mg, Intramuscular, PRN    glucose, 16 g, Oral, PRN    glucose, 24 g, Oral, PRN    heparin (porcine), 1,000 Units, Intra-Catheter, PRN    insulin aspart U-100, 0-5 Units, Subcutaneous, QID (AC + HS) PRN    melatonin, 6 mg, Oral, Nightly PRN    morphine, 4 mg, Intravenous, Q4H PRN    naloxone, 0.02 mg, Intravenous, PRN    ondansetron, 4 mg, Intravenous, Q6H PRN    oxyCODONE-acetaminophen, 1 tablet, Oral, Q4H PRN    senna-docusate, 1 tablet, Oral, BID PRN    sodium chloride 0.9%, 250 mL, Intravenous, PRN    Review of Systems   Constitutional: Negative.    HENT: Negative.     Respiratory:  Positive for cough.    Cardiovascular: Negative.    Gastrointestinal:  Negative.    Musculoskeletal: Negative.    Skin:  Positive for wound.   Neurological:  Positive for numbness.   Psychiatric/Behavioral: Negative.       Objective:     Vital Signs (Most Recent):  Temp: 97.5 °F (36.4 °C) (04/05/25 0801)  Pulse: 83 (04/05/25 0801)  Resp: 18 (04/05/25 0801)  BP: 115/74 (04/05/25 0801)  SpO2: 97 % (04/05/25 0801) Vital Signs (24h Range):  Temp:  [97.5 °F (36.4 °C)-99.6 °F (37.6 °C)] 97.5 °F (36.4 °C)  Pulse:  [71-97] 83  Resp:  [17-20] 18  SpO2:  [97 %-98 %] 97 %  BP: (115-146)/(61-81) 115/74     Weight: 104.3 kg (229 lb 15 oz)  Body mass index is 29.52 kg/m².    Foot Exam    General  Orientation: alert and oriented to person, place, and time   Affect: appropriate       Right Foot/Ankle     Inspection and Palpation  Tenderness: none   Swelling: (dariel wound)  Skin Exam: drainage, cellulitis and ulcer; no blister and no maceration     Neurovascular  Dorsalis pedis: 2+  Posterior tibial: 2+  Saphenous nerve sensation: diminished  Tibial nerve sensation: diminished  Superficial peroneal nerve sensation: diminished  Deep peroneal nerve sensation: diminished  Sural nerve sensation: diminished    Tests  Lisa's Sign: negative  Location:  Right foot    Location:  Right Midfoot         Length (cm):  8       Width (cm):  3.7       Depth (cm):  0.9   Comments  Plantar wound stable from previous, sloughing and fibrotic to granular wound bed, mild serous drainage, no purulence on expression, no maceration, no malodor, no pain or crepitus but mild fluctuance of dariel wound on palpation, bleeding on debridement.    Left Foot/Ankle      Inspection and Palpation  Tenderness: none   Swelling: none   Skin Exam: ulcer; no blister, no drainage, no maceration and no cellulitis     Neurovascular  Dorsalis pedis: 2+  Posterior tibial: 2+  Saphenous nerve sensation: diminished  Tibial nerve sensation: diminished  Superficial peroneal nerve sensation: diminished  Deep peroneal nerve sensation: diminished  Sural  "nerve sensation: diminished    Tests  Lisa's sign: negative    Location:  Left foot    Location:  Left 3rd Metatarsal Head    Location:  Left 3rd Metatarsal Head       Length (cm):  2.1       Area (sq cm):  2.47       Width (cm):  1.5  Comments  Plantar wound stable from previous, fibrotic to hypergranular wound bed, no drainage or maceration, no malodor, no pain flcutuance or crepitus on palpation, bleeding on debridement.                      Laboratory:  A1C: No results for input(s): "HGBA1C" in the last 4320 hours.  CBC:   Recent Labs   Lab 04/04/25  0745   WBC 16.00*   RBC 3.81*   HGB 11.1*   HCT 35.7*      MCV 94   MCH 29.1   MCHC 31.1*     CMP:   Recent Labs   Lab 04/03/25  0618 04/04/25  0745   CALCIUM 9.4 9.8   ALBUMIN 2.9* 2.8*    135*   K 4.7 5.0   CO2 21* 20*    104   BUN 41* 53*   CREATININE 12.5* 15.1*   ALKPHOS 95  --    ALT 9*  --    AST 12  --    BILITOT 0.3  --      CRP:   Recent Labs   Lab 03/31/25 1827   .3*       Microbiology Results (last 7 days)       Procedure Component Value Units Date/Time    Culture, Anaerobe [[id number]] Collected: 04/01/25 1621    Order Status: Completed Specimen: Biopsy from Foot, Left Updated: 04/05/25 0710     Anaerobe Culture No Anaerobes Isolated    Culture, Anaerobe [[id number]] Collected: 04/01/25 1557    Order Status: Completed Specimen: Wound from Foot, Right Updated: 04/05/25 0710     Anaerobe Culture No Anaerobes Isolated    Aerobic culture [[id number]] Collected: 04/01/25 1621    Order Status: Completed Specimen: Biopsy from Foot, Left Updated: 04/05/25 0604     CULTURE, AEROBIC No Growth    Blood Culture #1 **CANNOT BE ORDERED STAT** [[id number]]  (Normal) Collected: 03/31/25 1827    Order Status: Completed Specimen: Blood from Peripheral, Antecubital, Left Updated: 04/04/25 2001     Blood Culture No Growth After 96 hours    Blood Culture #2 **CANNOT BE ORDERED STAT** [[id number]]  (Normal) Collected: 03/31/25 1845    Order " Status: Completed Specimen: Blood from Peripheral, Hand, Left Updated: 04/04/25 2001     Blood Culture No Growth After 96 hours    Afb Culture Stain [[id number]] Collected: 04/01/25 1621    Order Status: Completed Specimen: Biopsy from Foot, Left Updated: 04/04/25 1542     ACID FAST STAIN  No acid fast bacilli seen    AFB Culture & Smear [[id number]] Collected: 04/01/25 1621    Order Status: Completed Specimen: Biopsy from Foot, Left Updated: 04/04/25 1542     CULTURE, AFB  Culture In Progress    AFB Culture & Smear [[id number]] Collected: 04/01/25 1557    Order Status: Completed Specimen: Wound from Foot, Right Updated: 04/03/25 0805     CULTURE, AFB  Culture In Progress    Aerobic culture [[id number]]  (Abnormal)  (Susceptibility) Collected: 04/01/25 1557    Order Status: Completed Specimen: Wound from Foot, Right Updated: 04/03/25 0539     CULTURE, AEROBIC Moderate Proteus mirabilis    Afb Culture Stain [[id number]] Collected: 04/01/25 1557    Order Status: Resulted Specimen: Wound from Foot, Right Updated: 04/02/25 1614    Gram stain [[id number]] Collected: 04/01/25 1621    Order Status: Completed Specimen: Biopsy from Foot, Left Updated: 04/02/25 1132     GRAM STAIN Rare WBC seen      No organisms seen    Gram stain [[id number]] Collected: 04/01/25 1557    Order Status: Completed Specimen: Wound from Foot, Right Updated: 04/02/25 1127     GRAM STAIN Many WBC seen      Moderate Gram negative diplococci      Few Gram Negative Rods      Rare Gram positive cocci    Fungus culture [[id number]] Collected: 04/01/25 1621    Order Status: Sent Specimen: Biopsy from Foot, Left Updated: 04/01/25 1654    Fungus culture [[id number]] Collected: 04/01/25 1557    Order Status: Sent Specimen: Wound from Foot, Right Updated: 04/01/25 1653            Diagnostic Results:  I have reviewed all pertinent imaging results/findings within the past 24 hours.    Assessment/Plan:     Orthopedic  * Infected ulcer of skin  Bilateral foot  ulcers in the setting of DM, ESRD, chronic OM L foot. Imaging negative for acute osseous changes or abscess formation bilaterally. Initially infected R foot plantar wound appears improved from previous, L foot plantar wound unchanged from previous.    - sharp excisional debridement of bilateral foot wounds performed at bedside without incident, to the level of subcutaneous tissue, with healthy bleeding observed and controlled with pressure  - bilateral wounds irrigated with Vashe  - bilateral wounds dressed with Vashe soaked gauze, dry gauze, ABD (right only), kerlix, and coban, moderate compression   - continue previously placed wound care orders   - physical therapy arrived at the end of patient encounter today, with plans to apply wound vac to R foot wound  - partial weight bearing RLE with emphasis to heel touch in CAM boot for short distances and transfers only  - abx management per primary / ID  - glucose management per primary / endocrine  - podiatry will follow        Cristobal Argueta MD  Podiatry  [facility] - [place] - Telemetry

## 2025-04-05 NOTE — ASSESSMENT & PLAN NOTE
Podiatry consulted, appreciated recs  Continue current antibiotics, ID consulted.  Continue wound care per podiatrist

## 2025-04-05 NOTE — ASSESSMENT & PLAN NOTE
Patient presents with ulcers to left foot and right foot stump.  MRI ordered.  No evidence of osteo on right foot stump.  Left foot MRI showing plantar soft tissue ulcer at the level of the 3rd MTP joint with associated osteomyelitis of the distal half of the metatarsal and base of the proximal phalanx.   Started on ABX's (Cefepime and metronidazole-Day 2); vancomycin discontinued yesterday  Wound cultures positive for Proteus mirabilis  S/p Underwent bilateral irrigation and debridement by Podiatry POD 3  ID following; input appreciated  Blood culture has been negative so far.

## 2025-04-05 NOTE — SUBJECTIVE & OBJECTIVE
Subjective:     Interval History: NAEON, VSS, AF, no morning labs collected today, pt endorses continued cough, denies any f/n/v/cp/sob, denies any new pedal complaints.    Follow-up For: Procedure(s) (LRB):  IRRIGATION AND DEBRIDEMENT (Bilateral)    Post-Operative Day: 4 Days Post-Op    Scheduled Meds:   carvediloL  25 mg Oral BID    ceFEPime IV (PEDS and ADULTS)  1 g Intravenous Daily    hydrALAZINE  50 mg Oral TID    metroNIDAZOLE IV (PEDS and ADULTS)  500 mg Intravenous Q8H    mupirocin   Nasal BID    NIFEdipine  90 mg Oral Daily    sevelamer carbonate  800 mg Oral TID WM     Continuous Infusions:  PRN Meds:  Current Facility-Administered Medications:     acetaminophen, 650 mg, Oral, Q4H PRN    aluminum-magnesium hydroxide-simethicone, 30 mL, Oral, QID PRN    benzonatate, 100 mg, Oral, TID PRN    dextromethorphan-guaiFENesin  mg/5 ml, 5 mL, Oral, Q6H PRN    dextrose 50%, 12.5 g, Intravenous, PRN    dextrose 50%, 25 g, Intravenous, PRN    diphenhydrAMINE, 25 mg, Oral, Q6H PRN    glucagon (human recombinant), 1 mg, Intramuscular, PRN    glucose, 16 g, Oral, PRN    glucose, 24 g, Oral, PRN    heparin (porcine), 1,000 Units, Intra-Catheter, PRN    insulin aspart U-100, 0-5 Units, Subcutaneous, QID (AC + HS) PRN    melatonin, 6 mg, Oral, Nightly PRN    morphine, 4 mg, Intravenous, Q4H PRN    naloxone, 0.02 mg, Intravenous, PRN    ondansetron, 4 mg, Intravenous, Q6H PRN    oxyCODONE-acetaminophen, 1 tablet, Oral, Q4H PRN    senna-docusate, 1 tablet, Oral, BID PRN    sodium chloride 0.9%, 250 mL, Intravenous, PRN    Review of Systems   Constitutional: Negative.    HENT: Negative.     Respiratory:  Positive for cough.    Cardiovascular: Negative.    Gastrointestinal: Negative.    Musculoskeletal: Negative.    Skin:  Positive for wound.   Neurological:  Positive for numbness.   Psychiatric/Behavioral: Negative.       Objective:     Vital Signs (Most Recent):  Temp: 97.5 °F (36.4 °C) (04/05/25 0801)  Pulse: 83  "(04/05/25 0801)  Resp: 18 (04/05/25 0801)  BP: 115/74 (04/05/25 0801)  SpO2: 97 % (04/05/25 0801) Vital Signs (24h Range):  Temp:  [97.5 °F (36.4 °C)-99.6 °F (37.6 °C)] 97.5 °F (36.4 °C)  Pulse:  [71-97] 83  Resp:  [17-20] 18  SpO2:  [97 %-98 %] 97 %  BP: (115-146)/(61-81) 115/74     Weight: 104.3 kg (229 lb 15 oz)  Body mass index is 29.52 kg/m².    Foot Exam    General  Orientation: alert and oriented to person, place, and time   Affect: appropriate       Right Foot/Ankle     Inspection and Palpation  Tenderness: none   Swelling: (dariel wound)  Skin Exam: drainage, cellulitis and ulcer; no blister and no maceration     Neurovascular  Dorsalis pedis: 2+  Posterior tibial: 2+  Saphenous nerve sensation: diminished  Tibial nerve sensation: diminished  Superficial peroneal nerve sensation: diminished  Deep peroneal nerve sensation: diminished  Sural nerve sensation: diminished    Tests  Lisa's Sign: negative    Comments  Plantar wound stable from previous, sloughing and fibrotic to granular wound bed, mild serous drainage, no purulence on expression, no maceration, no malodor, no pain or crepitus but mild fluctuance of dariel wound on palpation, bleeding on debridement.    Left Foot/Ankle      Inspection and Palpation  Tenderness: none   Swelling: none   Skin Exam: ulcer; no blister, no drainage, no maceration and no cellulitis     Neurovascular  Dorsalis pedis: 2+  Posterior tibial: 2+  Saphenous nerve sensation: diminished  Tibial nerve sensation: diminished  Superficial peroneal nerve sensation: diminished  Deep peroneal nerve sensation: diminished  Sural nerve sensation: diminished    Tests  Lisa's sign: negative    Comments  Plantar wound stable from previous, fibrotic to hypergranular wound bed, no drainage or maceration, no malodor, no pain flcutuance or crepitus on palpation, bleeding on debridement.                      Laboratory:  A1C: No results for input(s): "HGBA1C" in the last 4320 hours.  CBC:   Recent " Labs   Lab 04/04/25  0745   WBC 16.00*   RBC 3.81*   HGB 11.1*   HCT 35.7*      MCV 94   MCH 29.1   MCHC 31.1*     CMP:   Recent Labs   Lab 04/03/25  0618 04/04/25  0745   CALCIUM 9.4 9.8   ALBUMIN 2.9* 2.8*    135*   K 4.7 5.0   CO2 21* 20*    104   BUN 41* 53*   CREATININE 12.5* 15.1*   ALKPHOS 95  --    ALT 9*  --    AST 12  --    BILITOT 0.3  --      CRP:   Recent Labs   Lab 03/31/25 1827   .3*       Microbiology Results (last 7 days)       Procedure Component Value Units Date/Time    Culture, Anaerobe [[id number]] Collected: 04/01/25 1621    Order Status: Completed Specimen: Biopsy from Foot, Left Updated: 04/05/25 0710     Anaerobe Culture No Anaerobes Isolated    Culture, Anaerobe [[id number]] Collected: 04/01/25 1557    Order Status: Completed Specimen: Wound from Foot, Right Updated: 04/05/25 0710     Anaerobe Culture No Anaerobes Isolated    Aerobic culture [[id number]] Collected: 04/01/25 1621    Order Status: Completed Specimen: Biopsy from Foot, Left Updated: 04/05/25 0604     CULTURE, AEROBIC No Growth    Blood Culture #1 **CANNOT BE ORDERED STAT** [[id number]]  (Normal) Collected: 03/31/25 1827    Order Status: Completed Specimen: Blood from Peripheral, Antecubital, Left Updated: 04/04/25 2001     Blood Culture No Growth After 96 hours    Blood Culture #2 **CANNOT BE ORDERED STAT** [[id number]]  (Normal) Collected: 03/31/25 1845    Order Status: Completed Specimen: Blood from Peripheral, Hand, Left Updated: 04/04/25 2001     Blood Culture No Growth After 96 hours    Afb Culture Stain [[id number]] Collected: 04/01/25 1621    Order Status: Completed Specimen: Biopsy from Foot, Left Updated: 04/04/25 1542     ACID FAST STAIN  No acid fast bacilli seen    AFB Culture & Smear [[id number]] Collected: 04/01/25 1621    Order Status: Completed Specimen: Biopsy from Foot, Left Updated: 04/04/25 1542     CULTURE, AFB  Culture In Progress    AFB Culture & Smear [[id number]] Collected:  04/01/25 1557    Order Status: Completed Specimen: Wound from Foot, Right Updated: 04/03/25 0805     CULTURE, AFB  Culture In Progress    Aerobic culture [[id number]]  (Abnormal)  (Susceptibility) Collected: 04/01/25 1557    Order Status: Completed Specimen: Wound from Foot, Right Updated: 04/03/25 0539     CULTURE, AEROBIC Moderate Proteus mirabilis    Afb Culture Stain [[id number]] Collected: 04/01/25 1557    Order Status: Resulted Specimen: Wound from Foot, Right Updated: 04/02/25 1614    Gram stain [[id number]] Collected: 04/01/25 1621    Order Status: Completed Specimen: Biopsy from Foot, Left Updated: 04/02/25 1132     GRAM STAIN Rare WBC seen      No organisms seen    Gram stain [[id number]] Collected: 04/01/25 1557    Order Status: Completed Specimen: Wound from Foot, Right Updated: 04/02/25 1127     GRAM STAIN Many WBC seen      Moderate Gram negative diplococci      Few Gram Negative Rods      Rare Gram positive cocci    Fungus culture [[id number]] Collected: 04/01/25 1621    Order Status: Sent Specimen: Biopsy from Foot, Left Updated: 04/01/25 1654    Fungus culture [[id number]] Collected: 04/01/25 1557    Order Status: Sent Specimen: Wound from Foot, Right Updated: 04/01/25 1653            Diagnostic Results:  I have reviewed all pertinent imaging results/findings within the past 24 hours.

## 2025-04-06 LAB
POCT GLUCOSE: 105 MG/DL (ref 70–110)
POCT GLUCOSE: 115 MG/DL (ref 70–110)
POCT GLUCOSE: 118 MG/DL (ref 70–110)
POCT GLUCOSE: 99 MG/DL (ref 70–110)

## 2025-04-06 PROCEDURE — 63600175 PHARM REV CODE 636 W HCPCS: Performed by: HOSPITALIST

## 2025-04-06 PROCEDURE — 25000003 PHARM REV CODE 250: Performed by: INTERNAL MEDICINE

## 2025-04-06 PROCEDURE — 25000003 PHARM REV CODE 250: Performed by: STUDENT IN AN ORGANIZED HEALTH CARE EDUCATION/TRAINING PROGRAM

## 2025-04-06 PROCEDURE — 25000003 PHARM REV CODE 250: Performed by: PODIATRIST

## 2025-04-06 PROCEDURE — 97597 DBRDMT OPN WND 1ST 20 CM/<: CPT | Performed by: PHYSICAL THERAPIST

## 2025-04-06 PROCEDURE — 25000003 PHARM REV CODE 250: Performed by: HOSPITALIST

## 2025-04-06 PROCEDURE — 11000001 HC ACUTE MED/SURG PRIVATE ROOM

## 2025-04-06 RX ORDER — CARVEDILOL 6.25 MG/1
6.25 TABLET ORAL 2 TIMES DAILY
Status: DISCONTINUED | OUTPATIENT
Start: 2025-04-06 | End: 2025-04-15 | Stop reason: HOSPADM

## 2025-04-06 RX ADMIN — NIFEDIPINE 90 MG: 30 TABLET, FILM COATED, EXTENDED RELEASE ORAL at 08:04

## 2025-04-06 RX ADMIN — CEFEPIME 1 G: 1 INJECTION, POWDER, FOR SOLUTION INTRAMUSCULAR; INTRAVENOUS at 02:04

## 2025-04-06 RX ADMIN — CARVEDILOL 6.25 MG: 6.25 TABLET, FILM COATED ORAL at 08:04

## 2025-04-06 RX ADMIN — OXYCODONE AND ACETAMINOPHEN 1 TABLET: 10; 325 TABLET ORAL at 08:04

## 2025-04-06 RX ADMIN — Medication 6 MG: at 08:04

## 2025-04-06 RX ADMIN — SEVELAMER CARBONATE 800 MG: 800 TABLET, FILM COATED ORAL at 11:04

## 2025-04-06 RX ADMIN — METRONIDAZOLE 500 MG: 500 TABLET ORAL at 08:04

## 2025-04-06 RX ADMIN — SEVELAMER CARBONATE 800 MG: 800 TABLET, FILM COATED ORAL at 08:04

## 2025-04-06 RX ADMIN — DIPHENHYDRAMINE HYDROCHLORIDE 25 MG: 25 CAPSULE ORAL at 05:04

## 2025-04-06 RX ADMIN — SEVELAMER CARBONATE 800 MG: 800 TABLET, FILM COATED ORAL at 04:04

## 2025-04-06 NOTE — PLAN OF CARE
Problem: Diabetes Comorbidity  Goal: Blood Glucose Level Within Targeted Range  4/6/2025 0723 by Jennifer Faustin RN  Outcome: Progressing  4/6/2025 0722 by Jennifer Faustin RN  Outcome: Progressing     Problem: Wound  Goal: Optimal Functional Ability  Outcome: Progressing  Goal: Optimal Pain Control and Function  Outcome: Progressing     Problem: Infection  Goal: Absence of Infection Signs and Symptoms  Outcome: Progressing     Problem: Skin Injury Risk Increased  Goal: Skin Health and Integrity  4/6/2025 0723 by Jennifer Faustin RN  Outcome: Progressing  4/6/2025 0722 by Jennifer Faustin RN  Outcome: Progressing     Problem: Pain Acute  Goal: Optimal Pain Control and Function  Outcome: Progressing

## 2025-04-06 NOTE — PLAN OF CARE
Problem: Diabetes Comorbidity  Goal: Blood Glucose Level Within Targeted Range  Outcome: Met  Intervention: Monitor and Manage Glycemia  Flowsheets (Taken 4/6/2025 1644)  Glycemic Management: blood glucose monitored     Problem: Wound  Goal: Optimal Pain Control and Function  Outcome: Met  Intervention: Prevent or Manage Pain  Flowsheets (Taken 4/6/2025 1644)  Sleep/Rest Enhancement:   relaxation techniques promoted   room darkened  Pain Management Interventions:   quiet environment facilitated   relaxation techniques promoted   pillow support provided

## 2025-04-06 NOTE — PROGRESS NOTES
[facility] Medicine  Progress Note    Patient Name: Emmanuel Morgan  MRN: [id number]  Patient Class: IP- Inpatient   Admission Date: 3/31/2025  Length of Stay: 6 days  Attending Physician: Jude Pham MD  Primary Care Provider: Laurence, Primary Doctor        Subjective     Principal Problem:Infected ulcer of skin        HPI:  39-year-old male with ESRD on HD, diabetes not on insulin, prior osteomyelitis requiring right midfoot amputation with a long standing left foot ulcer (months) and a right foot ulcer of his stump which has been present for weeks who was sent to the ER by home health due to progression of wounds with skin and soft tissue infections of possibly both ulcers and possible osteomyelitis, he has been followed up by outpatient wound care agency, and was sent here by home health nurse due to nonhealing of the wound, noted with worsening erythema and purulent discharge.  Missed hemodialysis session on the day of presentation due to presentation in the emergency room.    Overview/Hospital Course:  39-year-old male with ESRD on HD, DM, prior osteomyelitis requiring right midfoot amputation with a long standing left foot ulcer (months) and a right foot ulcer of his stump which has been present for weeks who was sent to the ER by home health due to progression of wounds with skin and soft tissue infections of possibly both ulcers and possible osteomyelitis of either.  MRI of left foot showing plantar soft tissue ulcer at the level of the 3rd MTP joint with associated osteomyelitis of the distal half of the metatarsal and base of the proximal phalanx.  On ABX's and Podiatry consulted. Patient underwent irrigation and debridement of bilateral LE's.  Currently  is coordinating antibiotics with HD unit.    Reduce BP meds with infection.     Interval History:  NAEON.  No new issues.   CC- wound  All questions answered and updates on care given.       ROS:  General: Negative for  fevers   Cardiac: Negative for chest pain   Pulmonary: Negative for wheezing  GI: Negative for abdominal distention      Vitals:    04/05/25 2147 04/05/25 2351 04/06/25 0458 04/06/25 0817   BP:  131/88 (!) 140/91 (!) 143/89   BP Location:    Left arm   Patient Position:    Lying   Pulse:  81 79 77   Resp: 18 18 20 18   Temp:  98 °F (36.7 °C) 97.5 °F (36.4 °C) 98.2 °F (36.8 °C)   TempSrc:  Oral Oral Oral   SpO2:  99% 100% 97%   Weight:       Height:              Body mass index is 29.52 kg/m².      PHYSICAL EXAM:  GENERAL APPEARANCE: alert and cooperative.     HEAD: NC/AT  CARDIAC: There is no cyanosis or pallor.   LUNGS: No apparent wheezing or stridor.  ABDOMEN: Non-distended. No guarding.  MSK: No joint erythema or tenderness.   EXTREMITIES: No significant new deformity or new joint abnormality.   NEUROLOGICAL: CN II-XII grossly intact.   SKIN: No lesions or eruptions.  PSYCHIATRIC: No tangential speech. No Hyperactive features.        Recent Results (from the past 24 hours)   POCT glucose    Collection Time: 04/05/25 11:21 AM   Result Value Ref Range    POCT Glucose 127 (H) 70 - 110 mg/dL   POCT glucose    Collection Time: 04/05/25  3:28 PM   Result Value Ref Range    POCT Glucose 124 (H) 70 - 110 mg/dL       Microbiology Results (last 7 days)       Procedure Component Value Units Date/Time    Blood Culture #1 **CANNOT BE ORDERED STAT** [[id number]]  (Normal) Collected: 03/31/25 1827    Order Status: Completed Specimen: Blood from Peripheral, Antecubital, Left Updated: 04/05/25 2002     Blood Culture No Growth After 5 Days    Blood Culture #2 **CANNOT BE ORDERED STAT** [[id number]]  (Normal) Collected: 03/31/25 1845    Order Status: Completed Specimen: Blood from Peripheral, Hand, Left Updated: 04/05/25 2002     Blood Culture No Growth After 5 Days    Aerobic culture [[id number]] Collected: 04/01/25 1621    Order Status: Completed Specimen: Biopsy from Foot, Left Updated: 04/05/25 1205     CULTURE, AEROBIC No  Growth To Date    Culture, Anaerobe [[id number]] Collected: 04/01/25 1621    Order Status: Completed Specimen: Biopsy from Foot, Left Updated: 04/05/25 0710     Anaerobe Culture No Anaerobes Isolated    Culture, Anaerobe [[id number]] Collected: 04/01/25 1557    Order Status: Completed Specimen: Wound from Foot, Right Updated: 04/05/25 0710     Anaerobe Culture No Anaerobes Isolated    Afb Culture Stain [[id number]] Collected: 04/01/25 1621    Order Status: Completed Specimen: Biopsy from Foot, Left Updated: 04/04/25 1542     ACID FAST STAIN  No acid fast bacilli seen    AFB Culture & Smear [[id number]] Collected: 04/01/25 1621    Order Status: Completed Specimen: Biopsy from Foot, Left Updated: 04/04/25 1542     CULTURE, AFB  Culture In Progress    AFB Culture & Smear [[id number]] Collected: 04/01/25 1557    Order Status: Completed Specimen: Wound from Foot, Right Updated: 04/03/25 0805     CULTURE, AFB  Culture In Progress    Aerobic culture [[id number]]  (Abnormal)  (Susceptibility) Collected: 04/01/25 1557    Order Status: Completed Specimen: Wound from Foot, Right Updated: 04/03/25 0539     CULTURE, AEROBIC Moderate Proteus mirabilis    Afb Culture Stain [[id number]] Collected: 04/01/25 1557    Order Status: Resulted Specimen: Wound from Foot, Right Updated: 04/02/25 1614    Gram stain [[id number]] Collected: 04/01/25 1621    Order Status: Completed Specimen: Biopsy from Foot, Left Updated: 04/02/25 1132     GRAM STAIN Rare WBC seen      No organisms seen    Gram stain [[id number]] Collected: 04/01/25 1557    Order Status: Completed Specimen: Wound from Foot, Right Updated: 04/02/25 1127     GRAM STAIN Many WBC seen      Moderate Gram negative diplococci      Few Gram Negative Rods      Rare Gram positive cocci    Fungus culture [[id number]] Collected: 04/01/25 1621    Order Status: Sent Specimen: Biopsy from Foot, Left Updated: 04/01/25 1654    Fungus culture [[id number]] Collected: 04/01/25 1557    Order  Status: Sent Specimen: Wound from Foot, Right Updated: 04/01/25 1659             Imaging Results               MRI Hindfoot WO Contrast LT (Final result)  Result time 04/01/25 07:47:56      Final result by Seamus Hays MD (04/01/25 07:47:56)                   Impression:      1. Plantar soft tissue ulcer at the level of the 3rd MTP joint with associated osteomyelitis of the distal half of the metatarsal and base of the proximal phalanx.  No abscess.  2. Sequela of chronic osteomyelitis involving the 1st, 2nd and 5th metatarsals and proximal phalanges.  3. Postoperative change of transmetatarsal amputation of the 4th digit.  This report was flagged in Epic as abnormal.      Electronically signed by: Seamus Hays MD  Date:    04/01/2025  Time:    07:47               Narrative:    EXAMINATION:  MRI HINDFOOT WO CONTRAST LT; MRI FOREFOOT WO CONTRAST LT    CLINICAL HISTORY:  b/l ulcers concern for osteo;; b/l ulcers. Concern for osteo;    TECHNIQUE:  Routine MRI evaluation of the left ankle and forefoot performed without contrast.    COMPARISON:  Radiographs 09/01/2024.    FINDINGS:  Examination degraded by patient motion artifact.    BONES: Postsurgical change of transmetatarsal amputation of the 4th digit.  Osseous erosive changes of the 3rd metatarsal head and proximal phalanx base with associated confluent marrow edema involving the distal half of the metatarsal and proximal half of the proximal phalanx.  There is corresponding T1 medullary hypointensity throughout the distal half of the metatarsal and base of the proximal phalanx.  Chronic osseous erosive changes of the 1st, 2nd and 5th metatarsal heads and proximal phalanges with grossly preserved marrow signal intensity.  Solid periosteal reaction throughout the 2nd metatarsal shaft.  Linear area of edema signal at the posterior subchondral region of the tibial plafond, favored to be degenerative in nature.  No definite acute fractures.  Remote healed  fracture of the lateral malleolus.  No avascular necrosis.  No marrow infiltrative process.    JOINTS: 3rd MTP complex joint effusion with surrounding synovitis.  No dislocation.    LIGAMENTS: Chronic sprains of the anterior talofibular, superficial deltoid and deep deltoid ligaments.  Lisfranc ligament appears intact.    TENDONS: Ulceration and disruption of the 3rd flexor tendon at the level of the MTP joint.  Chronic ulceration of the 1st, 2nd and 5th flexor tendons at the level of the MTP joints.  Postoperative changes of the 4th flexor tendon.  Remaining tendons appear grossly intact.    MUSCLES: Edema and severe fatty degeneration of the visualized musculature.  No intramuscular fluid collection.    MISCELLANEOUS: Plantar soft tissue ulcer at the level of the 3rd MTP joint with adjacent fluid and surrounding subcutaneous edema.  No focal fluid collection.  Probable adventitial bursa at the plantar aspect of the hallux MTP joint.  Focal area of signal hypointensity at the level of the heel pad, possibly sequela of scarring.                                        MRI Forefoot WO Contrast LT (Final result)  Result time 04/01/25 07:47:56   Procedure changed from MRI Foot Toes WO Contrast DANY     Final result by Seamus Hays MD (04/01/25 07:47:56)                   Impression:      1. Plantar soft tissue ulcer at the level of the 3rd MTP joint with associated osteomyelitis of the distal half of the metatarsal and base of the proximal phalanx.  No abscess.  2. Sequela of chronic osteomyelitis involving the 1st, 2nd and 5th metatarsals and proximal phalanges.  3. Postoperative change of transmetatarsal amputation of the 4th digit.  This report was flagged in Epic as abnormal.      Electronically signed by: Seamus Hays MD  Date:    04/01/2025  Time:    07:47               Narrative:    EXAMINATION:  MRI HINDFOOT WO CONTRAST LT; MRI FOREFOOT WO CONTRAST LT    CLINICAL HISTORY:  b/l ulcers concern for osteo;;  b/l ulcers. Concern for osteo;    TECHNIQUE:  Routine MRI evaluation of the left ankle and forefoot performed without contrast.    COMPARISON:  Radiographs 09/01/2024.    FINDINGS:  Examination degraded by patient motion artifact.    BONES: Postsurgical change of transmetatarsal amputation of the 4th digit.  Osseous erosive changes of the 3rd metatarsal head and proximal phalanx base with associated confluent marrow edema involving the distal half of the metatarsal and proximal half of the proximal phalanx.  There is corresponding T1 medullary hypointensity throughout the distal half of the metatarsal and base of the proximal phalanx.  Chronic osseous erosive changes of the 1st, 2nd and 5th metatarsal heads and proximal phalanges with grossly preserved marrow signal intensity.  Solid periosteal reaction throughout the 2nd metatarsal shaft.  Linear area of edema signal at the posterior subchondral region of the tibial plafond, favored to be degenerative in nature.  No definite acute fractures.  Remote healed fracture of the lateral malleolus.  No avascular necrosis.  No marrow infiltrative process.    JOINTS: 3rd MTP complex joint effusion with surrounding synovitis.  No dislocation.    LIGAMENTS: Chronic sprains of the anterior talofibular, superficial deltoid and deep deltoid ligaments.  Lisfranc ligament appears intact.    TENDONS: Ulceration and disruption of the 3rd flexor tendon at the level of the MTP joint.  Chronic ulceration of the 1st, 2nd and 5th flexor tendons at the level of the MTP joints.  Postoperative changes of the 4th flexor tendon.  Remaining tendons appear grossly intact.    MUSCLES: Edema and severe fatty degeneration of the visualized musculature.  No intramuscular fluid collection.    MISCELLANEOUS: Plantar soft tissue ulcer at the level of the 3rd MTP joint with adjacent fluid and surrounding subcutaneous edema.  No focal fluid collection.  Probable adventitial bursa at the plantar aspect of  the hallux MTP joint.  Focal area of signal hypointensity at the level of the heel pad, possibly sequela of scarring.                                       MRI Hindfoot WO Contrast RT (Final result)  Result time 04/01/25 07:19:00      Final result by Seamus Hays MD (04/01/25 07:19:00)                   Impression:      1. Plantar soft tissue ulcer at the level of the calcaneocuboid joint with surrounding subcutaneous edema and adjacent blistering.  No osteomyelitis.  No abscess.  2. Advanced neuropathic arthropathy of the tibiotalar, subtalar and residual midtarsal joints.      Electronically signed by: Seamus Hays MD  Date:    04/01/2025  Time:    07:19               Narrative:    EXAMINATION:  MRI HINDFOOT WO CONTRAST RT    CLINICAL HISTORY:  Foot swelling, diabetic, osteomyelitis suspected, xray done;    TECHNIQUE:  Routine MRI evaluation of the right ankle performed without contrast.    COMPARISON:  Radiographs 03/31/2025.    FINDINGS:  BONES/JOINTS: Postsurgical changes of midfoot amputation.  Chronic osseous erosive changes centered about the tibiotalar, subtalar and residual midtarsal joints, likely sequela of chronic neuropathic arthropathy.  Intraosseous cyst at the lateral aspect of the distal tibia.  Circumferential solid periosteal reaction about the distal tibia and distal fibula.  No marrow signal abnormality to suggest an infiltrative process.  No significant joint effusion.  No dislocation.    TENDONS: Achilles tendon demonstrates normal morphology and signal intensity.  Residual posterior tibial, flexor digitorum longus, flexor hallucis longus, peroneus longus, peroneus brevis and extensor tendons are intact.    MUSCLES: Edema and fatty degeneration of the visualized musculature.  No intramuscular fluid collection.    MISCELLANEOUS: Plantar soft tissue ulcer at the level of the calcaneocuboid joint with involvement of the plantar aponeurosis, surrounding subcutaneous edema and adjacent  blistering.  No fluid collection.                                       US Lower Extremity Arteries Right (Final result)  Result time 03/31/25 21:32:36      Final result by Michelle Leon MD (03/31/25 21:32:36)                   Impression:      Normal triphasic waveforms throughout the right lower extremity.  No occlusion or stenosis detected.      Electronically signed by: Michelle Leon  Date:    03/31/2025  Time:    21:32               Narrative:    EXAMINATION:  US LOWER EXTREMITY ARTERIES RIGHT    CLINICAL HISTORY:  Unspecified open wound, right foot, subsequent encounter    TECHNIQUE:  Duplex and color flow Doppler evaluation and graded compression of the right lower extremity arteries was performed.    COMPARISON:  None    FINDINGS:  Peak systolic velocities in the right lower extremity arteries (cm/sec):    CFA: 127, triphasic    DFA: 74, triphasic    Proximal SFA: 103, triphasic    Mid SFA: 120, triphasic    Distal SFA: 109, triphasic    Proximal pop: 84, triphasic    Distal pop: 98, triphasic    LAUREL: 80, triphasic    PTA: 86, triphasic    Peroneal: 111, triphasic    DPA: 75, triphasic                                       X-Ray Foot Complete Right (Final result)  Result time 03/31/25 13:14:02      Final result by Alli Henriquez MD (03/31/25 13:14:02)                   Impression:      Please see above.      Electronically signed by: Alli Henriquez  Date:    03/31/2025  Time:    13:14               Narrative:    EXAMINATION:  XR FOOT COMPLETE 3 VIEW RIGHT    CLINICAL HISTORY:  . Pain in unspecified foot    TECHNIQUE:  AP, lateral, and oblique views of the right foot were performed.    COMPARISON:  None.    FINDINGS:  Extensive postoperative change including amputation to the level of the midfoot structures.  Remainder of the visualized osseous structures of the mid and hindfoot demonstrate diffuse degenerative change in osteopenia with surrounding diffuse soft tissue swelling.  Additional diffuse  osseous destruction and degenerative change about the partially visualized ankle.  Scattered vascular calcification as well as plantar calcaneal spur.  No obvious significant osseous erosive change, noting limitations from postoperative change and surrounding soft tissue swelling.  If there is continued clinical concern for osteomyelitis, consider MRI of the foot for further evaluation.                                       X-Ray Chest AP Portable (Final result)  Result time 03/31/25 13:15:24      Final result by Alli Henriquez MD (03/31/25 13:15:24)                   Impression:      Please see above.      Electronically signed by: Alli Henriquez  Date:    03/31/2025  Time:    13:15               Narrative:    EXAMINATION:  XR CHEST AP PORTABLE    CLINICAL HISTORY:  Chronic cough    TECHNIQUE:  Single frontal view of the chest was performed.    COMPARISON:  Chest radiograph 09/01/2024    FINDINGS:  Left-sided central venous catheter with tip overlying the in expected region of the cavoatrial junction.  Cardiomediastinal silhouette is unchanged in size.  Patient is rotated, limiting evaluation of the mediastinal structures.    Chronic right effusion with probable superimposed volume loss about the right lung base.  Superimposed infectious or non infectious inflammatory infiltrate cannot be excluded.  Remainder of the aerated portions of the lungs are clear.  No pneumothorax.    Osseous structures demonstrate no evidence for acute fracture or osseous destructive lesion.  Soft tissues are unremarkable.                                               Assessment & Plan  Infected ulcer of skin  Patient presents with ulcers to left foot and right foot stump.  MRI ordered.  No evidence of osteo on right foot stump.  Left foot MRI showing plantar soft tissue ulcer at the level of the 3rd MTP joint with associated osteomyelitis of the distal half of the metatarsal and base of the proximal phalanx.   Started on ABX's (Cefepime and  metronidazole-Day 2); vancomycin discontinued yesterday  Wound cultures positive for Proteus mirabilis  S/p Underwent bilateral irrigation and debridement by Podiatry POD 3  ID following; input appreciated  Blood culture has been negative so far.        End stage renal disease  Creatine stable for now. BMP reviewed- noted Estimated Creatinine Clearance: 8.4 mL/min (A) (based on SCr of 15.1 mg/dL (H)). according to latest data. Based on current GFR, CKD stage is end stage.  Monitor UOP and serial BMP and adjust therapy as needed. Renally dose meds. Avoid nephrotoxic medications and procedures.  Nephrology consulted for HD.  Continue Monday, Wednesday, and Friday hemodialysis schedule.    Essential hypertension  Patient's blood pressure range in the last 24 hours was: BP  Min: 111/69  Max: 143/89.The patient's inpatient anti-hypertensive regimen is listed below:  Current Antihypertensives  , Daily, Oral  carvediloL tablet 25 mg, 2 times daily, Oral  hydrALAZINE tablet 50 mg, 3 times daily, Oral  NIFEdipine 24 hr tablet 90 mg, Daily, Oral    Plan  - BP is controlled, no changes needed to their regimen  - lisinopril held due to hyperkalemia.  Will restart when clinically indicated  Hyperkalemia  Hyperkalemia is likely due to ESRD.The patients most recent potassium results are listed below.  Recent Labs     04/04/25  0745   K 5.0   He got some doses of [facility]  Nephrology consulted for HD.  Hyperkalemia has improved following hemodialysis          Type 2 diabetes mellitus with foot ulcer  Patient's FSGs are controlled on current medication regimen.  Last A1c reviewed-   Lab Results   Component Value Date    HGBA1C 4.9 08/20/2024     Most recent fingerstick glucose reviewed-   Recent Labs   Lab 04/05/25  1121 04/05/25  1528   POCTGLUCOSE 127* 124*     Current correctional scale  Low  Maintain anti-hyperglycemic dose as follows-   Antihyperglycemics (From admission, onward)      Start     Stop Route Frequency Ordered     03/31/25 2317  insulin aspart U-100 pen 0-5 Units         -- SubQ Before meals & nightly PRN 03/31/25 2218          Hold Oral hypoglycemics while patient is in the hospital.  Foot ulcer with fat layer exposed, unspecified laterality  Podiatry consulted, appreciated recs  Continue current antibiotics, ID consulted.  Continue wound care per podiatrist  Foot infection  See note above    Pyogenic inflammation of bone  Continue antibiotics as described above.    VTE Risk Mitigation (From admission, onward)           Ordered     heparin (porcine) injection 1,000 Units  As needed (PRN)         04/01/25 1827     IP VTE LOW RISK PATIENT  Once         03/31/25 2218     Place sequential compression device  Until discontinued         03/31/25 2218                    Discharge Planning   LULÚ: 4/7/2025     Code Status: Full Code   Medical Readiness for Discharge Date:   Discharge Plan A: Home   Discharge Delays: None known at this time                    Jude Pham MD  Department of Hospital Medicine   [facility] - Telemetry

## 2025-04-06 NOTE — NURSING
[facility] [place]  Nurses Note -- 4 Eyes      4/6/2025       Skin assessed on: Q Shift      [x] No Pressure Injuries Present    [x]Prevention Measures Documented    [] Yes LDA  for Pressure Injury Previously documented     [] Yes New Pressure Injury Discovered   [] LDA for New Pressure Injury Added      Attending RN:  Jennifer Faustin, RN     Second RN:  Renee BARRETT

## 2025-04-06 NOTE — PLAN OF CARE
Weekend discharge planning review completed. No changes to discharge plan today; will follow up on next business day to continue coordination of appropriate discharge services.

## 2025-04-06 NOTE — PROGRESS NOTES
Chart reviewed. VSS.  Labs in AM. HD tomorrow.      Laurie Root MD  [facility]  [phone]

## 2025-04-06 NOTE — ASSESSMENT & PLAN NOTE
Patient's FSGs are controlled on current medication regimen.  Last A1c reviewed-   Lab Results   Component Value Date    HGBA1C 4.9 08/20/2024     Most recent fingerstick glucose reviewed-   Recent Labs   Lab 04/05/25  1121 04/05/25  1528   POCTGLUCOSE 127* 124*     Current correctional scale  Low  Maintain anti-hyperglycemic dose as follows-   Antihyperglycemics (From admission, onward)      Start     Stop Route Frequency Ordered    03/31/25 2317  insulin aspart U-100 pen 0-5 Units         -- SubQ Before meals & nightly PRN 03/31/25 2218          Hold Oral hypoglycemics while patient is in the hospital.

## 2025-04-06 NOTE — PT/OT/SLP PROGRESS
Rehab Services Wound Care Treatment    Emmanuel Morgan  [id number]  4/6/2025     Diagnosis:    M.D note on 4/4/25  History of current illness and wound.  Patient is a 39 y.o. male  has a past medical history of Diabetes mellitus, Hypertension, and Renal disorder.  Consult to Podiatry for evaluation and treatment of bilateral foot wounds, most significant to the right Lisfranc stump.  He relates that the left foot wound is chronic in nature and the ulceration to the right foot occurred several weeks ago likely secondary to trauma versus ambulation is inappropriate shoe.  All foot surgeries surgeries done in outside facilities.  He presented to the emergency department on the advice of his home health nurse due to progression of wounds and suspicion of osteomyelitis.       4/2/25: S/p one day I&D B/L per Dr. Faria. Bandage intact no strikethrough noted.     04/04/2025 patient seen at bedside resting comfortably.  By the end of our encounter, his nephew was also present at bedside.  Patient relates that he has occasional discomfort to the right foot especially in the medial midfoot with palpation or any pressure.  He denies any pain to the left foot.  He denies nausea, vomiting, fever, chills.  Patient relates that he is concerned about his chronic but now more persistent cough and would like to discuss this concern further with the primary team.  .    Past Medical History:   Diagnosis Date    Diabetes mellitus     Hypertension     Renal disorder         Social History[1]    Precautions: Standard    Allergies as of 03/31/2025    (No Known Allergies)        Pre-medication: distractions    Subjective: Pt reports R foot has been feeling good, pain well controlled. Pt has been elevating while in bed    Patient reports pain level in wound: 0/10    Objective:   (Picture upload on 4/5/25 by Dr. Cristobal Argueta at 10:18 am)     Treatment:    Clean technique maintained throughout treatment.    Use Pulse Vac to clean wound. After  cleaning, Vashe soaked 4x4 applied, then dry 4x4, gauze dressing then cast padding applied. Ace wrap taped with today's date.     Assessment/Need for Treatment:  PT orders ([place] VAC to right foot ulcer for wound bed prep)  Patient tolerated today's treatment without any adverse effects. Wound looks clean with no drainage or maceration, no malodor, no pain. Nurse was notified.   Patient with diagnosis of R foot will benefit from skilled Rehab Services Wound Care to maximize wound healing potential and to assist wound to heal through appropriate healing phases.  Problems include diabetes    Goals:  Timeframe:  The following goals were discussed with the patient and He agrees.        Plan:  Frequency of treatment at this time will be daily.         [1]   Social History  Socioeconomic History    Marital status: Unknown   Tobacco Use    Smoking status: Never    Smokeless tobacco: Never   Substance and Sexual Activity    Alcohol use: Never    Drug use: Never     Social Drivers of Health     Financial Resource Strain: Low Risk  (4/1/2025)    Overall Financial Resource Strain (CARDIA)     Difficulty of Paying Living Expenses: Not hard at all   Food Insecurity: No Food Insecurity (4/1/2025)    Hunger Vital Sign     Worried About Running Out of Food in the Last Year: Never true     Ran Out of Food in the Last Year: Never true   Transportation Needs: No Transportation Needs (4/1/2025)    PRAPARE - Transportation     Lack of Transportation (Medical): No     Lack of Transportation (Non-Medical): No   Physical Activity: Sufficiently Active (9/1/2024)    Exercise Vital Sign     Days of Exercise per Week: 3 days     Minutes of Exercise per Session: 60 min   Stress: No Stress Concern Present (4/1/2025)    [employer] - Occupational Stress Questionnaire     Feeling of Stress : Not at all   Housing Stability: Low Risk  (4/1/2025)    Housing Stability Vital Sign     Unable to Pay for Housing in the Last  Year: No     Number of Times Moved in the Last Year: 0     Homeless in the Last Year: No

## 2025-04-06 NOTE — ASSESSMENT & PLAN NOTE
Hyperkalemia is likely due to ESRD.The patients most recent potassium results are listed below.  Recent Labs     04/04/25  0745   K 5.0   He got some doses of [facility]  Nephrology consulted for HD.  Hyperkalemia has improved following hemodialysis

## 2025-04-06 NOTE — ASSESSMENT & PLAN NOTE
Patient's blood pressure range in the last 24 hours was: BP  Min: 111/69  Max: 143/89.The patient's inpatient anti-hypertensive regimen is listed below:  Current Antihypertensives  , Daily, Oral  carvediloL tablet 25 mg, 2 times daily, Oral  hydrALAZINE tablet 50 mg, 3 times daily, Oral  NIFEdipine 24 hr tablet 90 mg, Daily, Oral    Plan  - BP is controlled, no changes needed to their regimen  - lisinopril held due to hyperkalemia.  Will restart when clinically indicated

## 2025-04-07 LAB
ABSOLUTE EOSINOPHIL (OHS): 0.58 K/UL
ABSOLUTE MONOCYTE (OHS): 1.55 K/UL (ref 0.3–1)
ABSOLUTE NEUTROPHIL COUNT (OHS): 5.16 K/UL (ref 1.8–7.7)
ALBUMIN SERPL BCP-MCNC: 2.7 G/DL (ref 3.5–5.2)
ANION GAP (OHS): 12 MMOL/L (ref 8–16)
BACTERIA SPEC AEROBE CULT: NO GROWTH
BASOPHILS # BLD AUTO: 0.06 K/UL
BASOPHILS NFR BLD AUTO: 0.6 %
BUN SERPL-MCNC: 61 MG/DL (ref 6–20)
CALCIUM SERPL-MCNC: 9.6 MG/DL (ref 8.7–10.5)
CHLORIDE SERPL-SCNC: 99 MMOL/L (ref 95–110)
CO2 SERPL-SCNC: 25 MMOL/L (ref 23–29)
CREAT SERPL-MCNC: 15.7 MG/DL (ref 0.5–1.4)
ERYTHROCYTE [DISTWIDTH] IN BLOOD BY AUTOMATED COUNT: 16.5 % (ref 11.5–14.5)
GFR SERPLBLD CREATININE-BSD FMLA CKD-EPI: 4 ML/MIN/1.73/M2
GLUCOSE SERPL-MCNC: 97 MG/DL (ref 70–110)
HCT VFR BLD AUTO: 35.2 % (ref 40–54)
HGB BLD-MCNC: 11 GM/DL (ref 14–18)
IMM GRANULOCYTES # BLD AUTO: 0.42 K/UL (ref 0–0.04)
IMM GRANULOCYTES NFR BLD AUTO: 4 % (ref 0–0.5)
LYMPHOCYTES # BLD AUTO: 2.7 K/UL (ref 1–4.8)
MCH RBC QN AUTO: 29.1 PG (ref 27–31)
MCHC RBC AUTO-ENTMCNC: 31.3 G/DL (ref 32–36)
MCV RBC AUTO: 93 FL (ref 82–98)
NUCLEATED RBC (/100WBC) (OHS): 0 /100 WBC
PHOSPHATE SERPL-MCNC: 3.9 MG/DL (ref 2.7–4.5)
PLATELET # BLD AUTO: 349 K/UL (ref 150–450)
PMV BLD AUTO: 9.4 FL (ref 9.2–12.9)
POCT GLUCOSE: 104 MG/DL (ref 70–110)
POCT GLUCOSE: 117 MG/DL (ref 70–110)
POCT GLUCOSE: 138 MG/DL (ref 70–110)
POCT GLUCOSE: 94 MG/DL (ref 70–110)
POTASSIUM SERPL-SCNC: 5.2 MMOL/L (ref 3.5–5.1)
RBC # BLD AUTO: 3.78 M/UL (ref 4.6–6.2)
RELATIVE EOSINOPHIL (OHS): 5.5 %
RELATIVE LYMPHOCYTE (OHS): 25.8 % (ref 18–48)
RELATIVE MONOCYTE (OHS): 14.8 % (ref 4–15)
RELATIVE NEUTROPHIL (OHS): 49.3 % (ref 38–73)
SODIUM SERPL-SCNC: 136 MMOL/L (ref 136–145)
WBC # BLD AUTO: 10.47 K/UL (ref 3.9–12.7)

## 2025-04-07 PROCEDURE — 11000001 HC ACUTE MED/SURG PRIVATE ROOM

## 2025-04-07 PROCEDURE — 25000003 PHARM REV CODE 250: Performed by: STUDENT IN AN ORGANIZED HEALTH CARE EDUCATION/TRAINING PROGRAM

## 2025-04-07 PROCEDURE — 99232 SBSQ HOSP IP/OBS MODERATE 35: CPT | Mod: ,,, | Performed by: PODIATRIST

## 2025-04-07 PROCEDURE — 63600175 PHARM REV CODE 636 W HCPCS: Performed by: HOSPITALIST

## 2025-04-07 PROCEDURE — 25000003 PHARM REV CODE 250: Performed by: PODIATRIST

## 2025-04-07 PROCEDURE — 97597 DBRDMT OPN WND 1ST 20 CM/<: CPT

## 2025-04-07 PROCEDURE — 36415 COLL VENOUS BLD VENIPUNCTURE: CPT | Performed by: INTERNAL MEDICINE

## 2025-04-07 PROCEDURE — 90935 HEMODIALYSIS ONE EVALUATION: CPT

## 2025-04-07 PROCEDURE — 25000003 PHARM REV CODE 250: Performed by: HOSPITALIST

## 2025-04-07 PROCEDURE — 97598 DBRDMT OPN WND ADDL 20CM/<: CPT

## 2025-04-07 PROCEDURE — 80069 RENAL FUNCTION PANEL: CPT | Performed by: INTERNAL MEDICINE

## 2025-04-07 PROCEDURE — 85025 COMPLETE CBC W/AUTO DIFF WBC: CPT | Performed by: INTERNAL MEDICINE

## 2025-04-07 PROCEDURE — 99232 SBSQ HOSP IP/OBS MODERATE 35: CPT | Mod: ,,, | Performed by: PHYSICIAN ASSISTANT

## 2025-04-07 PROCEDURE — 25000003 PHARM REV CODE 250: Performed by: INTERNAL MEDICINE

## 2025-04-07 PROCEDURE — 63600175 PHARM REV CODE 636 W HCPCS: Performed by: STUDENT IN AN ORGANIZED HEALTH CARE EDUCATION/TRAINING PROGRAM

## 2025-04-07 PROCEDURE — 80100016 HC MAINTENANCE HEMODIALYSIS

## 2025-04-07 RX ORDER — CEFEPIME HYDROCHLORIDE 2 G/1
INJECTION, POWDER, FOR SOLUTION INTRAVENOUS
Start: 2025-04-07 | End: 2025-05-13

## 2025-04-07 RX ORDER — METRONIDAZOLE 500 MG/1
500 TABLET ORAL EVERY 12 HOURS
Qty: 72 TABLET | Refills: 0 | Status: SHIPPED | OUTPATIENT
Start: 2025-04-07 | End: 2025-05-13

## 2025-04-07 RX ADMIN — METRONIDAZOLE 500 MG: 500 TABLET ORAL at 09:04

## 2025-04-07 RX ADMIN — SEVELAMER CARBONATE 800 MG: 800 TABLET, FILM COATED ORAL at 08:04

## 2025-04-07 RX ADMIN — Medication 6 MG: at 09:04

## 2025-04-07 RX ADMIN — HEPARIN SODIUM 1000 UNITS: 1000 INJECTION INTRAVENOUS; SUBCUTANEOUS at 09:04

## 2025-04-07 RX ADMIN — CARVEDILOL 6.25 MG: 6.25 TABLET, FILM COATED ORAL at 09:04

## 2025-04-07 RX ADMIN — OXYCODONE AND ACETAMINOPHEN 1 TABLET: 10; 325 TABLET ORAL at 02:04

## 2025-04-07 RX ADMIN — CEFEPIME 1 G: 1 INJECTION, POWDER, FOR SOLUTION INTRAMUSCULAR; INTRAVENOUS at 05:04

## 2025-04-07 RX ADMIN — MORPHINE SULFATE 4 MG: 4 INJECTION INTRAVENOUS at 05:04

## 2025-04-07 RX ADMIN — SEVELAMER CARBONATE 800 MG: 800 TABLET, FILM COATED ORAL at 05:04

## 2025-04-07 NOTE — SUBJECTIVE & OBJECTIVE
Interval History: NAEON. Pt seen during HD, tolerating well. Normal appetite, no complaints.     Review of patient's allergies indicates:  No Known Allergies  Current Facility-Administered Medications   Medication Frequency    acetaminophen tablet 650 mg Q4H PRN    aluminum-magnesium hydroxide-simethicone 200-200-20 mg/5 mL suspension 30 mL QID PRN    benzonatate capsule 100 mg TID PRN    carvediloL tablet 6.25 mg BID    ceFEPIme injection 1 g Daily    dextromethorphan-guaiFENesin  mg/5 ml liquid 5 mL Q6H PRN    dextrose 50% injection 12.5 g PRN    dextrose 50% injection 25 g PRN    diphenhydrAMINE capsule 25 mg Q6H PRN    glucagon (human recombinant) injection 1 mg PRN    glucose chewable tablet 16 g PRN    glucose chewable tablet 24 g PRN    heparin (porcine) injection 1,000 Units PRN    insulin aspart U-100 pen 0-5 Units QID (AC + HS) PRN    melatonin tablet 6 mg Nightly PRN    metroNIDAZOLE tablet 500 mg Q12H    morphine injection 4 mg Q4H PRN    naloxone 0.4 mg/mL injection 0.02 mg PRN    NIFEdipine 24 hr tablet 90 mg Daily    ondansetron injection 4 mg Q6H PRN    oxyCODONE-acetaminophen  mg per tablet 1 tablet Q4H PRN    senna-docusate 8.6-50 mg per tablet 1 tablet BID PRN    sevelamer carbonate tablet 800 mg TID WM    sodium chloride 0.9% bolus 250 mL 250 mL PRN       Objective:     Vital Signs (Most Recent):  Temp: 97.4 °F (36.3 °C) (04/07/25 0434)  Pulse: 71 (04/07/25 0930)  Resp: 18 (04/07/25 0434)  BP: 131/61 (04/07/25 0930)  SpO2: 98 % (04/07/25 0434) Vital Signs (24h Range):  Temp:  [97.4 °F (36.3 °C)-98.3 °F (36.8 °C)] 97.4 °F (36.3 °C)  Pulse:  [71-79] 71  Resp:  [18] 18  SpO2:  [96 %-99 %] 98 %  BP: (115-131)/(61-78) 131/61     Weight: 104.3 kg (229 lb 15 oz) (04/01/25 2045)  Body mass index is 29.52 kg/m².  Body surface area is 2.33 meters squared.    I/O last 3 completed shifts:  In: 780 [P.O.:780]  Out: -      Physical Exam  Vitals and nursing note reviewed.   Constitutional:        Appearance: Normal appearance.   HENT:      Head: Normocephalic.      Mouth/Throat:      Mouth: Mucous membranes are moist.   Eyes:      Extraocular Movements: Extraocular movements intact.      Conjunctiva/sclera: Conjunctivae normal.      Pupils: Pupils are equal, round, and reactive to light.   Cardiovascular:      Rate and Rhythm: Normal rate.   Pulmonary:      Effort: Pulmonary effort is normal.      Breath sounds: Normal breath sounds.   Musculoskeletal:         General: Normal range of motion.      Cervical back: Normal range of motion.      Right lower leg: No edema.      Comments: + left foot bandaged   Skin:     General: Skin is warm.   Neurological:      General: No focal deficit present.      Mental Status: He is alert.   Psychiatric:         Mood and Affect: Mood normal.          Significant Labs:  CBC:   Recent Labs   Lab 04/07/25  0418   WBC 10.47   RBC 3.78*   HGB 11.0*   HCT 35.2*      MCV 93   MCH 29.1   MCHC 31.3*     CMP:   Recent Labs   Lab 04/03/25  0618 04/04/25  0745 04/07/25  0418   CALCIUM 9.4   < > 9.6   ALBUMIN 2.9*   < > 2.7*      < > 136   K 4.7   < > 5.2*   CO2 21*   < > 25      < > 99   BUN 41*   < > 61*   CREATININE 12.5*   < > 15.7*   ALKPHOS 95  --   --    ALT 9*  --   --    AST 12  --   --    BILITOT 0.3  --   --     < > = values in this interval not displayed.     All labs within the past 24 hours have been reviewed.     Significant Imaging:  Labs: Reviewed

## 2025-04-07 NOTE — PROGRESS NOTES
[facility] - [place] - Podiatry  Progress Note    Patient Name: Emmanuel Morgan  MRN: [id number]  Admission Date: 3/31/2025  Hospital Length of Stay: 7 days  Attending Physician: Eliecer Posey MD  Primary Care Provider: Laurence, Primary Doctor     Subjective:     History of Present Illness: Emmanuel Morgan is a 39 y.o. male with  has a past medical history of Diabetes mellitus, Hypertension, and Renal disorder.  Consult to Podiatry for evaluation and treatment of bilateral foot wounds, most significant to the right Lisfranc stump.  He relates that the left foot wound is chronic in nature and the ulceration to the right foot occurred several weeks ago likely secondary to trauma versus ambulation is inappropriate shoe.  All foot surgeries surgeries done in outside facilities.  He presented to the emergency department on the advice of his home health nurse due to progression of wounds and suspicion of osteomyelitis.      4/2/25: S/p one day I&D B/L per Dr. Faria. Bandage intact no strikethrough noted.    04/04/2025 patient seen at bedside resting comfortably.  By the end of our encounter, his nephew was also present at bedside.  Patient relates that he has occasional discomfort to the right foot especially in the medial midfoot with palpation or any pressure.  He denies any pain to the left foot.  He denies nausea, vomiting, fever, chills.  Patient relates that he is concerned about his chronic but now more persistent cough and would like to discuss this concern further with the primary team.      4/7/25: Patient seen in dialysis. Bandage intact B/L   Chief Complaint   Patient presents with    Leg Pain     Sent from [facility] for increased pain to right foot     Scheduled Meds:   carvediloL  6.25 mg Oral BID    ceFEPime IV (PEDS and ADULTS)  1 g Intravenous Daily    metroNIDAZOLE  500 mg Oral Q12H    NIFEdipine  90 mg Oral Daily    sevelamer carbonate  800 mg Oral TID WM     Continuous Infusions:  PRN Meds:  Current  Facility-Administered Medications:     acetaminophen, 650 mg, Oral, Q4H PRN    aluminum-magnesium hydroxide-simethicone, 30 mL, Oral, QID PRN    benzonatate, 100 mg, Oral, TID PRN    dextromethorphan-guaiFENesin  mg/5 ml, 5 mL, Oral, Q6H PRN    dextrose 50%, 12.5 g, Intravenous, PRN    dextrose 50%, 25 g, Intravenous, PRN    diphenhydrAMINE, 25 mg, Oral, Q6H PRN    glucagon (human recombinant), 1 mg, Intramuscular, PRN    glucose, 16 g, Oral, PRN    glucose, 24 g, Oral, PRN    heparin (porcine), 1,000 Units, Intra-Catheter, PRN    insulin aspart U-100, 0-5 Units, Subcutaneous, QID (AC + HS) PRN    melatonin, 6 mg, Oral, Nightly PRN    morphine, 4 mg, Intravenous, Q4H PRN    naloxone, 0.02 mg, Intravenous, PRN    ondansetron, 4 mg, Intravenous, Q6H PRN    oxyCODONE-acetaminophen, 1 tablet, Oral, Q4H PRN    senna-docusate, 1 tablet, Oral, BID PRN    sodium chloride 0.9%, 250 mL, Intravenous, PRN    Review of patient's allergies indicates:  No Known Allergies     Past Medical History:   Diagnosis Date    Diabetes mellitus     Hypertension     Renal disorder      Past Surgical History:   Procedure Laterality Date    FOOT AMPUTATION Right     IRRIGATION AND DEBRIDEMENT Bilateral 4/1/2025    Procedure: IRRIGATION AND DEBRIDEMENT;  Surgeon: Bety Faria DPM;  Location: [facility];  Service: Podiatry;  Laterality: Bilateral;  need jamshidi needle available       Family History    None       Tobacco Use    Smoking status: Never    Smokeless tobacco: Never   Substance and Sexual Activity    Alcohol use: Never    Drug use: Never    Sexual activity: Not on file     Review of Systems   Constitutional:  Negative for appetite change, chills, fatigue and fever.   Respiratory:  Positive for cough. Negative for shortness of breath.    Cardiovascular:  Negative for chest pain and leg swelling.   Gastrointestinal:  Negative for diarrhea, nausea and vomiting.   Musculoskeletal:  Positive for arthralgias and myalgias.   Skin:   Positive for color change and wound.   Neurological:  Positive for numbness. Negative for weakness.        + paresthesia      Objective:     Vital Signs (Most Recent):  Temp: 98.3 °F (36.8 °C) (04/07/25 1439)  Pulse: 85 (04/07/25 1439)  Resp: 18 (04/07/25 1439)  BP: (!) 141/90 (04/07/25 1439)  SpO2: 98 % (04/07/25 1439) Vital Signs (24h Range):  Temp:  [97.4 °F (36.3 °C)-98.3 °F (36.8 °C)] 98.3 °F (36.8 °C)  Pulse:  [67-85] 85  Resp:  [18-19] 18  SpO2:  [96 %-99 %] 98 %  BP: (115-146)/(61-90) 141/90     Weight: 104.3 kg (229 lb 15 oz)  Body mass index is 29.52 kg/m².    Physical Exam  Vitals and nursing note reviewed.   Constitutional:       General: He is not in acute distress.     Appearance: He is well-developed. He is not toxic-appearing or diaphoretic.      Comments: alert and oriented x 3.    Cardiovascular:      Pulses:           Dorsalis pedis pulses are 1+ on the right side and 1+ on the left side.        Posterior tibial pulses are 1+ on the left side.   Pulmonary:      Effort: No respiratory distress.   Musculoskeletal:      Right ankle: No tenderness. No lateral malleolus, medial malleolus, AITF ligament, CF ligament or posterior TF ligament tenderness.      Right Achilles Tendon: No defects. Clements's test negative.      Left ankle: No tenderness. No lateral malleolus, medial malleolus, AITF ligament, CF ligament or posterior TF ligament tenderness.      Left Achilles Tendon: No defects. Clements's test negative.      Right foot: Swelling and tenderness present. No bony tenderness.      Left foot: Deformity (Appearance of short 4th metatarsal secondary to previous surgery.) and tenderness present. No bony tenderness.      Comments: Muscle strength is 5/5 in all groups bilaterally.              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 (midfoot amp)  Feet:      Right foot:      Skin integrity: Ulcer present.      Left foot:      Skin integrity: Ulcer present.   Lymphadenopathy:       "Comments: No lymphatic streaking     Skin:     General: Skin is warm and dry.      Coloration: Skin is not pale.      Findings: No rash.      Nails: There is no clubbing.   Neurological:      Sensory: No sensory deficit.      Motor: No atrophy.      Comments: Light touch present     Psychiatric:         Attention and Perception: He is attentive.         Mood and Affect: Mood is not anxious. Affect is not inappropriate.         Speech: He is communicative. Speech is not slurred.         Behavior: Behavior is not combative.       4/7/25:        04/04/2025     Stable ulceration sub 3rd metatarsophalangeal joint of the left foot without acute signs of infection but with deep probe to bone      Plantar right foot ulcer with significant purulent drainage particularly when milking from the medial midfoot.  Palpation to the medial midfoot is significantly tender.        Post wound debridement with opening of the tract between the 2 plantar wounds            4/2/25:  No purulent drainage noted.             04/01/2025    Right plantar lateral midfoot with periwound bulla formation and localized edema and erythema.  Exquisitely tender on palpation.  Fibro necrotic wound bed         Left sub 3rd metatarsophalangeal joint with deep probe to bone, fibrogranular wound bed with periwound callus.          Laboratory:  A1C: No results for input(s): "HGBA1C" in the last 4320 hours.  CBC:   Recent Labs   Lab 04/07/25  0418   WBC 10.47   RBC 3.78*   HGB 11.0*   HCT 35.2*      MCV 93   MCH 29.1   MCHC 31.3*     CMP:   Recent Labs   Lab 04/03/25  0618 04/04/25  0745 04/07/25  0418   CALCIUM 9.4   < > 9.6   ALBUMIN 2.9*   < > 2.7*      < > 136   K 4.7   < > 5.2*   CO2 21*   < > 25      < > 99   BUN 41*   < > 61*   CREATININE 12.5*   < > 15.7*   ALKPHOS 95  --   --    ALT 9*  --   --    AST 12  --   --    BILITOT 0.3  --   --     < > = values in this interval not displayed.     CRP:   Recent Labs   Lab 03/31/25  1827 "   .3*     ESR:   Recent Labs   Lab 03/31/25 1827   SEDRATE 120*     Microbiology Results (last 7 days)       Procedure Component Value Units Date/Time    Aerobic culture [[id number]] Collected: 04/01/25 1621    Order Status: Completed Specimen: Biopsy from Foot, Left Updated: 04/07/25 0647     CULTURE, AEROBIC No Growth    Blood Culture #1 **CANNOT BE ORDERED STAT** [[id number]]  (Normal) Collected: 03/31/25 1827    Order Status: Completed Specimen: Blood from Peripheral, Antecubital, Left Updated: 04/05/25 2002     Blood Culture No Growth After 5 Days    Blood Culture #2 **CANNOT BE ORDERED STAT** [[id number]]  (Normal) Collected: 03/31/25 1845    Order Status: Completed Specimen: Blood from Peripheral, Hand, Left Updated: 04/05/25 2002     Blood Culture No Growth After 5 Days    Culture, Anaerobe [[id number]] Collected: 04/01/25 1621    Order Status: Completed Specimen: Biopsy from Foot, Left Updated: 04/05/25 0710     Anaerobe Culture No Anaerobes Isolated    Culture, Anaerobe [[id number]] Collected: 04/01/25 1557    Order Status: Completed Specimen: Wound from Foot, Right Updated: 04/05/25 0710     Anaerobe Culture No Anaerobes Isolated    Afb Culture Stain [[id number]] Collected: 04/01/25 1621    Order Status: Completed Specimen: Biopsy from Foot, Left Updated: 04/04/25 1542     ACID FAST STAIN  No acid fast bacilli seen    AFB Culture & Smear [[id number]] Collected: 04/01/25 1621    Order Status: Completed Specimen: Biopsy from Foot, Left Updated: 04/04/25 1542     CULTURE, AFB  Culture In Progress    AFB Culture & Smear [[id number]] Collected: 04/01/25 1557    Order Status: Completed Specimen: Wound from Foot, Right Updated: 04/03/25 0805     CULTURE, AFB  Culture In Progress    Aerobic culture [[id number]]  (Abnormal)  (Susceptibility) Collected: 04/01/25 1557    Order Status: Completed Specimen: Wound from Foot, Right Updated: 04/03/25 0539     CULTURE, AEROBIC Moderate Proteus mirabilis    Afb  Culture Stain [[id number]] Collected: 04/01/25 1557    Order Status: Resulted Specimen: Wound from Foot, Right Updated: 04/02/25 1614    Gram stain [[id number]] Collected: 04/01/25 1621    Order Status: Completed Specimen: Biopsy from Foot, Left Updated: 04/02/25 1132     GRAM STAIN Rare WBC seen      No organisms seen    Gram stain [[id number]] Collected: 04/01/25 1557    Order Status: Completed Specimen: Wound from Foot, Right Updated: 04/02/25 1127     GRAM STAIN Many WBC seen      Moderate Gram negative diplococci      Few Gram Negative Rods      Rare Gram positive cocci    Fungus culture [[id number]] Collected: 04/01/25 1621    Order Status: Sent Specimen: Biopsy from Foot, Left Updated: 04/01/25 1654    Fungus culture [[id number]] Collected: 04/01/25 1557    Order Status: Sent Specimen: Wound from Foot, Right Updated: 04/01/25 1653            Diagnostic Results:  Imaging Results               MRI Hindfoot WO Contrast LT (Final result)  Result time 04/01/25 07:47:56      Final result by Seamus Hays MD (04/01/25 07:47:56)                   Impression:      1. Plantar soft tissue ulcer at the level of the 3rd MTP joint with associated osteomyelitis of the distal half of the metatarsal and base of the proximal phalanx.  No abscess.  2. Sequela of chronic osteomyelitis involving the 1st, 2nd and 5th metatarsals and proximal phalanges.  3. Postoperative change of transmetatarsal amputation of the 4th digit.  This report was flagged in Epic as abnormal.      Electronically signed by: Seamus Hays MD  Date:    04/01/2025  Time:    07:47               Narrative:    EXAMINATION:  MRI HINDFOOT WO CONTRAST LT; MRI FOREFOOT WO CONTRAST LT    CLINICAL HISTORY:  b/l ulcers concern for osteo;; b/l ulcers. Concern for osteo;    TECHNIQUE:  Routine MRI evaluation of the left ankle and forefoot performed without contrast.    COMPARISON:  Radiographs 09/01/2024.    FINDINGS:  Examination degraded by patient motion  artifact.    BONES: Postsurgical change of transmetatarsal amputation of the 4th digit.  Osseous erosive changes of the 3rd metatarsal head and proximal phalanx base with associated confluent marrow edema involving the distal half of the metatarsal and proximal half of the proximal phalanx.  There is corresponding T1 medullary hypointensity throughout the distal half of the metatarsal and base of the proximal phalanx.  Chronic osseous erosive changes of the 1st, 2nd and 5th metatarsal heads and proximal phalanges with grossly preserved marrow signal intensity.  Solid periosteal reaction throughout the 2nd metatarsal shaft.  Linear area of edema signal at the posterior subchondral region of the tibial plafond, favored to be degenerative in nature.  No definite acute fractures.  Remote healed fracture of the lateral malleolus.  No avascular necrosis.  No marrow infiltrative process.    JOINTS: 3rd MTP complex joint effusion with surrounding synovitis.  No dislocation.    LIGAMENTS: Chronic sprains of the anterior talofibular, superficial deltoid and deep deltoid ligaments.  Lisfranc ligament appears intact.    TENDONS: Ulceration and disruption of the 3rd flexor tendon at the level of the MTP joint.  Chronic ulceration of the 1st, 2nd and 5th flexor tendons at the level of the MTP joints.  Postoperative changes of the 4th flexor tendon.  Remaining tendons appear grossly intact.    MUSCLES: Edema and severe fatty degeneration of the visualized musculature.  No intramuscular fluid collection.    MISCELLANEOUS: Plantar soft tissue ulcer at the level of the 3rd MTP joint with adjacent fluid and surrounding subcutaneous edema.  No focal fluid collection.  Probable adventitial bursa at the plantar aspect of the hallux MTP joint.  Focal area of signal hypointensity at the level of the heel pad, possibly sequela of scarring.                                        MRI Forefoot WO Contrast LT (Final result)  Result time  04/01/25 07:47:56   Procedure changed from MRI Foot Toes WO Contrast DANY     Final result by Seamus Hays MD (04/01/25 07:47:56)                   Impression:      1. Plantar soft tissue ulcer at the level of the 3rd MTP joint with associated osteomyelitis of the distal half of the metatarsal and base of the proximal phalanx.  No abscess.  2. Sequela of chronic osteomyelitis involving the 1st, 2nd and 5th metatarsals and proximal phalanges.  3. Postoperative change of transmetatarsal amputation of the 4th digit.  This report was flagged in Epic as abnormal.      Electronically signed by: Seamus Hays MD  Date:    04/01/2025  Time:    07:47               Narrative:    EXAMINATION:  MRI HINDFOOT WO CONTRAST LT; MRI FOREFOOT WO CONTRAST LT    CLINICAL HISTORY:  b/l ulcers concern for osteo;; b/l ulcers. Concern for osteo;    TECHNIQUE:  Routine MRI evaluation of the left ankle and forefoot performed without contrast.    COMPARISON:  Radiographs 09/01/2024.    FINDINGS:  Examination degraded by patient motion artifact.    BONES: Postsurgical change of transmetatarsal amputation of the 4th digit.  Osseous erosive changes of the 3rd metatarsal head and proximal phalanx base with associated confluent marrow edema involving the distal half of the metatarsal and proximal half of the proximal phalanx.  There is corresponding T1 medullary hypointensity throughout the distal half of the metatarsal and base of the proximal phalanx.  Chronic osseous erosive changes of the 1st, 2nd and 5th metatarsal heads and proximal phalanges with grossly preserved marrow signal intensity.  Solid periosteal reaction throughout the 2nd metatarsal shaft.  Linear area of edema signal at the posterior subchondral region of the tibial plafond, favored to be degenerative in nature.  No definite acute fractures.  Remote healed fracture of the lateral malleolus.  No avascular necrosis.  No marrow infiltrative process.    JOINTS: 3rd MTP  complex joint effusion with surrounding synovitis.  No dislocation.    LIGAMENTS: Chronic sprains of the anterior talofibular, superficial deltoid and deep deltoid ligaments.  Lisfranc ligament appears intact.    TENDONS: Ulceration and disruption of the 3rd flexor tendon at the level of the MTP joint.  Chronic ulceration of the 1st, 2nd and 5th flexor tendons at the level of the MTP joints.  Postoperative changes of the 4th flexor tendon.  Remaining tendons appear grossly intact.    MUSCLES: Edema and severe fatty degeneration of the visualized musculature.  No intramuscular fluid collection.    MISCELLANEOUS: Plantar soft tissue ulcer at the level of the 3rd MTP joint with adjacent fluid and surrounding subcutaneous edema.  No focal fluid collection.  Probable adventitial bursa at the plantar aspect of the hallux MTP joint.  Focal area of signal hypointensity at the level of the heel pad, possibly sequela of scarring.                                       MRI Hindfoot WO Contrast RT (Final result)  Result time 04/01/25 07:19:00      Final result by Seamus Hays MD (04/01/25 07:19:00)                   Impression:      1. Plantar soft tissue ulcer at the level of the calcaneocuboid joint with surrounding subcutaneous edema and adjacent blistering.  No osteomyelitis.  No abscess.  2. Advanced neuropathic arthropathy of the tibiotalar, subtalar and residual midtarsal joints.      Electronically signed by: Seamus Hays MD  Date:    04/01/2025  Time:    07:19               Narrative:    EXAMINATION:  MRI HINDFOOT WO CONTRAST RT    CLINICAL HISTORY:  Foot swelling, diabetic, osteomyelitis suspected, xray done;    TECHNIQUE:  Routine MRI evaluation of the right ankle performed without contrast.    COMPARISON:  Radiographs 03/31/2025.    FINDINGS:  BONES/JOINTS: Postsurgical changes of midfoot amputation.  Chronic osseous erosive changes centered about the tibiotalar, subtalar and residual midtarsal joints,  likely sequela of chronic neuropathic arthropathy.  Intraosseous cyst at the lateral aspect of the distal tibia.  Circumferential solid periosteal reaction about the distal tibia and distal fibula.  No marrow signal abnormality to suggest an infiltrative process.  No significant joint effusion.  No dislocation.    TENDONS: Achilles tendon demonstrates normal morphology and signal intensity.  Residual posterior tibial, flexor digitorum longus, flexor hallucis longus, peroneus longus, peroneus brevis and extensor tendons are intact.    MUSCLES: Edema and fatty degeneration of the visualized musculature.  No intramuscular fluid collection.    MISCELLANEOUS: Plantar soft tissue ulcer at the level of the calcaneocuboid joint with involvement of the plantar aponeurosis, surrounding subcutaneous edema and adjacent blistering.  No fluid collection.                                       US Lower Extremity Arteries Right (Final result)  Result time 03/31/25 21:32:36      Final result by Michelle Leon MD (03/31/25 21:32:36)                   Impression:      Normal triphasic waveforms throughout the right lower extremity.  No occlusion or stenosis detected.      Electronically signed by: Michelle Leon  Date:    03/31/2025  Time:    21:32               Narrative:    EXAMINATION:  US LOWER EXTREMITY ARTERIES RIGHT    CLINICAL HISTORY:  Unspecified open wound, right foot, subsequent encounter    TECHNIQUE:  Duplex and color flow Doppler evaluation and graded compression of the right lower extremity arteries was performed.    COMPARISON:  None    FINDINGS:  Peak systolic velocities in the right lower extremity arteries (cm/sec):    CFA: 127, triphasic    DFA: 74, triphasic    Proximal SFA: 103, triphasic    Mid SFA: 120, triphasic    Distal SFA: 109, triphasic    Proximal pop: 84, triphasic    Distal pop: 98, triphasic    LAUREL: 80, triphasic    PTA: 86, triphasic    Peroneal: 111, triphasic    DPA: 75, triphasic                                        X-Ray Foot Complete Right (Final result)  Result time 03/31/25 13:14:02      Final result by Alli Henriquez MD (03/31/25 13:14:02)                   Impression:      Please see above.      Electronically signed by: Alli Henriquez  Date:    03/31/2025  Time:    13:14               Narrative:    EXAMINATION:  XR FOOT COMPLETE 3 VIEW RIGHT    CLINICAL HISTORY:  . Pain in unspecified foot    TECHNIQUE:  AP, lateral, and oblique views of the right foot were performed.    COMPARISON:  None.    FINDINGS:  Extensive postoperative change including amputation to the level of the midfoot structures.  Remainder of the visualized osseous structures of the mid and hindfoot demonstrate diffuse degenerative change in osteopenia with surrounding diffuse soft tissue swelling.  Additional diffuse osseous destruction and degenerative change about the partially visualized ankle.  Scattered vascular calcification as well as plantar calcaneal spur.  No obvious significant osseous erosive change, noting limitations from postoperative change and surrounding soft tissue swelling.  If there is continued clinical concern for osteomyelitis, consider MRI of the foot for further evaluation.                                       X-Ray Chest AP Portable (Final result)  Result time 03/31/25 13:15:24      Final result by Alli Henriquez MD (03/31/25 13:15:24)                   Impression:      Please see above.      Electronically signed by: Alli Henriquez  Date:    03/31/2025  Time:    13:15               Narrative:    EXAMINATION:  XR CHEST AP PORTABLE    CLINICAL HISTORY:  Chronic cough    TECHNIQUE:  Single frontal view of the chest was performed.    COMPARISON:  Chest radiograph 09/01/2024    FINDINGS:  Left-sided central venous catheter with tip overlying the in expected region of the cavoatrial junction.  Cardiomediastinal silhouette is unchanged in size.  Patient is rotated, limiting evaluation of the mediastinal  structures.    Chronic right effusion with probable superimposed volume loss about the right lung base.  Superimposed infectious or non infectious inflammatory infiltrate cannot be excluded.  Remainder of the aerated portions of the lungs are clear.  No pneumothorax.    Osseous structures demonstrate no evidence for acute fracture or osseous destructive lesion.  Soft tissues are unremarkable.                                      Assessment/Plan:     Active Diagnoses:    Diagnosis Date Noted POA    PRINCIPAL PROBLEM:  Infected ulcer of skin [L98.499, L08.9] 03/31/2025 Yes    Pyogenic inflammation of bone [M86.9] 04/03/2025 Unknown    Foot infection [L08.9] 04/02/2025 Yes    Foot ulcer with fat layer exposed, unspecified laterality [L97.502] 09/01/2024 Yes    Type 2 diabetes mellitus with foot ulcer [E11.621, L97.509] 06/25/2024 Yes    End stage renal disease [N18.6] 03/21/2024 Yes    Hyperkalemia [E87.5] 07/19/2021 Yes     Chronic    Essential hypertension [I10] 01/27/2021 Yes      Problems Resolved During this Admission:       Education about the prevention of limb loss.      S/p  irrigation and debridement of both feet DOS: 4/1/25.    Discussed wound healing cycle, skin integrity, ways to care for skin.Counseled patient on the effects of biomechanical pressure and deformity as well as blood glucose on healing. He verbalizes understanding that it can increase the chances of delayed healing and this prolonged exposure leads to infection or progression of infection which subsequently can result in loss of limb.    Purulent drainage noted today upon exam may benefit from bedside vs. OR washout. NPO @ midnight will eval in AM.     DSD applied to right foot    Abx per ID    Informed primary team of patient's concern with his cough.    F/u WB wound care upon discharge    Short-term goals include maintaining good offloading and minimizing bioburden, promoting granulation and epithelialization to healing.  Long-term goals  include keeping the wound healed by good offloading and medical management under the direction of internist.      We will continue to follow and work in partnership with treatment team on how to best treat patient concern and diagnosis.        Thank you for your consult. I will follow-up with patient. Please contact us if you have any additional questions.    Michelle Brown DPM  Podiatry  [facility] - [place] - Emergency Dept

## 2025-04-07 NOTE — PLAN OF CARE
Changes in medical condition or discharge plan: Set-up Abx with patient's dialysis ([facility]). SW sent orders to Dialysis center via fax [phone] and informed them that patient will be discharged tomorrow.    Does patient need new DME? No    Follow up appts needed: PCP    Medically stable: No    Estimated Discharge Date: 4/8/25 04/07/25 1522   Discharge Reassessment   Assessment Type Discharge Planning Reassessment   Did the patient's condition or plan change since previous assessment? No   Discharge Plan discussed with:   (Jesus Ruelas/Dr. Posey)   Communicated LULÚ with patient/caregiver Yes   Discharge Plan A Home   Discharge Plan B Home with family   DME Needed Upon Discharge  none   Transition of Care Barriers None   Why the patient remains in the hospital Requires continued medical care   Post-Acute Status   Coverage Medicare Part A & B   Hospital Resources/Appts/Education Provided Appointments scheduled and added to AVS   Discharge Delays None known at this time

## 2025-04-07 NOTE — ASSESSMENT & PLAN NOTE
Patient's FSGs are controlled on current medication regimen.  Last A1c reviewed-   Lab Results   Component Value Date    HGBA1C 4.9 08/20/2024     Most recent fingerstick glucose reviewed-   Recent Labs   Lab 04/06/25  2057 04/07/25  0815 04/07/25  1434 04/07/25  1637   POCTGLUCOSE 118* 117* 94 138*     Current correctional scale  Low  Maintain anti-hyperglycemic dose as follows-   Antihyperglycemics (From admission, onward)      Start     Stop Route Frequency Ordered    03/31/25 2317  insulin aspart U-100 pen 0-5 Units         -- SubQ Before meals & nightly PRN 03/31/25 2218          Hold Oral hypoglycemics while patient is in the hospital.

## 2025-04-07 NOTE — NURSING
[facility] [place]  Nurses Note -- 4 Eyes      4/7/2025       Skin assessed on: Q Shift      [x] No Pressure Injuries Present    [x]Prevention Measures Documented    [] Yes LDA  for Pressure Injury Previously documented     [] Yes New Pressure Injury Discovered   [] LDA for New Pressure Injury Added      Attending RN:  Jennifer Faustin RN     Second RN:  ARNOLD Duran

## 2025-04-07 NOTE — PROGRESS NOTES
[facility] Medicine  Progress Note    Patient Name: Emmanuel Morgan  MRN: [id number]  Patient Class: IP- Inpatient   Admission Date: 3/31/2025  Length of Stay: 7 days  Attending Physician: Eliecer Posey MD  Primary Care Provider: Laurence, Primary Doctor        Subjective     Principal Problem:Infected ulcer of skin        HPI:  39-year-old male with ESRD on HD, diabetes not on insulin, prior osteomyelitis requiring right midfoot amputation with a long standing left foot ulcer (months) and a right foot ulcer of his stump which has been present for weeks who was sent to the ER by home health due to progression of wounds with skin and soft tissue infections of possibly both ulcers and possible osteomyelitis, he has been followed up by outpatient wound care agency, and was sent here by home health nurse due to nonhealing of the wound, noted with worsening erythema and purulent discharge.  Missed hemodialysis session on the day of presentation due to presentation in the emergency room.    Overview/Hospital Course:  39-year-old male with ESRD on HD, DM, prior osteomyelitis requiring right midfoot amputation with a long standing left foot ulcer (months) and a right foot ulcer of his stump which has been present for weeks who was sent to the ER by home health due to progression of wounds with skin and soft tissue infections of possibly both ulcers and possible osteomyelitis of either.  MRI of left foot showing plantar soft tissue ulcer at the level of the 3rd MTP joint with associated osteomyelitis of the distal half of the metatarsal and base of the proximal phalanx.  On ABX's and Podiatry consulted. Patient underwent irrigation and debridement of bilateral LE's.  Currently  is coordinating antibiotics with HD unit.  Still having some purulent discharge and may need repeat I&D (bedside vs OR).      Interval History: seen during dialysis.  No new complaints.    Review of Systems   HENT:   Negative for ear discharge and ear pain.    Eyes:  Negative for discharge and itching.   Endocrine: Negative for cold intolerance and heat intolerance.   Neurological:  Negative for seizures and syncope.     Objective:     Vital Signs (Most Recent):  Temp: 98.2 °F (36.8 °C) (04/07/25 1635)  Pulse: 85 (04/07/25 1635)  Resp: 18 (04/07/25 1635)  BP: 125/78 (04/07/25 1635)  SpO2: 99 % (04/07/25 1635) Vital Signs (24h Range):  Temp:  [97.4 °F (36.3 °C)-98.3 °F (36.8 °C)] 98.2 °F (36.8 °C)  Pulse:  [67-85] 85  Resp:  [18-19] 18  SpO2:  [96 %-99 %] 99 %  BP: (115-146)/(61-90) 125/78     Weight: 104.3 kg (229 lb 15 oz)  Body mass index is 29.52 kg/m².    Intake/Output Summary (Last 24 hours) at 4/7/2025 1711  Last data filed at 4/7/2025 1300  Gross per 24 hour   Intake 1160 ml   Output 200 ml   Net 960 ml         Physical Exam  Constitutional:       Appearance: He is not toxic-appearing or diaphoretic.   HENT:      Head: Normocephalic and atraumatic.      Mouth/Throat:      Pharynx: Oropharynx is clear. No oropharyngeal exudate or posterior oropharyngeal erythema.   Cardiovascular:      Rate and Rhythm: Normal rate and regular rhythm.   Pulmonary:      Breath sounds: Normal breath sounds. No wheezing.   Abdominal:      General: Bowel sounds are normal.      Palpations: Abdomen is soft.   Skin:     Comments: Both feet dressed.   Neurological:      Mental Status: He is oriented to person, place, and time.      Cranial Nerves: No cranial nerve deficit.               Significant Labs: All pertinent labs within the past 24 hours have been reviewed.  BMP:   Recent Labs   Lab 04/07/25 0418      K 5.2*   CL 99   CO2 25   BUN 61*   CREATININE 15.7*   CALCIUM 9.6     CBC:   Recent Labs   Lab 04/07/25 0418   WBC 10.47   HGB 11.0*   HCT 35.2*          Significant Imaging: I have reviewed all pertinent imaging results/findings within the past 24 hours.      Assessment & Plan  Infected ulcer of skin  Patient presents with  ulcers to left foot and right foot stump.  MRI ordered.  No evidence of osteo on right foot stump.  Left foot MRI showing plantar soft tissue ulcer at the level of the 3rd MTP joint with associated osteomyelitis of the distal half of the metatarsal and base of the proximal phalanx.   Started on ABX's  Wound cultures positive for Proteus mirabilis  S/p Underwent bilateral irrigation and debridement by Podiatry  ID following; input appreciated.  Recommending IV Cefepime with HD and oral Flagyl until 5/13.  Will need outpatient ABX's with HD arranged.  Discussed with Podiatry.  Still having some purulent drainage.  May need repeat I&D, either bedside vs in OR.        End stage renal disease  Creatine stable for now. BMP reviewed- noted Estimated Creatinine Clearance: 8.1 mL/min (A) (based on SCr of 15.7 mg/dL (H)). according to latest data. Based on current GFR, CKD stage is end stage.  Monitor UOP and serial BMP and adjust therapy as needed. Renally dose meds. Avoid nephrotoxic medications and procedures.  Nephrology consulted for HD.  Continue Monday, Wednesday, and Friday hemodialysis schedule.    Essential hypertension  Patient's blood pressure range in the last 24 hours was: BP  Min: 115/73  Max: 146/71.The patient's inpatient anti-hypertensive regimen is listed below:  Current Antihypertensives  , Daily, Oral  NIFEdipine 24 hr tablet 90 mg, Daily, Oral  carvediloL tablet 6.25 mg, 2 times daily, Oral    Plan  - BP is controlled, no changes needed to their regimen  - lisinopril held due to hyperkalemia.  Will restart when clinically indicated  Hyperkalemia  Hyperkalemia is likely due to ESRD.The patients most recent potassium results are listed below.  Recent Labs     04/07/25  0418   K 5.2*   He got some doses of [facility]  Nephrology consulted for HD.  Hyperkalemia has improved following hemodialysis          Type 2 diabetes mellitus with foot ulcer  Patient's FSGs are controlled on current medication  regimen.  Last A1c reviewed-   Lab Results   Component Value Date    HGBA1C 4.9 08/20/2024     Most recent fingerstick glucose reviewed-   Recent Labs   Lab 04/06/25  2057 04/07/25  0815 04/07/25  1434 04/07/25  1637   POCTGLUCOSE 118* 117* 94 138*     Current correctional scale  Low  Maintain anti-hyperglycemic dose as follows-   Antihyperglycemics (From admission, onward)      Start     Stop Route Frequency Ordered    03/31/25 2317  insulin aspart U-100 pen 0-5 Units         -- SubQ Before meals & nightly PRN 03/31/25 2218          Hold Oral hypoglycemics while patient is in the hospital.  Foot ulcer with fat layer exposed, unspecified laterality  Podiatry consulted, appreciated recs  Continue current antibiotics, ID consulted.  Continue wound care per podiatrist  Foot infection  See note above    Pyogenic inflammation of bone  Continue antibiotics as described above.    VTE Risk Mitigation (From admission, onward)           Ordered     heparin (porcine) injection 1,000 Units  As needed (PRN)         04/01/25 1827     IP VTE LOW RISK PATIENT  Once         03/31/25 2218     Place sequential compression device  Until discontinued         03/31/25 2218                    Discharge Planning   LULÚ: 4/9/2025     Code Status: Full Code   Medical Readiness for Discharge Date:   Discharge Plan A: Home   Discharge Delays: None known at this time                    Eliecer Posey MD  Department of Hospital Medicine   [facility] [place] - Telemetry

## 2025-04-07 NOTE — ASSESSMENT & PLAN NOTE
Patient presents with ulcers to left foot and right foot stump.  MRI ordered.  No evidence of osteo on right foot stump.  Left foot MRI showing plantar soft tissue ulcer at the level of the 3rd MTP joint with associated osteomyelitis of the distal half of the metatarsal and base of the proximal phalanx.   Started on ABX's  Wound cultures positive for Proteus mirabilis  S/p Underwent bilateral irrigation and debridement by Podiatry  ID following; input appreciated.  Recommending IV Cefepime with HD and oral Flagyl until 5/13.  Will need outpatient ABX's with HD arranged.  Discussed with Podiatry.  Still having some purulent drainage.  May need repeat I&D, either bedside vs in OR.

## 2025-04-07 NOTE — PROGRESS NOTES
[facility]  Nephrology  Progress Note    Patient Name: Emmanuel Morgan  MRN: [id number]  Admission Date: 3/31/2025  Hospital Length of Stay: 7 days  Attending Provider: Eliecer Posey MD   Primary Care Physician: Laurence, Primary Doctor  Principal Problem:Infected ulcer of skin    Subjective:     HPI: 39 year old man with a history of ESRD on HD, DM, hx of osteomyelitis following with wound care presents to the ED at wound care request for evaluation for possible debridement. Podiatry consulted.    Outpatient ESRD  Hemodialysis  Outpatient nephrologist: Dr. Wade  Outpatient center:  [facility]  Dialysis schedule: MWF  Last treatment: 3/28/2025  Dry weight: 99kg  Access: right forearm AVF      Interval History: NAEON. Pt seen during HD, tolerating well. Normal appetite, no complaints.     Review of patient's allergies indicates:  No Known Allergies  Current Facility-Administered Medications   Medication Frequency    acetaminophen tablet 650 mg Q4H PRN    aluminum-magnesium hydroxide-simethicone 200-200-20 mg/5 mL suspension 30 mL QID PRN    benzonatate capsule 100 mg TID PRN    carvediloL tablet 6.25 mg BID    ceFEPIme injection 1 g Daily    dextromethorphan-guaiFENesin  mg/5 ml liquid 5 mL Q6H PRN    dextrose 50% injection 12.5 g PRN    dextrose 50% injection 25 g PRN    diphenhydrAMINE capsule 25 mg Q6H PRN    glucagon (human recombinant) injection 1 mg PRN    glucose chewable tablet 16 g PRN    glucose chewable tablet 24 g PRN    heparin (porcine) injection 1,000 Units PRN    insulin aspart U-100 pen 0-5 Units QID (AC + HS) PRN    melatonin tablet 6 mg Nightly PRN    metroNIDAZOLE tablet 500 mg Q12H    morphine injection 4 mg Q4H PRN    naloxone 0.4 mg/mL injection 0.02 mg PRN    NIFEdipine 24 hr tablet 90 mg Daily    ondansetron injection 4 mg Q6H PRN    oxyCODONE-acetaminophen  mg per tablet 1 tablet Q4H PRN    senna-docusate 8.6-50 mg per tablet 1 tablet BID PRN    sevelamer carbonate  tablet 800 mg TID WM    sodium chloride 0.9% bolus 250 mL 250 mL PRN       Objective:     Vital Signs (Most Recent):  Temp: 97.4 °F (36.3 °C) (04/07/25 0434)  Pulse: 71 (04/07/25 0930)  Resp: 18 (04/07/25 0434)  BP: 131/61 (04/07/25 0930)  SpO2: 98 % (04/07/25 0434) Vital Signs (24h Range):  Temp:  [97.4 °F (36.3 °C)-98.3 °F (36.8 °C)] 97.4 °F (36.3 °C)  Pulse:  [71-79] 71  Resp:  [18] 18  SpO2:  [96 %-99 %] 98 %  BP: (115-131)/(61-78) 131/61     Weight: 104.3 kg (229 lb 15 oz) (04/01/25 2045)  Body mass index is 29.52 kg/m².  Body surface area is 2.33 meters squared.    I/O last 3 completed shifts:  In: 780 [P.O.:780]  Out: -      Physical Exam  Vitals and nursing note reviewed.   Constitutional:       Appearance: Normal appearance.   HENT:      Head: Normocephalic.      Mouth/Throat:      Mouth: Mucous membranes are moist.   Eyes:      Extraocular Movements: Extraocular movements intact.      Conjunctiva/sclera: Conjunctivae normal.      Pupils: Pupils are equal, round, and reactive to light.   Cardiovascular:      Rate and Rhythm: Normal rate.   Pulmonary:      Effort: Pulmonary effort is normal.      Breath sounds: Normal breath sounds.   Musculoskeletal:         General: Normal range of motion.      Cervical back: Normal range of motion.      Right lower leg: No edema.      Comments: + left foot bandaged   Skin:     General: Skin is warm.   Neurological:      General: No focal deficit present.      Mental Status: He is alert.   Psychiatric:         Mood and Affect: Mood normal.          Significant Labs:  CBC:   Recent Labs   Lab 04/07/25  0418   WBC 10.47   RBC 3.78*   HGB 11.0*   HCT 35.2*      MCV 93   MCH 29.1   MCHC 31.3*     CMP:   Recent Labs   Lab 04/03/25  0618 04/04/25  0745 04/07/25  0418   CALCIUM 9.4   < > 9.6   ALBUMIN 2.9*   < > 2.7*      < > 136   K 4.7   < > 5.2*   CO2 21*   < > 25      < > 99   BUN 41*   < > 61*   CREATININE 12.5*   < > 15.7*   ALKPHOS 95  --   --    ALT 9*   --   --    AST 12  --   --    BILITOT 0.3  --   --     < > = values in this interval not displayed.     All labs within the past 24 hours have been reviewed.     Significant Imaging:  Labs: Reviewed    Assessment/Plan:     ESRD  - receives HD MWF at [facility] under the c/o Dr. Wade  - HD today; continue HD MWF  - labs MWF     Anemia of CKD  - hgb 11.1 today; at goal  - no need for KIKI at this time     Secondary HPTH  - CCa 10.6 elevated; phos controlled  - continue sevelamer.   - encourage ambulation     Hyperkalemia  - 5.2 today, continue low K diet  - HD above         Thank you for your consult. I will follow-up with patient. Please contact us if you have any additional questions.    DUANE Del Real  Nephrology  [facility] - Telemetry

## 2025-04-07 NOTE — ASSESSMENT & PLAN NOTE
Patient's blood pressure range in the last 24 hours was: BP  Min: 115/73  Max: 146/71.The patient's inpatient anti-hypertensive regimen is listed below:  Current Antihypertensives  , Daily, Oral  NIFEdipine 24 hr tablet 90 mg, Daily, Oral  carvediloL tablet 6.25 mg, 2 times daily, Oral    Plan  - BP is controlled, no changes needed to their regimen  - lisinopril held due to hyperkalemia.  Will restart when clinically indicated

## 2025-04-07 NOTE — PLAN OF CARE
Problem: Infection  Goal: Absence of Infection Signs and Symptoms  Outcome: Progressing     Problem: Skin Injury Risk Increased  Goal: Skin Health and Integrity  Outcome: Progressing     Problem: Pain Acute  Goal: Optimal Pain Control and Function  Outcome: Progressing

## 2025-04-07 NOTE — PT/OT/SLP PROGRESS
[facility]hab [facility] Wound Care Progress Note    Emmanuel Morgan  [id number]  4/7/2025 [id number] (40 min - 2 wound care)    Diagnosis: Pt admitted with R plantar midfoot wounds s/p I+D on 4/1/25 in OR and bedside debridement on 4/4/25 by Podiatry.    Precautions: Standard, Diabetes, and Weightbearing (R heel WB with Cam walker boot)    Allergies as of 03/31/2025    (No Known Allergies)        Pre-medication: pain medication taken by patient prior to treatment    Subjective:  Pt reported no R foot pain before, during, and after pulsavac wound care.    Objective:   Pt received pulsavac wound care to R plantar foot wound with ~500 mL NS.  R plantar foot wound and periwound cleansed with Vashe moistened 4x4 gauze.  R foot patted dry.  Periwound applied with Cavilon no sting film barrier.  R plantar foot wound covered with Vashe moistened 4x4 gauze, dry 4x4 gauze, and abdominal pad.  R foot wrapped with cast padding, kerlix, and ace wrap, than secured with tape.  Shoe cover applied to R foot.  Clean technique maintained throughout treatment.      Assessment:  Pt reported Podiatry came by earlier to exam wound and new dressing was placed.  Per Podiatry's note, there were some purulent drainage.  Podiatry dressings removed with minimal bloody draining.  Pt s/p R foot debridement with opening of the tract between the 2 plantar foot wounds by podiatry on 4/4/25, wound now measured at 8 cm (L) x 3 cm (W).  At this time, no further purulent drainage noted after Podiatry's visit despite pressing on periwound.  Wound tissues appeared pink with minimal yellow slough, no maceration or malodor.  Periwound with callus and dry flaky skin.  Patient tolerated today's treatment without any adverse effects.  Goals remain appropriate.    Photodocumentation: R plantar foot wound      Plan:  The following goals were discussed with the patient and he agrees.  Frequency of treatment at this time will be daily.

## 2025-04-07 NOTE — ASSESSMENT & PLAN NOTE
Creatine stable for now. BMP reviewed- noted Estimated Creatinine Clearance: 8.1 mL/min (A) (based on SCr of 15.7 mg/dL (H)). according to latest data. Based on current GFR, CKD stage is end stage.  Monitor UOP and serial BMP and adjust therapy as needed. Renally dose meds. Avoid nephrotoxic medications and procedures.  Nephrology consulted for HD.  Continue Monday, Wednesday, and Friday hemodialysis schedule.

## 2025-04-07 NOTE — ASSESSMENT & PLAN NOTE
Hyperkalemia is likely due to ESRD.The patients most recent potassium results are listed below.  Recent Labs     04/07/25  0418   K 5.2*   He got some doses of [facility]  Nephrology consulted for HD.  Hyperkalemia has improved following hemodialysis

## 2025-04-07 NOTE — NURSING
Report received from rae Rodriguez RN. Patient resting quietly, no apparent distress noted at this time. Wheels locked in lowest position, call light within reach.    [facility]  Nurses Note -- 4 Eyes      4/7/2025       Skin assessed on: Q Shift      [x] No Pressure Injuries Present    [x]Prevention Measures Documented    [] Yes LDA  for Pressure Injury Previously documented     [] Yes New Pressure Injury Discovered   [] LDA for New Pressure Injury Added      Attending RN:  Rosmery Archer LPN     Second RN:  ALPHONSE Rodriguez

## 2025-04-07 NOTE — NURSING
Patient back to unit from dialysis via bed. Patient accompanied by transport. Patient AAOx3, NAD.

## 2025-04-07 NOTE — PLAN OF CARE
Problem: Adult Inpatient Plan of Care  Goal: Plan of Care Review  Flowsheets (Taken 4/7/2025 1742)  Plan of Care Reviewed With: patient  Goal: Absence of Hospital-Acquired Illness or Injury  Intervention: Identify and Manage Fall Risk  Flowsheets (Taken 4/7/2025 1742)  Safety Promotion/Fall Prevention:   assistive device/personal item within reach   bed alarm set   Fall Risk reviewed with patient/family   patient expresses understanding of fall risk and prevention  Intervention: Prevent Skin Injury  Flowsheets (Taken 4/7/2025 1742)  Body Position: position changed independently  Skin Protection: incontinence pads utilized  Device Skin Pressure Protection: absorbent pad utilized/changed  Intervention: Prevent and Manage VTE (Venous Thromboembolism) Risk  Flowsheets (Taken 4/7/2025 1742)  VTE Prevention/Management: bleeding risk assessed  Intervention: Prevent Infection  Flowsheets (Taken 4/7/2025 1742)  Infection Prevention: environmental surveillance performed  Goal: Optimal Comfort and Wellbeing  Intervention: Monitor Pain and Promote Comfort  Flowsheets (Taken 4/7/2025 1742)  Pain Management Interventions: care clustered  Intervention: Provide Person-Centered Care  Flowsheets (Taken 4/7/2025 1742)  Trust Relationship/Rapport:   care explained   questions answered     Problem: Hemodialysis  Goal: Safe, Effective Therapy Delivery  Intervention: Optimize Device Care and Function  Flowsheets (Taken 4/7/2025 1742)  Medication Review/Management: medications reviewed  Goal: Absence of Infection Signs and Symptoms  Intervention: Prevent or Manage Infection  Flowsheets (Taken 4/7/2025 1742)  Infection Prevention: environmental surveillance performed  Infection Management: aseptic technique maintained     Problem: Infection  Goal: Absence of Infection Signs and Symptoms  Intervention: Prevent or Manage Infection  Flowsheets (Taken 4/7/2025 1742)  Infection Management: aseptic technique maintained     Problem: Skin Injury  Risk Increased  Goal: Skin Health and Integrity  Intervention: Optimize Skin Protection  Flowsheets (Taken 4/7/2025 7629)  Skin Protection: incontinence pads utilized  Activity Management:   Arm raise - L1   Rolling - L1  Head of Bed (HOB) Positioning: HOB elevated

## 2025-04-07 NOTE — SUBJECTIVE & OBJECTIVE
Interval History: seen during dialysis.  No new complaints.    Review of Systems   HENT:  Negative for ear discharge and ear pain.    Eyes:  Negative for discharge and itching.   Endocrine: Negative for cold intolerance and heat intolerance.   Neurological:  Negative for seizures and syncope.     Objective:     Vital Signs (Most Recent):  Temp: 98.2 °F (36.8 °C) (04/07/25 1635)  Pulse: 85 (04/07/25 1635)  Resp: 18 (04/07/25 1635)  BP: 125/78 (04/07/25 1635)  SpO2: 99 % (04/07/25 1635) Vital Signs (24h Range):  Temp:  [97.4 °F (36.3 °C)-98.3 °F (36.8 °C)] 98.2 °F (36.8 °C)  Pulse:  [67-85] 85  Resp:  [18-19] 18  SpO2:  [96 %-99 %] 99 %  BP: (115-146)/(61-90) 125/78     Weight: 104.3 kg (229 lb 15 oz)  Body mass index is 29.52 kg/m².    Intake/Output Summary (Last 24 hours) at 4/7/2025 1711  Last data filed at 4/7/2025 1300  Gross per 24 hour   Intake 1160 ml   Output 200 ml   Net 960 ml         Physical Exam  Constitutional:       Appearance: He is not toxic-appearing or diaphoretic.   HENT:      Head: Normocephalic and atraumatic.      Mouth/Throat:      Pharynx: Oropharynx is clear. No oropharyngeal exudate or posterior oropharyngeal erythema.   Cardiovascular:      Rate and Rhythm: Normal rate and regular rhythm.   Pulmonary:      Breath sounds: Normal breath sounds. No wheezing.   Abdominal:      General: Bowel sounds are normal.      Palpations: Abdomen is soft.   Skin:     Comments: Both feet dressed.   Neurological:      Mental Status: He is oriented to person, place, and time.      Cranial Nerves: No cranial nerve deficit.               Significant Labs: All pertinent labs within the past 24 hours have been reviewed.  BMP:   Recent Labs   Lab 04/07/25 0418      K 5.2*   CL 99   CO2 25   BUN 61*   CREATININE 15.7*   CALCIUM 9.6     CBC:   Recent Labs   Lab 04/07/25 0418   WBC 10.47   HGB 11.0*   HCT 35.2*          Significant Imaging: I have reviewed all pertinent imaging results/findings within  the past 24 hours.

## 2025-04-08 ENCOUNTER — ANESTHESIA EVENT (OUTPATIENT)
Dept: SURGERY | Facility: HOSPITAL | Age: 40
End: 2025-04-08
Payer: MEDICARE

## 2025-04-08 ENCOUNTER — ANESTHESIA (OUTPATIENT)
Dept: SURGERY | Facility: HOSPITAL | Age: 40
End: 2025-04-08
Payer: MEDICARE

## 2025-04-08 LAB
GRAM STN SPEC: NORMAL
GRAM STN SPEC: NORMAL
POCT GLUCOSE: 89 MG/DL (ref 70–110)
POCT GLUCOSE: 90 MG/DL (ref 70–110)
POCT GLUCOSE: 94 MG/DL (ref 70–110)
POCT GLUCOSE: 98 MG/DL (ref 70–110)
POTASSIUM SERPL-SCNC: 5.4 MMOL/L (ref 3.5–5.1)

## 2025-04-08 PROCEDURE — 25000003 PHARM REV CODE 250: Performed by: PODIATRIST

## 2025-04-08 PROCEDURE — 36000706: Performed by: PODIATRIST

## 2025-04-08 PROCEDURE — 36000707: Performed by: PODIATRIST

## 2025-04-08 PROCEDURE — 15275 SKIN SUB GRAFT FACE/NK/HF/G: CPT | Mod: ,,, | Performed by: PODIATRIST

## 2025-04-08 PROCEDURE — 63600175 PHARM REV CODE 636 W HCPCS: Performed by: HOSPITALIST

## 2025-04-08 PROCEDURE — 99231 SBSQ HOSP IP/OBS SF/LOW 25: CPT | Mod: 25,,, | Performed by: PODIATRIST

## 2025-04-08 PROCEDURE — 63600175 PHARM REV CODE 636 W HCPCS: Performed by: NURSE ANESTHETIST, CERTIFIED REGISTERED

## 2025-04-08 PROCEDURE — 0JBR0ZZ EXCISION OF LEFT FOOT SUBCUTANEOUS TISSUE AND FASCIA, OPEN APPROACH: ICD-10-PCS | Performed by: PODIATRIST

## 2025-04-08 PROCEDURE — 87206 SMEAR FLUORESCENT/ACID STAI: CPT | Performed by: PODIATRIST

## 2025-04-08 PROCEDURE — 11000001 HC ACUTE MED/SURG PRIVATE ROOM

## 2025-04-08 PROCEDURE — 0LBV0ZZ EXCISION OF RIGHT FOOT TENDON, OPEN APPROACH: ICD-10-PCS | Performed by: PODIATRIST

## 2025-04-08 PROCEDURE — 25000003 PHARM REV CODE 250: Performed by: STUDENT IN AN ORGANIZED HEALTH CARE EDUCATION/TRAINING PROGRAM

## 2025-04-08 PROCEDURE — 25000003 PHARM REV CODE 250: Performed by: INTERNAL MEDICINE

## 2025-04-08 PROCEDURE — 84132 ASSAY OF SERUM POTASSIUM: CPT | Performed by: ANESTHESIOLOGY

## 2025-04-08 PROCEDURE — 87075 CULTR BACTERIA EXCEPT BLOOD: CPT | Performed by: PODIATRIST

## 2025-04-08 PROCEDURE — 36415 COLL VENOUS BLD VENIPUNCTURE: CPT | Performed by: ANESTHESIOLOGY

## 2025-04-08 PROCEDURE — 87205 SMEAR GRAM STAIN: CPT | Performed by: PODIATRIST

## 2025-04-08 PROCEDURE — 37000008 HC ANESTHESIA 1ST 15 MINUTES: Performed by: PODIATRIST

## 2025-04-08 PROCEDURE — 87070 CULTURE OTHR SPECIMN AEROBIC: CPT | Performed by: PODIATRIST

## 2025-04-08 PROCEDURE — 87102 FUNGUS ISOLATION CULTURE: CPT | Performed by: PODIATRIST

## 2025-04-08 PROCEDURE — 27201423 OPTIME MED/SURG SUP & DEVICES STERILE SUPPLY: Performed by: PODIATRIST

## 2025-04-08 PROCEDURE — 87116 MYCOBACTERIA CULTURE: CPT | Performed by: PODIATRIST

## 2025-04-08 PROCEDURE — 71000033 HC RECOVERY, INTIAL HOUR: Performed by: PODIATRIST

## 2025-04-08 PROCEDURE — 11042 DBRDMT SUBQ TIS 1ST 20SQCM/<: CPT | Mod: XS,,, | Performed by: PODIATRIST

## 2025-04-08 PROCEDURE — 37000009 HC ANESTHESIA EA ADD 15 MINS: Performed by: PODIATRIST

## 2025-04-08 PROCEDURE — 27800903 OPTIME MED/SURG SUP & DEVICES OTHER IMPLANTS: Performed by: PODIATRIST

## 2025-04-08 PROCEDURE — 99232 SBSQ HOSP IP/OBS MODERATE 35: CPT | Mod: ,,, | Performed by: INTERNAL MEDICINE

## 2025-04-08 PROCEDURE — 25000003 PHARM REV CODE 250: Performed by: HOSPITALIST

## 2025-04-08 PROCEDURE — 63600175 PHARM REV CODE 636 W HCPCS: Performed by: PODIATRIST

## 2025-04-08 RX ORDER — LIDOCAINE HYDROCHLORIDE 20 MG/ML
INJECTION INTRAVENOUS
Status: DISCONTINUED | OUTPATIENT
Start: 2025-04-08 | End: 2025-04-08

## 2025-04-08 RX ORDER — ONDANSETRON HYDROCHLORIDE 2 MG/ML
4 INJECTION, SOLUTION INTRAVENOUS DAILY PRN
Status: DISCONTINUED | OUTPATIENT
Start: 2025-04-08 | End: 2025-04-08 | Stop reason: HOSPADM

## 2025-04-08 RX ORDER — GLUCAGON 1 MG
1 KIT INJECTION
Status: DISCONTINUED | OUTPATIENT
Start: 2025-04-08 | End: 2025-04-08 | Stop reason: HOSPADM

## 2025-04-08 RX ORDER — LIDOCAINE HYDROCHLORIDE 20 MG/ML
INJECTION, SOLUTION INFILTRATION; PERINEURAL
Status: DISCONTINUED | OUTPATIENT
Start: 2025-04-08 | End: 2025-04-08 | Stop reason: HOSPADM

## 2025-04-08 RX ORDER — FENTANYL CITRATE 50 UG/ML
INJECTION, SOLUTION INTRAMUSCULAR; INTRAVENOUS
Status: DISCONTINUED | OUTPATIENT
Start: 2025-04-08 | End: 2025-04-08

## 2025-04-08 RX ORDER — BUPIVACAINE HYDROCHLORIDE 5 MG/ML
INJECTION, SOLUTION PERINEURAL
Status: DISCONTINUED | OUTPATIENT
Start: 2025-04-08 | End: 2025-04-08 | Stop reason: HOSPADM

## 2025-04-08 RX ORDER — PROPOFOL 10 MG/ML
VIAL (ML) INTRAVENOUS CONTINUOUS PRN
Status: DISCONTINUED | OUTPATIENT
Start: 2025-04-08 | End: 2025-04-08

## 2025-04-08 RX ORDER — HYDROMORPHONE HYDROCHLORIDE 2 MG/ML
0.2 INJECTION, SOLUTION INTRAMUSCULAR; INTRAVENOUS; SUBCUTANEOUS EVERY 5 MIN PRN
Status: DISCONTINUED | OUTPATIENT
Start: 2025-04-08 | End: 2025-04-08 | Stop reason: HOSPADM

## 2025-04-08 RX ORDER — SODIUM CHLORIDE 0.9 % (FLUSH) 0.9 %
10 SYRINGE (ML) INJECTION
Status: DISCONTINUED | OUTPATIENT
Start: 2025-04-08 | End: 2025-04-08 | Stop reason: HOSPADM

## 2025-04-08 RX ADMIN — CARVEDILOL 6.25 MG: 6.25 TABLET, FILM COATED ORAL at 08:04

## 2025-04-08 RX ADMIN — ACETAMINOPHEN 650 MG: 325 TABLET ORAL at 08:04

## 2025-04-08 RX ADMIN — METRONIDAZOLE 500 MG: 500 TABLET ORAL at 08:04

## 2025-04-08 RX ADMIN — MORPHINE SULFATE 4 MG: 4 INJECTION INTRAVENOUS at 10:04

## 2025-04-08 RX ADMIN — FENTANYL CITRATE 50 MCG: 50 INJECTION, SOLUTION INTRAMUSCULAR; INTRAVENOUS at 01:04

## 2025-04-08 RX ADMIN — OXYCODONE AND ACETAMINOPHEN 1 TABLET: 10; 325 TABLET ORAL at 08:04

## 2025-04-08 RX ADMIN — SEVELAMER CARBONATE 800 MG: 800 TABLET, FILM COATED ORAL at 04:04

## 2025-04-08 RX ADMIN — CEFEPIME 1 G: 1 INJECTION, POWDER, FOR SOLUTION INTRAMUSCULAR; INTRAVENOUS at 04:04

## 2025-04-08 RX ADMIN — PROPOFOL 30 MCG/KG/MIN: 10 INJECTION, EMULSION INTRAVENOUS at 01:04

## 2025-04-08 RX ADMIN — LIDOCAINE HYDROCHLORIDE 100 MG: 20 INJECTION, SOLUTION INTRAVENOUS at 01:04

## 2025-04-08 RX ADMIN — SODIUM CHLORIDE: 9 INJECTION, SOLUTION INTRAVENOUS at 01:04

## 2025-04-08 NOTE — ASSESSMENT & PLAN NOTE
Hyperkalemia is likely due to ESRD.The patients most recent potassium results are listed below.  Recent Labs     04/07/25  0418 04/08/25  1246   K 5.2* 5.4*   He got some doses of Lokelma;will order another dose today  Nephrology consulted for HD.  Continue monitoring

## 2025-04-08 NOTE — ASSESSMENT & PLAN NOTE
Patient presents with ulcers to left foot and right foot stump.  MRI ordered.  No evidence of osteo on right foot stump.  Left foot MRI showing plantar soft tissue ulcer at the level of the 3rd MTP joint with associated osteomyelitis of the distal half of the metatarsal and base of the proximal phalanx.   Started on ABX's  Wound cultures positive for Proteus mirabilis  S/p Underwent bilateral irrigation and debridement by Podiatry  ID following; input appreciated.  Recommending IV Cefepime with HD and oral Flagyl until 5/13.  Will need outpatient ABX's with HD arranged.  Discussed with Podiatry.  Still having some purulent drainage; repeat I and D on 04/08 in the OR

## 2025-04-08 NOTE — NURSING
Report received from rae Glover RN. Patient resting quietly, no apparent distress noted at this time. Wheels locked in lowest position, call light within reach.    [facility] [place]  Nurses Note -- 4 Eyes      4/8/2025       Skin assessed on: Q Shift      [x] No Pressure Injuries Present    [x]Prevention Measures Documented    [] Yes LDA  for Pressure Injury Previously documented     [] Yes New Pressure Injury Discovered   [] LDA for New Pressure Injury Added      Attending RN:  Rosmery Archer LPN     Second RN:  APLHONSE Glover

## 2025-04-08 NOTE — SUBJECTIVE & OBJECTIVE
Interval History: s/p HD yesterday with 1.5L removed. No events overnight. Doing okay this morning. Going back to OR this afternoon.       Review of patient's allergies indicates:  No Known Allergies  Current Facility-Administered Medications   Medication Frequency    acetaminophen tablet 650 mg Q4H PRN    aluminum-magnesium hydroxide-simethicone 200-200-20 mg/5 mL suspension 30 mL QID PRN    benzonatate capsule 100 mg TID PRN    carvediloL tablet 6.25 mg BID    ceFEPIme injection 1 g Daily    dextromethorphan-guaiFENesin  mg/5 ml liquid 5 mL Q6H PRN    dextrose 50% injection 12.5 g PRN    dextrose 50% injection 12.5 g PRN    dextrose 50% injection 25 g PRN    diphenhydrAMINE capsule 25 mg Q6H PRN    glucagon (human recombinant) injection 1 mg PRN    glucagon (human recombinant) injection 1 mg PRN    glucose chewable tablet 16 g PRN    glucose chewable tablet 24 g PRN    heparin (porcine) injection 1,000 Units PRN    HYDROmorphone (PF) injection 0.2 mg Q5 Min PRN    insulin aspart U-100 pen 0-5 Units QID (AC + HS) PRN    melatonin tablet 6 mg Nightly PRN    metroNIDAZOLE tablet 500 mg Q12H    morphine injection 4 mg Q4H PRN    naloxone 0.4 mg/mL injection 0.02 mg PRN    NIFEdipine 24 hr tablet 90 mg Daily    ondansetron injection 4 mg Q6H PRN    ondansetron injection 4 mg Daily PRN    oxyCODONE-acetaminophen  mg per tablet 1 tablet Q4H PRN    senna-docusate 8.6-50 mg per tablet 1 tablet BID PRN    sevelamer carbonate tablet 800 mg TID WM    sodium chloride 0.9% bolus 250 mL 250 mL PRN    sodium chloride 0.9% flush 10 mL PRN       Objective:     Vital Signs (Most Recent):  Temp: 98.2 °F (36.8 °C) (04/08/25 1457)  Pulse: 88 (04/08/25 1515)  Resp: 20 (04/08/25 1515)  BP: 116/71 (04/08/25 1515)  SpO2: 95 % (04/08/25 1515) Vital Signs (24h Range):  Temp:  [98.2 °F (36.8 °C)-98.8 °F (37.1 °C)] 98.2 °F (36.8 °C)  Pulse:  [83-91] 88  Resp:  [13-20] 20  SpO2:  [93 %-99 %] 95 %  BP: (102-141)/(64-86) 116/71      Weight: 104.3 kg (229 lb 15 oz) (04/01/25 2045)  Body mass index is 29.52 kg/m².  Body surface area is 2.33 meters squared.    I/O last 3 completed shifts:  In: 1340 [P.O.:840; Other:500]  Out: 500 [Urine:300; Other:200]     Physical Exam  Vitals and nursing note reviewed.   Constitutional:       General: He is awake. He is not in acute distress.     Appearance: Normal appearance. He is well-developed.   HENT:      Head: Normocephalic and atraumatic.      Nose: Nose normal.      Mouth/Throat:      Mouth: Mucous membranes are moist.   Eyes:      Extraocular Movements: Extraocular movements intact.      Conjunctiva/sclera: Conjunctivae normal.   Cardiovascular:      Rate and Rhythm: Normal rate and regular rhythm.   Pulmonary:      Effort: Pulmonary effort is normal.   Abdominal:      Palpations: Abdomen is soft.   Skin:     General: Skin is warm and dry.      Findings: No erythema or rash.   Neurological:      General: No focal deficit present.      Mental Status: He is alert. Mental status is at baseline.   Psychiatric:         Mood and Affect: Mood normal.         Behavior: Behavior normal.          Significant Labs:  CBC:   Recent Labs   Lab 04/07/25  0418   WBC 10.47   RBC 3.78*   HGB 11.0*   HCT 35.2*      MCV 93   MCH 29.1   MCHC 31.3*     CMP:   Recent Labs   Lab 04/03/25  0618 04/04/25  0745 04/07/25  0418 04/08/25  1246   CALCIUM 9.4   < > 9.6  --    ALBUMIN 2.9*   < > 2.7*  --       < > 136  --    K 4.7   < > 5.2* 5.4*   CO2 21*   < > 25  --       < > 99  --    BUN 41*   < > 61*  --    CREATININE 12.5*   < > 15.7*  --    ALKPHOS 95  --   --   --    ALT 9*  --   --   --    AST 12  --   --   --    BILITOT 0.3  --   --   --     < > = values in this interval not displayed.     All labs within the past 24 hours have been reviewed.     Significant Imaging:  Labs: Reviewed

## 2025-04-08 NOTE — ASSESSMENT & PLAN NOTE
Patient's blood pressure range in the last 24 hours was: BP  Min: 102/64  Max: 141/86.The patient's inpatient anti-hypertensive regimen is listed below:  Current Antihypertensives  , Daily, Oral  NIFEdipine 24 hr tablet 90 mg, Daily, Oral  carvediloL tablet 6.25 mg, 2 times daily, Oral    Plan  - BP is controlled, no changes needed to their regimen  - lisinopril held due to hyperkalemia.  Will restart when clinically indicated

## 2025-04-08 NOTE — ASSESSMENT & PLAN NOTE
Patient's FSGs are controlled on current medication regimen.  Last A1c reviewed-   Lab Results   Component Value Date    HGBA1C 4.9 08/20/2024     Most recent fingerstick glucose reviewed-   Recent Labs   Lab 04/07/25 2012 04/08/25  0756 04/08/25  1144 04/08/25  1503   POCTGLUCOSE 104 94 89 90     Current correctional scale  Low  Maintain anti-hyperglycemic dose as follows-   Antihyperglycemics (From admission, onward)      Start     Stop Route Frequency Ordered    03/31/25 2317  insulin aspart U-100 pen 0-5 Units         -- SubQ Before meals & nightly PRN 03/31/25 2218          Hold Oral hypoglycemics while patient is in the hospital.

## 2025-04-08 NOTE — PROGRESS NOTES
[facility]  Progress Note    Patient Name: Emmanuel Morgan  MRN: [id number]  Patient Class: IP- Inpatient   Admission Date: 3/31/2025  Length of Stay: 8 days  Attending Physician: Tony Sainz MD  Primary Care Provider: Laurence, Primary Doctor        Subjective     Principal Problem:Infected ulcer of skin        HPI:  39-year-old male with ESRD on HD, diabetes not on insulin, prior osteomyelitis requiring right midfoot amputation with a long standing left foot ulcer (months) and a right foot ulcer of his stump which has been present for weeks who was sent to the ER by home health due to progression of wounds with skin and soft tissue infections of possibly both ulcers and possible osteomyelitis, he has been followed up by outpatient wound care agency, and was sent here by home health nurse due to nonhealing of the wound, noted with worsening erythema and purulent discharge.  Missed hemodialysis session on the day of presentation due to presentation in the emergency room.    Overview/Hospital Course:  39-year-old male with ESRD on HD, DM, prior osteomyelitis requiring right midfoot amputation with a long standing left foot ulcer (months) and a right foot ulcer of his stump which has been present for weeks who was sent to the ER by home health due to progression of wounds with skin and soft tissue infections of possibly both ulcers and possible osteomyelitis of either.  MRI of left foot showing plantar soft tissue ulcer at the level of the 3rd MTP joint with associated osteomyelitis of the distal half of the metatarsal and base of the proximal phalanx.  On ABX's and Podiatry consulted. Patient underwent irrigation and debridement of bilateral LE's.  Currently  is coordinating antibiotics with HD unit.  Still having some purulent discharge and may need repeat I&D (bedside vs OR).      Interval History:  No acute event overnight.  No fever noted on chart.  Patient taken to the OR  for irrigation and debridement per Podiatry     Review of Systems  Objective:     Vital Signs (Most Recent):  Temp: 98.2 °F (36.8 °C) (04/08/25 1457)  Pulse: 86 (04/08/25 1457)  Resp: 18 (04/08/25 1457)  BP: 102/64 (04/08/25 1457)  SpO2: 97 % (04/08/25 1457) Vital Signs (24h Range):  Temp:  [98.2 °F (36.8 °C)-98.8 °F (37.1 °C)] 98.2 °F (36.8 °C)  Pulse:  [83-91] 86  Resp:  [18-19] 18  SpO2:  [95 %-99 %] 97 %  BP: (102-141)/(64-86) 102/64     Weight: 104.3 kg (229 lb 15 oz)  Body mass index is 29.52 kg/m².    Intake/Output Summary (Last 24 hours) at 4/8/2025 1505  Last data filed at 4/8/2025 0000  Gross per 24 hour   Intake 300 ml   Output --   Net 300 ml         Physical Exam  Constitutional:       General: He is not in acute distress.     Appearance: He is not ill-appearing, toxic-appearing or diaphoretic.   Cardiovascular:      Rate and Rhythm: Bradycardia present. Rhythm irregular.      Pulses: Normal pulses.      Heart sounds: Normal heart sounds. No murmur heard.     No gallop.   Pulmonary:      Effort: Pulmonary effort is normal.      Breath sounds: Normal breath sounds.   Abdominal:      General: Abdomen is flat. There is no distension.      Palpations: Abdomen is soft. There is mass.   Neurological:      General: No focal deficit present.      Mental Status: He is oriented to person, place, and time.               Significant Labs: CBC:   Recent Labs   Lab 04/07/25  0418   WBC 10.47   HGB 11.0*   HCT 35.2*        CMP:   Recent Labs   Lab 04/07/25  0418 04/08/25  1246     --    K 5.2* 5.4*   CL 99  --    CO2 25  --    BUN 61*  --    CREATININE 15.7*  --    CALCIUM 9.6  --    ALBUMIN 2.7*  --    ANIONGAP 12  --        Significant Imaging: I have reviewed all pertinent imaging results/findings within the past 24 hours.      Assessment & Plan  Infected ulcer of skin  Patient presents with ulcers to left foot and right foot stump.  MRI ordered.  No evidence of osteo on right foot stump.  Left foot  MRI showing plantar soft tissue ulcer at the level of the 3rd MTP joint with associated osteomyelitis of the distal half of the metatarsal and base of the proximal phalanx.   Started on ABX's  Wound cultures positive for Proteus mirabilis  S/p Underwent bilateral irrigation and debridement by Podiatry  ID following; input appreciated.  Recommending IV Cefepime with HD and oral Flagyl until 5/13.  Will need outpatient ABX's with HD arranged.  Discussed with Podiatry.  Still having some purulent drainage; repeat I and D on 04/08 in the OR        End stage renal disease  Creatine stable for now. BMP reviewed- noted Estimated Creatinine Clearance: 8.1 mL/min (A) (based on SCr of 15.7 mg/dL (H)). according to latest data. Based on current GFR, CKD stage is end stage.  Monitor UOP and serial BMP and adjust therapy as needed. Renally dose meds. Avoid nephrotoxic medications and procedures.  Nephrology consulted for HD.  Continue Monday, Wednesday, and Friday hemodialysis schedule.    Essential hypertension  Patient's blood pressure range in the last 24 hours was: BP  Min: 102/64  Max: 141/86.The patient's inpatient anti-hypertensive regimen is listed below:  Current Antihypertensives  , Daily, Oral  NIFEdipine 24 hr tablet 90 mg, Daily, Oral  carvediloL tablet 6.25 mg, 2 times daily, Oral    Plan  - BP is controlled, no changes needed to their regimen  - lisinopril held due to hyperkalemia.  Will restart when clinically indicated  Hyperkalemia  Hyperkalemia is likely due to ESRD.The patients most recent potassium results are listed below.  Recent Labs     04/07/25  0418 04/08/25  1246   K 5.2* 5.4*   He got some doses of Lokelma;will order another dose today  Nephrology consulted for HD.  Continue monitoring          Type 2 diabetes mellitus with foot ulcer  Patient's FSGs are controlled on current medication regimen.  Last A1c reviewed-   Lab Results   Component Value Date    HGBA1C 4.9 08/20/2024     Most recent  fingerstick glucose reviewed-   Recent Labs   Lab 04/07/25  2012 04/08/25  0756 04/08/25  1144 04/08/25  1503   POCTGLUCOSE 104 94 89 90     Current correctional scale  Low  Maintain anti-hyperglycemic dose as follows-   Antihyperglycemics (From admission, onward)      Start     Stop Route Frequency Ordered    03/31/25 2317  insulin aspart U-100 pen 0-5 Units         -- SubQ Before meals & nightly PRN 03/31/25 2218          Hold Oral hypoglycemics while patient is in the hospital.  Foot ulcer with fat layer exposed, unspecified laterality  Podiatry consulted, appreciated recs  Continue current antibiotics, ID consulted.  Continue wound care per podiatrist  Foot infection  See note above    Pyogenic inflammation of bone  Continue antibiotics as described above.    VTE Risk Mitigation (From admission, onward)           Ordered     heparin (porcine) injection 1,000 Units  As needed (PRN)         04/01/25 1827     IP VTE LOW RISK PATIENT  Once         03/31/25 2218     Place sequential compression device  Until discontinued         03/31/25 2218                    Discharge Planning   LULÚ: 4/9/2025     Code Status: Full Code   Medical Readiness for Discharge Date:   Discharge Plan A: Home   Discharge Delays: None known at this time                    Tony Sainz MD  Department of Hospital Medicine   [facility] - Surgery

## 2025-04-08 NOTE — PLAN OF CARE
Problem: Adult Inpatient Plan of Care  Goal: Plan of Care Review  Flowsheets (Taken 4/8/2025 1846)  Plan of Care Reviewed With: patient  Goal: Absence of Hospital-Acquired Illness or Injury  Intervention: Identify and Manage Fall Risk  Flowsheets (Taken 4/8/2025 1846)  Safety Promotion/Fall Prevention:   Fall Risk reviewed with patient/family   patient expresses understanding of fall risk and prevention  Intervention: Prevent Skin Injury  Flowsheets (Taken 4/8/2025 1846)  Body Position: position changed independently  Skin Protection: incontinence pads utilized  Device Skin Pressure Protection: absorbent pad utilized/changed  Intervention: Prevent and Manage VTE (Venous Thromboembolism) Risk  Flowsheets (Taken 4/8/2025 1846)  VTE Prevention/Management: ambulation promoted  Intervention: Prevent Infection  Flowsheets (Taken 4/8/2025 1846)  Infection Prevention: environmental surveillance performed     Problem: Hemodialysis  Goal: Safe, Effective Therapy Delivery  Intervention: Optimize Device Care and Function  Flowsheets (Taken 4/8/2025 1846)  Medication Review/Management: medications reviewed     Problem: Wound  Goal: Optimal Coping  Intervention: Support Patient and Family Response  Flowsheets (Taken 4/8/2025 1846)  Supportive Measures: active listening utilized     Problem: Infection  Goal: Absence of Infection Signs and Symptoms  Intervention: Prevent or Manage Infection  Flowsheets (Taken 4/8/2025 1846)  Infection Management: aseptic technique maintained

## 2025-04-08 NOTE — PROGRESS NOTES
[facility] [place] - Podiatry  Progress Note    Patient Name: Emmanuel Morgan  MRN: [id number]  Admission Date: 3/31/2025  Hospital Length of Stay: 8 days  Attending Physician: Tony Sainz MD  Primary Care Provider: Laurence, Primary Doctor     Subjective:     History of Present Illness: Emmanuel Morgan is a 39 y.o. male with  has a past medical history of Diabetes mellitus, Hypertension, and Renal disorder.  Consult to Podiatry for evaluation and treatment of bilateral foot wounds, most significant to the right Lisfranc stump.  He relates that the left foot wound is chronic in nature and the ulceration to the right foot occurred several weeks ago likely secondary to trauma versus ambulation is inappropriate shoe.  All foot surgeries surgeries done in outside facilities.  He presented to the emergency department on the advice of his home health nurse due to progression of wounds and suspicion of osteomyelitis.      4/2/25: S/p one day I&D B/L per Dr. Faria. Bandage intact no strikethrough noted.    04/04/2025 patient seen at bedside resting comfortably.  By the end of our encounter, his nephew was also present at bedside.  Patient relates that he has occasional discomfort to the right foot especially in the medial midfoot with palpation or any pressure.  He denies any pain to the left foot.  He denies nausea, vomiting, fever, chills.  Patient relates that he is concerned about his chronic but now more persistent cough and would like to discuss this concern further with the primary team.      4/7/25: Patient seen in dialysis. Bandage intact B/L     04/08/2025 patient seen at bedside resting comfortably.  No new pedal complaints     Chief Complaint   Patient presents with    Leg Pain     Sent from [facility] for increased pain to right foot     Scheduled Meds:   carvediloL  6.25 mg Oral BID    ceFEPime IV (PEDS and ADULTS)  1 g Intravenous Daily    metroNIDAZOLE  500 mg Oral Q12H    NIFEdipine  90 mg Oral Daily    sevelamer carbonate   800 mg Oral TID WM     Continuous Infusions:  PRN Meds:  Current Facility-Administered Medications:     acetaminophen, 650 mg, Oral, Q4H PRN    aluminum-magnesium hydroxide-simethicone, 30 mL, Oral, QID PRN    benzonatate, 100 mg, Oral, TID PRN    dextromethorphan-guaiFENesin  mg/5 ml, 5 mL, Oral, Q6H PRN    dextrose 50%, 12.5 g, Intravenous, PRN    dextrose 50%, 25 g, Intravenous, PRN    diphenhydrAMINE, 25 mg, Oral, Q6H PRN    glucagon (human recombinant), 1 mg, Intramuscular, PRN    glucose, 16 g, Oral, PRN    glucose, 24 g, Oral, PRN    heparin (porcine), 1,000 Units, Intra-Catheter, PRN    insulin aspart U-100, 0-5 Units, Subcutaneous, QID (AC + HS) PRN    melatonin, 6 mg, Oral, Nightly PRN    morphine, 4 mg, Intravenous, Q4H PRN    naloxone, 0.02 mg, Intravenous, PRN    ondansetron, 4 mg, Intravenous, Q6H PRN    oxyCODONE-acetaminophen, 1 tablet, Oral, Q4H PRN    senna-docusate, 1 tablet, Oral, BID PRN    sodium chloride 0.9%, 250 mL, Intravenous, PRN    Review of patient's allergies indicates:  No Known Allergies     Past Medical History:   Diagnosis Date    Diabetes mellitus     Hypertension     Renal disorder      Past Surgical History:   Procedure Laterality Date    FOOT AMPUTATION Right     IRRIGATION AND DEBRIDEMENT Bilateral 4/1/2025    Procedure: IRRIGATION AND DEBRIDEMENT;  Surgeon: Bety Faria DPM;  Location: [facility];  Service: Podiatry;  Laterality: Bilateral;  need jamshidi needle available       Family History    None       Tobacco Use    Smoking status: Never    Smokeless tobacco: Never   Substance and Sexual Activity    Alcohol use: Never    Drug use: Never    Sexual activity: Not on file     Review of Systems   Constitutional:  Negative for appetite change, chills, fatigue and fever.   Respiratory:  Positive for cough. Negative for shortness of breath.    Cardiovascular:  Negative for chest pain and leg swelling.   Gastrointestinal:  Negative for diarrhea, nausea and vomiting.    Musculoskeletal:  Positive for arthralgias and myalgias.   Skin:  Positive for color change and wound.   Neurological:  Positive for numbness. Negative for weakness.        + paresthesia      Objective:     Vital Signs (Most Recent):  Temp: 98.4 °F (36.9 °C) (04/08/25 0001)  Pulse: 90 (04/08/25 0001)  Resp: 18 (04/08/25 0001)  BP: 128/81 (04/08/25 0001)  SpO2: 95 % (04/08/25 0001) Vital Signs (24h Range):  Temp:  [98.1 °F (36.7 °C)-98.8 °F (37.1 °C)] 98.4 °F (36.9 °C)  Pulse:  [67-91] 90  Resp:  [18-19] 18  SpO2:  [95 %-99 %] 95 %  BP: (125-146)/(61-90) 128/81     Weight: 104.3 kg (229 lb 15 oz)  Body mass index is 29.52 kg/m².    Physical Exam  Vitals and nursing note reviewed.   Constitutional:       General: He is not in acute distress.     Appearance: He is well-developed. He is not toxic-appearing or diaphoretic.      Comments: alert and oriented x 3.    Cardiovascular:      Pulses:           Dorsalis pedis pulses are 1+ on the right side and 1+ on the left side.        Posterior tibial pulses are 1+ on the left side.   Pulmonary:      Effort: No respiratory distress.   Musculoskeletal:      Right ankle: No tenderness. No lateral malleolus, medial malleolus, AITF ligament, CF ligament or posterior TF ligament tenderness.      Right Achilles Tendon: No defects. Clements's test negative.      Left ankle: No tenderness. No lateral malleolus, medial malleolus, AITF ligament, CF ligament or posterior TF ligament tenderness.      Left Achilles Tendon: No defects. Clements's test negative.      Right foot: Swelling and tenderness present. No bony tenderness.      Left foot: Deformity (Appearance of short 4th metatarsal secondary to previous surgery.) and tenderness present. No bony tenderness.      Comments: Muscle strength is 5/5 in all groups bilaterally.              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 (midfoot amp)  Feet:      Right foot:      Skin integrity: Ulcer present.      Left foot:  "     Skin integrity: Ulcer present.   Lymphadenopathy:      Comments: No lymphatic streaking     Skin:     General: Skin is warm and dry.      Coloration: Skin is not pale.      Findings: No rash.      Nails: There is no clubbing.   Neurological:      Sensory: No sensory deficit.      Motor: No atrophy.      Comments: Light touch present     Psychiatric:         Attention and Perception: He is attentive.         Mood and Affect: Mood is not anxious. Affect is not inappropriate.         Speech: He is communicative. Speech is not slurred.         Behavior: Behavior is not combative.       4/7/25:        04/04/2025     Stable ulceration sub 3rd metatarsophalangeal joint of the left foot without acute signs of infection but with deep probe to bone      Plantar right foot ulcer with significant purulent drainage particularly when milking from the medial midfoot.  Palpation to the medial midfoot is significantly tender.        Post wound debridement with opening of the tract between the 2 plantar wounds            4/2/25:  No purulent drainage noted.             04/01/2025    Right plantar lateral midfoot with periwound bulla formation and localized edema and erythema.  Exquisitely tender on palpation.  Fibro necrotic wound bed         Left sub 3rd metatarsophalangeal joint with deep probe to bone, fibrogranular wound bed with periwound callus.          Laboratory:  A1C: No results for input(s): "HGBA1C" in the last 4320 hours.  CBC:   Recent Labs   Lab 04/07/25  0418   WBC 10.47   RBC 3.78*   HGB 11.0*   HCT 35.2*      MCV 93   MCH 29.1   MCHC 31.3*     CMP:   Recent Labs   Lab 04/03/25  0618 04/04/25  0745 04/07/25  0418   CALCIUM 9.4   < > 9.6   ALBUMIN 2.9*   < > 2.7*      < > 136   K 4.7   < > 5.2*   CO2 21*   < > 25      < > 99   BUN 41*   < > 61*   CREATININE 12.5*   < > 15.7*   ALKPHOS 95  --   --    ALT 9*  --   --    AST 12  --   --    BILITOT 0.3  --   --     < > = values in this interval not " "displayed.     CRP:   No results for input(s): "CRP" in the last 168 hours.    ESR:   No results for input(s): "SEDRATE" in the last 168 hours.    Microbiology Results (last 7 days)       Procedure Component Value Units Date/Time    Aerobic culture [[id number]] Collected: 04/01/25 1621    Order Status: Completed Specimen: Biopsy from Foot, Left Updated: 04/07/25 0647     CULTURE, AEROBIC No Growth    Blood Culture #1 **CANNOT BE ORDERED STAT** [[id number]]  (Normal) Collected: 03/31/25 1827    Order Status: Completed Specimen: Blood from Peripheral, Antecubital, Left Updated: 04/05/25 2002     Blood Culture No Growth After 5 Days    Blood Culture #2 **CANNOT BE ORDERED STAT** [[id number]]  (Normal) Collected: 03/31/25 1845    Order Status: Completed Specimen: Blood from Peripheral, Hand, Left Updated: 04/05/25 2002     Blood Culture No Growth After 5 Days    Culture, Anaerobe [[id number]] Collected: 04/01/25 1621    Order Status: Completed Specimen: Biopsy from Foot, Left Updated: 04/05/25 0710     Anaerobe Culture No Anaerobes Isolated    Culture, Anaerobe [[id number]] Collected: 04/01/25 1557    Order Status: Completed Specimen: Wound from Foot, Right Updated: 04/05/25 0710     Anaerobe Culture No Anaerobes Isolated    Afb Culture Stain [[id number]] Collected: 04/01/25 1621    Order Status: Completed Specimen: Biopsy from Foot, Left Updated: 04/04/25 1542     ACID FAST STAIN  No acid fast bacilli seen    AFB Culture & Smear [[id number]] Collected: 04/01/25 1621    Order Status: Completed Specimen: Biopsy from Foot, Left Updated: 04/04/25 1542     CULTURE, AFB  Culture In Progress    AFB Culture & Smear [[id number]] Collected: 04/01/25 1557    Order Status: Completed Specimen: Wound from Foot, Right Updated: 04/03/25 0805     CULTURE, AFB  Culture In Progress    Aerobic culture [[id number]]  (Abnormal)  (Susceptibility) Collected: 04/01/25 1557    Order Status: Completed Specimen: Wound from Foot, Right Updated: " 04/03/25 0539     CULTURE, AEROBIC Moderate Proteus mirabilis    Afb Culture Stain [[id number]] Collected: 04/01/25 1557    Order Status: Resulted Specimen: Wound from Foot, Right Updated: 04/02/25 1614    Gram stain [[id number]] Collected: 04/01/25 1621    Order Status: Completed Specimen: Biopsy from Foot, Left Updated: 04/02/25 1132     GRAM STAIN Rare WBC seen      No organisms seen    Gram stain [[id number]] Collected: 04/01/25 1557    Order Status: Completed Specimen: Wound from Foot, Right Updated: 04/02/25 1127     GRAM STAIN Many WBC seen      Moderate Gram negative diplococci      Few Gram Negative Rods      Rare Gram positive cocci    Fungus culture [[id number]] Collected: 04/01/25 1621    Order Status: Sent Specimen: Biopsy from Foot, Left Updated: 04/01/25 1654    Fungus culture [[id number]] Collected: 04/01/25 1557    Order Status: Sent Specimen: Wound from Foot, Right Updated: 04/01/25 1653            Diagnostic Results:  Imaging Results               MRI Hindfoot WO Contrast LT (Final result)  Result time 04/01/25 07:47:56      Final result by Seamus Hays MD (04/01/25 07:47:56)                   Impression:      1. Plantar soft tissue ulcer at the level of the 3rd MTP joint with associated osteomyelitis of the distal half of the metatarsal and base of the proximal phalanx.  No abscess.  2. Sequela of chronic osteomyelitis involving the 1st, 2nd and 5th metatarsals and proximal phalanges.  3. Postoperative change of transmetatarsal amputation of the 4th digit.  This report was flagged in Epic as abnormal.      Electronically signed by: Seamus Hays MD  Date:    04/01/2025  Time:    07:47               Narrative:    EXAMINATION:  MRI HINDFOOT WO CONTRAST LT; MRI FOREFOOT WO CONTRAST LT    CLINICAL HISTORY:  b/l ulcers concern for osteo;; b/l ulcers. Concern for osteo;    TECHNIQUE:  Routine MRI evaluation of the left ankle and forefoot performed without contrast.    COMPARISON:  Radiographs  09/01/2024.    FINDINGS:  Examination degraded by patient motion artifact.    BONES: Postsurgical change of transmetatarsal amputation of the 4th digit.  Osseous erosive changes of the 3rd metatarsal head and proximal phalanx base with associated confluent marrow edema involving the distal half of the metatarsal and proximal half of the proximal phalanx.  There is corresponding T1 medullary hypointensity throughout the distal half of the metatarsal and base of the proximal phalanx.  Chronic osseous erosive changes of the 1st, 2nd and 5th metatarsal heads and proximal phalanges with grossly preserved marrow signal intensity.  Solid periosteal reaction throughout the 2nd metatarsal shaft.  Linear area of edema signal at the posterior subchondral region of the tibial plafond, favored to be degenerative in nature.  No definite acute fractures.  Remote healed fracture of the lateral malleolus.  No avascular necrosis.  No marrow infiltrative process.    JOINTS: 3rd MTP complex joint effusion with surrounding synovitis.  No dislocation.    LIGAMENTS: Chronic sprains of the anterior talofibular, superficial deltoid and deep deltoid ligaments.  Lisfranc ligament appears intact.    TENDONS: Ulceration and disruption of the 3rd flexor tendon at the level of the MTP joint.  Chronic ulceration of the 1st, 2nd and 5th flexor tendons at the level of the MTP joints.  Postoperative changes of the 4th flexor tendon.  Remaining tendons appear grossly intact.    MUSCLES: Edema and severe fatty degeneration of the visualized musculature.  No intramuscular fluid collection.    MISCELLANEOUS: Plantar soft tissue ulcer at the level of the 3rd MTP joint with adjacent fluid and surrounding subcutaneous edema.  No focal fluid collection.  Probable adventitial bursa at the plantar aspect of the hallux MTP joint.  Focal area of signal hypointensity at the level of the heel pad, possibly sequela of scarring.                                         MRI Forefoot WO Contrast LT (Final result)  Result time 04/01/25 07:47:56   Procedure changed from MRI Foot Toes WO Contrast DANY     Final result by Seamus Hays MD (04/01/25 07:47:56)                   Impression:      1. Plantar soft tissue ulcer at the level of the 3rd MTP joint with associated osteomyelitis of the distal half of the metatarsal and base of the proximal phalanx.  No abscess.  2. Sequela of chronic osteomyelitis involving the 1st, 2nd and 5th metatarsals and proximal phalanges.  3. Postoperative change of transmetatarsal amputation of the 4th digit.  This report was flagged in Epic as abnormal.      Electronically signed by: Seamus Hays MD  Date:    04/01/2025  Time:    07:47               Narrative:    EXAMINATION:  MRI HINDFOOT WO CONTRAST LT; MRI FOREFOOT WO CONTRAST LT    CLINICAL HISTORY:  b/l ulcers concern for osteo;; b/l ulcers. Concern for osteo;    TECHNIQUE:  Routine MRI evaluation of the left ankle and forefoot performed without contrast.    COMPARISON:  Radiographs 09/01/2024.    FINDINGS:  Examination degraded by patient motion artifact.    BONES: Postsurgical change of transmetatarsal amputation of the 4th digit.  Osseous erosive changes of the 3rd metatarsal head and proximal phalanx base with associated confluent marrow edema involving the distal half of the metatarsal and proximal half of the proximal phalanx.  There is corresponding T1 medullary hypointensity throughout the distal half of the metatarsal and base of the proximal phalanx.  Chronic osseous erosive changes of the 1st, 2nd and 5th metatarsal heads and proximal phalanges with grossly preserved marrow signal intensity.  Solid periosteal reaction throughout the 2nd metatarsal shaft.  Linear area of edema signal at the posterior subchondral region of the tibial plafond, favored to be degenerative in nature.  No definite acute fractures.  Remote healed fracture of the lateral malleolus.  No avascular necrosis.   No marrow infiltrative process.    JOINTS: 3rd MTP complex joint effusion with surrounding synovitis.  No dislocation.    LIGAMENTS: Chronic sprains of the anterior talofibular, superficial deltoid and deep deltoid ligaments.  Lisfranc ligament appears intact.    TENDONS: Ulceration and disruption of the 3rd flexor tendon at the level of the MTP joint.  Chronic ulceration of the 1st, 2nd and 5th flexor tendons at the level of the MTP joints.  Postoperative changes of the 4th flexor tendon.  Remaining tendons appear grossly intact.    MUSCLES: Edema and severe fatty degeneration of the visualized musculature.  No intramuscular fluid collection.    MISCELLANEOUS: Plantar soft tissue ulcer at the level of the 3rd MTP joint with adjacent fluid and surrounding subcutaneous edema.  No focal fluid collection.  Probable adventitial bursa at the plantar aspect of the hallux MTP joint.  Focal area of signal hypointensity at the level of the heel pad, possibly sequela of scarring.                                       MRI Hindfoot WO Contrast RT (Final result)  Result time 04/01/25 07:19:00      Final result by Seamus Hays MD (04/01/25 07:19:00)                   Impression:      1. Plantar soft tissue ulcer at the level of the calcaneocuboid joint with surrounding subcutaneous edema and adjacent blistering.  No osteomyelitis.  No abscess.  2. Advanced neuropathic arthropathy of the tibiotalar, subtalar and residual midtarsal joints.      Electronically signed by: Seamus Hays MD  Date:    04/01/2025  Time:    07:19               Narrative:    EXAMINATION:  MRI HINDFOOT WO CONTRAST RT    CLINICAL HISTORY:  Foot swelling, diabetic, osteomyelitis suspected, xray done;    TECHNIQUE:  Routine MRI evaluation of the right ankle performed without contrast.    COMPARISON:  Radiographs 03/31/2025.    FINDINGS:  BONES/JOINTS: Postsurgical changes of midfoot amputation.  Chronic osseous erosive changes centered about the  tibiotalar, subtalar and residual midtarsal joints, likely sequela of chronic neuropathic arthropathy.  Intraosseous cyst at the lateral aspect of the distal tibia.  Circumferential solid periosteal reaction about the distal tibia and distal fibula.  No marrow signal abnormality to suggest an infiltrative process.  No significant joint effusion.  No dislocation.    TENDONS: Achilles tendon demonstrates normal morphology and signal intensity.  Residual posterior tibial, flexor digitorum longus, flexor hallucis longus, peroneus longus, peroneus brevis and extensor tendons are intact.    MUSCLES: Edema and fatty degeneration of the visualized musculature.  No intramuscular fluid collection.    MISCELLANEOUS: Plantar soft tissue ulcer at the level of the calcaneocuboid joint with involvement of the plantar aponeurosis, surrounding subcutaneous edema and adjacent blistering.  No fluid collection.                                       US Lower Extremity Arteries Right (Final result)  Result time 03/31/25 21:32:36      Final result by Michelle Leon MD (03/31/25 21:32:36)                   Impression:      Normal triphasic waveforms throughout the right lower extremity.  No occlusion or stenosis detected.      Electronically signed by: Michelle Leon  Date:    03/31/2025  Time:    21:32               Narrative:    EXAMINATION:  US LOWER EXTREMITY ARTERIES RIGHT    CLINICAL HISTORY:  Unspecified open wound, right foot, subsequent encounter    TECHNIQUE:  Duplex and color flow Doppler evaluation and graded compression of the right lower extremity arteries was performed.    COMPARISON:  None    FINDINGS:  Peak systolic velocities in the right lower extremity arteries (cm/sec):    CFA: 127, triphasic    DFA: 74, triphasic    Proximal SFA: 103, triphasic    Mid SFA: 120, triphasic    Distal SFA: 109, triphasic    Proximal pop: 84, triphasic    Distal pop: 98, triphasic    LAUREL: 80, triphasic    PTA: 86,  triphasic    Peroneal: 111, triphasic    DPA: 75, triphasic                                       X-Ray Foot Complete Right (Final result)  Result time 03/31/25 13:14:02      Final result by Alli Henriquez MD (03/31/25 13:14:02)                   Impression:      Please see above.      Electronically signed by: Alli Henriquez  Date:    03/31/2025  Time:    13:14               Narrative:    EXAMINATION:  XR FOOT COMPLETE 3 VIEW RIGHT    CLINICAL HISTORY:  . Pain in unspecified foot    TECHNIQUE:  AP, lateral, and oblique views of the right foot were performed.    COMPARISON:  None.    FINDINGS:  Extensive postoperative change including amputation to the level of the midfoot structures.  Remainder of the visualized osseous structures of the mid and hindfoot demonstrate diffuse degenerative change in osteopenia with surrounding diffuse soft tissue swelling.  Additional diffuse osseous destruction and degenerative change about the partially visualized ankle.  Scattered vascular calcification as well as plantar calcaneal spur.  No obvious significant osseous erosive change, noting limitations from postoperative change and surrounding soft tissue swelling.  If there is continued clinical concern for osteomyelitis, consider MRI of the foot for further evaluation.                                       X-Ray Chest AP Portable (Final result)  Result time 03/31/25 13:15:24      Final result by Alli Henriquez MD (03/31/25 13:15:24)                   Impression:      Please see above.      Electronically signed by: Alli Henriquez  Date:    03/31/2025  Time:    13:15               Narrative:    EXAMINATION:  XR CHEST AP PORTABLE    CLINICAL HISTORY:  Chronic cough    TECHNIQUE:  Single frontal view of the chest was performed.    COMPARISON:  Chest radiograph 09/01/2024    FINDINGS:  Left-sided central venous catheter with tip overlying the in expected region of the cavoatrial junction.  Cardiomediastinal silhouette is unchanged in size.   Patient is rotated, limiting evaluation of the mediastinal structures.    Chronic right effusion with probable superimposed volume loss about the right lung base.  Superimposed infectious or non infectious inflammatory infiltrate cannot be excluded.  Remainder of the aerated portions of the lungs are clear.  No pneumothorax.    Osseous structures demonstrate no evidence for acute fracture or osseous destructive lesion.  Soft tissues are unremarkable.                                      Assessment/Plan:     Active Diagnoses:    Diagnosis Date Noted POA    PRINCIPAL PROBLEM:  Infected ulcer of skin [L98.499, L08.9] 03/31/2025 Yes    Pyogenic inflammation of bone [M86.9] 04/03/2025 Yes    Foot infection [L08.9] 04/02/2025 Yes    Foot ulcer with fat layer exposed, unspecified laterality [L97.502] 09/01/2024 Yes    Type 2 diabetes mellitus with foot ulcer [E11.621, L97.509] 06/25/2024 Yes    End stage renal disease [N18.6] 03/21/2024 Yes    Hyperkalemia [E87.5] 07/19/2021 Yes     Chronic    Essential hypertension [I10] 01/27/2021 Yes      Problems Resolved During this Admission:       Education about the prevention of limb loss.      S/p  irrigation and debridement of both feet DOS: 4/1/25.    Discussed wound healing cycle, skin integrity, ways to care for skin.Counseled patient on the effects of biomechanical pressure and deformity as well as blood glucose on healing. He verbalizes understanding that it can increase the chances of delayed healing and this prolonged exposure leads to infection or progression of infection which subsequently can result in loss of limb.    OR washout this afternoon. NPO     DSD applied to right foot    Abx per ID    F/u WB wound care upon discharge    We will continue to follow and work in partnership with treatment team on how to best treat patient concern and diagnosis.      Bety Faria DPM  Podiatry  [facility] - [place] - Emergency Dept

## 2025-04-08 NOTE — TRANSFER OF CARE
"Anesthesia Transfer of Care Note    Patient: Emmanuel Morgan    Procedure(s) Performed: Procedure(s) (LRB):  IRRIGATION AND DEBRIDEMENT (Bilateral)    Patient location: PACU    Anesthesia Type: MAC    Transport from OR: Transported from OR on room air with adequate spontaneous ventilation    Post assessment: no apparent anesthetic complications and tolerated procedure well    Post vital signs: stable    Level of consciousness: awake    Nausea/Vomiting: no nausea/vomiting    Complications: none    Transfer of care protocol was followed      Last vitals: Visit Vitals  /64   Pulse 86   Temp 36.8 °C (98.2 °F)   Resp 18   Ht 6' 2" (1.88 m)   Wt 104.3 kg (229 lb 15 oz)   SpO2 97%   BMI 29.52 kg/m²     "

## 2025-04-08 NOTE — NURSING
Patient back to unit via bed. Patient accompanied by transport. NAD, no complaints of pain. Bilateral dressings assessed, clean dry intact. Patient reoriented to room, bed in lowest position, bed alarm on,call light within reach. Will continue to monitor patient.

## 2025-04-08 NOTE — ANESTHESIA PREPROCEDURE EVALUATION
04/08/2025  Emmanuel Morgan is a 39 y.o., male.      Pre-op Assessment    I have reviewed the Patient Summary Reports.     I have reviewed the Nursing Notes.       Review of Systems  Anesthesia Hx:  No problems with previous Anesthesia             Denies Family Hx of Anesthesia complications.    Denies Personal Hx of Anesthesia complications.                    Social:  Non-Smoker       Cardiovascular:     Hypertension       CHF        03/2024 Echo: EF 40-45%                           Pulmonary:  Pulmonary Normal                       Renal/:  Chronic Renal Disease, ESRD, Dialysis   Right arm AVF; left chest tunneled catheter    Last HD 04/07             Hepatic/GI:  Hepatic/GI Normal                    Neurological:  Neurology Normal                                      Endocrine:  Diabetes               Physical Exam  General: Well nourished, Cooperative, Alert and Oriented    Airway:  Mallampati: II   Mouth Opening: Normal  TM Distance: 4 - 6 cm  Tongue: Normal  Neck ROM: Normal ROM    Dental:    Chest/Lungs:  Normal Respiratory Rate, Clear to auscultation    Heart:  Rate: Normal  Rhythm: Regular Rhythm      Wt Readings from Last 3 Encounters:   04/01/25 104.3 kg (229 lb 15 oz)   09/01/24 96.2 kg (212 lb 1.3 oz)     Temp Readings from Last 3 Encounters:   04/08/25 36.9 °C (98.4 °F) (Oral)   09/02/24 36.7 °C (98 °F)     BP Readings from Last 3 Encounters:   04/08/25 128/85   09/02/24 127/79     Pulse Readings from Last 3 Encounters:   04/08/25 83   09/02/24 87     Lab Results   Component Value Date    WBC 10.47 04/07/2025    HGB 11.0 (L) 04/07/2025    HCT 35.2 (L) 04/07/2025    MCV 93 04/07/2025     04/07/2025       CMP  Sodium   Date Value Ref Range Status   04/07/2025 136 136 - 145 mmol/L Final   09/02/2024 135 (L) 136 - 145 mmol/L Final     Potassium   Date Value Ref Range Status   04/08/2025  5.4 (H) 3.5 - 5.1 mmol/L Final   11/18/2024 5.3 (H) 3.6 - 5.2 mmol/L Final   09/02/2024 6.1 (H) 3.5 - 5.1 mmol/L Final     Comment:     No visible hemolysis     Chloride   Date Value Ref Range Status   04/07/2025 99 95 - 110 mmol/L Final   09/02/2024 97 95 - 110 mmol/L Final     CO2   Date Value Ref Range Status   04/07/2025 25 23 - 29 mmol/L Final   09/02/2024 26 23 - 29 mmol/L Final     Glucose   Date Value Ref Range Status   09/02/2024 83 70 - 110 mg/dL Final     BUN   Date Value Ref Range Status   04/07/2025 61 (H) 6 - 20 mg/dL Final     Creatinine   Date Value Ref Range Status   04/07/2025 15.7 (H) 0.5 - 1.4 mg/dL Final     Calcium   Date Value Ref Range Status   04/07/2025 9.6 8.7 - 10.5 mg/dL Final   09/02/2024 8.8 8.7 - 10.5 mg/dL Final     Total Protein   Date Value Ref Range Status   09/02/2024 7.4 6.0 - 8.4 g/dL Final     Albumin   Date Value Ref Range Status   04/07/2025 2.7 (L) 3.5 - 5.2 g/dL Final   09/02/2024 3.3 (L) 3.5 - 5.2 g/dL Final     Total Bilirubin   Date Value Ref Range Status   09/02/2024 0.6 0.1 - 1.0 mg/dL Final     Comment:     For infants and newborns, interpretation of results should be based  on gestational age, weight and in agreement with clinical  observations.    Premature Infant recommended reference ranges:  Up to 24 hours.............<8.0 mg/dL  Up to 48 hours............<12.0 mg/dL  3-5 days..................<15.0 mg/dL  6-29 days.................<15.0 mg/dL       Bilirubin Total   Date Value Ref Range Status   04/03/2025 0.3 0.1 - 1.0 mg/dL Final     Comment:     For infants and newborns, interpretation of results should be based   on gestational age, weight and in agreement with clinical   observations.    Premature Infant recommended reference ranges:   0-24 hours:  <8.0 mg/dL   24-48 hours: <12.0 mg/dL   3-5 days:    <15.0 mg/dL   6-29 days:   <15.0 mg/dL     Alkaline Phosphatase   Date Value Ref Range Status   09/02/2024 51 (L) 55 - 135 U/L Final     ALP   Date Value Ref  Range Status   04/03/2025 95 40 - 150 unit/L Final     AST   Date Value Ref Range Status   04/03/2025 12 11 - 45 unit/L Final   09/02/2024 14 10 - 40 U/L Final     ALT   Date Value Ref Range Status   04/03/2025 9 (L) 10 - 44 unit/L Final   09/02/2024 18 10 - 44 U/L Final     Anion Gap   Date Value Ref Range Status   04/07/2025 12 8 - 16 mmol/L Final     eGFR   Date Value Ref Range Status   04/07/2025 4 (L) >60 mL/min/1.73/m2 Final     Comment:     Estimated GFR calculated using the CKD-EPI creatinine (2021) equation.   09/02/2024 5 (A) >60 mL/min/1.73 m^2 Final         Anesthesia Plan  Type of Anesthesia, risks & benefits discussed:    Anesthesia Type: MAC, Gen Natural Airway, Gen ETT, Gen Supraglottic Airway  Intra-op Monitoring Plan: Standard ASA Monitors  Post Op Pain Control Plan: multimodal analgesia and IV/PO Opioids PRN  Induction:  IV  Informed Consent: Informed consent signed with the Patient and all parties understand the risks and agree with anesthesia plan.  All questions answered.   ASA Score: 3  Day of Surgery Review of History & Physical: H&P Update referred to the surgeon/provider.    Ready For Surgery From Anesthesia Perspective.     .

## 2025-04-08 NOTE — SUBJECTIVE & OBJECTIVE
Interval History:  No acute event overnight.  No fever noted on chart.  Patient taken to the OR for irrigation and debridement per Podiatry     Review of Systems  Objective:     Vital Signs (Most Recent):  Temp: 98.2 °F (36.8 °C) (04/08/25 1457)  Pulse: 86 (04/08/25 1457)  Resp: 18 (04/08/25 1457)  BP: 102/64 (04/08/25 1457)  SpO2: 97 % (04/08/25 1457) Vital Signs (24h Range):  Temp:  [98.2 °F (36.8 °C)-98.8 °F (37.1 °C)] 98.2 °F (36.8 °C)  Pulse:  [83-91] 86  Resp:  [18-19] 18  SpO2:  [95 %-99 %] 97 %  BP: (102-141)/(64-86) 102/64     Weight: 104.3 kg (229 lb 15 oz)  Body mass index is 29.52 kg/m².    Intake/Output Summary (Last 24 hours) at 4/8/2025 1505  Last data filed at 4/8/2025 0000  Gross per 24 hour   Intake 300 ml   Output --   Net 300 ml         Physical Exam  Constitutional:       General: He is not in acute distress.     Appearance: He is not ill-appearing, toxic-appearing or diaphoretic.   Cardiovascular:      Rate and Rhythm: Bradycardia present. Rhythm irregular.      Pulses: Normal pulses.      Heart sounds: Normal heart sounds. No murmur heard.     No gallop.   Pulmonary:      Effort: Pulmonary effort is normal.      Breath sounds: Normal breath sounds.   Abdominal:      General: Abdomen is flat. There is no distension.      Palpations: Abdomen is soft. There is mass.   Neurological:      General: No focal deficit present.      Mental Status: He is oriented to person, place, and time.               Significant Labs: CBC:   Recent Labs   Lab 04/07/25 0418   WBC 10.47   HGB 11.0*   HCT 35.2*        CMP:   Recent Labs   Lab 04/07/25 0418 04/08/25  1246     --    K 5.2* 5.4*   CL 99  --    CO2 25  --    BUN 61*  --    CREATININE 15.7*  --    CALCIUM 9.6  --    ALBUMIN 2.7*  --    ANIONGAP 12  --        Significant Imaging: I have reviewed all pertinent imaging results/findings within the past 24 hours.

## 2025-04-08 NOTE — PLAN OF CARE
Patient to Return to floor with orders to ice and elevate surgical limb for the next 24 hours.      He will be nonweightbearing.  When patient does require transfer, he  should have CAM boot to limb with pressure concentrated to the heel.     He will elevate his feet and rest.  Podiatry will round on him tomorrow to change dressing to the right foot.  Left foot dressing is to remain intact for 1 week.

## 2025-04-08 NOTE — PROGRESS NOTES
[facility]  Nephrology  Progress Note    Patient Name: Emmanuel Morgan  MRN: [id number]  Admission Date: 3/31/2025  Hospital Length of Stay: 8 days  Attending Provider: Tony Sainz MD   Primary Care Physician: Laurence, Primary Doctor  Principal Problem:Infected ulcer of skin    Subjective:     HPI: 39 year old man with a history of ESRD on HD, DM, hx of osteomyelitis following with wound care presents to the ED at wound care request for evaluation for possible debridement. Podiatry consulted.    Outpatient ESRD  Hemodialysis  Outpatient nephrologist: Dr. Wade  Outpatient center:  [facility]  Dialysis schedule: MWF  Last treatment: 3/28/2025  Dry weight: 99kg  Access: right forearm AVF      Interval History: s/p HD yesterday with 1.5L removed. No events overnight. Doing okay this morning. Going back to OR this afternoon.       Review of patient's allergies indicates:  No Known Allergies  Current Facility-Administered Medications   Medication Frequency    acetaminophen tablet 650 mg Q4H PRN    aluminum-magnesium hydroxide-simethicone 200-200-20 mg/5 mL suspension 30 mL QID PRN    benzonatate capsule 100 mg TID PRN    carvediloL tablet 6.25 mg BID    ceFEPIme injection 1 g Daily    dextromethorphan-guaiFENesin  mg/5 ml liquid 5 mL Q6H PRN    dextrose 50% injection 12.5 g PRN    dextrose 50% injection 12.5 g PRN    dextrose 50% injection 25 g PRN    diphenhydrAMINE capsule 25 mg Q6H PRN    glucagon (human recombinant) injection 1 mg PRN    glucagon (human recombinant) injection 1 mg PRN    glucose chewable tablet 16 g PRN    glucose chewable tablet 24 g PRN    heparin (porcine) injection 1,000 Units PRN    HYDROmorphone (PF) injection 0.2 mg Q5 Min PRN    insulin aspart U-100 pen 0-5 Units QID (AC + HS) PRN    melatonin tablet 6 mg Nightly PRN    metroNIDAZOLE tablet 500 mg Q12H    morphine injection 4 mg Q4H PRN    naloxone 0.4 mg/mL injection 0.02 mg PRN    NIFEdipine 24 hr tablet 90 mg Daily     ondansetron injection 4 mg Q6H PRN    ondansetron injection 4 mg Daily PRN    oxyCODONE-acetaminophen  mg per tablet 1 tablet Q4H PRN    senna-docusate 8.6-50 mg per tablet 1 tablet BID PRN    sevelamer carbonate tablet 800 mg TID WM    sodium chloride 0.9% bolus 250 mL 250 mL PRN    sodium chloride 0.9% flush 10 mL PRN       Objective:     Vital Signs (Most Recent):  Temp: 98.2 °F (36.8 °C) (04/08/25 1457)  Pulse: 88 (04/08/25 1515)  Resp: 20 (04/08/25 1515)  BP: 116/71 (04/08/25 1515)  SpO2: 95 % (04/08/25 1515) Vital Signs (24h Range):  Temp:  [98.2 °F (36.8 °C)-98.8 °F (37.1 °C)] 98.2 °F (36.8 °C)  Pulse:  [83-91] 88  Resp:  [13-20] 20  SpO2:  [93 %-99 %] 95 %  BP: (102-141)/(64-86) 116/71     Weight: 104.3 kg (229 lb 15 oz) (04/01/25 2045)  Body mass index is 29.52 kg/m².  Body surface area is 2.33 meters squared.    I/O last 3 completed shifts:  In: 1340 [P.O.:840; Other:500]  Out: 500 [Urine:300; Other:200]     Physical Exam  Vitals and nursing note reviewed.   Constitutional:       General: He is awake. He is not in acute distress.     Appearance: Normal appearance. He is well-developed.   HENT:      Head: Normocephalic and atraumatic.      Nose: Nose normal.      Mouth/Throat:      Mouth: Mucous membranes are moist.   Eyes:      Extraocular Movements: Extraocular movements intact.      Conjunctiva/sclera: Conjunctivae normal.   Cardiovascular:      Rate and Rhythm: Normal rate and regular rhythm.   Pulmonary:      Effort: Pulmonary effort is normal.   Abdominal:      Palpations: Abdomen is soft.   Skin:     General: Skin is warm and dry.      Findings: No erythema or rash.   Neurological:      General: No focal deficit present.      Mental Status: He is alert. Mental status is at baseline.   Psychiatric:         Mood and Affect: Mood normal.         Behavior: Behavior normal.          Significant Labs:  CBC:   Recent Labs   Lab 04/07/25  0418   WBC 10.47   RBC 3.78*   HGB 11.0*   HCT 35.2*       MCV 93   MCH 29.1   MCHC 31.3*     CMP:   Recent Labs   Lab 04/03/25  0618 04/04/25  0745 04/07/25  0418 04/08/25  1246   CALCIUM 9.4   < > 9.6  --    ALBUMIN 2.9*   < > 2.7*  --       < > 136  --    K 4.7   < > 5.2* 5.4*   CO2 21*   < > 25  --       < > 99  --    BUN 41*   < > 61*  --    CREATININE 12.5*   < > 15.7*  --    ALKPHOS 95  --   --   --    ALT 9*  --   --   --    AST 12  --   --   --    BILITOT 0.3  --   --   --     < > = values in this interval not displayed.     All labs within the past 24 hours have been reviewed.     Significant Imaging:  Labs: Reviewed    Assessment/Plan:     ESRD  - receives HD MWF at [facility] under the c/o Dr. Wade  - planning for HD tomorrow; continue HD MWF  - labs MWF    Anemia of CKD  - last hgb 11.0; at goal  - no need for KIKI at this time  - will repeat in AM    Secondary HPTH  - CCa elevated; phos controlled  - continue sevelamer  - encourage mobilization    Hyperkalemia  - K 5.4 this AM; acceptable  - HD tomorrow      Thank you for your consult. I will follow-up with patient. Please contact us if you have any additional questions.    Laurie Root MD  Nephrology  [facility] - Surgery

## 2025-04-08 NOTE — OP NOTE
Wound Debridement,   Bilateral Foot    Surgery Date: 4/8/2025     Surgeons and Role:     * Bety Faria DPM - Primary    Assisting Surgeon: None    Pre-op Diagnosis:  Foot infection [L08.9]    Post-op Diagnosis:  Post-Op Diagnosis Codes:     * Open wound of foot, right, subsequent encounter [S91.301D]     * Foot ulcer with fat layer exposed, unspecified laterality [L97.502]    Procedure(s) (LRB):  IRRIGATION AND DEBRIDEMENT (Bilateral)    Anesthesia: Local MAC    Description of the findings of the procedure: active purulent drainage to the the plantar right foot with medial tract and deep probe to bone of left foot    Estimated Blood Loss: less than 20 mL         Specimens:   Specimen (24h ago, onward)      None            Hemostasis: Pneumatic ankle tourniquets, not inflated     Procedure in Detail:  The patient was seen in the Holding Room. The risks, benefits, complications, treatment options, and expected outcomes were discussed with the patient. The risks and potential complications of their problem and purposed treatment include but are not limited to infection, nerve injury, vascular injury, pain, potential skin necrosis, deep vein thrombosis, possible pulmonary embolus, complications of the anesthetics and need for further amputation.  The patient concurred with the proposed plan, giving informed consent.  The site of surgery properly noted/marked. The patient was taken to Operating Room #1 identified as Emmanuel Morgan and the procedure verified as irrigation and debridement of both feet. A Time Out was held and the above information confirmed.    The patient was brought to the operating room, placed on the operating table in a supine position. Following the successful induction of anesthesia, a total of 10 ccs to the left and 20 ccs to the right of a 1:1 mixture of 2% Lidocaine plain and 0.5% Bupivicaine plain was injected as an ankle block to the right foot and as a forefoot block to the left foot.  The feet were then scrubbed, prepped, and draped in the usual aseptic manner.        Wound Debridement    Date/Time: 4/8/2025 2:57 PM    Performed by: Bety Faria DPM  Authorized by: Bety Faria DPM      Wound Details:    Location:  Right foot    Type of Debridement:  Excisional       Length (cm):  7.1       Width (cm):  3.5       Depth (cm):  1.2       Area (sq cm):  19.52       Percent Debrided (%):  100       Total Area Debrided (sq cm):  19.52    Depth of debridement:  Muscle/fascia/tendon    Tissue debrided:  Dermis, Epidermis, Fascia and Subcutaneous    Devitalized tissue debrided:  Biofilm, Callus, Slough, Necrotic/Eschar and Fibrin    Instruments:  Curette, Scissors, Nippers and Rongeur  Bleeding:  Moderate    Additional wounds:  1    2nd Wound Details:     Location:  Left foot    Type of Debridement:  Excisional       Length (cm):  1.5       Area (sq cm):  1.18       Width (cm):  1       Percent Debrided (%):  100       Depth (cm):  1.1       Total Area Debrided (sq cm):  1.18    Depth of debridement:  Subcutaneous tissue    Tissue debrided:  Epidermis, Dermis and Subcutaneous    Devitalized tissue debrided:  Callus and Fibrin    Instruments:  Curette, Scissors and Forceps  Bleeding:  Minimal     Significant purulent drainage to wound of the plantar right foot which increased with palpation of the medial midfoot where an area of fluctuance noted and a 2.1 incision made through skin and soft tissue to allow for drainage of purulence from this site as well.        Attention was directed sub 3rd MTPJ of the left foot where a full thickness ulceration with probe to bone noted.  A Jamshidi needle was utilized to take a plug of bone for culture and pathology. No purulent drainage or abscess was found.       Next,  2 L of betadine impregnated sterile saline were instilled into the right foot wound, 1 L into the left foot wound.    A 2.5 x 2.5 Restrata fenestrated and then was cut to pack and then cut to  fit the plantar left foot ulcer.  Secured in place with 3-0 Prolene and covered by Adaptic and further secured with Steri-Strips.      The patient tolerated the above anesthesia and surgery without apparent complications. A standard postoperative dressing was applied consisting of betadine soaked adaptic, gauze, 4x4s, Kerlix, Cast padding and coban to each foot. The patient was transported via cart to Postanesthesia Care Unit with vital signs able and vascular status intact to foot. He will be readmitted to the floor where we will continue to follow . Plan is to continue the antibiotics until further ID recommendations.    He will be nonweightbearing.  He will elevate his feet and rest.  Podiatry will round on him tomorrow to change dressing to the right foot.  Left foot dressing is to remain intact for 1 week.           Implants:  2.5 x 2.5 cm Restrata           Complications:  None; patient tolerated the procedure well.           Disposition: PACU - hemodynamically stable. Then back to floor           Condition: stable

## 2025-04-08 NOTE — NURSING
PER handoff given by Rosmery BARRETT      Pt resting in bed quietly. NAD noted. No c/o pain.     Fall and safety precautions maintained. Bed alarm activated and audible.. Bed locked in lowest position, with side rails up x2. Call bell and personal items within reach    [facility]  Nurses Note -- 4 Eyes      4/7/2025       Skin assessed on: Q Shift      [x] No Pressure Injuries Present    [x]Prevention Measures Documented    [] Yes LDA  for Pressure Injury Previously documented     [] Yes New Pressure Injury Discovered   [] LDA for New Pressure Injury Added      Attending RN:  Belen Mariano, ALPHONSE     Second RN:  Rosmery Archer LPN

## 2025-04-08 NOTE — PT/OT/SLP PROGRESS
Physical Therapy      Patient Name:  Emmanuel Morgan   MRN:  [id number]    Patient not seen today secondary to (Hold PT pulsavac wound care to R foot today. Per Podiatry, pt scheduled for R foot I+D in OR today.). Will follow-up tomorrow pending Podiatry's recommendation.

## 2025-04-09 LAB
ABSOLUTE EOSINOPHIL (OHS): 0.36 K/UL
ABSOLUTE MONOCYTE (OHS): 2.79 K/UL (ref 0.3–1)
ABSOLUTE NEUTROPHIL COUNT (OHS): 8.12 K/UL (ref 1.8–7.7)
ACID FAST MOD KINY STN SPEC: NORMAL
ALBUMIN SERPL BCP-MCNC: 2.6 G/DL (ref 3.5–5.2)
ANION GAP (OHS): 11 MMOL/L (ref 8–16)
BASOPHILS # BLD AUTO: 0.06 K/UL
BASOPHILS NFR BLD AUTO: 0.4 %
BUN SERPL-MCNC: 57 MG/DL (ref 6–20)
CALCIUM SERPL-MCNC: 9.5 MG/DL (ref 8.7–10.5)
CHLORIDE SERPL-SCNC: 99 MMOL/L (ref 95–110)
CO2 SERPL-SCNC: 24 MMOL/L (ref 23–29)
CREAT SERPL-MCNC: 14.1 MG/DL (ref 0.5–1.4)
ERYTHROCYTE [DISTWIDTH] IN BLOOD BY AUTOMATED COUNT: 16.8 % (ref 11.5–14.5)
GFR SERPLBLD CREATININE-BSD FMLA CKD-EPI: 4 ML/MIN/1.73/M2
GLUCOSE SERPL-MCNC: 96 MG/DL (ref 70–110)
HCT VFR BLD AUTO: 30.1 % (ref 40–54)
HGB BLD-MCNC: 9.4 GM/DL (ref 14–18)
IMM GRANULOCYTES # BLD AUTO: 0.4 K/UL (ref 0–0.04)
IMM GRANULOCYTES NFR BLD AUTO: 2.6 % (ref 0–0.5)
LYMPHOCYTES # BLD AUTO: 3.44 K/UL (ref 1–4.8)
MCH RBC QN AUTO: 29.1 PG (ref 27–31)
MCHC RBC AUTO-ENTMCNC: 31.2 G/DL (ref 32–36)
MCV RBC AUTO: 93 FL (ref 82–98)
NUCLEATED RBC (/100WBC) (OHS): 0 /100 WBC
PHOSPHATE SERPL-MCNC: 3.3 MG/DL (ref 2.7–4.5)
PLATELET # BLD AUTO: 286 K/UL (ref 150–450)
PMV BLD AUTO: 9.5 FL (ref 9.2–12.9)
POTASSIUM SERPL-SCNC: 5.2 MMOL/L (ref 3.5–5.1)
RBC # BLD AUTO: 3.23 M/UL (ref 4.6–6.2)
RELATIVE EOSINOPHIL (OHS): 2.4 %
RELATIVE LYMPHOCYTE (OHS): 22.7 % (ref 18–48)
RELATIVE MONOCYTE (OHS): 18.4 % (ref 4–15)
RELATIVE NEUTROPHIL (OHS): 53.5 % (ref 38–73)
SODIUM SERPL-SCNC: 134 MMOL/L (ref 136–145)
W HEPATITIS B SURFACE ANTIBODY, QUALITATIVE: NEGATIVE
W HEPATITIS B SURFACE ANTIBODY, QUANTITATIVE: 6 MIU/ML
WBC # BLD AUTO: 15.17 K/UL (ref 3.9–12.7)

## 2025-04-09 PROCEDURE — 63600175 PHARM REV CODE 636 W HCPCS: Performed by: PODIATRIST

## 2025-04-09 PROCEDURE — 80069 RENAL FUNCTION PANEL: CPT | Performed by: INTERNAL MEDICINE

## 2025-04-09 PROCEDURE — 63600175 PHARM REV CODE 636 W HCPCS: Mod: JZ,TB | Performed by: PHYSICIAN ASSISTANT

## 2025-04-09 PROCEDURE — 25000003 PHARM REV CODE 250: Performed by: PODIATRIST

## 2025-04-09 PROCEDURE — 99232 SBSQ HOSP IP/OBS MODERATE 35: CPT | Mod: ,,, | Performed by: PODIATRIST

## 2025-04-09 PROCEDURE — 80100016 HC MAINTENANCE HEMODIALYSIS

## 2025-04-09 PROCEDURE — 99232 SBSQ HOSP IP/OBS MODERATE 35: CPT | Mod: ,,, | Performed by: PHYSICIAN ASSISTANT

## 2025-04-09 PROCEDURE — 11000001 HC ACUTE MED/SURG PRIVATE ROOM

## 2025-04-09 PROCEDURE — 85025 COMPLETE CBC W/AUTO DIFF WBC: CPT | Performed by: INTERNAL MEDICINE

## 2025-04-09 PROCEDURE — 36415 COLL VENOUS BLD VENIPUNCTURE: CPT | Performed by: INTERNAL MEDICINE

## 2025-04-09 RX ADMIN — DIPHENHYDRAMINE HYDROCHLORIDE 25 MG: 25 CAPSULE ORAL at 09:04

## 2025-04-09 RX ADMIN — CARVEDILOL 6.25 MG: 6.25 TABLET, FILM COATED ORAL at 08:04

## 2025-04-09 RX ADMIN — EPOETIN ALFA-EPBX 10000 UNITS: 10000 INJECTION, SOLUTION INTRAVENOUS; SUBCUTANEOUS at 06:04

## 2025-04-09 RX ADMIN — METRONIDAZOLE 500 MG: 500 TABLET ORAL at 08:04

## 2025-04-09 RX ADMIN — Medication 6 MG: at 09:04

## 2025-04-09 RX ADMIN — NIFEDIPINE 90 MG: 30 TABLET, FILM COATED, EXTENDED RELEASE ORAL at 08:04

## 2025-04-09 RX ADMIN — METRONIDAZOLE 500 MG: 500 TABLET ORAL at 09:04

## 2025-04-09 RX ADMIN — SEVELAMER CARBONATE 800 MG: 800 TABLET, FILM COATED ORAL at 12:04

## 2025-04-09 RX ADMIN — SEVELAMER CARBONATE 800 MG: 800 TABLET, FILM COATED ORAL at 08:04

## 2025-04-09 RX ADMIN — OXYCODONE AND ACETAMINOPHEN 1 TABLET: 10; 325 TABLET ORAL at 09:04

## 2025-04-09 RX ADMIN — CARVEDILOL 6.25 MG: 6.25 TABLET, FILM COATED ORAL at 09:04

## 2025-04-09 RX ADMIN — CEFEPIME 1 G: 1 INJECTION, POWDER, FOR SOLUTION INTRAMUSCULAR; INTRAVENOUS at 09:04

## 2025-04-09 RX ADMIN — OXYCODONE AND ACETAMINOPHEN 1 TABLET: 10; 325 TABLET ORAL at 12:04

## 2025-04-09 RX ADMIN — HEPARIN SODIUM 1000 UNITS: 1000 INJECTION INTRAVENOUS; SUBCUTANEOUS at 06:04

## 2025-04-09 NOTE — SUBJECTIVE & OBJECTIVE
Interval History: NAEON. Normal appetite, no complaints.    Review of patient's allergies indicates:  No Known Allergies  Current Facility-Administered Medications   Medication Frequency    acetaminophen tablet 650 mg Q4H PRN    aluminum-magnesium hydroxide-simethicone 200-200-20 mg/5 mL suspension 30 mL QID PRN    benzonatate capsule 100 mg TID PRN    carvediloL tablet 6.25 mg BID    ceFEPIme injection 1 g Daily    dextromethorphan-guaiFENesin  mg/5 ml liquid 5 mL Q6H PRN    dextrose 50% injection 12.5 g PRN    dextrose 50% injection 25 g PRN    diphenhydrAMINE capsule 25 mg Q6H PRN    epoetin beto-epbx injection 10,000 Units Once    glucagon (human recombinant) injection 1 mg PRN    glucose chewable tablet 16 g PRN    glucose chewable tablet 24 g PRN    heparin (porcine) injection 1,000 Units PRN    insulin aspart U-100 pen 0-5 Units QID (AC + HS) PRN    melatonin tablet 6 mg Nightly PRN    metroNIDAZOLE tablet 500 mg Q12H    morphine injection 4 mg Q4H PRN    naloxone 0.4 mg/mL injection 0.02 mg PRN    NIFEdipine 24 hr tablet 90 mg Daily    ondansetron injection 4 mg Q6H PRN    oxyCODONE-acetaminophen  mg per tablet 1 tablet Q4H PRN    senna-docusate 8.6-50 mg per tablet 1 tablet BID PRN    sevelamer carbonate tablet 800 mg TID WM    sodium chloride 0.9% bolus 250 mL 250 mL PRN       Objective:     Vital Signs (Most Recent):  Temp: 98.6 °F (37 °C) (04/09/25 1113)  Pulse: 88 (04/09/25 1113)  Resp: 18 (04/09/25 1239)  BP: 134/76 (04/09/25 1113)  SpO2: 95 % (04/09/25 1113) Vital Signs (24h Range):  Temp:  [98.4 °F (36.9 °C)-100.8 °F (38.2 °C)] 98.6 °F (37 °C)  Pulse:  [] 88  Resp:  [18-20] 18  SpO2:  [95 %-99 %] 95 %  BP: (125-160)/(58-89) 134/76     Weight: 104.3 kg (229 lb 15 oz) (04/01/25 2045)  Body mass index is 29.52 kg/m².  Body surface area is 2.33 meters squared.    I/O last 3 completed shifts:  In: 540 [P.O.:540]  Out: -      Physical Exam  Vitals and nursing note reviewed.    Constitutional:       Appearance: Normal appearance.   HENT:      Head: Normocephalic.      Mouth/Throat:      Mouth: Mucous membranes are moist.   Eyes:      Extraocular Movements: Extraocular movements intact.      Conjunctiva/sclera: Conjunctivae normal.      Pupils: Pupils are equal, round, and reactive to light.   Cardiovascular:      Rate and Rhythm: Normal rate.   Pulmonary:      Effort: Pulmonary effort is normal.      Breath sounds: Normal breath sounds.   Abdominal:      Palpations: Abdomen is soft.   Musculoskeletal:         General: Normal range of motion.      Cervical back: Normal range of motion.      Right lower leg: No edema.      Left lower leg: No edema.   Skin:     General: Skin is warm.   Neurological:      General: No focal deficit present.      Mental Status: He is alert.   Psychiatric:         Mood and Affect: Mood normal.          Significant Labs:  CBC:   Recent Labs   Lab 04/09/25  0451   WBC 15.17*   RBC 3.23*   HGB 9.4*   HCT 30.1*      MCV 93   MCH 29.1   MCHC 31.2*     CMP:   Recent Labs   Lab 04/03/25  0618 04/04/25  0745 04/09/25  0451   CALCIUM 9.4   < > 9.5   ALBUMIN 2.9*   < > 2.6*      < > 134*   K 4.7   < > 5.2*   CO2 21*   < > 24      < > 99   BUN 41*   < > 57*   CREATININE 12.5*   < > 14.1*   ALKPHOS 95  --   --    ALT 9*  --   --    AST 12  --   --    BILITOT 0.3  --   --     < > = values in this interval not displayed.     All labs within the past 24 hours have been reviewed.     Significant Imaging:  Labs: Reviewed

## 2025-04-09 NOTE — PROGRESS NOTES
[facility] [place] [facility]  [facility]  Progress Note    Patient Name: Emmanuel Morgan  MRN: [id number]  Admission Date: 3/31/2025  Hospital Length of Stay: 9 days  Attending Provider: Tony Sainz MD   Primary Care Physician: Laurence, Primary Doctor  Principal Problem:Infected ulcer of skin    Subjective:     HPI: 39 year old man with a history of ESRD on HD, DM, hx of osteomyelitis following with wound care presents to the ED at wound care request for evaluation for possible debridement. Podiatry consulted.    Outpatient ESRD  Hemodialysis  Outpatient nephrologist: Dr. Wade  Outpatient center:  [facility]  Dialysis schedule: MWF  Last treatment: 3/28/2025  Dry weight: 99kg  Access: right forearm AVF      Interval History: NAEON. Normal appetite, no complaints.    Review of patient's allergies indicates:  No Known Allergies  Current Facility-Administered Medications   Medication Frequency    acetaminophen tablet 650 mg Q4H PRN    aluminum-magnesium hydroxide-simethicone 200-200-20 mg/5 mL suspension 30 mL QID PRN    benzonatate capsule 100 mg TID PRN    carvediloL tablet 6.25 mg BID    ceFEPIme injection 1 g Daily    dextromethorphan-guaiFENesin  mg/5 ml liquid 5 mL Q6H PRN    dextrose 50% injection 12.5 g PRN    dextrose 50% injection 25 g PRN    diphenhydrAMINE capsule 25 mg Q6H PRN    epoetin beto-epbx injection 10,000 Units Once    glucagon (human recombinant) injection 1 mg PRN    glucose chewable tablet 16 g PRN    glucose chewable tablet 24 g PRN    heparin (porcine) injection 1,000 Units PRN    insulin aspart U-100 pen 0-5 Units QID (AC + HS) PRN    melatonin tablet 6 mg Nightly PRN    metroNIDAZOLE tablet 500 mg Q12H    morphine injection 4 mg Q4H PRN    naloxone 0.4 mg/mL injection 0.02 mg PRN    NIFEdipine 24 hr tablet 90 mg Daily    ondansetron injection 4 mg Q6H PRN    oxyCODONE-acetaminophen  mg per tablet 1 tablet Q4H PRN    senna-docusate 8.6-50 mg per tablet 1 tablet BID PRN     sevelamer carbonate tablet 800 mg TID WM    sodium chloride 0.9% bolus 250 mL 250 mL PRN       Objective:     Vital Signs (Most Recent):  Temp: 98.6 °F (37 °C) (04/09/25 1113)  Pulse: 88 (04/09/25 1113)  Resp: 18 (04/09/25 1239)  BP: 134/76 (04/09/25 1113)  SpO2: 95 % (04/09/25 1113) Vital Signs (24h Range):  Temp:  [98.4 °F (36.9 °C)-100.8 °F (38.2 °C)] 98.6 °F (37 °C)  Pulse:  [] 88  Resp:  [18-20] 18  SpO2:  [95 %-99 %] 95 %  BP: (125-160)/(58-89) 134/76     Weight: 104.3 kg (229 lb 15 oz) (04/01/25 2045)  Body mass index is 29.52 kg/m².  Body surface area is 2.33 meters squared.    I/O last 3 completed shifts:  In: 540 [P.O.:540]  Out: -      Physical Exam  Vitals and nursing note reviewed.   Constitutional:       Appearance: Normal appearance.   HENT:      Head: Normocephalic.      Mouth/Throat:      Mouth: Mucous membranes are moist.   Eyes:      Extraocular Movements: Extraocular movements intact.      Conjunctiva/sclera: Conjunctivae normal.      Pupils: Pupils are equal, round, and reactive to light.   Cardiovascular:      Rate and Rhythm: Normal rate.   Pulmonary:      Effort: Pulmonary effort is normal.      Breath sounds: Normal breath sounds.   Abdominal:      Palpations: Abdomen is soft.   Musculoskeletal:         General: Normal range of motion.      Cervical back: Normal range of motion.      Right lower leg: No edema.      Left lower leg: No edema.   Skin:     General: Skin is warm.   Neurological:      General: No focal deficit present.      Mental Status: He is alert.   Psychiatric:         Mood and Affect: Mood normal.          Significant Labs:  CBC:   Recent Labs   Lab 04/09/25  0451   WBC 15.17*   RBC 3.23*   HGB 9.4*   HCT 30.1*      MCV 93   MCH 29.1   MCHC 31.2*     CMP:   Recent Labs   Lab 04/03/25  0618 04/04/25  0745 04/09/25  0451   CALCIUM 9.4   < > 9.5   ALBUMIN 2.9*   < > 2.6*      < > 134*   K 4.7   < > 5.2*   CO2 21*   < > 24      < > 99   BUN 41*   < > 57*    CREATININE 12.5*   < > 14.1*   ALKPHOS 95  --   --    ALT 9*  --   --    AST 12  --   --    BILITOT 0.3  --   --     < > = values in this interval not displayed.     All labs within the past 24 hours have been reviewed.     Significant Imaging:  Labs: Reviewed    Assessment/Plan:     ESRD  - receives HD MWF at [facility] under the c/o Dr. Wade  - HD today; UF 1.5L  - labs MWF     Anemia of CKD  - last hgb 9.4; below goal  - KIKI with HD  - will trend     Secondary HPTH  - CCa 10.6 elevated; phos controlled  - continue sevelamer  - encourage mobilization     Hyperkalemia  - K 5.2 this AM;   - HD above      Thank you for your consult. I will follow-up with patient. Please contact us if you have any additional questions.    DUANE Del Real  Nephrology  [facility] [place] - Telemetry

## 2025-04-09 NOTE — PT/OT/SLP PROGRESS
Physical Therapy      Patient Name:  Emmanuel Morgan   MRN:  [id number]    Patient not seen today for PT pulsavac wound care secondary to (Per conversation with Dr. Brown, will restart pulsavac wound care to R foot tomorrow. R foot dressing change already performed by Podiatry today.). Will follow-up tomorrow.

## 2025-04-09 NOTE — ASSESSMENT & PLAN NOTE
Patient's blood pressure range in the last 24 hours was: BP  Min: 116/71  Max: 160/89.The patient's inpatient anti-hypertensive regimen is listed below:  Current Antihypertensives  , Daily, Oral  NIFEdipine 24 hr tablet 90 mg, Daily, Oral  carvediloL tablet 6.25 mg, 2 times daily, Oral    Plan  - BP is controlled, no changes needed to their regimen  - lisinopril held due to hyperkalemia.  Will restart when clinically indicated

## 2025-04-09 NOTE — ANESTHESIA POSTPROCEDURE EVALUATION
Anesthesia Post Evaluation    Patient: Emmanuel Morgan    Procedure(s) Performed: Procedure(s) (LRB):  IRRIGATION AND DEBRIDEMENT (Bilateral)    Final Anesthesia Type: MAC      Patient location during evaluation: PACU  Patient participation: Yes- Able to Participate  Level of consciousness: awake and alert  Post-procedure vital signs: reviewed and stable  Pain management: adequate  Airway patency: patent    PONV status at discharge: No PONV  Anesthetic complications: no      Cardiovascular status: hemodynamically stable  Respiratory status: unassisted and spontaneous ventilation  Hydration status: euvolemic  Follow-up not needed.              Vitals Value Taken Time   /74 04/09/25 07:42   Temp 36.9 °C (98.5 °F) 04/09/25 07:42   Pulse 80 04/09/25 07:42   Resp 18 04/09/25 07:42   SpO2 97 % 04/09/25 07:42         Event Time   Out of Recovery 04/08/2025 15:24:00         Pain/Rodger Score: Pain Rating Prior to Med Admin: 9 (4/8/2025 10:12 PM)  Rodger Score: 10 (4/8/2025  3:15 PM)

## 2025-04-09 NOTE — ASSESSMENT & PLAN NOTE
Creatine stable for now. BMP reviewed- noted Estimated Creatinine Clearance: 9 mL/min (A) (based on SCr of 14.1 mg/dL (H)). according to latest data. Based on current GFR, CKD stage is end stage.  Monitor UOP and serial BMP and adjust therapy as needed. Renally dose meds. Avoid nephrotoxic medications and procedures.  Nephrology consulted for HD.  Continue Monday, Wednesday, and Friday hemodialysis schedule.

## 2025-04-09 NOTE — PROGRESS NOTES
[facility]  Nurses Note -- 4 Eyes      4/9/2025       Skin assessed on: Q Shift      [x] No Pressure Injuries Present    []Prevention Measures Documented    [] Yes LDA  for Pressure Injury Previously documented     [] Yes New Pressure Injury Discovered   [] LDA for New Pressure Injury Added      Attending RN:  Saurav Redd RN     Second RN:  Corinna Olvera RN

## 2025-04-09 NOTE — PROGRESS NOTES
[facility] Medicine  Progress Note    Patient Name: Emmanuel Morgan  MRN: [id number]  Patient Class: IP- Inpatient   Admission Date: 3/31/2025  Length of Stay: 9 days  Attending Physician: Tony Sainz MD  Primary Care Provider: Laurence, Primary Doctor        Subjective     Principal Problem:Infected ulcer of skin        HPI:  39-year-old male with ESRD on HD, diabetes not on insulin, prior osteomyelitis requiring right midfoot amputation with a long standing left foot ulcer (months) and a right foot ulcer of his stump which has been present for weeks who was sent to the ER by home health due to progression of wounds with skin and soft tissue infections of possibly both ulcers and possible osteomyelitis, he has been followed up by outpatient wound care agency, and was sent here by home health nurse due to nonhealing of the wound, noted with worsening erythema and purulent discharge.  Missed hemodialysis session on the day of presentation due to presentation in the emergency room.    Overview/Hospital Course:  39-year-old male with ESRD on HD, DM, prior osteomyelitis requiring right midfoot amputation with a long standing left foot ulcer (months) and a right foot ulcer of his stump which has been present for weeks who was sent to the ER by home health due to progression of wounds with skin and soft tissue infections of possibly both ulcers and possible osteomyelitis of either.  MRI of left foot showing plantar soft tissue ulcer at the level of the 3rd MTP joint with associated osteomyelitis of the distal half of the metatarsal and base of the proximal phalanx.  On ABX's and Podiatry consulted. Patient underwent irrigation and debridement of bilateral LE's.  Currently  is coordinating antibiotics with HD unit.  Still having some purulent discharge and may need repeat I&D (bedside vs OR).      Interval History:  No acute event overnight.  Patient seen in bed this morning.  Complaint of  some nausea with associated vomiting.  Denied fever, chest pain, abdominal pain, nausea or diarrhea.  PT to start Pulsavac wound care to right foot on 04/10.     Review of Systems   Constitutional:  Negative for fatigue and fever.   Gastrointestinal:  Positive for nausea and vomiting.     Objective:     Vital Signs (Most Recent):  Temp: 98.6 °F (37 °C) (04/09/25 1113)  Pulse: 88 (04/09/25 1113)  Resp: 18 (04/09/25 1239)  BP: 134/76 (04/09/25 1113)  SpO2: 95 % (04/09/25 1113) Vital Signs (24h Range):  Temp:  [98.4 °F (36.9 °C)-100.8 °F (38.2 °C)] 98.6 °F (37 °C)  Pulse:  [] 88  Resp:  [18-20] 18  SpO2:  [95 %-99 %] 95 %  BP: (116-160)/(58-89) 134/76     Weight: 104.3 kg (229 lb 15 oz)  Body mass index is 29.52 kg/m².    Intake/Output Summary (Last 24 hours) at 4/9/2025 1500  Last data filed at 4/9/2025 0446  Gross per 24 hour   Intake 240 ml   Output --   Net 240 ml         Physical Exam  Constitutional:       General: He is not in acute distress.     Appearance: He is not ill-appearing, toxic-appearing or diaphoretic.   Cardiovascular:      Rate and Rhythm: Normal rate and regular rhythm.      Heart sounds: Normal heart sounds. No murmur heard.     No friction rub. No gallop.   Pulmonary:      Effort: Pulmonary effort is normal.      Breath sounds: Normal breath sounds.   Abdominal:      General: Bowel sounds are normal. There is no distension.      Palpations: Abdomen is soft. There is no mass.   Musculoskeletal:      Comments: Clean surgical dressing right lower extremity               Significant Labs: CBC:   Recent Labs   Lab 04/09/25  0451   WBC 15.17*   HGB 9.4*   HCT 30.1*        CMP:   Recent Labs   Lab 04/08/25  1246 04/09/25  0451   NA  --  134*   K 5.4* 5.2*   CL  --  99   CO2  --  24   BUN  --  57*   CREATININE  --  14.1*   CALCIUM  --  9.5   ALBUMIN  --  2.6*   ANIONGAP  --  11       Significant Imaging: I have reviewed all pertinent imaging results/findings within the past 24  hours.      Assessment & Plan  Infected ulcer of skin  Patient presents with ulcers to left foot and right foot stump.  MRI ordered.  No evidence of osteo on right foot stump.  Left foot MRI showing plantar soft tissue ulcer at the level of the 3rd MTP joint with associated osteomyelitis of the distal half of the metatarsal and base of the proximal phalanx.   Started on ABX's  Wound cultures positive for Proteus mirabilis  S/p Underwent bilateral irrigation and debridement by Podiatry  ID following; input appreciated.  Recommending IV Cefepime with HD and oral Flagyl until 5/13.  Will need outpatient ABX's with HD arranged.  Discussed with Podiatry.  Still having some purulent drainage; repeat I and D on 04/08 in the OR        End stage renal disease  Creatine stable for now. BMP reviewed- noted Estimated Creatinine Clearance: 9 mL/min (A) (based on SCr of 14.1 mg/dL (H)). according to latest data. Based on current GFR, CKD stage is end stage.  Monitor UOP and serial BMP and adjust therapy as needed. Renally dose meds. Avoid nephrotoxic medications and procedures.  Nephrology consulted for HD.  Continue Monday, Wednesday, and Friday hemodialysis schedule.    Essential hypertension  Patient's blood pressure range in the last 24 hours was: BP  Min: 116/71  Max: 160/89.The patient's inpatient anti-hypertensive regimen is listed below:  Current Antihypertensives  , Daily, Oral  NIFEdipine 24 hr tablet 90 mg, Daily, Oral  carvediloL tablet 6.25 mg, 2 times daily, Oral    Plan  - BP is controlled, no changes needed to their regimen  - lisinopril held due to hyperkalemia.  Will restart when clinically indicated  Hyperkalemia  Hyperkalemia is likely due to ESRD.The patients most recent potassium results are listed below.  Recent Labs     04/07/25  0418 04/08/25  1246 04/09/25  0451   K 5.2* 5.4* 5.2*   He got some doses of Lokelma;will order another dose today  Nephrology consulted for HD.  Continue monitoring          Type  2 diabetes mellitus with foot ulcer  Patient's FSGs are controlled on current medication regimen.  Last A1c reviewed-   Lab Results   Component Value Date    HGBA1C 4.9 08/20/2024     Most recent fingerstick glucose reviewed-   Recent Labs   Lab 04/08/25 2049   POCTGLUCOSE 98     Current correctional scale  Low  Maintain anti-hyperglycemic dose as follows-   Antihyperglycemics (From admission, onward)      Start     Stop Route Frequency Ordered    03/31/25 2317  insulin aspart U-100 pen 0-5 Units         -- SubQ Before meals & nightly PRN 03/31/25 2218          Hold Oral hypoglycemics while patient is in the hospital.  Foot ulcer with fat layer exposed, unspecified laterality  Podiatry consulted, appreciated recs  Continue current antibiotics, ID consulted.  Continue wound care per podiatrist  Foot infection  See note above    Pyogenic inflammation of bone  Continue antibiotics as described above.    VTE Risk Mitigation (From admission, onward)           Ordered     heparin (porcine) injection 1,000 Units  As needed (PRN)         04/01/25 1827     IP VTE LOW RISK PATIENT  Once         03/31/25 2218     Place sequential compression device  Until discontinued         03/31/25 2218                    Discharge Planning   LULÚ: 4/9/2025     Code Status: Full Code   Medical Readiness for Discharge Date:   Discharge Plan A: Home   Discharge Delays: None known at this time                    Tony Sainz MD  Department of Hospital Medicine   [facility] [place] [facility]

## 2025-04-09 NOTE — SUBJECTIVE & OBJECTIVE
Interval History:  No acute event overnight.  Patient seen in bed this morning.  Complaint of some nausea with associated vomiting.  Denied fever, chest pain, abdominal pain, nausea or diarrhea.  PT to start Pulsavac wound care to right foot on 04/10.     Review of Systems   Constitutional:  Negative for fatigue and fever.   Gastrointestinal:  Positive for nausea and vomiting.     Objective:     Vital Signs (Most Recent):  Temp: 98.6 °F (37 °C) (04/09/25 1113)  Pulse: 88 (04/09/25 1113)  Resp: 18 (04/09/25 1239)  BP: 134/76 (04/09/25 1113)  SpO2: 95 % (04/09/25 1113) Vital Signs (24h Range):  Temp:  [98.4 °F (36.9 °C)-100.8 °F (38.2 °C)] 98.6 °F (37 °C)  Pulse:  [] 88  Resp:  [18-20] 18  SpO2:  [95 %-99 %] 95 %  BP: (116-160)/(58-89) 134/76     Weight: 104.3 kg (229 lb 15 oz)  Body mass index is 29.52 kg/m².    Intake/Output Summary (Last 24 hours) at 4/9/2025 1500  Last data filed at 4/9/2025 0446  Gross per 24 hour   Intake 240 ml   Output --   Net 240 ml         Physical Exam  Constitutional:       General: He is not in acute distress.     Appearance: He is not ill-appearing, toxic-appearing or diaphoretic.   Cardiovascular:      Rate and Rhythm: Normal rate and regular rhythm.      Heart sounds: Normal heart sounds. No murmur heard.     No friction rub. No gallop.   Pulmonary:      Effort: Pulmonary effort is normal.      Breath sounds: Normal breath sounds.   Abdominal:      General: Bowel sounds are normal. There is no distension.      Palpations: Abdomen is soft. There is no mass.   Musculoskeletal:      Comments: Clean surgical dressing right lower extremity               Significant Labs: CBC:   Recent Labs   Lab 04/09/25  0451   WBC 15.17*   HGB 9.4*   HCT 30.1*        CMP:   Recent Labs   Lab 04/08/25  1246 04/09/25  0451   NA  --  134*   K 5.4* 5.2*   CL  --  99   CO2  --  24   BUN  --  57*   CREATININE  --  14.1*   CALCIUM  --  9.5   ALBUMIN  --  2.6*   ANIONGAP  --  11       Significant  Imaging: I have reviewed all pertinent imaging results/findings within the past 24 hours.

## 2025-04-09 NOTE — ASSESSMENT & PLAN NOTE
Patient's FSGs are controlled on current medication regimen.  Last A1c reviewed-   Lab Results   Component Value Date    HGBA1C 4.9 08/20/2024     Most recent fingerstick glucose reviewed-   Recent Labs   Lab 04/08/25 2049   POCTGLUCOSE 98     Current correctional scale  Low  Maintain anti-hyperglycemic dose as follows-   Antihyperglycemics (From admission, onward)      Start     Stop Route Frequency Ordered    03/31/25 2317  insulin aspart U-100 pen 0-5 Units         -- SubQ Before meals & nightly PRN 03/31/25 2218          Hold Oral hypoglycemics while patient is in the hospital.

## 2025-04-09 NOTE — PROGRESS NOTES
[facility] - Podiatry  Progress Note    Patient Name: Emmanuel Morgan  MRN: [id number]  Admission Date: 3/31/2025  Hospital Length of Stay: 9 days  Attending Physician: Tony Sainz MD  Primary Care Provider: Laurence, Primary Doctor     Subjective:     History of Present Illness: Emmanuel Morgan is a 39 y.o. male with  has a past medical history of Diabetes mellitus, Hypertension, and Renal disorder.  Consult to Podiatry for evaluation and treatment of bilateral foot wounds, most significant to the right Lisfranc stump.  He relates that the left foot wound is chronic in nature and the ulceration to the right foot occurred several weeks ago likely secondary to trauma versus ambulation is inappropriate shoe.  All foot surgeries surgeries done in outside facilities.  He presented to the emergency department on the advice of his home health nurse due to progression of wounds and suspicion of osteomyelitis.      4/2/25: S/p one day I&D B/L per Dr. Faria. Bandage intact no strikethrough noted.    04/04/2025 patient seen at bedside resting comfortably.  By the end of our encounter, his nephew was also present at bedside.  Patient relates that he has occasional discomfort to the right foot especially in the medial midfoot with palpation or any pressure.  He denies any pain to the left foot.  He denies nausea, vomiting, fever, chills.  Patient relates that he is concerned about his chronic but now more persistent cough and would like to discuss this concern further with the primary team.      4/7/25: Patient seen in dialysis. Bandage intact B/L     04/08/2025 patient seen at bedside resting comfortably.  No new pedal complaints     4/9/25: Patient seen bedside. B/L debridement.     Chief Complaint   Patient presents with    Leg Pain     Sent from [facility] for increased pain to right foot     Scheduled Meds:   carvediloL  6.25 mg Oral BID    ceFEPime IV (PEDS and ADULTS)  1 g Intravenous Daily    epoetin beto-epbx  10,000 Units  Intravenous Once    metroNIDAZOLE  500 mg Oral Q12H    NIFEdipine  90 mg Oral Daily    sevelamer carbonate  800 mg Oral TID WM     Continuous Infusions:  PRN Meds:  Current Facility-Administered Medications:     acetaminophen, 650 mg, Oral, Q4H PRN    aluminum-magnesium hydroxide-simethicone, 30 mL, Oral, QID PRN    benzonatate, 100 mg, Oral, TID PRN    dextromethorphan-guaiFENesin  mg/5 ml, 5 mL, Oral, Q6H PRN    dextrose 50%, 12.5 g, Intravenous, PRN    dextrose 50%, 25 g, Intravenous, PRN    diphenhydrAMINE, 25 mg, Oral, Q6H PRN    glucagon (human recombinant), 1 mg, Intramuscular, PRN    glucose, 16 g, Oral, PRN    glucose, 24 g, Oral, PRN    heparin (porcine), 1,000 Units, Intra-Catheter, PRN    insulin aspart U-100, 0-5 Units, Subcutaneous, QID (AC + HS) PRN    melatonin, 6 mg, Oral, Nightly PRN    morphine, 4 mg, Intravenous, Q4H PRN    naloxone, 0.02 mg, Intravenous, PRN    ondansetron, 4 mg, Intravenous, Q6H PRN    oxyCODONE-acetaminophen, 1 tablet, Oral, Q4H PRN    senna-docusate, 1 tablet, Oral, BID PRN    sodium chloride 0.9%, 250 mL, Intravenous, PRN    Review of patient's allergies indicates:  No Known Allergies     Past Medical History:   Diagnosis Date    Diabetes mellitus     Hypertension     Renal disorder      Past Surgical History:   Procedure Laterality Date    FOOT AMPUTATION Right     IRRIGATION AND DEBRIDEMENT Bilateral 4/1/2025    Procedure: IRRIGATION AND DEBRIDEMENT;  Surgeon: Bety Faria DPM;  Location: [facility];  Service: Podiatry;  Laterality: Bilateral;  need jamshidi needle available       Family History    None       Tobacco Use    Smoking status: Never    Smokeless tobacco: Never   Substance and Sexual Activity    Alcohol use: Never    Drug use: Never    Sexual activity: Not on file     Review of Systems   Constitutional:  Negative for appetite change, chills, fatigue and fever.   Respiratory:  Positive for cough. Negative for shortness of breath.    Cardiovascular:   Negative for chest pain and leg swelling.   Gastrointestinal:  Negative for diarrhea, nausea and vomiting.   Musculoskeletal:  Positive for arthralgias and myalgias.   Skin:  Positive for color change and wound.   Neurological:  Positive for numbness. Negative for weakness.        + paresthesia      Objective:     Vital Signs (Most Recent):  Temp: 98.6 °F (37 °C) (04/09/25 1113)  Pulse: 88 (04/09/25 1113)  Resp: 18 (04/09/25 1113)  BP: 134/76 (04/09/25 1113)  SpO2: 95 % (04/09/25 1113) Vital Signs (24h Range):  Temp:  [98.2 °F (36.8 °C)-100.8 °F (38.2 °C)] 98.6 °F (37 °C)  Pulse:  [] 88  Resp:  [13-20] 18  SpO2:  [93 %-99 %] 95 %  BP: (102-160)/(58-89) 134/76     Weight: 104.3 kg (229 lb 15 oz)  Body mass index is 29.52 kg/m².    Physical Exam  Vitals and nursing note reviewed.   Constitutional:       General: He is not in acute distress.     Appearance: He is well-developed. He is not toxic-appearing or diaphoretic.      Comments: alert and oriented x 3.    Cardiovascular:      Pulses:           Dorsalis pedis pulses are 1+ on the right side and 1+ on the left side.        Posterior tibial pulses are 1+ on the left side.   Pulmonary:      Effort: No respiratory distress.   Musculoskeletal:      Right ankle: No tenderness. No lateral malleolus, medial malleolus, AITF ligament, CF ligament or posterior TF ligament tenderness.      Right Achilles Tendon: No defects. Clements's test negative.      Left ankle: No tenderness. No lateral malleolus, medial malleolus, AITF ligament, CF ligament or posterior TF ligament tenderness.      Left Achilles Tendon: No defects. Clements's test negative.      Right foot: Swelling and tenderness present. No bony tenderness.      Left foot: Deformity (Appearance of short 4th metatarsal secondary to previous surgery.) and tenderness present. No bony tenderness.      Comments: Muscle strength is 5/5 in all groups bilaterally.              Right Lower Extremity: Right leg is  "amputated below ankle. (midfoot amp)  Feet:      Right foot:      Skin integrity: Ulcer present.      Left foot:      Skin integrity: Ulcer present.   Lymphadenopathy:      Comments: No lymphatic streaking     Skin:     General: Skin is warm and dry.      Coloration: Skin is not pale.      Findings: No rash.      Nails: There is no clubbing.   Neurological:      Sensory: No sensory deficit.      Motor: No atrophy.      Comments: Light touch present     Psychiatric:         Attention and Perception: He is attentive.         Mood and Affect: Mood is not anxious. Affect is not inappropriate.         Speech: He is communicative. Speech is not slurred.         Behavior: Behavior is not combative.         4/9/25:  Scant seropurulent drainage right medial foot incision             4/7/25:        04/04/2025     Stable ulceration sub 3rd metatarsophalangeal joint of the left foot without acute signs of infection but with deep probe to bone      Plantar right foot ulcer with significant purulent drainage particularly when milking from the medial midfoot.  Palpation to the medial midfoot is significantly tender.        Post wound debridement with opening of the tract between the 2 plantar wounds            4/2/25:  No purulent drainage noted.             04/01/2025    Right plantar lateral midfoot with periwound bulla formation and localized edema and erythema.  Exquisitely tender on palpation.  Fibro necrotic wound bed         Left sub 3rd metatarsophalangeal joint with deep probe to bone, fibrogranular wound bed with periwound callus.          Laboratory:  A1C: No results for input(s): "HGBA1C" in the last 4320 hours.  CBC:   Recent Labs   Lab 04/09/25  0451   WBC 15.17*   RBC 3.23*   HGB 9.4*   HCT 30.1*      MCV 93   MCH 29.1   MCHC 31.2*     CMP:   Recent Labs   Lab 04/03/25  0618 04/04/25  0745 04/09/25  0451   CALCIUM 9.4   < > 9.5   ALBUMIN 2.9*   < > 2.6*      < > 134*   K 4.7   < > 5.2*   CO2 21*   < > " "24      < > 99   BUN 41*   < > 57*   CREATININE 12.5*   < > 14.1*   ALKPHOS 95  --   --    ALT 9*  --   --    AST 12  --   --    BILITOT 0.3  --   --     < > = values in this interval not displayed.     CRP:   No results for input(s): "CRP" in the last 168 hours.    ESR:   No results for input(s): "SEDRATE" in the last 168 hours.    Microbiology Results (last 7 days)       Procedure Component Value Units Date/Time    Afb Culture Stain [[id number]] Collected: 04/08/25 1433    Order Status: Sent Specimen: Wound from Foot, Right Updated: 04/09/25 1158    Afb Culture Stain [[id number]] Collected: 04/01/25 1557    Order Status: Completed Specimen: Wound from Foot, Right Updated: 04/09/25 1121     ACID FAST STAIN  No acid fast bacilli seen    Aerobic culture [[id number]]  (Abnormal) Collected: 04/08/25 1433    Order Status: Completed Specimen: Wound from Foot, Right Updated: 04/09/25 1044     CULTURE, AEROBIC Moderate PRESUMPTIVE PROTEUS SPECIES    Fungus culture [[id number]]  (Normal) Collected: 04/01/25 1557    Order Status: Completed Specimen: Wound from Foot, Right Updated: 04/08/25 2002     Fungal Culture No Fungus Isolated at 1 Week    Fungus culture [[id number]]  (Normal) Collected: 04/01/25 1621    Order Status: Completed Specimen: Biopsy from Foot, Left Updated: 04/08/25 2002     Fungal Culture No Fungus Isolated at 1 Week    Gram stain [[id number]] Collected: 04/08/25 1433    Order Status: Completed Specimen: Wound from Foot, Right Updated: 04/08/25 1846     GRAM STAIN Rare WBC seen      No organisms seen    Fungus culture [[id number]] Collected: 04/08/25 1433    Order Status: Sent Specimen: Wound from Foot, Right Updated: 04/08/25 1528    Culture, Anaerobic [[id number]] Collected: 04/08/25 1433    Order Status: Sent Specimen: Wound from Foot, Right Updated: 04/08/25 1528    AFB Culture & Smear [[id number]] Collected: 04/08/25 1433    Order Status: Sent Specimen: Wound from Foot, Right Updated: 04/08/25 " 1528    Aerobic culture [[id number]] Collected: 04/01/25 1621    Order Status: Completed Specimen: Biopsy from Foot, Left Updated: 04/07/25 0647     CULTURE, AEROBIC No Growth    Blood Culture #1 **CANNOT BE ORDERED STAT** [[id number]]  (Normal) Collected: 03/31/25 1827    Order Status: Completed Specimen: Blood from Peripheral, Antecubital, Left Updated: 04/05/25 2002     Blood Culture No Growth After 5 Days    Blood Culture #2 **CANNOT BE ORDERED STAT** [[id number]]  (Normal) Collected: 03/31/25 1845    Order Status: Completed Specimen: Blood from Peripheral, Hand, Left Updated: 04/05/25 2002     Blood Culture No Growth After 5 Days    Culture, Anaerobe [[id number]] Collected: 04/01/25 1621    Order Status: Completed Specimen: Biopsy from Foot, Left Updated: 04/05/25 0710     Anaerobe Culture No Anaerobes Isolated    Culture, Anaerobe [[id number]] Collected: 04/01/25 1557    Order Status: Completed Specimen: Wound from Foot, Right Updated: 04/05/25 0710     Anaerobe Culture No Anaerobes Isolated    Afb Culture Stain [[id number]] Collected: 04/01/25 1621    Order Status: Completed Specimen: Biopsy from Foot, Left Updated: 04/04/25 1542     ACID FAST STAIN  No acid fast bacilli seen    AFB Culture & Smear [[id number]] Collected: 04/01/25 1621    Order Status: Completed Specimen: Biopsy from Foot, Left Updated: 04/04/25 1542     CULTURE, AFB  Culture In Progress    AFB Culture & Smear [[id number]] Collected: 04/01/25 1557    Order Status: Completed Specimen: Wound from Foot, Right Updated: 04/03/25 0805     CULTURE, AFB  Culture In Progress    Aerobic culture [[id number]]  (Abnormal)  (Susceptibility) Collected: 04/01/25 1557    Order Status: Completed Specimen: Wound from Foot, Right Updated: 04/03/25 0539     CULTURE, AEROBIC Moderate Proteus mirabilis            Diagnostic Results:  Imaging Results               MRI Hindfoot WO Contrast LT (Final result)  Result time 04/01/25 07:47:56      Final result by Saúl  Seamus ADORNO MD (04/01/25 07:47:56)                   Impression:      1. Plantar soft tissue ulcer at the level of the 3rd MTP joint with associated osteomyelitis of the distal half of the metatarsal and base of the proximal phalanx.  No abscess.  2. Sequela of chronic osteomyelitis involving the 1st, 2nd and 5th metatarsals and proximal phalanges.  3. Postoperative change of transmetatarsal amputation of the 4th digit.  This report was flagged in Epic as abnormal.      Electronically signed by: Seamus Hays MD  Date:    04/01/2025  Time:    07:47               Narrative:    EXAMINATION:  MRI HINDFOOT WO CONTRAST LT; MRI FOREFOOT WO CONTRAST LT    CLINICAL HISTORY:  b/l ulcers concern for osteo;; b/l ulcers. Concern for osteo;    TECHNIQUE:  Routine MRI evaluation of the left ankle and forefoot performed without contrast.    COMPARISON:  Radiographs 09/01/2024.    FINDINGS:  Examination degraded by patient motion artifact.    BONES: Postsurgical change of transmetatarsal amputation of the 4th digit.  Osseous erosive changes of the 3rd metatarsal head and proximal phalanx base with associated confluent marrow edema involving the distal half of the metatarsal and proximal half of the proximal phalanx.  There is corresponding T1 medullary hypointensity throughout the distal half of the metatarsal and base of the proximal phalanx.  Chronic osseous erosive changes of the 1st, 2nd and 5th metatarsal heads and proximal phalanges with grossly preserved marrow signal intensity.  Solid periosteal reaction throughout the 2nd metatarsal shaft.  Linear area of edema signal at the posterior subchondral region of the tibial plafond, favored to be degenerative in nature.  No definite acute fractures.  Remote healed fracture of the lateral malleolus.  No avascular necrosis.  No marrow infiltrative process.    JOINTS: 3rd MTP complex joint effusion with surrounding synovitis.  No dislocation.    LIGAMENTS: Chronic sprains of the  anterior talofibular, superficial deltoid and deep deltoid ligaments.  Lisfranc ligament appears intact.    TENDONS: Ulceration and disruption of the 3rd flexor tendon at the level of the MTP joint.  Chronic ulceration of the 1st, 2nd and 5th flexor tendons at the level of the MTP joints.  Postoperative changes of the 4th flexor tendon.  Remaining tendons appear grossly intact.    MUSCLES: Edema and severe fatty degeneration of the visualized musculature.  No intramuscular fluid collection.    MISCELLANEOUS: Plantar soft tissue ulcer at the level of the 3rd MTP joint with adjacent fluid and surrounding subcutaneous edema.  No focal fluid collection.  Probable adventitial bursa at the plantar aspect of the hallux MTP joint.  Focal area of signal hypointensity at the level of the heel pad, possibly sequela of scarring.                                        MRI Forefoot WO Contrast LT (Final result)  Result time 04/01/25 07:47:56   Procedure changed from MRI Foot Toes WO Contrast DANY     Final result by Seamus Hays MD (04/01/25 07:47:56)                   Impression:      1. Plantar soft tissue ulcer at the level of the 3rd MTP joint with associated osteomyelitis of the distal half of the metatarsal and base of the proximal phalanx.  No abscess.  2. Sequela of chronic osteomyelitis involving the 1st, 2nd and 5th metatarsals and proximal phalanges.  3. Postoperative change of transmetatarsal amputation of the 4th digit.  This report was flagged in Epic as abnormal.      Electronically signed by: Seamus Hays MD  Date:    04/01/2025  Time:    07:47               Narrative:    EXAMINATION:  MRI HINDFOOT WO CONTRAST LT; MRI FOREFOOT WO CONTRAST LT    CLINICAL HISTORY:  b/l ulcers concern for osteo;; b/l ulcers. Concern for osteo;    TECHNIQUE:  Routine MRI evaluation of the left ankle and forefoot performed without contrast.    COMPARISON:  Radiographs 09/01/2024.    FINDINGS:  Examination degraded by patient  motion artifact.    BONES: Postsurgical change of transmetatarsal amputation of the 4th digit.  Osseous erosive changes of the 3rd metatarsal head and proximal phalanx base with associated confluent marrow edema involving the distal half of the metatarsal and proximal half of the proximal phalanx.  There is corresponding T1 medullary hypointensity throughout the distal half of the metatarsal and base of the proximal phalanx.  Chronic osseous erosive changes of the 1st, 2nd and 5th metatarsal heads and proximal phalanges with grossly preserved marrow signal intensity.  Solid periosteal reaction throughout the 2nd metatarsal shaft.  Linear area of edema signal at the posterior subchondral region of the tibial plafond, favored to be degenerative in nature.  No definite acute fractures.  Remote healed fracture of the lateral malleolus.  No avascular necrosis.  No marrow infiltrative process.    JOINTS: 3rd MTP complex joint effusion with surrounding synovitis.  No dislocation.    LIGAMENTS: Chronic sprains of the anterior talofibular, superficial deltoid and deep deltoid ligaments.  Lisfranc ligament appears intact.    TENDONS: Ulceration and disruption of the 3rd flexor tendon at the level of the MTP joint.  Chronic ulceration of the 1st, 2nd and 5th flexor tendons at the level of the MTP joints.  Postoperative changes of the 4th flexor tendon.  Remaining tendons appear grossly intact.    MUSCLES: Edema and severe fatty degeneration of the visualized musculature.  No intramuscular fluid collection.    MISCELLANEOUS: Plantar soft tissue ulcer at the level of the 3rd MTP joint with adjacent fluid and surrounding subcutaneous edema.  No focal fluid collection.  Probable adventitial bursa at the plantar aspect of the hallux MTP joint.  Focal area of signal hypointensity at the level of the heel pad, possibly sequela of scarring.                                       MRI Hindfoot WO Contrast RT (Final result)  Result time  04/01/25 07:19:00      Final result by Seamus Hays MD (04/01/25 07:19:00)                   Impression:      1. Plantar soft tissue ulcer at the level of the calcaneocuboid joint with surrounding subcutaneous edema and adjacent blistering.  No osteomyelitis.  No abscess.  2. Advanced neuropathic arthropathy of the tibiotalar, subtalar and residual midtarsal joints.      Electronically signed by: Seamus Hays MD  Date:    04/01/2025  Time:    07:19               Narrative:    EXAMINATION:  MRI HINDFOOT WO CONTRAST RT    CLINICAL HISTORY:  Foot swelling, diabetic, osteomyelitis suspected, xray done;    TECHNIQUE:  Routine MRI evaluation of the right ankle performed without contrast.    COMPARISON:  Radiographs 03/31/2025.    FINDINGS:  BONES/JOINTS: Postsurgical changes of midfoot amputation.  Chronic osseous erosive changes centered about the tibiotalar, subtalar and residual midtarsal joints, likely sequela of chronic neuropathic arthropathy.  Intraosseous cyst at the lateral aspect of the distal tibia.  Circumferential solid periosteal reaction about the distal tibia and distal fibula.  No marrow signal abnormality to suggest an infiltrative process.  No significant joint effusion.  No dislocation.    TENDONS: Achilles tendon demonstrates normal morphology and signal intensity.  Residual posterior tibial, flexor digitorum longus, flexor hallucis longus, peroneus longus, peroneus brevis and extensor tendons are intact.    MUSCLES: Edema and fatty degeneration of the visualized musculature.  No intramuscular fluid collection.    MISCELLANEOUS: Plantar soft tissue ulcer at the level of the calcaneocuboid joint with involvement of the plantar aponeurosis, surrounding subcutaneous edema and adjacent blistering.  No fluid collection.                                       US Lower Extremity Arteries Right (Final result)  Result time 03/31/25 21:32:36      Final result by Michelle Leon MD (03/31/25  21:32:36)                   Impression:      Normal triphasic waveforms throughout the right lower extremity.  No occlusion or stenosis detected.      Electronically signed by: Michelle Leon  Date:    03/31/2025  Time:    21:32               Narrative:    EXAMINATION:  US LOWER EXTREMITY ARTERIES RIGHT    CLINICAL HISTORY:  Unspecified open wound, right foot, subsequent encounter    TECHNIQUE:  Duplex and color flow Doppler evaluation and graded compression of the right lower extremity arteries was performed.    COMPARISON:  None    FINDINGS:  Peak systolic velocities in the right lower extremity arteries (cm/sec):    CFA: 127, triphasic    DFA: 74, triphasic    Proximal SFA: 103, triphasic    Mid SFA: 120, triphasic    Distal SFA: 109, triphasic    Proximal pop: 84, triphasic    Distal pop: 98, triphasic    LAUREL: 80, triphasic    PTA: 86, triphasic    Peroneal: 111, triphasic    DPA: 75, triphasic                                       X-Ray Foot Complete Right (Final result)  Result time 03/31/25 13:14:02      Final result by Alli Henriquez MD (03/31/25 13:14:02)                   Impression:      Please see above.      Electronically signed by: Alli Henriquez  Date:    03/31/2025  Time:    13:14               Narrative:    EXAMINATION:  XR FOOT COMPLETE 3 VIEW RIGHT    CLINICAL HISTORY:  . Pain in unspecified foot    TECHNIQUE:  AP, lateral, and oblique views of the right foot were performed.    COMPARISON:  None.    FINDINGS:  Extensive postoperative change including amputation to the level of the midfoot structures.  Remainder of the visualized osseous structures of the mid and hindfoot demonstrate diffuse degenerative change in osteopenia with surrounding diffuse soft tissue swelling.  Additional diffuse osseous destruction and degenerative change about the partially visualized ankle.  Scattered vascular calcification as well as plantar calcaneal spur.  No obvious significant osseous erosive change, noting  limitations from postoperative change and surrounding soft tissue swelling.  If there is continued clinical concern for osteomyelitis, consider MRI of the foot for further evaluation.                                       X-Ray Chest AP Portable (Final result)  Result time 03/31/25 13:15:24      Final result by Alli Henriquez MD (03/31/25 13:15:24)                   Impression:      Please see above.      Electronically signed by: Alli Henriquez  Date:    03/31/2025  Time:    13:15               Narrative:    EXAMINATION:  XR CHEST AP PORTABLE    CLINICAL HISTORY:  Chronic cough    TECHNIQUE:  Single frontal view of the chest was performed.    COMPARISON:  Chest radiograph 09/01/2024    FINDINGS:  Left-sided central venous catheter with tip overlying the in expected region of the cavoatrial junction.  Cardiomediastinal silhouette is unchanged in size.  Patient is rotated, limiting evaluation of the mediastinal structures.    Chronic right effusion with probable superimposed volume loss about the right lung base.  Superimposed infectious or non infectious inflammatory infiltrate cannot be excluded.  Remainder of the aerated portions of the lungs are clear.  No pneumothorax.    Osseous structures demonstrate no evidence for acute fracture or osseous destructive lesion.  Soft tissues are unremarkable.                                      Assessment/Plan:     Active Diagnoses:    Diagnosis Date Noted POA    PRINCIPAL PROBLEM:  Infected ulcer of skin [L98.499, L08.9] 03/31/2025 Yes    Pyogenic inflammation of bone [M86.9] 04/03/2025 Yes    Foot infection [L08.9] 04/02/2025 Yes    Foot ulcer with fat layer exposed, unspecified laterality [L97.502] 09/01/2024 Yes    Type 2 diabetes mellitus with foot ulcer [E11.621, L97.509] 06/25/2024 Yes    End stage renal disease [N18.6] 03/21/2024 Yes    Hyperkalemia [E87.5] 07/19/2021 Yes     Chronic    Essential hypertension [I10] 01/27/2021 Yes      Problems Resolved During this  Admission:       Education about the prevention of limb loss.      S/p  repeat irrigation and debridement of both feet DOS: 4/1/25, 4/8/25    Discussed wound healing cycle, skin integrity, ways to care for skin.Counseled patient on the effects of biomechanical pressure and deformity as well as blood glucose on healing. He verbalizes understanding that it can increase the chances of delayed healing and this prolonged exposure leads to infection or progression of infection which subsequently can result in loss of limb.    PT pulse lavage ordered.     DSD applied to right foot    Abx per ID    F/u WB wound care upon discharge    We will continue to follow and work in partnership with treatment team on how to best treat patient concern and diagnosis.      Michelle Brown DPM  Podiatry  [facility] - Emergency Dept

## 2025-04-09 NOTE — ASSESSMENT & PLAN NOTE
Hyperkalemia is likely due to ESRD.The patients most recent potassium results are listed below.  Recent Labs     04/07/25  0418 04/08/25  1246 04/09/25  0451   K 5.2* 5.4* 5.2*   He got some doses of Lokelma;will order another dose today  Nephrology consulted for HD.  Continue monitoring

## 2025-04-10 LAB
ABSOLUTE NEUTROPHIL MANUAL (OHS): 8.8 K/UL
ACID FAST MOD KINY STN SPEC: NORMAL
EOSINOPHIL NFR BLD MANUAL: 1 % (ref 0–8)
ERYTHROCYTE [DISTWIDTH] IN BLOOD BY AUTOMATED COUNT: 16.7 % (ref 11.5–14.5)
HCT VFR BLD AUTO: 30.2 % (ref 40–54)
HGB BLD-MCNC: 9.4 GM/DL (ref 14–18)
HOLD SPECIMEN: NORMAL
LACTATE SERPL-SCNC: 0.8 MMOL/L (ref 0.5–2.2)
LYMPHOCYTES NFR BLD MANUAL: 14 % (ref 18–48)
MCH RBC QN AUTO: 28.8 PG (ref 27–31)
MCHC RBC AUTO-ENTMCNC: 31.1 G/DL (ref 32–36)
MCV RBC AUTO: 93 FL (ref 82–98)
MONOCYTES NFR BLD MANUAL: 29 % (ref 4–15)
NEUTROPHILS NFR BLD MANUAL: 56 % (ref 38–73)
NUCLEATED RBC (/100WBC) (OHS): 0 /100 WBC
PLATELET # BLD AUTO: 263 K/UL (ref 150–450)
PLATELET BLD QL SMEAR: ABNORMAL
PMV BLD AUTO: 9.9 FL (ref 9.2–12.9)
POCT GLUCOSE: 110 MG/DL (ref 70–110)
POCT GLUCOSE: 115 MG/DL (ref 70–110)
POCT GLUCOSE: 119 MG/DL (ref 70–110)
POCT GLUCOSE: 127 MG/DL (ref 70–110)
POCT GLUCOSE: 170 MG/DL (ref 70–110)
RBC # BLD AUTO: 3.26 M/UL (ref 4.6–6.2)
WBC # BLD AUTO: 15.8 K/UL (ref 3.9–12.7)

## 2025-04-10 PROCEDURE — 85027 COMPLETE CBC AUTOMATED: CPT

## 2025-04-10 PROCEDURE — 25000003 PHARM REV CODE 250: Performed by: PODIATRIST

## 2025-04-10 PROCEDURE — 36415 COLL VENOUS BLD VENIPUNCTURE: CPT

## 2025-04-10 PROCEDURE — 11000001 HC ACUTE MED/SURG PRIVATE ROOM

## 2025-04-10 PROCEDURE — 83605 ASSAY OF LACTIC ACID: CPT

## 2025-04-10 PROCEDURE — 97530 THERAPEUTIC ACTIVITIES: CPT

## 2025-04-10 PROCEDURE — 25000003 PHARM REV CODE 250

## 2025-04-10 PROCEDURE — 99232 SBSQ HOSP IP/OBS MODERATE 35: CPT | Mod: ,,, | Performed by: PHYSICIAN ASSISTANT

## 2025-04-10 PROCEDURE — 97597 DBRDMT OPN WND 1ST 20 CM/<: CPT

## 2025-04-10 PROCEDURE — 99232 SBSQ HOSP IP/OBS MODERATE 35: CPT | Mod: ,,, | Performed by: PODIATRIST

## 2025-04-10 PROCEDURE — 63600175 PHARM REV CODE 636 W HCPCS: Performed by: PODIATRIST

## 2025-04-10 RX ORDER — GUAIFENESIN 600 MG/1
600 TABLET, EXTENDED RELEASE ORAL 2 TIMES DAILY
Status: DISCONTINUED | OUTPATIENT
Start: 2025-04-10 | End: 2025-04-15 | Stop reason: HOSPADM

## 2025-04-10 RX ORDER — BENZONATATE 100 MG/1
100 CAPSULE ORAL EVERY 8 HOURS
Status: DISCONTINUED | OUTPATIENT
Start: 2025-04-10 | End: 2025-04-15 | Stop reason: HOSPADM

## 2025-04-10 RX ADMIN — SEVELAMER CARBONATE 800 MG: 800 TABLET, FILM COATED ORAL at 04:04

## 2025-04-10 RX ADMIN — CARVEDILOL 6.25 MG: 6.25 TABLET, FILM COATED ORAL at 09:04

## 2025-04-10 RX ADMIN — METRONIDAZOLE 500 MG: 500 TABLET ORAL at 08:04

## 2025-04-10 RX ADMIN — GUAIFENESIN 600 MG: 600 TABLET, EXTENDED RELEASE ORAL at 09:04

## 2025-04-10 RX ADMIN — CEFEPIME 1 G: 1 INJECTION, POWDER, FOR SOLUTION INTRAMUSCULAR; INTRAVENOUS at 04:04

## 2025-04-10 RX ADMIN — METRONIDAZOLE 500 MG: 500 TABLET ORAL at 09:04

## 2025-04-10 RX ADMIN — OXYCODONE AND ACETAMINOPHEN 1 TABLET: 10; 325 TABLET ORAL at 09:04

## 2025-04-10 RX ADMIN — BENZONATATE 100 MG: 100 CAPSULE ORAL at 02:04

## 2025-04-10 RX ADMIN — OXYCODONE AND ACETAMINOPHEN 1 TABLET: 10; 325 TABLET ORAL at 01:04

## 2025-04-10 RX ADMIN — SEVELAMER CARBONATE 800 MG: 800 TABLET, FILM COATED ORAL at 08:04

## 2025-04-10 RX ADMIN — SEVELAMER CARBONATE 800 MG: 800 TABLET, FILM COATED ORAL at 01:04

## 2025-04-10 RX ADMIN — BENZONATATE 100 MG: 100 CAPSULE ORAL at 09:04

## 2025-04-10 NOTE — SUBJECTIVE & OBJECTIVE
Interval History: UF 1.5L, tolerated HD well yesterday. NAEON. Normal appetite, no complaints.    Review of patient's allergies indicates:  No Known Allergies  Current Facility-Administered Medications   Medication Frequency    acetaminophen tablet 650 mg Q4H PRN    aluminum-magnesium hydroxide-simethicone 200-200-20 mg/5 mL suspension 30 mL QID PRN    benzonatate capsule 100 mg TID PRN    carvediloL tablet 6.25 mg BID    ceFEPIme injection 1 g Daily    dextromethorphan-guaiFENesin  mg/5 ml liquid 5 mL Q6H PRN    dextrose 50% injection 12.5 g PRN    dextrose 50% injection 25 g PRN    diphenhydrAMINE capsule 25 mg Q6H PRN    glucagon (human recombinant) injection 1 mg PRN    glucose chewable tablet 16 g PRN    glucose chewable tablet 24 g PRN    heparin (porcine) injection 1,000 Units PRN    insulin aspart U-100 pen 0-5 Units QID (AC + HS) PRN    melatonin tablet 6 mg Nightly PRN    metroNIDAZOLE tablet 500 mg Q12H    morphine injection 4 mg Q4H PRN    naloxone 0.4 mg/mL injection 0.02 mg PRN    NIFEdipine 24 hr tablet 90 mg Daily    ondansetron injection 4 mg Q6H PRN    oxyCODONE-acetaminophen  mg per tablet 1 tablet Q4H PRN    senna-docusate 8.6-50 mg per tablet 1 tablet BID PRN    sevelamer carbonate tablet 800 mg TID WM    sodium chloride 0.9% bolus 250 mL 250 mL PRN       Objective:     Vital Signs (Most Recent):  Temp: 99.4 °F (37.4 °C) (04/10/25 0818)  Pulse: 91 (04/10/25 0946)  Resp: 18 (04/10/25 0818)  BP: 109/70 (04/10/25 0946)  SpO2: 99 % (04/10/25 0818) Vital Signs (24h Range):  Temp:  [98.1 °F (36.7 °C)-99.4 °F (37.4 °C)] 99.4 °F (37.4 °C)  Pulse:  [] 91  Resp:  [18-19] 18  SpO2:  [95 %-99 %] 99 %  BP: ()/(57-84) 109/70     Weight: 104.3 kg (229 lb 15 oz) (04/01/25 2045)  Body mass index is 29.52 kg/m².  Body surface area is 2.33 meters squared.    I/O last 3 completed shifts:  In: 500 [Other:500]  Out: 2000 [Other:2000]     Physical Exam  Vitals and nursing note reviewed.    Constitutional:       Appearance: Normal appearance.   HENT:      Head: Normocephalic.      Nose: Nose normal.   Eyes:      Extraocular Movements: Extraocular movements intact.      Conjunctiva/sclera: Conjunctivae normal.      Pupils: Pupils are equal, round, and reactive to light.   Cardiovascular:      Rate and Rhythm: Normal rate.   Pulmonary:      Effort: Pulmonary effort is normal.      Breath sounds: Normal breath sounds.   Abdominal:      Palpations: Abdomen is soft.   Musculoskeletal:         General: Normal range of motion.      Cervical back: Normal range of motion.   Skin:     General: Skin is warm.   Neurological:      General: No focal deficit present.      Mental Status: He is alert.   Psychiatric:         Mood and Affect: Mood normal.          Significant Labs:  All labs within the past 24 hours have been reviewed.  None     Significant Imaging:

## 2025-04-10 NOTE — PROGRESS NOTES
[facility]  [facility]phrology  Progress Note    Patient Name: Emmanuel Morgan  MRN: [id number]  Admission Date: 3/31/2025  Hospital Length of Stay: 10 days  Attending Provider: Tony Sainz MD   Primary Care Physician: Laurence, Primary Doctor  Principal Problem:Infected ulcer of skin    Subjective:     HPI: 39 year old man with a history of ESRD on HD, DM, hx of osteomyelitis following with wound care presents to the ED at wound care request for evaluation for possible debridement. Podiatry consulted.    Outpatient ESRD  Hemodialysis  Outpatient nephrologist: Dr. Wade  Outpatient center:  [facility]  Dialysis schedule: MWF  Last treatment: 3/28/2025  Dry weight: 99kg  Access: right forearm AVF      Interval History: UF 1.5L, tolerated HD well yesterday. NAEON. Normal appetite, no complaints.    Review of patient's allergies indicates:  No Known Allergies  Current Facility-Administered Medications   Medication Frequency    acetaminophen tablet 650 mg Q4H PRN    aluminum-magnesium hydroxide-simethicone 200-200-20 mg/5 mL suspension 30 mL QID PRN    benzonatate capsule 100 mg TID PRN    carvediloL tablet 6.25 mg BID    ceFEPIme injection 1 g Daily    dextromethorphan-guaiFENesin  mg/5 ml liquid 5 mL Q6H PRN    dextrose 50% injection 12.5 g PRN    dextrose 50% injection 25 g PRN    diphenhydrAMINE capsule 25 mg Q6H PRN    glucagon (human recombinant) injection 1 mg PRN    glucose chewable tablet 16 g PRN    glucose chewable tablet 24 g PRN    heparin (porcine) injection 1,000 Units PRN    insulin aspart U-100 pen 0-5 Units QID (AC + HS) PRN    melatonin tablet 6 mg Nightly PRN    metroNIDAZOLE tablet 500 mg Q12H    morphine injection 4 mg Q4H PRN    naloxone 0.4 mg/mL injection 0.02 mg PRN    NIFEdipine 24 hr tablet 90 mg Daily    ondansetron injection 4 mg Q6H PRN    oxyCODONE-acetaminophen  mg per tablet 1 tablet Q4H PRN    senna-docusate 8.6-50 mg per tablet 1 tablet BID PRN    sevelamer  carbonate tablet 800 mg TID WM    sodium chloride 0.9% bolus 250 mL 250 mL PRN       Objective:     Vital Signs (Most Recent):  Temp: 99.4 °F (37.4 °C) (04/10/25 0818)  Pulse: 91 (04/10/25 0946)  Resp: 18 (04/10/25 0818)  BP: 109/70 (04/10/25 0946)  SpO2: 99 % (04/10/25 0818) Vital Signs (24h Range):  Temp:  [98.1 °F (36.7 °C)-99.4 °F (37.4 °C)] 99.4 °F (37.4 °C)  Pulse:  [] 91  Resp:  [18-19] 18  SpO2:  [95 %-99 %] 99 %  BP: ()/(57-84) 109/70     Weight: 104.3 kg (229 lb 15 oz) (04/01/25 2045)  Body mass index is 29.52 kg/m².  Body surface area is 2.33 meters squared.    I/O last 3 completed shifts:  In: 500 [Other:500]  Out: 2000 [Other:2000]     Physical Exam  Vitals and nursing note reviewed.   Constitutional:       Appearance: Normal appearance.   HENT:      Head: Normocephalic.      Nose: Nose normal.   Eyes:      Extraocular Movements: Extraocular movements intact.      Conjunctiva/sclera: Conjunctivae normal.      Pupils: Pupils are equal, round, and reactive to light.   Cardiovascular:      Rate and Rhythm: Normal rate.   Pulmonary:      Effort: Pulmonary effort is normal.      Breath sounds: Normal breath sounds.   Abdominal:      Palpations: Abdomen is soft.   Musculoskeletal:         General: Normal range of motion.      Cervical back: Normal range of motion.   Skin:     General: Skin is warm.   Neurological:      General: No focal deficit present.      Mental Status: He is alert.   Psychiatric:         Mood and Affect: Mood normal.          Significant Labs:  All labs within the past 24 hours have been reviewed.  None     Significant Imaging:    Assessment/Plan:     ESRD  - receives HD MWF at [facility] under the c/o Dr. Wade  - HD tomorrow; UF 1.5L  - labs MWF     Anemia of CKD  - last hgb 9.4; below goal  - KIKI with HD  - will trend     Secondary HPTH  - last CCa 10.6 elevated; phos controlled  - continue sevelamer  - encourage mobilization     Hyperkalemia  - last K 5.2 this AM;    - HD above      Thank you for your consult. I will follow-up with patient. Please contact us if you have any additional questions.    DUANE Del Real  Nephrology  [facility] [place] - Telemetry

## 2025-04-10 NOTE — PROGRESS NOTES
[facility] [place]  Nurses Note -- 4 Eyes      4/10/2025       Skin assessed on: Q Shift      [x] No Pressure Injuries Present    []Prevention Measures Documented    [] Yes LDA  for Pressure Injury Previously documented     [] Yes New Pressure Injury Discovered   [] LDA for New Pressure Injury Added      Attending RN:  Saurav Redd RN     Second RN:  Aby Montoya RN

## 2025-04-10 NOTE — PT/OT/SLP PROGRESS
Physical Therapy      Patient Name:  Emmanuel Morgan   MRN:  [id number]      Per podiatry, pt able to transfer with B heel WB in cam walker boots.  Pt has no DME's at home, will benefit from a w/c due to WB precautions to BLE at this time.  Pt educated on B heel WB for tranfers only, ambulation limited at this time.  Pt verbalized understanding.      Patient has a mobility limitation that significantly impairs their ability to participate in one or more mobility related activities of daily living in customary locations in the home. The mobility limitation cannot be sufficiently resolved by the use of a cane or walker. The use of a manual wheelchair will greatly improve the patient's ability to participate in MRADLs. The patient will use the wheelchair on a regular basis at home. They have expressed their willingness to use a manual wheelchair in the home, and have a caregiver who is available and willing to assist with the wheelchair if needed.

## 2025-04-10 NOTE — PT/OT/SLP PROGRESS
[facility] Wound Care Progress Note     Emmanuel Morgan  [id number]  4/10/2025 [id number] (40 min - 1 wound care and 1 TA)     Diagnosis: Pt admitted with R plantar midfoot wounds s/p I+D in OR on 4/1/25, R foot bedside debridement on 4/4/25, and R foot I+D again in OR on 4/8/25 by Podiatry.     Precautions: Standard, Diabetes, and Weightbearing (R heel WB with Cam walker boot)         Allergies as of 03/31/2025    (No Known Allergies)         Pre-medication: No pain medication taken by patient prior to treatment     Subjective:  Pt reported no R foot pain before, during, and after pulsavac wound care.     Objective:   Pt received pulsavac wound care to R plantar foot wound and R foot medial incision with ~500 mL NS.  PT attempted to press around R foot medial incision, only scant amount of purulent drainage noted.  R plantar foot wound and periwound cleansed with Vashe moistened 4x4 gauze.  R foot patted dry.  Periwound applied with Cavilon no sting film barrier.  R plantar foot wound covered with Vashe moistened 4x4 gauze and medial incision gently and minimally packed with Vashe moistened 4x4 gauze, then both covered with dry 4x4 gauze, and abdominal pad.  R foot wrapped with cast padding, kerlix, and ace wrap, than secured with tape.  Shoe cover applied to R foot.  Clean technique maintained throughout treatment.       Assessment:  Podiatry betadine soaked gauze/dressings removed.  Pt s/p R foot medial incision during I+D on 4/8/25, linear incision measured at 1 cm in length.  R plantar foot presented with opening of the tract between the 2 plantar foot wounds by podiatry on 4/4/25, wound now measured at 7 cm (L) x 2.5 cm (W).  Scant amount of purulent drainage noted when pressing on periwound around R foot medial linear incision.  R foot plantar wound tissues appeared pink with minimal yellow slough, no maceration or malodor.  Periwound with callus and dry flaky skin.  Patient tolerated today's treatment  without any adverse effects.  Goals remain appropriate.     Photodocumentation: R plantar foot wound      R foot medial/plantar view        Plan:  The following goals were discussed with the patient and he agrees.  Frequency of treatment at this time will be daily.

## 2025-04-10 NOTE — PLAN OF CARE
Changes in medical condition or discharge plan: Per MD, pt will start pulsavac wound care today on left foot.    Does patient need new DME? Wheel chair    Follow up appts needed: TBD    Medically stable: No    Estimated Discharge Date:  1- 3 days    CM will continue to follow up as needed.     04/10/25 1354   Discharge Reassessment   Assessment Type Discharge Planning Reassessment   Did the patient's condition or plan change since previous assessment? Yes   Discharge Plan discussed with: Patient   Communicated LULÚ with patient/caregiver Yes   Discharge Plan A Home   Discharge Plan B Other  (tbd)   DME Needed Upon Discharge  wheelchair   Transition of Care Barriers None   Why the patient remains in the hospital Requires continued medical care   Post-Acute Status   Coverage Medicare AB   Discharge Delays None known at this time

## 2025-04-10 NOTE — ASSESSMENT & PLAN NOTE
Hyperkalemia is likely due to ESRD.The patients most recent potassium results are listed below.  Recent Labs     04/08/25  1246 04/09/25  0451   K 5.4* 5.2*   He got some doses of Lokelma;will order another dose today  Nephrology consulted for HD.  Continue monitoring

## 2025-04-10 NOTE — ASSESSMENT & PLAN NOTE
Patient's blood pressure range in the last 24 hours was: BP  Min: 95/63  Max: 136/76.The patient's inpatient anti-hypertensive regimen is listed below:  Current Antihypertensives  , Daily, Oral  NIFEdipine 24 hr tablet 90 mg, Daily, Oral  carvediloL tablet 6.25 mg, 2 times daily, Oral    Plan  - BP is controlled, no changes needed to their regimen  - lisinopril held due to hyperkalemia.  Will restart when clinically indicated

## 2025-04-10 NOTE — PROGRESS NOTES
[facility] [place] - Podiatry  Progress Note    Patient Name: Emmanuel Morgan  MRN: [id number]  Admission Date: 3/31/2025  Hospital Length of Stay: 10 days  Attending Physician: Tony Sainz MD  Primary Care Provider: Laurence, Primary Doctor     Subjective:     History of Present Illness: Emmanuel Morgan is a 39 y.o. male with  has a past medical history of Diabetes mellitus, Hypertension, and Renal disorder.  Consult to Podiatry for evaluation and treatment of bilateral foot wounds, most significant to the right Lisfranc stump.  He relates that the left foot wound is chronic in nature and the ulceration to the right foot occurred several weeks ago likely secondary to trauma versus ambulation is inappropriate shoe.  All foot surgeries surgeries done in outside facilities.  He presented to the emergency department on the advice of his home health nurse due to progression of wounds and suspicion of osteomyelitis.      4/2/25: S/p one day I&D B/L per Dr. Faria. Bandage intact no strikethrough noted.    04/04/2025 patient seen at bedside resting comfortably.  By the end of our encounter, his nephew was also present at bedside.  Patient relates that he has occasional discomfort to the right foot especially in the medial midfoot with palpation or any pressure.  He denies any pain to the left foot.  He denies nausea, vomiting, fever, chills.  Patient relates that he is concerned about his chronic but now more persistent cough and would like to discuss this concern further with the primary team.      4/7/25: Patient seen in dialysis. Bandage intact B/L     04/08/2025 patient seen at bedside resting comfortably.  No new pedal complaints     4/9/25: Patient seen bedside. B/L debridement.     4/10/2025 patient seen at bedside.  Resting comfortably.  Significant decrease in pain.  Still complaining about his cough    Chief Complaint   Patient presents with    Leg Pain     Sent from [facility] for increased pain to right foot     Scheduled  Meds:   carvediloL  6.25 mg Oral BID    ceFEPime IV (PEDS and ADULTS)  1 g Intravenous Daily    metroNIDAZOLE  500 mg Oral Q12H    NIFEdipine  90 mg Oral Daily    sevelamer carbonate  800 mg Oral TID WM     Continuous Infusions:  PRN Meds:  Current Facility-Administered Medications:     acetaminophen, 650 mg, Oral, Q4H PRN    aluminum-magnesium hydroxide-simethicone, 30 mL, Oral, QID PRN    benzonatate, 100 mg, Oral, TID PRN    dextromethorphan-guaiFENesin  mg/5 ml, 5 mL, Oral, Q6H PRN    dextrose 50%, 12.5 g, Intravenous, PRN    dextrose 50%, 25 g, Intravenous, PRN    diphenhydrAMINE, 25 mg, Oral, Q6H PRN    glucagon (human recombinant), 1 mg, Intramuscular, PRN    glucose, 16 g, Oral, PRN    glucose, 24 g, Oral, PRN    heparin (porcine), 1,000 Units, Intra-Catheter, PRN    insulin aspart U-100, 0-5 Units, Subcutaneous, QID (AC + HS) PRN    melatonin, 6 mg, Oral, Nightly PRN    morphine, 4 mg, Intravenous, Q4H PRN    naloxone, 0.02 mg, Intravenous, PRN    ondansetron, 4 mg, Intravenous, Q6H PRN    oxyCODONE-acetaminophen, 1 tablet, Oral, Q4H PRN    senna-docusate, 1 tablet, Oral, BID PRN    sodium chloride 0.9%, 250 mL, Intravenous, PRN    Review of patient's allergies indicates:  No Known Allergies     Past Medical History:   Diagnosis Date    Diabetes mellitus     Hypertension     Renal disorder      Past Surgical History:   Procedure Laterality Date    FOOT AMPUTATION Right     IRRIGATION AND DEBRIDEMENT Bilateral 4/1/2025    Procedure: IRRIGATION AND DEBRIDEMENT;  Surgeon: Bety Faria DPM;  Location: [facility] OR;  Service: Podiatry;  Laterality: Bilateral;  need jamshidi needle available    IRRIGATION AND DEBRIDEMENT Bilateral 4/8/2025    Procedure: IRRIGATION AND DEBRIDEMENT;  Surgeon: Bety Faria DPM;  Location: [facility] OR;  Service: Podiatry;  Laterality: Bilateral;       Family History    None       Tobacco Use    Smoking status: Never    Smokeless tobacco: Never   Substance and Sexual Activity     Alcohol use: Never    Drug use: Never    Sexual activity: Not on file     Review of Systems   Constitutional:  Negative for appetite change, chills, fatigue and fever.   Respiratory:  Positive for cough. Negative for shortness of breath.    Cardiovascular:  Negative for chest pain and leg swelling.   Gastrointestinal:  Negative for diarrhea, nausea and vomiting.   Musculoskeletal:  Positive for arthralgias and myalgias.   Skin:  Positive for color change and wound.   Neurological:  Positive for numbness. Negative for weakness.        + paresthesia      Objective:     Vital Signs (Most Recent):  Temp: 97.1 °F (36.2 °C) (04/10/25 1154)  Pulse: 88 (04/10/25 1154)  Resp: 18 (04/10/25 1154)  BP: 111/67 (04/10/25 1154)  SpO2: 96 % (04/10/25 1154) Vital Signs (24h Range):  Temp:  [97.1 °F (36.2 °C)-99.4 °F (37.4 °C)] 97.1 °F (36.2 °C)  Pulse:  [] 88  Resp:  [18-19] 18  SpO2:  [95 %-99 %] 96 %  BP: ()/(57-84) 111/67     Weight: 104.3 kg (229 lb 15 oz)  Body mass index is 29.52 kg/m².    Physical Exam  Vitals and nursing note reviewed.   Constitutional:       General: He is not in acute distress.     Appearance: He is well-developed. He is not toxic-appearing or diaphoretic.      Comments: alert and oriented x 3.    Cardiovascular:      Pulses:           Dorsalis pedis pulses are 1+ on the right side and 1+ on the left side.        Posterior tibial pulses are 1+ on the left side.   Pulmonary:      Effort: No respiratory distress.   Musculoskeletal:      Right ankle: No tenderness. No lateral malleolus, medial malleolus, AITF ligament, CF ligament or posterior TF ligament tenderness.      Right Achilles Tendon: No defects. Clements's test negative.      Left ankle: No tenderness. No lateral malleolus, medial malleolus, AITF ligament, CF ligament or posterior TF ligament tenderness.      Left Achilles Tendon: No defects. Clements's test negative.      Right foot: Swelling and tenderness present. No bony tenderness.  "     Left foot: Deformity (Appearance of short 4th metatarsal secondary to previous surgery.) and tenderness present. No bony tenderness.      Comments: Muscle strength is 5/5 in all groups bilaterally.              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 (midfoot amp)  Feet:      Right foot:      Skin integrity: Ulcer present.      Left foot:      Skin integrity: Ulcer present.   Lymphadenopathy:      Comments: No lymphatic streaking     Skin:     General: Skin is warm and dry.      Coloration: Skin is not pale.      Findings: No rash.      Nails: There is no clubbing.   Neurological:      Sensory: No sensory deficit.      Motor: No atrophy.      Comments: Light touch present     Psychiatric:         Attention and Perception: He is attentive.         Mood and Affect: Mood is not anxious. Affect is not inappropriate.         Speech: He is communicative. Speech is not slurred.         Behavior: Behavior is not combative.         4/10/2025                  4/9/25:  Scant seropurulent drainage right medial foot incision             4/7/25:        04/04/2025     Stable ulceration sub 3rd metatarsophalangeal joint of the left foot without acute signs of infection but with deep probe to bone      Plantar right foot ulcer with significant purulent drainage particularly when milking from the medial midfoot.  Palpation to the medial midfoot is significantly tender.        Post wound debridement with opening of the tract between the 2 plantar wounds            4/2/25:  No purulent drainage noted.             04/01/2025    Right plantar lateral midfoot with periwound bulla formation and localized edema and erythema.  Exquisitely tender on palpation.  Fibro necrotic wound bed         Left sub 3rd metatarsophalangeal joint with deep probe to bone, fibrogranular wound bed with periwound callus.          Laboratory:  A1C: No results for input(s): "HGBA1C" in the last 4320 hours.  CBC:   Recent Labs   Lab " "04/09/25  0451   WBC 15.17*   RBC 3.23*   HGB 9.4*   HCT 30.1*      MCV 93   MCH 29.1   MCHC 31.2*     CMP:   Recent Labs   Lab 04/09/25  0451   CALCIUM 9.5   ALBUMIN 2.6*   *   K 5.2*   CO2 24   CL 99   BUN 57*   CREATININE 14.1*     CRP:   No results for input(s): "CRP" in the last 168 hours.    ESR:   No results for input(s): "SEDRATE" in the last 168 hours.    Microbiology Results (last 7 days)       Procedure Component Value Units Date/Time    Culture, Anaerobic [[id number]] Collected: 04/08/25 1433    Order Status: Completed Specimen: Wound from Foot, Right Updated: 04/10/25 0808     Anaerobe Culture Culture In Progress    Aerobic culture [[id number]]  (Abnormal)  (Susceptibility) Collected: 04/08/25 1433    Order Status: Completed Specimen: Wound from Foot, Right Updated: 04/10/25 0754     CULTURE, AEROBIC Moderate Morganella morganii      Few Gram-negative Rods    Afb Culture Stain [[id number]] Collected: 04/08/25 1433    Order Status: Sent Specimen: Wound from Foot, Right Updated: 04/09/25 1158    Afb Culture Stain [[id number]] Collected: 04/01/25 1557    Order Status: Completed Specimen: Wound from Foot, Right Updated: 04/09/25 1121     ACID FAST STAIN  No acid fast bacilli seen    Fungus culture [[id number]]  (Normal) Collected: 04/01/25 1557    Order Status: Completed Specimen: Wound from Foot, Right Updated: 04/08/25 2002     Fungal Culture No Fungus Isolated at 1 Week    Fungus culture [[id number]]  (Normal) Collected: 04/01/25 1621    Order Status: Completed Specimen: Biopsy from Foot, Left Updated: 04/08/25 2002     Fungal Culture No Fungus Isolated at 1 Week    Gram stain [[id number]] Collected: 04/08/25 1433    Order Status: Completed Specimen: Wound from Foot, Right Updated: 04/08/25 1846     GRAM STAIN Rare WBC seen      No organisms seen    Fungus culture [[id number]] Collected: 04/08/25 1433    Order Status: Sent Specimen: Wound from Foot, Right Updated: 04/08/25 1528    AFB " Culture & Smear [[id number]] Collected: 04/08/25 1433    Order Status: Resulted Specimen: Wound from Foot, Right Updated: 04/08/25 1528    Aerobic culture [[id number]] Collected: 04/01/25 1621    Order Status: Completed Specimen: Biopsy from Foot, Left Updated: 04/07/25 0647     CULTURE, AEROBIC No Growth    Blood Culture #1 **CANNOT BE ORDERED STAT** [[id number]]  (Normal) Collected: 03/31/25 1827    Order Status: Completed Specimen: Blood from Peripheral, Antecubital, Left Updated: 04/05/25 2002     Blood Culture No Growth After 5 Days    Blood Culture #2 **CANNOT BE ORDERED STAT** [[id number]]  (Normal) Collected: 03/31/25 1845    Order Status: Completed Specimen: Blood from Peripheral, Hand, Left Updated: 04/05/25 2002     Blood Culture No Growth After 5 Days    Culture, Anaerobe [[id number]] Collected: 04/01/25 1621    Order Status: Completed Specimen: Biopsy from Foot, Left Updated: 04/05/25 0710     Anaerobe Culture No Anaerobes Isolated    Culture, Anaerobe [[id number]] Collected: 04/01/25 1557    Order Status: Completed Specimen: Wound from Foot, Right Updated: 04/05/25 0710     Anaerobe Culture No Anaerobes Isolated    Afb Culture Stain [[id number]] Collected: 04/01/25 1621    Order Status: Completed Specimen: Biopsy from Foot, Left Updated: 04/04/25 1542     ACID FAST STAIN  No acid fast bacilli seen    AFB Culture & Smear [[id number]] Collected: 04/01/25 1621    Order Status: Completed Specimen: Biopsy from Foot, Left Updated: 04/04/25 1542     CULTURE, AFB  Culture In Progress            Diagnostic Results:  Imaging Results               MRI Hindfoot WO Contrast LT (Final result)  Result time 04/01/25 07:47:56      Final result by Seamus Hays MD (04/01/25 07:47:56)                   Impression:      1. Plantar soft tissue ulcer at the level of the 3rd MTP joint with associated osteomyelitis of the distal half of the metatarsal and base of the proximal phalanx.  No abscess.  2. Sequela of chronic  osteomyelitis involving the 1st, 2nd and 5th metatarsals and proximal phalanges.  3. Postoperative change of transmetatarsal amputation of the 4th digit.  This report was flagged in Epic as abnormal.      Electronically signed by: Seamus Hays MD  Date:    04/01/2025  Time:    07:47               Narrative:    EXAMINATION:  MRI HINDFOOT WO CONTRAST LT; MRI FOREFOOT WO CONTRAST LT    CLINICAL HISTORY:  b/l ulcers concern for osteo;; b/l ulcers. Concern for osteo;    TECHNIQUE:  Routine MRI evaluation of the left ankle and forefoot performed without contrast.    COMPARISON:  Radiographs 09/01/2024.    FINDINGS:  Examination degraded by patient motion artifact.    BONES: Postsurgical change of transmetatarsal amputation of the 4th digit.  Osseous erosive changes of the 3rd metatarsal head and proximal phalanx base with associated confluent marrow edema involving the distal half of the metatarsal and proximal half of the proximal phalanx.  There is corresponding T1 medullary hypointensity throughout the distal half of the metatarsal and base of the proximal phalanx.  Chronic osseous erosive changes of the 1st, 2nd and 5th metatarsal heads and proximal phalanges with grossly preserved marrow signal intensity.  Solid periosteal reaction throughout the 2nd metatarsal shaft.  Linear area of edema signal at the posterior subchondral region of the tibial plafond, favored to be degenerative in nature.  No definite acute fractures.  Remote healed fracture of the lateral malleolus.  No avascular necrosis.  No marrow infiltrative process.    JOINTS: 3rd MTP complex joint effusion with surrounding synovitis.  No dislocation.    LIGAMENTS: Chronic sprains of the anterior talofibular, superficial deltoid and deep deltoid ligaments.  Lisfranc ligament appears intact.    TENDONS: Ulceration and disruption of the 3rd flexor tendon at the level of the MTP joint.  Chronic ulceration of the 1st, 2nd and 5th flexor tendons at the level  of the MTP joints.  Postoperative changes of the 4th flexor tendon.  Remaining tendons appear grossly intact.    MUSCLES: Edema and severe fatty degeneration of the visualized musculature.  No intramuscular fluid collection.    MISCELLANEOUS: Plantar soft tissue ulcer at the level of the 3rd MTP joint with adjacent fluid and surrounding subcutaneous edema.  No focal fluid collection.  Probable adventitial bursa at the plantar aspect of the hallux MTP joint.  Focal area of signal hypointensity at the level of the heel pad, possibly sequela of scarring.                                        MRI Forefoot WO Contrast LT (Final result)  Result time 04/01/25 07:47:56   Procedure changed from MRI Foot Toes WO Contrast DANY     Final result by Seamus Hays MD (04/01/25 07:47:56)                   Impression:      1. Plantar soft tissue ulcer at the level of the 3rd MTP joint with associated osteomyelitis of the distal half of the metatarsal and base of the proximal phalanx.  No abscess.  2. Sequela of chronic osteomyelitis involving the 1st, 2nd and 5th metatarsals and proximal phalanges.  3. Postoperative change of transmetatarsal amputation of the 4th digit.  This report was flagged in Epic as abnormal.      Electronically signed by: Seamus Hays MD  Date:    04/01/2025  Time:    07:47               Narrative:    EXAMINATION:  MRI HINDFOOT WO CONTRAST LT; MRI FOREFOOT WO CONTRAST LT    CLINICAL HISTORY:  b/l ulcers concern for osteo;; b/l ulcers. Concern for osteo;    TECHNIQUE:  Routine MRI evaluation of the left ankle and forefoot performed without contrast.    COMPARISON:  Radiographs 09/01/2024.    FINDINGS:  Examination degraded by patient motion artifact.    BONES: Postsurgical change of transmetatarsal amputation of the 4th digit.  Osseous erosive changes of the 3rd metatarsal head and proximal phalanx base with associated confluent marrow edema involving the distal half of the metatarsal and proximal  half of the proximal phalanx.  There is corresponding T1 medullary hypointensity throughout the distal half of the metatarsal and base of the proximal phalanx.  Chronic osseous erosive changes of the 1st, 2nd and 5th metatarsal heads and proximal phalanges with grossly preserved marrow signal intensity.  Solid periosteal reaction throughout the 2nd metatarsal shaft.  Linear area of edema signal at the posterior subchondral region of the tibial plafond, favored to be degenerative in nature.  No definite acute fractures.  Remote healed fracture of the lateral malleolus.  No avascular necrosis.  No marrow infiltrative process.    JOINTS: 3rd MTP complex joint effusion with surrounding synovitis.  No dislocation.    LIGAMENTS: Chronic sprains of the anterior talofibular, superficial deltoid and deep deltoid ligaments.  Lisfranc ligament appears intact.    TENDONS: Ulceration and disruption of the 3rd flexor tendon at the level of the MTP joint.  Chronic ulceration of the 1st, 2nd and 5th flexor tendons at the level of the MTP joints.  Postoperative changes of the 4th flexor tendon.  Remaining tendons appear grossly intact.    MUSCLES: Edema and severe fatty degeneration of the visualized musculature.  No intramuscular fluid collection.    MISCELLANEOUS: Plantar soft tissue ulcer at the level of the 3rd MTP joint with adjacent fluid and surrounding subcutaneous edema.  No focal fluid collection.  Probable adventitial bursa at the plantar aspect of the hallux MTP joint.  Focal area of signal hypointensity at the level of the heel pad, possibly sequela of scarring.                                       MRI Hindfoot WO Contrast RT (Final result)  Result time 04/01/25 07:19:00      Final result by Seamus Hays MD (04/01/25 07:19:00)                   Impression:      1. Plantar soft tissue ulcer at the level of the calcaneocuboid joint with surrounding subcutaneous edema and adjacent blistering.  No osteomyelitis.  No  abscess.  2. Advanced neuropathic arthropathy of the tibiotalar, subtalar and residual midtarsal joints.      Electronically signed by: Seamus Hays MD  Date:    04/01/2025  Time:    07:19               Narrative:    EXAMINATION:  MRI HINDFOOT WO CONTRAST RT    CLINICAL HISTORY:  Foot swelling, diabetic, osteomyelitis suspected, xray done;    TECHNIQUE:  Routine MRI evaluation of the right ankle performed without contrast.    COMPARISON:  Radiographs 03/31/2025.    FINDINGS:  BONES/JOINTS: Postsurgical changes of midfoot amputation.  Chronic osseous erosive changes centered about the tibiotalar, subtalar and residual midtarsal joints, likely sequela of chronic neuropathic arthropathy.  Intraosseous cyst at the lateral aspect of the distal tibia.  Circumferential solid periosteal reaction about the distal tibia and distal fibula.  No marrow signal abnormality to suggest an infiltrative process.  No significant joint effusion.  No dislocation.    TENDONS: Achilles tendon demonstrates normal morphology and signal intensity.  Residual posterior tibial, flexor digitorum longus, flexor hallucis longus, peroneus longus, peroneus brevis and extensor tendons are intact.    MUSCLES: Edema and fatty degeneration of the visualized musculature.  No intramuscular fluid collection.    MISCELLANEOUS: Plantar soft tissue ulcer at the level of the calcaneocuboid joint with involvement of the plantar aponeurosis, surrounding subcutaneous edema and adjacent blistering.  No fluid collection.                                       US Lower Extremity Arteries Right (Final result)  Result time 03/31/25 21:32:36      Final result by Michelle Leon MD (03/31/25 21:32:36)                   Impression:      Normal triphasic waveforms throughout the right lower extremity.  No occlusion or stenosis detected.      Electronically signed by: Michelle Leon  Date:    03/31/2025  Time:    21:32               Narrative:    EXAMINATION:  US  LOWER EXTREMITY ARTERIES RIGHT    CLINICAL HISTORY:  Unspecified open wound, right foot, subsequent encounter    TECHNIQUE:  Duplex and color flow Doppler evaluation and graded compression of the right lower extremity arteries was performed.    COMPARISON:  None    FINDINGS:  Peak systolic velocities in the right lower extremity arteries (cm/sec):    CFA: 127, triphasic    DFA: 74, triphasic    Proximal SFA: 103, triphasic    Mid SFA: 120, triphasic    Distal SFA: 109, triphasic    Proximal pop: 84, triphasic    Distal pop: 98, triphasic    LAUREL: 80, triphasic    PTA: 86, triphasic    Peroneal: 111, triphasic    DPA: 75, triphasic                                       X-Ray Foot Complete Right (Final result)  Result time 03/31/25 13:14:02      Final result by Alli Henriquez MD (03/31/25 13:14:02)                   Impression:      Please see above.      Electronically signed by: Alli Henriquez  Date:    03/31/2025  Time:    13:14               Narrative:    EXAMINATION:  XR FOOT COMPLETE 3 VIEW RIGHT    CLINICAL HISTORY:  . Pain in unspecified foot    TECHNIQUE:  AP, lateral, and oblique views of the right foot were performed.    COMPARISON:  None.    FINDINGS:  Extensive postoperative change including amputation to the level of the midfoot structures.  Remainder of the visualized osseous structures of the mid and hindfoot demonstrate diffuse degenerative change in osteopenia with surrounding diffuse soft tissue swelling.  Additional diffuse osseous destruction and degenerative change about the partially visualized ankle.  Scattered vascular calcification as well as plantar calcaneal spur.  No obvious significant osseous erosive change, noting limitations from postoperative change and surrounding soft tissue swelling.  If there is continued clinical concern for osteomyelitis, consider MRI of the foot for further evaluation.                                       X-Ray Chest AP Portable (Final result)  Result time 03/31/25  13:15:24      Final result by Alli Henriquez MD (03/31/25 13:15:24)                   Impression:      Please see above.      Electronically signed by: Alli Henriquez  Date:    03/31/2025  Time:    13:15               Narrative:    EXAMINATION:  XR CHEST AP PORTABLE    CLINICAL HISTORY:  Chronic cough    TECHNIQUE:  Single frontal view of the chest was performed.    COMPARISON:  Chest radiograph 09/01/2024    FINDINGS:  Left-sided central venous catheter with tip overlying the in expected region of the cavoatrial junction.  Cardiomediastinal silhouette is unchanged in size.  Patient is rotated, limiting evaluation of the mediastinal structures.    Chronic right effusion with probable superimposed volume loss about the right lung base.  Superimposed infectious or non infectious inflammatory infiltrate cannot be excluded.  Remainder of the aerated portions of the lungs are clear.  No pneumothorax.    Osseous structures demonstrate no evidence for acute fracture or osseous destructive lesion.  Soft tissues are unremarkable.                                      Assessment/Plan:     Active Diagnoses:    Diagnosis Date Noted POA    PRINCIPAL PROBLEM:  Infected ulcer of skin [L98.499, L08.9] 03/31/2025 Yes    Pyogenic inflammation of bone [M86.9] 04/03/2025 Yes    Foot infection [L08.9] 04/02/2025 Yes    Foot ulcer with fat layer exposed, unspecified laterality [L97.502] 09/01/2024 Yes    Type 2 diabetes mellitus with foot ulcer [E11.621, L97.509] 06/25/2024 Yes    End stage renal disease [N18.6] 03/21/2024 Yes    Hyperkalemia [E87.5] 07/19/2021 Yes     Chronic    Essential hypertension [I10] 01/27/2021 Yes      Problems Resolved During this Admission:       Education about the prevention of limb loss.      S/p  repeat irrigation and debridement of both feet DOS: 4/1/25, 4/8/25    Discussed wound healing cycle, skin integrity, ways to care for skin.Counseled patient on the effects of biomechanical pressure and deformity as  well as blood glucose on healing. He verbalizes understanding that it can increase the chances of delayed healing and this prolonged exposure leads to infection or progression of infection which subsequently can result in loss of limb.    Scant purulent drainage noted, no longer painful with palpation     PT pulse lavage ordered.     DSD applied to right foot    Abx per ID    F/u WB wound care upon discharge.  Recommend twice weekly HH dressing changes on discharge    We will continue to follow and work in partnership with treatment team on how to best treat patient concern and diagnosis.      Bety Faria DPM  Podiatry  [facility] [place] - Emergency Dept

## 2025-04-10 NOTE — SUBJECTIVE & OBJECTIVE
Interval History:  No acute event overnight.  Wound improved compared to yesterday.  Pulsavac ordered for 4/10 and 04/11; patient complaining of cough.  Noted elevated WBC     Review of Systems   Constitutional:  Negative for fatigue and fever.   Respiratory:  Positive for cough. Negative for choking, shortness of breath and wheezing.    Cardiovascular:  Negative for chest pain and leg swelling.     Objective:     Vital Signs (Most Recent):  Temp: 97.1 °F (36.2 °C) (04/10/25 1154)  Pulse: 88 (04/10/25 1154)  Resp: 18 (04/10/25 1346)  BP: 111/67 (04/10/25 1154)  SpO2: 96 % (04/10/25 1154) Vital Signs (24h Range):  Temp:  [97.1 °F (36.2 °C)-99.4 °F (37.4 °C)] 97.1 °F (36.2 °C)  Pulse:  [] 88  Resp:  [18-19] 18  SpO2:  [95 %-99 %] 96 %  BP: ()/(57-76) 111/67     Weight: 104.3 kg (229 lb 15 oz)  Body mass index is 29.52 kg/m².    Intake/Output Summary (Last 24 hours) at 4/10/2025 1554  Last data filed at 4/10/2025 0921  Gross per 24 hour   Intake 620 ml   Output 2000 ml   Net -1380 ml         Physical Exam  Constitutional:       General: He is not in acute distress.     Appearance: He is not ill-appearing, toxic-appearing or diaphoretic.   HENT:      Head: Normocephalic and atraumatic.   Cardiovascular:      Rate and Rhythm: Normal rate and regular rhythm.      Pulses: Normal pulses.      Heart sounds: Normal heart sounds. No murmur heard.     No friction rub.   Pulmonary:      Effort: Pulmonary effort is normal. No respiratory distress.      Breath sounds: Normal breath sounds. No stridor.   Abdominal:      General: Bowel sounds are normal. There is no distension.      Palpations: Abdomen is soft. There is mass.   Musculoskeletal:      Comments: Lower extremity wound with clean surgical margin; limited discharge   Neurological:      General: No focal deficit present.               Significant Labs: CBC:   Recent Labs   Lab 04/09/25  0451 04/10/25  1334   WBC 15.17* 15.80*   HGB 9.4* 9.4*   HCT 30.1* 30.2*     263     CMP:   Recent Labs   Lab 04/09/25  0451   *   K 5.2*   CL 99   CO2 24   BUN 57*   CREATININE 14.1*   CALCIUM 9.5   ALBUMIN 2.6*   ANIONGAP 11       Significant Imaging: I have reviewed all pertinent imaging results/findings within the past 24 hours.

## 2025-04-10 NOTE — PLAN OF CARE
Problem: Adult Inpatient Plan of Care  Goal: Plan of Care Review  Outcome: Progressing  Goal: Patient-Specific Goal (Individualized)  Outcome: Progressing     Problem: Hemodialysis  Goal: Safe, Effective Therapy Delivery  Outcome: Progressing

## 2025-04-10 NOTE — PLAN OF CARE
Nutrition Plan of Care:    Recommendations     1. Continue with consistent carbohydrate, renal oral diet    2. Recommend Aguila BID to promote wound healing    3. Monitor labs and weights     Goals: Patient to consume >75% of EEN prior to RD follow up  Nutrition Goal Status: new  Communication of RD Recs: other (comment) (Consult, poc)     Nutrition Discharge Planning     Nutrition Discharge Planning: Therapeutic diet (comments), Oral supplement regimen (comments)  Therapeutic diet (comments): Consistent carbohydrate  Oral supplement regimen (comments): Aguila     Assessment and Plan     Nutrition Problem  Increased protein needs     Related to (etiology):   Increased demand for nutrition     Signs and Symptoms (as evidenced by):   Delayed wound healing      Interventions (treatment strategy):  Consistent carbohydrate modified diet   Amino acid medical food supplement therapy   Collaboration by nutrition professional with other providers     Nutrition Diagnosis Status:   Jamie Berry MS, RDN, LDN

## 2025-04-10 NOTE — PROGRESS NOTES
[facility] Medicine  Progress Note    Patient Name: Emmanuel Morgan  MRN: [id number]  Patient Class: IP- Inpatient   Admission Date: 3/31/2025  Length of Stay: 10 days  Attending Physician: Tony Sainz MD  Primary Care Provider: Laurence, Primary Doctor        Subjective     Principal Problem:Infected ulcer of skin        HPI:  39-year-old male with ESRD on HD, diabetes not on insulin, prior osteomyelitis requiring right midfoot amputation with a long standing left foot ulcer (months) and a right foot ulcer of his stump which has been present for weeks who was sent to the ER by home health due to progression of wounds with skin and soft tissue infections of possibly both ulcers and possible osteomyelitis, he has been followed up by outpatient wound care agency, and was sent here by home health nurse due to nonhealing of the wound, noted with worsening erythema and purulent discharge.  Missed hemodialysis session on the day of presentation due to presentation in the emergency room.    Overview/Hospital Course:  39-year-old male with ESRD on HD, DM, prior osteomyelitis requiring right midfoot amputation with a long standing left foot ulcer (months) and a right foot ulcer of his stump which has been present for weeks who was sent to the ER by home health due to progression of wounds with skin and soft tissue infections of possibly both ulcers and possible osteomyelitis of either.  MRI of left foot showing plantar soft tissue ulcer at the level of the 3rd MTP joint with associated osteomyelitis of the distal half of the metatarsal and base of the proximal phalanx.  On ABX's and Podiatry consulted. Patient underwent irrigation and debridement of bilateral LE's.  Currently  is coordinating antibiotics with HD unit.  Still having some purulent discharge and needed repeat I&D in the OR on 4/8 with some improvement in wound.  Pulsavac was ordered for 4/11 and 4/12.  With noted improvement in  wound.  Patient to be discharged on IV antibiotics and outpatient wound care      Interval History:  No acute event overnight.  Wound improved compared to yesterday.  Pulsavac ordered for 4/10 and 04/11; patient complaining of cough.  Noted elevated WBC     Review of Systems   Constitutional:  Negative for fatigue and fever.   Respiratory:  Positive for cough. Negative for choking, shortness of breath and wheezing.    Cardiovascular:  Negative for chest pain and leg swelling.     Objective:     Vital Signs (Most Recent):  Temp: 97.1 °F (36.2 °C) (04/10/25 1154)  Pulse: 88 (04/10/25 1154)  Resp: 18 (04/10/25 1346)  BP: 111/67 (04/10/25 1154)  SpO2: 96 % (04/10/25 1154) Vital Signs (24h Range):  Temp:  [97.1 °F (36.2 °C)-99.4 °F (37.4 °C)] 97.1 °F (36.2 °C)  Pulse:  [] 88  Resp:  [18-19] 18  SpO2:  [95 %-99 %] 96 %  BP: ()/(57-76) 111/67     Weight: 104.3 kg (229 lb 15 oz)  Body mass index is 29.52 kg/m².    Intake/Output Summary (Last 24 hours) at 4/10/2025 1554  Last data filed at 4/10/2025 0921  Gross per 24 hour   Intake 620 ml   Output 2000 ml   Net -1380 ml         Physical Exam  Constitutional:       General: He is not in acute distress.     Appearance: He is not ill-appearing, toxic-appearing or diaphoretic.   HENT:      Head: Normocephalic and atraumatic.   Cardiovascular:      Rate and Rhythm: Normal rate and regular rhythm.      Pulses: Normal pulses.      Heart sounds: Normal heart sounds. No murmur heard.     No friction rub.   Pulmonary:      Effort: Pulmonary effort is normal. No respiratory distress.      Breath sounds: Normal breath sounds. No stridor.   Abdominal:      General: Bowel sounds are normal. There is no distension.      Palpations: Abdomen is soft. There is mass.   Musculoskeletal:      Comments: Lower extremity wound with clean surgical margin; limited discharge   Neurological:      General: No focal deficit present.               Significant Labs: CBC:   Recent Labs   Lab  04/09/25  0451 04/10/25  1334   WBC 15.17* 15.80*   HGB 9.4* 9.4*   HCT 30.1* 30.2*    263     CMP:   Recent Labs   Lab 04/09/25  0451   *   K 5.2*   CL 99   CO2 24   BUN 57*   CREATININE 14.1*   CALCIUM 9.5   ALBUMIN 2.6*   ANIONGAP 11       Significant Imaging: I have reviewed all pertinent imaging results/findings within the past 24 hours.      Assessment & Plan  Infected ulcer of skin  Patient presents with ulcers to left foot and right foot stump.  MRI ordered.  No evidence of osteo on right foot stump.  Left foot MRI showing plantar soft tissue ulcer at the level of the 3rd MTP joint with associated osteomyelitis of the distal half of the metatarsal and base of the proximal phalanx.   Started on ABX's  Wound cultures positive for Proteus mirabilis  S/p Underwent bilateral irrigation and debridement by Podiatry  ID following; input appreciated.  Recommending IV Cefepime with HD and oral Flagyl until 5/13.  Will need outpatient ABX's with HD arranged.  Discussed with Podiatry.  Still having some purulent drainage; repeat I and D on 04/08 in the OR        End stage renal disease  Creatine stable for now. BMP reviewed- noted Estimated Creatinine Clearance: 9 mL/min (A) (based on SCr of 14.1 mg/dL (H)). according to latest data. Based on current GFR, CKD stage is end stage.  Monitor UOP and serial BMP and adjust therapy as needed. Renally dose meds. Avoid nephrotoxic medications and procedures.  Nephrology consulted for HD.  Continue Monday, Wednesday, and Friday hemodialysis schedule.    Essential hypertension  Patient's blood pressure range in the last 24 hours was: BP  Min: 95/63  Max: 136/76.The patient's inpatient anti-hypertensive regimen is listed below:  Current Antihypertensives  , Daily, Oral  NIFEdipine 24 hr tablet 90 mg, Daily, Oral  carvediloL tablet 6.25 mg, 2 times daily, Oral    Plan  - BP is controlled, no changes needed to their regimen  - lisinopril held due to hyperkalemia.  Will  restart when clinically indicated  Hyperkalemia  Hyperkalemia is likely due to ESRD.The patients most recent potassium results are listed below.  Recent Labs     04/08/25  1246 04/09/25  0451   K 5.4* 5.2*   He got some doses of Lokelma;will order another dose today  Nephrology consulted for HD.  Continue monitoring          Type 2 diabetes mellitus with foot ulcer  Patient's FSGs are controlled on current medication regimen.  Last A1c reviewed-   Lab Results   Component Value Date    HGBA1C 4.9 08/20/2024     Most recent fingerstick glucose reviewed-   Recent Labs   Lab 04/09/25  1957 04/10/25  0821 04/10/25  1150   POCTGLUCOSE 110 127* 119*     Current correctional scale  Low  Maintain anti-hyperglycemic dose as follows-   Antihyperglycemics (From admission, onward)      Start     Stop Route Frequency Ordered    03/31/25 2317  insulin aspart U-100 pen 0-5 Units         -- SubQ Before meals & nightly PRN 03/31/25 2218          Hold Oral hypoglycemics while patient is in the hospital.  Foot ulcer with fat layer exposed, unspecified laterality  Podiatry consulted, appreciated recs  Continue current antibiotics, ID consulted.  Continue wound care per podiatrist  Foot infection  See note above    Pyogenic inflammation of bone  Continue antibiotics as described above.    VTE Risk Mitigation (From admission, onward)           Ordered     heparin (porcine) injection 1,000 Units  As needed (PRN)         04/01/25 1827     IP VTE LOW RISK PATIENT  Once         03/31/25 2218     Place sequential compression device  Until discontinued         03/31/25 2218                    Discharge Planning   LULÚ: 4/9/2025     Code Status: Full Code   Medical Readiness for Discharge Date:   Discharge Plan A: Home   Discharge Delays: None known at this time                    Tony Sainz MD  Department of Hospital Medicine   [facility] [place] [facility]

## 2025-04-10 NOTE — PROGRESS NOTES
[facility] [place] [facility]  [facility]  Progress Note    SUMMARY       Recommendations    1. Continue with consistent carbohydrate, renal oral diet    2. Recommend Aguila BID to promote wound healing    3. Monitor labs and weights    Goals: Patient to consume >75% of EEN prior to RD follow up  Nutrition Goal Status: new  Communication of RD Recs: other (comment) (Consult, poc)    Nutrition Discharge Planning    Nutrition Discharge Planning: Therapeutic diet (comments), Oral supplement regimen (comments)  Therapeutic diet (comments): Consistent carbohydrate  Oral supplement regimen (comments): Aguila    Assessment and Plan    Nutrition Problem  Increased protein needs    Related to (etiology):   Increased demand for nutrition    Signs and Symptoms (as evidenced by):   Delayed wound healing     Interventions (treatment strategy):  Consistent carbohydrate modified diet   Amino acid medical food supplement therapy   Collaboration by nutrition professional with other providers    Nutrition Diagnosis Status:   New      Malnutrition Assessment     Skin (Micronutrient): wounds unhealed                                 Reason for Assessment    Reason For Assessment: pressure injury/ wound, length of stay    Diagnosis: infection/sepsis  Patient Active Problem List  Diagnosis    Anemia in chronic kidney disease    End stage renal disease    Essential hypertension    Hyperkalemia    Type 2 diabetes mellitus with foot ulcer    Hypertensive urgency    Acute respiratory distress    Foot ulcer with fat layer exposed, unspecified laterality    Infected ulcer of skin    Foot infection    Pyogenic inflammation of bone     Past Medical History:   Diagnosis Date    Diabetes mellitus     Hypertension     Renal disorder        General Information Comments:   4/10/25: Patient is on a carbohydrate consistent oral diet with varying po intake.  Patient with ESRD on HD with bilateral foot ulcer infection with mid foot amputation.  RD recommends  "scott BID to promote wound healing.  labs reviewed.  NKFA.  LBM: 4/8/25.  RD to continue to monitor po intake and tolerance    Nutrition/Diet History    Food Allergies: [facility]  Nutrition Related Social Determinants of Health: SDOH: None Identified    Anthropometrics    Height: 6' 2" (188 cm)  Height (inches): 74 in  Height Method: Stated  Weight: 104.3 kg (229 lb 15 oz)  Weight (lb): 229.94 lb  Weight Method: Bed Scale  Ideal Body Weight (IBW), Male: 190 lb  % Ideal Body Weight, Male (lb): 121.02 %  BMI (Calculated): 29.5  BMI Grade: 25 - 29.9 - overweight       Lab/Procedures/Meds    Pertinent Labs Reviewed: reviewed  BMP  Lab Results   Component Value Date     (L) 04/09/2025    K 5.2 (H) 04/09/2025    CL 99 04/09/2025    CO2 24 04/09/2025    BUN 57 (H) 04/09/2025    CREATININE 14.1 (H) 04/09/2025    CALCIUM 9.5 04/09/2025    ANIONGAP 11 04/09/2025    EGFRNORACEVR 4 (L) 04/09/2025     Lab Results   Component Value Date    HGBA1C 4.9 08/20/2024     Lab Results   Component Value Date    CALCIUM 9.5 04/09/2025    PHOS 3.3 04/09/2025       Pertinent Medications Reviewed: reviewed  Scheduled Meds:   benzonatate  100 mg Oral Q8H    carvediloL  6.25 mg Oral BID    ceFEPime IV (PEDS and ADULTS)  1 g Intravenous Daily    guaiFENesin  600 mg Oral BID    metroNIDAZOLE  500 mg Oral Q12H    NIFEdipine  90 mg Oral Daily    sevelamer carbonate  800 mg Oral TID WM     Continuous Infusions:  PRN Meds:.  Current Facility-Administered Medications:     acetaminophen, 650 mg, Oral, Q4H PRN    aluminum-magnesium hydroxide-simethicone, 30 mL, Oral, QID PRN    dextromethorphan-guaiFENesin  mg/5 ml, 5 mL, Oral, Q6H PRN    dextrose 50%, 12.5 g, Intravenous, PRN    dextrose 50%, 25 g, Intravenous, PRN    diphenhydrAMINE, 25 mg, Oral, Q6H PRN    glucagon (human recombinant), 1 mg, Intramuscular, PRN    glucose, 16 g, Oral, PRN    glucose, 24 g, Oral, PRN    heparin (porcine), 1,000 Units, Intra-Catheter, PRN    insulin aspart U-100, " 0-5 Units, Subcutaneous, QID (AC + HS) PRN    melatonin, 6 mg, Oral, Nightly PRN    morphine, 4 mg, Intravenous, Q4H PRN    naloxone, 0.02 mg, Intravenous, PRN    ondansetron, 4 mg, Intravenous, Q6H PRN    oxyCODONE-acetaminophen, 1 tablet, Oral, Q4H PRN    senna-docusate, 1 tablet, Oral, BID PRN    sodium chloride 0.9%, 250 mL, Intravenous, PRN    Estimated/Assessed Needs    Weight Used For Calorie Calculations: 104.3 kg (229 lb 15 oz)  Energy Calorie Requirements (kcal): 20-25kcals/kg (2086-2607kcals/day)  Energy Need Method: Kcal/kg  Protein Requirements: 1.2-1.5g/kg (125-156g/day)  Weight Used For Protein Calculations: 104.3 kg (229 lb 15 oz)  Fluid Requirements (mL): 1000+output or per md order  Estimated Fluid Requirement Method: RDA Method  RDA Method (mL): 20  CHO Requirement: 250      Nutrition Prescription Ordered    Current Diet Order: Consistent carbohydrate  Oral Nutrition Supplement: scott    Evaluation of Received Nutrient/Fluid Intake    % Kcal Needs: %  % Protein Needs: 50%  I/O: 4/10/25: -2161ml since admit  Energy Calories Required: meeting needs  Protein Required: not meeting needs  Fluid Required: meeting needs  Comments: LBM: 4/8/25  Tolerance: tolerating  % Intake of Estimated Energy Needs: 25 - 75 %  % Meal Intake: 50 - 75 %    Nutrition Risk    Level of Risk/Frequency of Follow-up: moderate (Follow up: 1-2x per week)     Monitor and Evaluation    Monitor and Evaluation: Energy intake, Protein intake, Diet order, Weight, Beliefs and attitudes, Skin, Lipid profile, Inflammatory profile, Glucose/endocrine profile, Gastrointestinal profile, Electrolyte and renal panel     Nutrition Follow-Up    RD Follow-up?: Yes  Grace Berry, MS, RDN, LDN

## 2025-04-10 NOTE — ASSESSMENT & PLAN NOTE
Patient's FSGs are controlled on current medication regimen.  Last A1c reviewed-   Lab Results   Component Value Date    HGBA1C 4.9 08/20/2024     Most recent fingerstick glucose reviewed-   Recent Labs   Lab 04/09/25 1957 04/10/25  0821 04/10/25  1150   POCTGLUCOSE 110 127* 119*     Current correctional scale  Low  Maintain anti-hyperglycemic dose as follows-   Antihyperglycemics (From admission, onward)      Start     Stop Route Frequency Ordered    03/31/25 2317  insulin aspart U-100 pen 0-5 Units         -- SubQ Before meals & nightly PRN 03/31/25 2218          Hold Oral hypoglycemics while patient is in the hospital.

## 2025-04-10 NOTE — NURSING
[facility]  Rapid Response Nurse Intervention/ Task    Date of Visit: 04/10/2025  Time of Visit: 0600       INTERVENTIONS/ TASK Completed:     PIV insertion X1   [facility]  Rapid Response Nurse Intervention/ Task

## 2025-04-11 PROBLEM — D72.829 LEUKOCYTOSIS: Status: ACTIVE | Noted: 2025-04-11

## 2025-04-11 LAB
ABSOLUTE EOSINOPHIL (OHS): 0.36 K/UL
ABSOLUTE MONOCYTE (OHS): 3.4 K/UL (ref 0.3–1)
ABSOLUTE NEUTROPHIL COUNT (OHS): 9.34 K/UL (ref 1.8–7.7)
ALBUMIN SERPL BCP-MCNC: 2.5 G/DL (ref 3.5–5.2)
ANION GAP (OHS): 12 MMOL/L (ref 8–16)
BACTERIA SPEC AEROBE CULT: ABNORMAL
BACTERIA SPEC AEROBE CULT: ABNORMAL
BASOPHILS # BLD AUTO: 0.06 K/UL
BASOPHILS NFR BLD AUTO: 0.4 %
BUN SERPL-MCNC: 53 MG/DL (ref 6–20)
CALCIUM SERPL-MCNC: 9.5 MG/DL (ref 8.7–10.5)
CHLORIDE SERPL-SCNC: 100 MMOL/L (ref 95–110)
CO2 SERPL-SCNC: 22 MMOL/L (ref 23–29)
CREAT SERPL-MCNC: 12.8 MG/DL (ref 0.5–1.4)
ERYTHROCYTE [DISTWIDTH] IN BLOOD BY AUTOMATED COUNT: 16.9 % (ref 11.5–14.5)
GFR SERPLBLD CREATININE-BSD FMLA CKD-EPI: 5 ML/MIN/1.73/M2
GLUCOSE SERPL-MCNC: 93 MG/DL (ref 70–110)
HCT VFR BLD AUTO: 31.3 % (ref 40–54)
HGB BLD-MCNC: 9.6 GM/DL (ref 14–18)
IMM GRANULOCYTES # BLD AUTO: 0.36 K/UL (ref 0–0.04)
IMM GRANULOCYTES NFR BLD AUTO: 2.2 % (ref 0–0.5)
LYMPHOCYTES # BLD AUTO: 3.02 K/UL (ref 1–4.8)
MCH RBC QN AUTO: 28.4 PG (ref 27–31)
MCHC RBC AUTO-ENTMCNC: 30.7 G/DL (ref 32–36)
MCV RBC AUTO: 93 FL (ref 82–98)
NUCLEATED RBC (/100WBC) (OHS): 0 /100 WBC
PHOSPHATE SERPL-MCNC: 4 MG/DL (ref 2.7–4.5)
PLATELET # BLD AUTO: 316 K/UL (ref 150–450)
PMV BLD AUTO: 9.7 FL (ref 9.2–12.9)
POCT GLUCOSE: 111 MG/DL (ref 70–110)
POCT GLUCOSE: 115 MG/DL (ref 70–110)
POCT GLUCOSE: 124 MG/DL (ref 70–110)
POTASSIUM SERPL-SCNC: 5.2 MMOL/L (ref 3.5–5.1)
RBC # BLD AUTO: 3.38 M/UL (ref 4.6–6.2)
RELATIVE EOSINOPHIL (OHS): 2.2 %
RELATIVE LYMPHOCYTE (OHS): 18.3 % (ref 18–48)
RELATIVE MONOCYTE (OHS): 20.6 % (ref 4–15)
RELATIVE NEUTROPHIL (OHS): 56.3 % (ref 38–73)
SODIUM SERPL-SCNC: 134 MMOL/L (ref 136–145)
WBC # BLD AUTO: 16.54 K/UL (ref 3.9–12.7)

## 2025-04-11 PROCEDURE — 90935 HEMODIALYSIS ONE EVALUATION: CPT | Mod: ,,, | Performed by: INTERNAL MEDICINE

## 2025-04-11 PROCEDURE — 63600175 PHARM REV CODE 636 W HCPCS: Performed by: PODIATRIST

## 2025-04-11 PROCEDURE — C1752 CATH,HEMODIALYSIS,SHORT-TERM: HCPCS

## 2025-04-11 PROCEDURE — 25000003 PHARM REV CODE 250: Performed by: PODIATRIST

## 2025-04-11 PROCEDURE — 25000003 PHARM REV CODE 250

## 2025-04-11 PROCEDURE — 80069 RENAL FUNCTION PANEL: CPT

## 2025-04-11 PROCEDURE — 85025 COMPLETE CBC W/AUTO DIFF WBC: CPT

## 2025-04-11 PROCEDURE — 97597 DBRDMT OPN WND 1ST 20 CM/<: CPT

## 2025-04-11 PROCEDURE — 90935 HEMODIALYSIS ONE EVALUATION: CPT

## 2025-04-11 PROCEDURE — 87040 BLOOD CULTURE FOR BACTERIA: CPT

## 2025-04-11 PROCEDURE — 11000001 HC ACUTE MED/SURG PRIVATE ROOM

## 2025-04-11 PROCEDURE — 99232 SBSQ HOSP IP/OBS MODERATE 35: CPT | Mod: ,,, | Performed by: PODIATRIST

## 2025-04-11 PROCEDURE — 36415 COLL VENOUS BLD VENIPUNCTURE: CPT

## 2025-04-11 RX ADMIN — BENZONATATE 100 MG: 100 CAPSULE ORAL at 01:04

## 2025-04-11 RX ADMIN — GUAIFENESIN 600 MG: 600 TABLET, EXTENDED RELEASE ORAL at 09:04

## 2025-04-11 RX ADMIN — ONDANSETRON 4 MG: 2 INJECTION INTRAMUSCULAR; INTRAVENOUS at 09:04

## 2025-04-11 RX ADMIN — CARVEDILOL 6.25 MG: 6.25 TABLET, FILM COATED ORAL at 09:04

## 2025-04-11 RX ADMIN — SEVELAMER CARBONATE 800 MG: 800 TABLET, FILM COATED ORAL at 01:04

## 2025-04-11 RX ADMIN — METRONIDAZOLE 500 MG: 500 TABLET ORAL at 09:04

## 2025-04-11 RX ADMIN — BENZONATATE 100 MG: 100 CAPSULE ORAL at 09:04

## 2025-04-11 RX ADMIN — Medication 6 MG: at 09:04

## 2025-04-11 RX ADMIN — SEVELAMER CARBONATE 800 MG: 800 TABLET, FILM COATED ORAL at 08:04

## 2025-04-11 RX ADMIN — OXYCODONE AND ACETAMINOPHEN 1 TABLET: 10; 325 TABLET ORAL at 07:04

## 2025-04-11 RX ADMIN — SEVELAMER CARBONATE 800 MG: 800 TABLET, FILM COATED ORAL at 04:04

## 2025-04-11 RX ADMIN — CEFEPIME 1 G: 1 INJECTION, POWDER, FOR SOLUTION INTRAMUSCULAR; INTRAVENOUS at 04:04

## 2025-04-11 NOTE — PROGRESS NOTES
[facility] Medicine  Progress Note    Patient Name: Emmanuel Morgan  MRN: [id number]  Patient Class: IP- Inpatient   Admission Date: 3/31/2025  Length of Stay: 11 days  Attending Physician: Tony Sainz MD  Primary Care Provider: Laurence, Primary Doctor        Subjective     Principal Problem:Infected ulcer of skin        HPI:  39-year-old male with ESRD on HD, diabetes not on insulin, prior osteomyelitis requiring right midfoot amputation with a long standing left foot ulcer (months) and a right foot ulcer of his stump which has been present for weeks who was sent to the ER by home health due to progression of wounds with skin and soft tissue infections of possibly both ulcers and possible osteomyelitis, he has been followed up by outpatient wound care agency, and was sent here by home health nurse due to nonhealing of the wound, noted with worsening erythema and purulent discharge.  Missed hemodialysis session on the day of presentation due to presentation in the emergency room.    Overview/Hospital Course:  39-year-old male with ESRD on HD, DM, prior osteomyelitis requiring right midfoot amputation with a long standing left foot ulcer (months) and a right foot ulcer of his stump which has been present for weeks who was sent to the ER by home health due to progression of wounds with skin and soft tissue infections of possibly both ulcers and possible osteomyelitis of either.  MRI of left foot showing plantar soft tissue ulcer at the level of the 3rd MTP joint with associated osteomyelitis of the distal half of the metatarsal and base of the proximal phalanx.  On ABX's and Podiatry consulted. Patient underwent irrigation and debridement of bilateral LE's.  Currently  is coordinating antibiotics with HD unit.  Still having some purulent discharge and needed repeat I&D in the OR on 4/8 with some improvement in wound.  Pulsavac was ordered for 4/11 and 4/12.  With noted improvement in  wound.  Patient to be discharged on IV antibiotics and outpatient wound care      Interval History:  No acute event overnight.  Patient is still complaining of cough; CXR ordered.  Noted persistent WBC elevation; repeat blood culture ordered.  Currently continue with the Pulsavac.    Review of Systems   Constitutional:  Negative for chills, diaphoresis and fatigue.   Respiratory:  Negative for cough and shortness of breath.      Objective:     Vital Signs (Most Recent):  Temp: 98.6 °F (37 °C) (04/11/25 0930)  Pulse: 96 (04/11/25 1200)  Resp: 18 (04/11/25 1200)  BP: 98/60 (04/11/25 1200)  SpO2: 95 % (04/11/25 1200) Vital Signs (24h Range):  Temp:  [98.5 °F (36.9 °C)-100.6 °F (38.1 °C)] 98.6 °F (37 °C)  Pulse:  [] 96  Resp:  [17-20] 18  SpO2:  [92 %-100 %] 95 %  BP: ()/(53-78) 98/60     Weight: 104.3 kg (229 lb 15 oz)  Body mass index is 29.52 kg/m².    Intake/Output Summary (Last 24 hours) at 4/11/2025 1454  Last data filed at 4/11/2025 1200  Gross per 24 hour   Intake 1400 ml   Output 914 ml   Net 486 ml         Physical Exam  Constitutional:       General: He is not in acute distress.     Appearance: He is not ill-appearing, toxic-appearing or diaphoretic.   Cardiovascular:      Rate and Rhythm: Tachycardia present. Rhythm irregular.      Pulses: Normal pulses.      Heart sounds: Normal heart sounds. No murmur heard.     No friction rub.   Pulmonary:      Effort: Pulmonary effort is normal. No respiratory distress.      Breath sounds: Normal breath sounds.   Abdominal:      General: Bowel sounds are normal.      Palpations: Abdomen is soft.   Neurological:      General: No focal deficit present.      Mental Status: He is oriented to person, place, and time.               Significant Labs: CBC:   Recent Labs   Lab 04/10/25  1334 04/11/25  0423   WBC 15.80* 16.54*   HGB 9.4* 9.6*   HCT 30.2* 31.3*    316     CMP:   Recent Labs   Lab 04/11/25  0423   *   K 5.2*      CO2 22*   BUN 53*    CREATININE 12.8*   CALCIUM 9.5   ALBUMIN 2.5*   ANIONGAP 12       Significant Imaging: I have reviewed all pertinent imaging results/findings within the past 24 hours.      Assessment & Plan  Infected ulcer of skin  Patient presents with ulcers to left foot and right foot stump.  MRI ordered.  No evidence of osteo on right foot stump.  Left foot MRI showing plantar soft tissue ulcer at the level of the 3rd MTP joint with associated osteomyelitis of the distal half of the metatarsal and base of the proximal phalanx.   Started on ABX's  Wound cultures positive for Proteus mirabilis  S/p Underwent bilateral irrigation and debridement by Podiatry  ID following; input appreciated.  Recommending IV Cefepime with HD and oral Flagyl until 5/13.  Will need outpatient ABX's with HD arranged.  Discussed with Podiatry.  Still having some purulent drainage; repeat I and D on 04/08 in the OR        End stage renal disease  Creatine stable for now. BMP reviewed- noted Estimated Creatinine Clearance: 9.9 mL/min (A) (based on SCr of 12.8 mg/dL (H)). according to latest data. Based on current GFR, CKD stage is end stage.  Monitor UOP and serial BMP and adjust therapy as needed. Renally dose meds. Avoid nephrotoxic medications and procedures.  Nephrology consulted for HD.  Continue Monday, Wednesday, and Friday hemodialysis schedule.    Essential hypertension  Patient's blood pressure range in the last 24 hours was: BP  Min: 98/60  Max: 134/73.The patient's inpatient anti-hypertensive regimen is listed below:  Current Antihypertensives  , Daily, Oral  NIFEdipine 24 hr tablet 90 mg, Daily, Oral  carvediloL tablet 6.25 mg, 2 times daily, Oral    Plan  - BP is controlled, no changes needed to their regimen  - lisinopril held due to hyperkalemia.  Will restart when clinically indicated  Hyperkalemia  Hyperkalemia is likely due to ESRD.The patients most recent potassium results are listed below.  Recent Labs     04/09/25  7250  04/11/25  0423   K 5.2* 5.2*   He got some doses of Lokelma;will order another dose today  Nephrology consulted for HD.  Continue monitoring          Type 2 diabetes mellitus with foot ulcer  Patient's FSGs are controlled on current medication regimen.  Last A1c reviewed-   Lab Results   Component Value Date    HGBA1C 4.9 08/20/2024     Most recent fingerstick glucose reviewed-   Recent Labs   Lab 04/10/25  1639 04/10/25  2101 04/11/25  0847   POCTGLUCOSE 170* 115* 124*     Current correctional scale  Low  Maintain anti-hyperglycemic dose as follows-   Antihyperglycemics (From admission, onward)      Start     Stop Route Frequency Ordered    03/31/25 2317  insulin aspart U-100 pen 0-5 Units         -- SubQ Before meals & nightly PRN 03/31/25 2218          Hold Oral hypoglycemics while patient is in the hospital.  Foot ulcer with fat layer exposed, unspecified laterality  Podiatry consulted, appreciated recs  Continue current antibiotics, ID consulted.  Continue wound care per podiatrist  Foot infection  See note above    Pyogenic inflammation of bone  Continue antibiotics as described above.    Leukocytosis  Persistent leukocytosis noted over the past 2 days  In his unknown at this time whether this is reactive or as a result of some occult infectious process  Repeat blood culture ordered    VTE Risk Mitigation (From admission, onward)           Ordered     heparin (porcine) injection 1,000 Units  As needed (PRN)         04/01/25 1827     IP VTE LOW RISK PATIENT  Once         03/31/25 2218     Place sequential compression device  Until discontinued         03/31/25 2218                    Discharge Planning   LULÚ: 4/9/2025     Code Status: Full Code   Medical Readiness for Discharge Date:   Discharge Plan A: Home   Discharge Delays: None known at this time                    Tony Sainz MD  Department of Hospital Medicine   [facility]

## 2025-04-11 NOTE — PLAN OF CARE
04/11/25 1618   Medicare Message   Important Message from Medicare regarding Discharge Appeal Rights Given to patient/caregiver;Explained to patient/caregiver;Signed/date by patient/caregiver   Date IMM was signed 04/11/25   Time IMM was signed [id number]

## 2025-04-11 NOTE — ASSESSMENT & PLAN NOTE
Creatine stable for now. BMP reviewed- noted Estimated Creatinine Clearance: 9.9 mL/min (A) (based on SCr of 12.8 mg/dL (H)). according to latest data. Based on current GFR, CKD stage is end stage.  Monitor UOP and serial BMP and adjust therapy as needed. Renally dose meds. Avoid nephrotoxic medications and procedures.  Nephrology consulted for HD.  Continue Monday, Wednesday, and Friday hemodialysis schedule.

## 2025-04-11 NOTE — PLAN OF CARE
Problem: Adult Inpatient Plan of Care  Goal: Plan of Care Review  Outcome: Progressing  Goal: Patient-Specific Goal (Individualized)  Outcome: Progressing  Goal: Absence of Hospital-Acquired Illness or Injury  Outcome: Progressing  Goal: Optimal Comfort and Wellbeing  Outcome: Progressing  Goal: Readiness for Transition of Care  Outcome: Progressing     Problem: Hemodialysis  Goal: Safe, Effective Therapy Delivery  Outcome: Progressing  Goal: Effective Tissue Perfusion  Outcome: Progressing  Goal: Absence of Infection Signs and Symptoms  Outcome: Progressing     Problem: Wound  Goal: Optimal Coping  Outcome: Progressing  Goal: Optimal Functional Ability  Outcome: Progressing  Goal: Absence of Infection Signs and Symptoms  Outcome: Progressing  Goal: Improved Oral Intake  Outcome: Progressing  Goal: Skin Health and Integrity  Outcome: Progressing  Goal: Optimal Wound Healing  Outcome: Progressing     Problem: Skin Injury Risk Increased  Goal: Skin Health and Integrity  Outcome: Progressing     Problem: Pain Acute  Goal: Optimal Pain Control and Function  Outcome: Progressing     Problem: Infection  Goal: Absence of Infection Signs and Symptoms  Outcome: Progressing

## 2025-04-11 NOTE — ASSESSMENT & PLAN NOTE
Persistent leukocytosis noted over the past 2 days  In his unknown at this time whether this is reactive or as a result of some occult infectious process  Repeat blood culture ordered

## 2025-04-11 NOTE — PT/OT/SLP PROGRESS
[facility] Wound Care Progress Note     Emmanuel Morgan  [id number]  4/11/2025 [id number] (27 min - 1 wound care)     Diagnosis: Pt admitted with R plantar midfoot wounds s/p I+D in OR on 4/1/25, R foot bedside debridement on 4/4/25, and R foot I+D again in OR on 4/8/25 by Podiatry.     Precautions: Standard, Diabetes, and Weightbearing (R heel WB with Cam walker boot)           Allergies as of 03/31/2025    (No Known Allergies)         Pre-medication: No pain medication taken by patient prior to treatment     Subjective:  Pt reported no R foot pain before, during, and after pulsavac wound care.     Objective:   Pt received pulsavac wound care to R plantar foot wound and R foot medial incision with ~500 mL NS.  PT attempted to press around R foot medial incision, no purulent drainage noted.  R plantar foot wound and periwound cleansed with Vashe moistened 4x4 gauze.  R foot patted dry.  Periwound applied with Cavilon no sting film barrier.  R plantar foot wound covered with Vashe moistened 4x4 gauze, then covered with dry 4x4 gauze, and abdominal pad.  R foot wrapped with cast padding, kerlix, and ace wrap, than secured with tape.  Shoe cover applied to R foot.  Clean technique maintained throughout treatment.       Assessment:  Podiatry examined R foot wound earlier this afternoon, dressings removed with minimal bloody drainage.  No purulent drainage noted when pressing on periwound around R foot medial linear incision.  R foot plantar wound with increased red tissues and scant yellow slough, no maceration or malodor.  Periwound with callus and dry flaky skin.  Patient tolerated today's treatment without any adverse effects.  Goals remain appropriate.     Photodocumentation: R plantar foot wound        R foot medial/plantar view         Plan:  The following goals were discussed with the patient and he agrees.  Frequency of treatment at this time will be daily.   No

## 2025-04-11 NOTE — ASSESSMENT & PLAN NOTE
Hyperkalemia is likely due to ESRD.The patients most recent potassium results are listed below.  Recent Labs     04/09/25  0451 04/11/25  0423   K 5.2* 5.2*   He got some doses of Lokelma;will order another dose today  Nephrology consulted for HD.  Continue monitoring

## 2025-04-11 NOTE — PROGRESS NOTES
[facility] [place]    Patient Name: Emmanuel Morgan  MRN: [id number]  Admission Date: 3/31/2025  Hospital Length of Stay: 11 days  Attending Physician: Tony Sainz MD  Primary Care Provider: Laurence, Primary Doctor     Subjective:     History of Present Illness: Emmanuel Morgan is a 39 y.o. male with  has a past medical history of Diabetes mellitus, Hypertension, and Renal disorder.  Consult to Podiatry for evaluation and treatment of bilateral foot wounds, most significant to the right Lisfranc stump.  He relates that the left foot wound is chronic in nature and the ulceration to the right foot occurred several weeks ago likely secondary to trauma versus ambulation is inappropriate shoe.  All foot surgeries surgeries done in outside facilities.  He presented to the emergency department on the advice of his home health nurse due to progression of wounds and suspicion of osteomyelitis.      4/2/25: S/p one day I&D B/L per Dr. Faria. Bandage intact no strikethrough noted.    04/04/2025 patient seen at bedside resting comfortably.  By the end of our encounter, his nephew was also present at bedside.  Patient relates that he has occasional discomfort to the right foot especially in the medial midfoot with palpation or any pressure.  He denies any pain to the left foot.  He denies nausea, vomiting, fever, chills.  Patient relates that he is concerned about his chronic but now more persistent cough and would like to discuss this concern further with the primary team.      4/7/25: Patient seen in dialysis. Bandage intact B/L     04/08/2025 patient seen at bedside resting comfortably.  No new pedal complaints     4/9/25: Patient seen bedside. B/L debridement.     4/10/2025 patient seen at bedside.  Resting comfortably.  Significant decrease in pain.  Still complaining about his cough    4/11/25: Patient seen in dialysis.Bandage intact B/L     Chief Complaint   Patient presents with    Leg Pain     Sent from  [facility] for increased pain to right foot     Scheduled Meds:   benzonatate  100 mg Oral Q8H    carvediloL  6.25 mg Oral BID    ceFEPime IV (PEDS and ADULTS)  1 g Intravenous Daily    epoetin beto-epbx  5,000 Units Intravenous Every Mon, Wed, Fri    guaiFENesin  600 mg Oral BID    metroNIDAZOLE  500 mg Oral Q12H    NIFEdipine  90 mg Oral Daily    sevelamer carbonate  800 mg Oral TID WM     Continuous Infusions:  PRN Meds:  Current Facility-Administered Medications:     acetaminophen, 650 mg, Oral, Q4H PRN    aluminum-magnesium hydroxide-simethicone, 30 mL, Oral, QID PRN    dextromethorphan-guaiFENesin  mg/5 ml, 5 mL, Oral, Q6H PRN    dextrose 50%, 12.5 g, Intravenous, PRN    dextrose 50%, 25 g, Intravenous, PRN    diphenhydrAMINE, 25 mg, Oral, Q6H PRN    glucagon (human recombinant), 1 mg, Intramuscular, PRN    glucose, 16 g, Oral, PRN    glucose, 24 g, Oral, PRN    heparin (porcine), 1,000 Units, Intra-Catheter, PRN    insulin aspart U-100, 0-5 Units, Subcutaneous, QID (AC + HS) PRN    melatonin, 6 mg, Oral, Nightly PRN    morphine, 4 mg, Intravenous, Q4H PRN    naloxone, 0.02 mg, Intravenous, PRN    ondansetron, 4 mg, Intravenous, Q6H PRN    oxyCODONE-acetaminophen, 1 tablet, Oral, Q4H PRN    senna-docusate, 1 tablet, Oral, BID PRN    sodium chloride 0.9%, 250 mL, Intravenous, PRN    Review of patient's allergies indicates:  No Known Allergies     Past Medical History:   Diagnosis Date    Diabetes mellitus     Hypertension     Renal disorder      Past Surgical History:   Procedure Laterality Date    FOOT AMPUTATION Right     IRRIGATION AND DEBRIDEMENT Bilateral 4/1/2025    Procedure: IRRIGATION AND DEBRIDEMENT;  Surgeon: Bety Faria DPM;  Location: [facility] OR;  Service: Podiatry;  Laterality: Bilateral;  need jamshidi needle available    IRRIGATION AND DEBRIDEMENT Bilateral 4/8/2025    Procedure: IRRIGATION AND DEBRIDEMENT;  Surgeon: Bety Faria DPM;  Location: [facility] OR;  Service: Podiatry;  Laterality:  Bilateral;       Family History    None       Tobacco Use    Smoking status: Never    Smokeless tobacco: Never   Substance and Sexual Activity    Alcohol use: Never    Drug use: Never    Sexual activity: Not on file     Review of Systems   Constitutional:  Negative for appetite change, chills, fatigue and fever.   Respiratory:  Positive for cough. Negative for shortness of breath.    Cardiovascular:  Negative for chest pain and leg swelling.   Gastrointestinal:  Negative for diarrhea, nausea and vomiting.   Musculoskeletal:  Positive for arthralgias and myalgias.   Skin:  Positive for color change and wound.   Neurological:  Positive for numbness. Negative for weakness.        + paresthesia      Objective:     Vital Signs (Most Recent):  Temp: 98.6 °F (37 °C) (04/11/25 0930)  Pulse: 96 (04/11/25 1200)  Resp: 18 (04/11/25 1200)  BP: 98/60 (04/11/25 1200)  SpO2: 95 % (04/11/25 1200) Vital Signs (24h Range):  Temp:  [98.5 °F (36.9 °C)-100.6 °F (38.1 °C)] 98.6 °F (37 °C)  Pulse:  [] 96  Resp:  [17-20] 18  SpO2:  [92 %-100 %] 95 %  BP: ()/(53-78) 98/60     Weight: 104.3 kg (229 lb 15 oz)  Body mass index is 29.52 kg/m².    Physical Exam  Vitals and nursing note reviewed.   Constitutional:       General: He is not in acute distress.     Appearance: He is well-developed. He is not toxic-appearing or diaphoretic.      Comments: alert and oriented x 3.    Cardiovascular:      Pulses:           Dorsalis pedis pulses are 1+ on the right side and 1+ on the left side.        Posterior tibial pulses are 1+ on the left side.   Pulmonary:      Effort: No respiratory distress.   Musculoskeletal:      Right ankle: No tenderness. No lateral malleolus, medial malleolus, AITF ligament, CF ligament or posterior TF ligament tenderness.      Right Achilles Tendon: No defects. Clements's test negative.      Left ankle: No tenderness. No lateral malleolus, medial malleolus, AITF ligament, CF ligament or posterior TF ligament  tenderness.      Left Achilles Tendon: No defects. Clements's test negative.      Right foot: Swelling and tenderness present. No bony tenderness.      Left foot: Deformity (Appearance of short 4th metatarsal secondary to previous surgery.) and tenderness present. No bony tenderness.      Comments: Muscle strength is 5/5 in all groups bilaterally.              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 (midfoot amp)  Feet:      Right foot:      Skin integrity: Ulcer present.      Left foot:      Skin integrity: Ulcer present.   Lymphadenopathy:      Comments: No lymphatic streaking     Skin:     General: Skin is warm and dry.      Coloration: Skin is not pale.      Findings: No rash.      Nails: There is no clubbing.   Neurological:      Sensory: No sensory deficit.      Motor: No atrophy.      Comments: Light touch present     Psychiatric:         Attention and Perception: He is attentive.         Mood and Affect: Mood is not anxious. Affect is not inappropriate.         Speech: He is communicative. Speech is not slurred.         Behavior: Behavior is not combative.       4/11/25:            4/10/2025                  4/9/25:  Scant seropurulent drainage right medial foot incision             4/7/25:        04/04/2025     Stable ulceration sub 3rd metatarsophalangeal joint of the left foot without acute signs of infection but with deep probe to bone      Plantar right foot ulcer with significant purulent drainage particularly when milking from the medial midfoot.  Palpation to the medial midfoot is significantly tender.        Post wound debridement with opening of the tract between the 2 plantar wounds            4/2/25:  No purulent drainage noted.             04/01/2025    Right plantar lateral midfoot with periwound bulla formation and localized edema and erythema.  Exquisitely tender on palpation.  Fibro necrotic wound bed         Left sub 3rd metatarsophalangeal joint with deep probe to bone,  "fibrogranular wound bed with periwound callus.          Laboratory:  A1C: No results for input(s): "HGBA1C" in the last 4320 hours.  CBC:   Recent Labs   Lab 04/11/25 0423   WBC 16.54*   RBC 3.38*   HGB 9.6*   HCT 31.3*      MCV 93   MCH 28.4   MCHC 30.7*     CMP:   Recent Labs   Lab 04/11/25 0423   CALCIUM 9.5   ALBUMIN 2.5*   *   K 5.2*   CO2 22*      BUN 53*   CREATININE 12.8*     CRP:   No results for input(s): "CRP" in the last 168 hours.    ESR:   No results for input(s): "SEDRATE" in the last 168 hours.    Microbiology Results (last 7 days)       Procedure Component Value Units Date/Time    Blood culture [[id number]]     Order Status: Sent Specimen: Blood     Culture, Respiratory with Gram Stain [[id number]]     Order Status: Sent Specimen: Respiratory     Aerobic culture [[id number]]  (Abnormal)  (Susceptibility) Collected: 04/08/25 1433    Order Status: Completed Specimen: Wound from Foot, Right Updated: 04/11/25 0748     CULTURE, AEROBIC Moderate Morganella morganii      Few Proteus mirabilis    Afb Culture Stain [[id number]] Collected: 04/08/25 1433    Order Status: Completed Specimen: Wound from Foot, Right Updated: 04/10/25 1321     ACID FAST STAIN  No acid fast bacilli seen    Culture, Anaerobic [[id number]] Collected: 04/08/25 1433    Order Status: Completed Specimen: Wound from Foot, Right Updated: 04/10/25 0808     Anaerobe Culture Culture In Progress    Afb Culture Stain [[id number]] Collected: 04/01/25 1557    Order Status: Completed Specimen: Wound from Foot, Right Updated: 04/09/25 1121     ACID FAST STAIN  No acid fast bacilli seen    Fungus culture [[id number]]  (Normal) Collected: 04/01/25 1557    Order Status: Completed Specimen: Wound from Foot, Right Updated: 04/08/25 2002     Fungal Culture No Fungus Isolated at 1 Week    Fungus culture [[id number]]  (Normal) Collected: 04/01/25 1621    Order Status: Completed Specimen: Biopsy from Foot, Left Updated: 04/08/25 2002    "  Fungal Culture No Fungus Isolated at 1 Week    Gram stain [[id number]] Collected: 04/08/25 1433    Order Status: Completed Specimen: Wound from Foot, Right Updated: 04/08/25 1846     GRAM STAIN Rare WBC seen      No organisms seen    Fungus culture [[id number]] Collected: 04/08/25 1433    Order Status: Sent Specimen: Wound from Foot, Right Updated: 04/08/25 1528    AFB Culture & Smear [[id number]] Collected: 04/08/25 1433    Order Status: Resulted Specimen: Wound from Foot, Right Updated: 04/08/25 1528    Aerobic culture [[id number]] Collected: 04/01/25 1621    Order Status: Completed Specimen: Biopsy from Foot, Left Updated: 04/07/25 0647     CULTURE, AEROBIC No Growth    Blood Culture #1 **CANNOT BE ORDERED STAT** [[id number]]  (Normal) Collected: 03/31/25 1827    Order Status: Completed Specimen: Blood from Peripheral, Antecubital, Left Updated: 04/05/25 2002     Blood Culture No Growth After 5 Days    Blood Culture #2 **CANNOT BE ORDERED STAT** [[id number]]  (Normal) Collected: 03/31/25 1845    Order Status: Completed Specimen: Blood from Peripheral, Hand, Left Updated: 04/05/25 2002     Blood Culture No Growth After 5 Days    Culture, Anaerobe [[id number]] Collected: 04/01/25 1621    Order Status: Completed Specimen: Biopsy from Foot, Left Updated: 04/05/25 0710     Anaerobe Culture No Anaerobes Isolated    Culture, Anaerobe [[id number]] Collected: 04/01/25 1557    Order Status: Completed Specimen: Wound from Foot, Right Updated: 04/05/25 0710     Anaerobe Culture No Anaerobes Isolated    Afb Culture Stain [[id number]] Collected: 04/01/25 1621    Order Status: Completed Specimen: Biopsy from Foot, Left Updated: 04/04/25 1542     ACID FAST STAIN  No acid fast bacilli seen    AFB Culture & Smear [[id number]] Collected: 04/01/25 1621    Order Status: Resulted Specimen: Biopsy from Foot, Left Updated: 04/04/25 1542     CULTURE, AFB  Culture In Progress            Diagnostic Results:  Imaging Results                MRI Hindfoot WO Contrast LT (Final result)  Result time 04/01/25 07:47:56      Final result by Seamus Hays MD (04/01/25 07:47:56)                   Impression:      1. Plantar soft tissue ulcer at the level of the 3rd MTP joint with associated osteomyelitis of the distal half of the metatarsal and base of the proximal phalanx.  No abscess.  2. Sequela of chronic osteomyelitis involving the 1st, 2nd and 5th metatarsals and proximal phalanges.  3. Postoperative change of transmetatarsal amputation of the 4th digit.  This report was flagged in Epic as abnormal.      Electronically signed by: Seamus Hays MD  Date:    04/01/2025  Time:    07:47               Narrative:    EXAMINATION:  MRI HINDFOOT WO CONTRAST LT; MRI FOREFOOT WO CONTRAST LT    CLINICAL HISTORY:  b/l ulcers concern for osteo;; b/l ulcers. Concern for osteo;    TECHNIQUE:  Routine MRI evaluation of the left ankle and forefoot performed without contrast.    COMPARISON:  Radiographs 09/01/2024.    FINDINGS:  Examination degraded by patient motion artifact.    BONES: Postsurgical change of transmetatarsal amputation of the 4th digit.  Osseous erosive changes of the 3rd metatarsal head and proximal phalanx base with associated confluent marrow edema involving the distal half of the metatarsal and proximal half of the proximal phalanx.  There is corresponding T1 medullary hypointensity throughout the distal half of the metatarsal and base of the proximal phalanx.  Chronic osseous erosive changes of the 1st, 2nd and 5th metatarsal heads and proximal phalanges with grossly preserved marrow signal intensity.  Solid periosteal reaction throughout the 2nd metatarsal shaft.  Linear area of edema signal at the posterior subchondral region of the tibial plafond, favored to be degenerative in nature.  No definite acute fractures.  Remote healed fracture of the lateral malleolus.  No avascular necrosis.  No marrow infiltrative process.    JOINTS: 3rd MTP  complex joint effusion with surrounding synovitis.  No dislocation.    LIGAMENTS: Chronic sprains of the anterior talofibular, superficial deltoid and deep deltoid ligaments.  Lisfranc ligament appears intact.    TENDONS: Ulceration and disruption of the 3rd flexor tendon at the level of the MTP joint.  Chronic ulceration of the 1st, 2nd and 5th flexor tendons at the level of the MTP joints.  Postoperative changes of the 4th flexor tendon.  Remaining tendons appear grossly intact.    MUSCLES: Edema and severe fatty degeneration of the visualized musculature.  No intramuscular fluid collection.    MISCELLANEOUS: Plantar soft tissue ulcer at the level of the 3rd MTP joint with adjacent fluid and surrounding subcutaneous edema.  No focal fluid collection.  Probable adventitial bursa at the plantar aspect of the hallux MTP joint.  Focal area of signal hypointensity at the level of the heel pad, possibly sequela of scarring.                                        MRI Forefoot WO Contrast LT (Final result)  Result time 04/01/25 07:47:56   Procedure changed from MRI Foot Toes WO Contrast DANY     Final result by Seamus Hays MD (04/01/25 07:47:56)                   Impression:      1. Plantar soft tissue ulcer at the level of the 3rd MTP joint with associated osteomyelitis of the distal half of the metatarsal and base of the proximal phalanx.  No abscess.  2. Sequela of chronic osteomyelitis involving the 1st, 2nd and 5th metatarsals and proximal phalanges.  3. Postoperative change of transmetatarsal amputation of the 4th digit.  This report was flagged in Epic as abnormal.      Electronically signed by: Seamus Hays MD  Date:    04/01/2025  Time:    07:47               Narrative:    EXAMINATION:  MRI HINDFOOT WO CONTRAST LT; MRI FOREFOOT WO CONTRAST LT    CLINICAL HISTORY:  b/l ulcers concern for osteo;; b/l ulcers. Concern for osteo;    TECHNIQUE:  Routine MRI evaluation of the left ankle and forefoot performed  without contrast.    COMPARISON:  Radiographs 09/01/2024.    FINDINGS:  Examination degraded by patient motion artifact.    BONES: Postsurgical change of transmetatarsal amputation of the 4th digit.  Osseous erosive changes of the 3rd metatarsal head and proximal phalanx base with associated confluent marrow edema involving the distal half of the metatarsal and proximal half of the proximal phalanx.  There is corresponding T1 medullary hypointensity throughout the distal half of the metatarsal and base of the proximal phalanx.  Chronic osseous erosive changes of the 1st, 2nd and 5th metatarsal heads and proximal phalanges with grossly preserved marrow signal intensity.  Solid periosteal reaction throughout the 2nd metatarsal shaft.  Linear area of edema signal at the posterior subchondral region of the tibial plafond, favored to be degenerative in nature.  No definite acute fractures.  Remote healed fracture of the lateral malleolus.  No avascular necrosis.  No marrow infiltrative process.    JOINTS: 3rd MTP complex joint effusion with surrounding synovitis.  No dislocation.    LIGAMENTS: Chronic sprains of the anterior talofibular, superficial deltoid and deep deltoid ligaments.  Lisfranc ligament appears intact.    TENDONS: Ulceration and disruption of the 3rd flexor tendon at the level of the MTP joint.  Chronic ulceration of the 1st, 2nd and 5th flexor tendons at the level of the MTP joints.  Postoperative changes of the 4th flexor tendon.  Remaining tendons appear grossly intact.    MUSCLES: Edema and severe fatty degeneration of the visualized musculature.  No intramuscular fluid collection.    MISCELLANEOUS: Plantar soft tissue ulcer at the level of the 3rd MTP joint with adjacent fluid and surrounding subcutaneous edema.  No focal fluid collection.  Probable adventitial bursa at the plantar aspect of the hallux MTP joint.  Focal area of signal hypointensity at the level of the heel pad, possibly sequela of  scarring.                                       MRI Hindfoot WO Contrast RT (Final result)  Result time 04/01/25 07:19:00      Final result by Seamus Hays MD (04/01/25 07:19:00)                   Impression:      1. Plantar soft tissue ulcer at the level of the calcaneocuboid joint with surrounding subcutaneous edema and adjacent blistering.  No osteomyelitis.  No abscess.  2. Advanced neuropathic arthropathy of the tibiotalar, subtalar and residual midtarsal joints.      Electronically signed by: Seamus Hays MD  Date:    04/01/2025  Time:    07:19               Narrative:    EXAMINATION:  MRI HINDFOOT WO CONTRAST RT    CLINICAL HISTORY:  Foot swelling, diabetic, osteomyelitis suspected, xray done;    TECHNIQUE:  Routine MRI evaluation of the right ankle performed without contrast.    COMPARISON:  Radiographs 03/31/2025.    FINDINGS:  BONES/JOINTS: Postsurgical changes of midfoot amputation.  Chronic osseous erosive changes centered about the tibiotalar, subtalar and residual midtarsal joints, likely sequela of chronic neuropathic arthropathy.  Intraosseous cyst at the lateral aspect of the distal tibia.  Circumferential solid periosteal reaction about the distal tibia and distal fibula.  No marrow signal abnormality to suggest an infiltrative process.  No significant joint effusion.  No dislocation.    TENDONS: Achilles tendon demonstrates normal morphology and signal intensity.  Residual posterior tibial, flexor digitorum longus, flexor hallucis longus, peroneus longus, peroneus brevis and extensor tendons are intact.    MUSCLES: Edema and fatty degeneration of the visualized musculature.  No intramuscular fluid collection.    MISCELLANEOUS: Plantar soft tissue ulcer at the level of the calcaneocuboid joint with involvement of the plantar aponeurosis, surrounding subcutaneous edema and adjacent blistering.  No fluid collection.                                       US Lower Extremity Arteries Right  (Final result)  Result time 03/31/25 21:32:36      Final result by Michelle Leon MD (03/31/25 21:32:36)                   Impression:      Normal triphasic waveforms throughout the right lower extremity.  No occlusion or stenosis detected.      Electronically signed by: Michelle Leon  Date:    03/31/2025  Time:    21:32               Narrative:    EXAMINATION:  US LOWER EXTREMITY ARTERIES RIGHT    CLINICAL HISTORY:  Unspecified open wound, right foot, subsequent encounter    TECHNIQUE:  Duplex and color flow Doppler evaluation and graded compression of the right lower extremity arteries was performed.    COMPARISON:  None    FINDINGS:  Peak systolic velocities in the right lower extremity arteries (cm/sec):    CFA: 127, triphasic    DFA: 74, triphasic    Proximal SFA: 103, triphasic    Mid SFA: 120, triphasic    Distal SFA: 109, triphasic    Proximal pop: 84, triphasic    Distal pop: 98, triphasic    LAUREL: 80, triphasic    PTA: 86, triphasic    Peroneal: 111, triphasic    DPA: 75, triphasic                                       X-Ray Foot Complete Right (Final result)  Result time 03/31/25 13:14:02      Final result by Alli Henriquez MD (03/31/25 13:14:02)                   Impression:      Please see above.      Electronically signed by: Alli Henriquez  Date:    03/31/2025  Time:    13:14               Narrative:    EXAMINATION:  XR FOOT COMPLETE 3 VIEW RIGHT    CLINICAL HISTORY:  . Pain in unspecified foot    TECHNIQUE:  AP, lateral, and oblique views of the right foot were performed.    COMPARISON:  None.    FINDINGS:  Extensive postoperative change including amputation to the level of the midfoot structures.  Remainder of the visualized osseous structures of the mid and hindfoot demonstrate diffuse degenerative change in osteopenia with surrounding diffuse soft tissue swelling.  Additional diffuse osseous destruction and degenerative change about the partially visualized ankle.  Scattered vascular  calcification as well as plantar calcaneal spur.  No obvious significant osseous erosive change, noting limitations from postoperative change and surrounding soft tissue swelling.  If there is continued clinical concern for osteomyelitis, consider MRI of the foot for further evaluation.                                       X-Ray Chest AP Portable (Final result)  Result time 03/31/25 13:15:24      Final result by Alli Henriquez MD (03/31/25 13:15:24)                   Impression:      Please see above.      Electronically signed by: Alli Henriquez  Date:    03/31/2025  Time:    13:15               Narrative:    EXAMINATION:  XR CHEST AP PORTABLE    CLINICAL HISTORY:  Chronic cough    TECHNIQUE:  Single frontal view of the chest was performed.    COMPARISON:  Chest radiograph 09/01/2024    FINDINGS:  Left-sided central venous catheter with tip overlying the in expected region of the cavoatrial junction.  Cardiomediastinal silhouette is unchanged in size.  Patient is rotated, limiting evaluation of the mediastinal structures.    Chronic right effusion with probable superimposed volume loss about the right lung base.  Superimposed infectious or non infectious inflammatory infiltrate cannot be excluded.  Remainder of the aerated portions of the lungs are clear.  No pneumothorax.    Osseous structures demonstrate no evidence for acute fracture or osseous destructive lesion.  Soft tissues are unremarkable.                                      Assessment/Plan:     Active Diagnoses:    Diagnosis Date Noted POA    PRINCIPAL PROBLEM:  Infected ulcer of skin [L98.499, L08.9] 03/31/2025 Yes    Pyogenic inflammation of bone [M86.9] 04/03/2025 Yes    Foot infection [L08.9] 04/02/2025 Yes    Foot ulcer with fat layer exposed, unspecified laterality [L97.502] 09/01/2024 Yes    Type 2 diabetes mellitus with foot ulcer [E11.621, L97.509] 06/25/2024 Yes    End stage renal disease [N18.6] 03/21/2024 Yes    Hyperkalemia [E87.5] 07/19/2021  Yes     Chronic    Essential hypertension [I10] 01/27/2021 Yes      Problems Resolved During this Admission:       Education about the prevention of limb loss.      S/p  repeat irrigation and debridement of both feet DOS: 4/1/25, 4/8/25    Discussed wound healing cycle, skin integrity, ways to care for skin.Counseled patient on the effects of biomechanical pressure and deformity as well as blood glucose on healing. He verbalizes understanding that it can increase the chances of delayed healing and this prolonged exposure leads to infection or progression of infection which subsequently can result in loss of limb.    Scant purulent drainage noted, no longer painful with palpation     PT pulse lavage ordered.     DSD applied to right foot    Abx per ID    F/u WB wound care upon discharge.  Recommend twice weekly HH dressing changes on discharge    We will continue to follow and work in partnership with treatment team on how to best treat patient concern and diagnosis.      Michelle Brown DPM  Podiatry  [facility] [place] - Emergency Dept

## 2025-04-11 NOTE — PROGRESS NOTES
04/11/25 1200   Vital Signs   Pulse 96   Heart Rate Source Left;NIBP   Resp 18   SpO2 95 %   Device (Oxygen Therapy) room air   BP 98/60   BP Location Left arm   BP Method cNIBP   Patient Position Lying   Trialysis (Dialysis) Catheter left subclavian   No placement date or time found.   Present Prior to Hospital Arrival?: Yes  Location: left subclavian   $ Dialysis Supplies Short Term Dialysis Catheter (Supply)   Site Assessment No drainage;No swelling;No warmth;No redness   Line Securement Device Antimicrobial Adhesive   Dressing Type Central line dressing   Dressing Status Clean;Dry;Intact   Dressing Intervention Integrity maintained   Date on Dressing 04/09/25   Dressing Due to be Changed 04/16/25   Arterial Patency/Care blood return present   Distal Patency/Care blood return present   Waveform Not being transduced   Post-Hemodialysis Assessment   Blood Volume Processed (Liters) 42 L   Dialyzer Clearance Clear   Duration of Treatment 120 minutes   Additional Fluid Intake (mL) 500 mL   Total UF (mL) 914 mL   Net Fluid Removal 414   Patient Response to Treatment Tx stopper per pt MD vicky aware, lines flushed and then packed with saline to filling volume with new end caps applied, denies SOB and no distress noted

## 2025-04-11 NOTE — ASSESSMENT & PLAN NOTE
Patient's FSGs are controlled on current medication regimen.  Last A1c reviewed-   Lab Results   Component Value Date    HGBA1C 4.9 08/20/2024     Most recent fingerstick glucose reviewed-   Recent Labs   Lab 04/10/25  1639 04/10/25  2101 04/11/25  0847   POCTGLUCOSE 170* 115* 124*     Current correctional scale  Low  Maintain anti-hyperglycemic dose as follows-   Antihyperglycemics (From admission, onward)      Start     Stop Route Frequency Ordered    03/31/25 2317  insulin aspart U-100 pen 0-5 Units         -- SubQ Before meals & nightly PRN 03/31/25 2218          Hold Oral hypoglycemics while patient is in the hospital.

## 2025-04-11 NOTE — PROGRESS NOTES
Patient seen and examined during hemodialysis this morning.  /71, HR 97. . AP -166,  179. [facility] TDC.  Tolerating treatment well. No complaints.       ESRD - HD MWF. Labs MWF.   Anemia of CKD - hgb 9.6; starting KIKI with HD.  Secondary HPTH - CCa 10.7; phos controlled. Continue sevelamer. Continue PT/OT.  Hyperkalemia - K 5.2 today; HD as above.      Thank you for allowing me to participate in the care of this patient. Please call with any questions.     Laurie Root MD  [facility]  [phone]

## 2025-04-11 NOTE — ASSESSMENT & PLAN NOTE
Patient's blood pressure range in the last 24 hours was: BP  Min: 98/60  Max: 134/73.The patient's inpatient anti-hypertensive regimen is listed below:  Current Antihypertensives  , Daily, Oral  NIFEdipine 24 hr tablet 90 mg, Daily, Oral  carvediloL tablet 6.25 mg, 2 times daily, Oral    Plan  - BP is controlled, no changes needed to their regimen  - lisinopril held due to hyperkalemia.  Will restart when clinically indicated

## 2025-04-11 NOTE — SUBJECTIVE & OBJECTIVE
Interval History:  No acute event overnight.  Patient is still complaining of cough; CXR ordered.  Noted persistent WBC elevation; repeat blood culture ordered.  Currently continue with the Pulsavac.    Review of Systems   Constitutional:  Negative for chills, diaphoresis and fatigue.   Respiratory:  Negative for cough and shortness of breath.      Objective:     Vital Signs (Most Recent):  Temp: 98.6 °F (37 °C) (04/11/25 0930)  Pulse: 96 (04/11/25 1200)  Resp: 18 (04/11/25 1200)  BP: 98/60 (04/11/25 1200)  SpO2: 95 % (04/11/25 1200) Vital Signs (24h Range):  Temp:  [98.5 °F (36.9 °C)-100.6 °F (38.1 °C)] 98.6 °F (37 °C)  Pulse:  [] 96  Resp:  [17-20] 18  SpO2:  [92 %-100 %] 95 %  BP: ()/(53-78) 98/60     Weight: 104.3 kg (229 lb 15 oz)  Body mass index is 29.52 kg/m².    Intake/Output Summary (Last 24 hours) at 4/11/2025 1454  Last data filed at 4/11/2025 1200  Gross per 24 hour   Intake 1400 ml   Output 914 ml   Net 486 ml         Physical Exam  Constitutional:       General: He is not in acute distress.     Appearance: He is not ill-appearing, toxic-appearing or diaphoretic.   Cardiovascular:      Rate and Rhythm: Tachycardia present. Rhythm irregular.      Pulses: Normal pulses.      Heart sounds: Normal heart sounds. No murmur heard.     No friction rub.   Pulmonary:      Effort: Pulmonary effort is normal. No respiratory distress.      Breath sounds: Normal breath sounds.   Abdominal:      General: Bowel sounds are normal.      Palpations: Abdomen is soft.   Neurological:      General: No focal deficit present.      Mental Status: He is oriented to person, place, and time.               Significant Labs: CBC:   Recent Labs   Lab 04/10/25  1334 04/11/25  0423   WBC 15.80* 16.54*   HGB 9.4* 9.6*   HCT 30.2* 31.3*    316     CMP:   Recent Labs   Lab 04/11/25  0423   *   K 5.2*      CO2 22*   BUN 53*   CREATININE 12.8*   CALCIUM 9.5   ALBUMIN 2.5*   ANIONGAP 12       Significant  Imaging: I have reviewed all pertinent imaging results/findings within the past 24 hours.

## 2025-04-11 NOTE — PROGRESS NOTES
[facility], [facility] [place]  Nurses Note -- 4 Eyes      4/11/2025       Skin assessed on: Q Shift      [x] No Pressure Injuries Present    []Prevention Measures Documented    [] Yes LDA  for Pressure Injury Previously documented     [] Yes New Pressure Injury Discovered   [] LDA for New Pressure Injury Added      Attending RN:  Saurav Redd RN     Second RN:  Memo Perez RN

## 2025-04-12 LAB
ABSOLUTE EOSINOPHIL (OHS): 0.3 K/UL
ABSOLUTE MONOCYTE (OHS): 3.08 K/UL (ref 0.3–1)
ABSOLUTE NEUTROPHIL COUNT (OHS): 7.66 K/UL (ref 1.8–7.7)
ALBUMIN SERPL BCP-MCNC: 2.4 G/DL (ref 3.5–5.2)
ANION GAP (OHS): 12 MMOL/L (ref 8–16)
BACTERIA SPEC ANAEROBE CULT: NORMAL
BASOPHILS # BLD AUTO: 0.05 K/UL
BASOPHILS NFR BLD AUTO: 0.4 %
BUN SERPL-MCNC: 43 MG/DL (ref 6–20)
CALCIUM SERPL-MCNC: 9.3 MG/DL (ref 8.7–10.5)
CHLORIDE SERPL-SCNC: 97 MMOL/L (ref 95–110)
CO2 SERPL-SCNC: 24 MMOL/L (ref 23–29)
CREAT SERPL-MCNC: 11.2 MG/DL (ref 0.5–1.4)
ERYTHROCYTE [DISTWIDTH] IN BLOOD BY AUTOMATED COUNT: 16.7 % (ref 11.5–14.5)
GFR SERPLBLD CREATININE-BSD FMLA CKD-EPI: 5 ML/MIN/1.73/M2
GLUCOSE SERPL-MCNC: 94 MG/DL (ref 70–110)
HCT VFR BLD AUTO: 29.1 % (ref 40–54)
HGB BLD-MCNC: 9 GM/DL (ref 14–18)
HOLD SPECIMEN: NORMAL
HOLD SPECIMEN: NORMAL
IMM GRANULOCYTES # BLD AUTO: 0.2 K/UL (ref 0–0.04)
IMM GRANULOCYTES NFR BLD AUTO: 1.5 % (ref 0–0.5)
LACTATE SERPL-SCNC: 0.6 MMOL/L (ref 0.5–2.2)
LYMPHOCYTES # BLD AUTO: 2.3 K/UL (ref 1–4.8)
MCH RBC QN AUTO: 28.3 PG (ref 27–31)
MCHC RBC AUTO-ENTMCNC: 30.9 G/DL (ref 32–36)
MCV RBC AUTO: 92 FL (ref 82–98)
NUCLEATED RBC (/100WBC) (OHS): 0 /100 WBC
PHOSPHATE SERPL-MCNC: 3.9 MG/DL (ref 2.7–4.5)
PLATELET # BLD AUTO: 319 K/UL (ref 150–450)
PMV BLD AUTO: 9.7 FL (ref 9.2–12.9)
POCT GLUCOSE: 104 MG/DL (ref 70–110)
POCT GLUCOSE: 121 MG/DL (ref 70–110)
POCT GLUCOSE: 96 MG/DL (ref 70–110)
POTASSIUM SERPL-SCNC: 5 MMOL/L (ref 3.5–5.1)
RBC # BLD AUTO: 3.18 M/UL (ref 4.6–6.2)
RELATIVE EOSINOPHIL (OHS): 2.2 %
RELATIVE LYMPHOCYTE (OHS): 16.9 % (ref 18–48)
RELATIVE MONOCYTE (OHS): 22.7 % (ref 4–15)
RELATIVE NEUTROPHIL (OHS): 56.3 % (ref 38–73)
SODIUM SERPL-SCNC: 133 MMOL/L (ref 136–145)
WBC # BLD AUTO: 13.59 K/UL (ref 3.9–12.7)

## 2025-04-12 PROCEDURE — 63600175 PHARM REV CODE 636 W HCPCS: Performed by: PODIATRIST

## 2025-04-12 PROCEDURE — 25000003 PHARM REV CODE 250

## 2025-04-12 PROCEDURE — 25000003 PHARM REV CODE 250: Performed by: PODIATRIST

## 2025-04-12 PROCEDURE — 80069 RENAL FUNCTION PANEL: CPT

## 2025-04-12 PROCEDURE — 36415 COLL VENOUS BLD VENIPUNCTURE: CPT

## 2025-04-12 PROCEDURE — 11000001 HC ACUTE MED/SURG PRIVATE ROOM

## 2025-04-12 PROCEDURE — 97597 DBRDMT OPN WND 1ST 20 CM/<: CPT

## 2025-04-12 PROCEDURE — 83605 ASSAY OF LACTIC ACID: CPT

## 2025-04-12 PROCEDURE — 99231 SBSQ HOSP IP/OBS SF/LOW 25: CPT | Mod: ,,, | Performed by: PODIATRIST

## 2025-04-12 PROCEDURE — 85025 COMPLETE CBC W/AUTO DIFF WBC: CPT

## 2025-04-12 RX ADMIN — Medication 6 MG: at 08:04

## 2025-04-12 RX ADMIN — BENZONATATE 100 MG: 100 CAPSULE ORAL at 01:04

## 2025-04-12 RX ADMIN — NIFEDIPINE 90 MG: 30 TABLET, FILM COATED, EXTENDED RELEASE ORAL at 08:04

## 2025-04-12 RX ADMIN — SEVELAMER CARBONATE 800 MG: 800 TABLET, FILM COATED ORAL at 12:04

## 2025-04-12 RX ADMIN — BENZONATATE 100 MG: 100 CAPSULE ORAL at 09:04

## 2025-04-12 RX ADMIN — GUAIFENESIN 600 MG: 600 TABLET, EXTENDED RELEASE ORAL at 08:04

## 2025-04-12 RX ADMIN — SEVELAMER CARBONATE 800 MG: 800 TABLET, FILM COATED ORAL at 04:04

## 2025-04-12 RX ADMIN — CARVEDILOL 6.25 MG: 6.25 TABLET, FILM COATED ORAL at 08:04

## 2025-04-12 RX ADMIN — OXYCODONE AND ACETAMINOPHEN 1 TABLET: 10; 325 TABLET ORAL at 08:04

## 2025-04-12 RX ADMIN — SEVELAMER CARBONATE 800 MG: 800 TABLET, FILM COATED ORAL at 08:04

## 2025-04-12 RX ADMIN — CEFEPIME 1 G: 1 INJECTION, POWDER, FOR SOLUTION INTRAMUSCULAR; INTRAVENOUS at 04:04

## 2025-04-12 RX ADMIN — METRONIDAZOLE 500 MG: 500 TABLET ORAL at 08:04

## 2025-04-12 NOTE — ASSESSMENT & PLAN NOTE
Creatine stable for now. BMP reviewed- noted Estimated Creatinine Clearance: 11.3 mL/min (A) (based on SCr of 11.2 mg/dL (H)). according to latest data. Based on current GFR, CKD stage is end stage.  Monitor UOP and serial BMP and adjust therapy as needed. Renally dose meds. Avoid nephrotoxic medications and procedures.  Nephrology consulted for HD.  Continue Monday, Wednesday, and Friday hemodialysis schedule.

## 2025-04-12 NOTE — NURSING
[facility], [facility] [place]  Nurses Note -- 4 Eyes      4/12/2025       Skin assessed on: Q Shift      [x] No Pressure Injuries Present    [x]Prevention Measures Documented    [] Yes LDA  for Pressure Injury Previously documented     [] Yes New Pressure Injury Discovered   [] LDA for New Pressure Injury Added      Attending RN:  Gabbie Robb RN     Second RN:  Aby VALE RN

## 2025-04-12 NOTE — PLAN OF CARE
Problem: Adult Inpatient Plan of Care  Goal: Plan of Care Review  Outcome: Progressing  Goal: Patient-Specific Goal (Individualized)  Outcome: Progressing  Goal: Absence of Hospital-Acquired Illness or Injury  Outcome: Progressing  Goal: Optimal Comfort and Wellbeing  Outcome: Progressing  Goal: Readiness for Transition of Care  Outcome: Progressing     Problem: Hemodialysis  Goal: Safe, Effective Therapy Delivery  Outcome: Progressing  Goal: Effective Tissue Perfusion  Outcome: Progressing  Goal: Absence of Infection Signs and Symptoms  Outcome: Progressing     Problem: Wound  Goal: Optimal Coping  Outcome: Progressing  Goal: Optimal Functional Ability  Outcome: Progressing  Goal: Absence of Infection Signs and Symptoms  Outcome: Progressing  Goal: Improved Oral Intake  Outcome: Progressing  Goal: Skin Health and Integrity  Outcome: Progressing  Goal: Optimal Wound Healing  Outcome: Progressing     Problem: Infection  Goal: Absence of Infection Signs and Symptoms  Outcome: Progressing     Problem: Skin Injury Risk Increased  Goal: Skin Health and Integrity  Outcome: Progressing     Problem: Pain Acute  Goal: Optimal Pain Control and Function  Outcome: Progressing

## 2025-04-12 NOTE — PT/OT/SLP PROGRESS
[facility] Wound Care Progress Note    Emmanuel Morgan  [id number]  4/12/2025 1422 -1450 ( 28 min - 1 wound care)     Diagnosis: Pt admitted with R plantar midfoot wounds s/p I+D in OR on 4/1/25, R foot bedside debridement on 4/4/25, and R foot I+D again in OR on 4/8/25 by Podiatry.     Precautions: Standard, Diabetes, and Weightbearing (R heel WB with Cam walker boot)       Allergies as of 03/31/2025    (No Known Allergies)        Pre-medication:  No pain medication taken by patient prior to treatment    Subjective: Pt reported no R foot pain before, during, and after pulsavac wound care. Some has clean my wound earlier today.     Patient reports pain level in wound:     Objective:   Pt received pulsavac wound care to R plantar foot wound and R foot medial incision with ~500 mL NS.  PT attempted to press around R foot medial incision, no purulent drainage noted.  R plantar foot wound and periwound cleansed with Vashe moistened 4x4 gauze.  R foot patted dry.  Periwound applied with Cavilon no sting film barrier.  R plantar foot wound covered with Vashe moistened 4x4 gauze, then covered with dry 4x4 gauze, and abdominal pad.  R foot wrapped with cast padding, kerlix, and ace wrap, than secured with tape.  Shoe cover applied to R foot.  Clean technique maintained throughout treatment.       Assessment:  Podiatry examined R foot wound earlier this afternoon, dressings removed with minimal bloody drainage.  No purulent drainage noted when pressing on periwound around R foot medial linear incision.  R foot plantar wound with increased red tissues and scant yellow slough, no maceration or malodor.  Periwound with callus and dry flaky skin.  Patient tolerated today's treatment without any adverse effects.  Goals remain appropriate.      Plan:  The following goals were discussed with the patient and he agrees. Frequency of treatment at this time will be daily.

## 2025-04-12 NOTE — ASSESSMENT & PLAN NOTE
Patient's FSGs are controlled on current medication regimen.  Last A1c reviewed-   Lab Results   Component Value Date    HGBA1C 4.9 08/20/2024     Most recent fingerstick glucose reviewed-   Recent Labs   Lab 04/11/25  1715 04/11/25  2109 04/12/25  0834 04/12/25  1127   POCTGLUCOSE 111* 115* 104 96     Current correctional scale  Low  Maintain anti-hyperglycemic dose as follows-   Antihyperglycemics (From admission, onward)      Start     Stop Route Frequency Ordered    03/31/25 2317  insulin aspart U-100 pen 0-5 Units         -- SubQ Before meals & nightly PRN 03/31/25 2218          Hold Oral hypoglycemics while patient is in the hospital.

## 2025-04-12 NOTE — PROGRESS NOTES
[facility] - Podiatry  Progress Note    Patient Name: Emmanuel Morgan  MRN: [id number]  Admission Date: 3/31/2025  Hospital Length of Stay: 12 days  Attending Physician: Tony Sainz MD  Primary Care Provider: Laurence, Primary Doctor     Subjective:     History of Present Illness: Emmanuel Morgan is a 39 y.o. male with  has a past medical history of Diabetes mellitus, Hypertension, and Renal disorder.  Consult to Podiatry for evaluation and treatment of bilateral foot wounds, most significant to the right Lisfranc stump.  He relates that the left foot wound is chronic in nature and the ulceration to the right foot occurred several weeks ago likely secondary to trauma versus ambulation is inappropriate shoe.  All foot surgeries surgeries done in outside facilities.  He presented to the emergency department on the advice of his home health nurse due to progression of wounds and suspicion of osteomyelitis.      4/2/25: S/p one day I&D B/L per Dr. Faria. Bandage intact no strikethrough noted.    04/04/2025 patient seen at bedside resting comfortably.  By the end of our encounter, his nephew was also present at bedside.  Patient relates that he has occasional discomfort to the right foot especially in the medial midfoot with palpation or any pressure.  He denies any pain to the left foot.  He denies nausea, vomiting, fever, chills.  Patient relates that he is concerned about his chronic but now more persistent cough and would like to discuss this concern further with the primary team.      4/7/25: Patient seen in dialysis. Bandage intact B/L     04/08/2025 patient seen at bedside resting comfortably.  No new pedal complaints     4/9/25: Patient seen bedside. B/L debridement.     4/10/2025 patient seen at bedside.  Resting comfortably.  Significant decrease in pain.  Still complaining about his cough    4/11/25: Patient seen in dialysis.Bandage intact B/L     4/12/25: Dressing to b/l feet c/d/I. PT changed dressing  yesterday. No f/c/n/v. No new complaints    Chief Complaint   Patient presents with    Leg Pain     Sent from [facility] for increased pain to right foot     Scheduled Meds:   benzonatate  100 mg Oral Q8H    carvediloL  6.25 mg Oral BID    ceFEPime IV (PEDS and ADULTS)  1 g Intravenous Daily    epoetin beto-epbx  5,000 Units Intravenous Every Mon, Wed, Fri    guaiFENesin  600 mg Oral BID    metroNIDAZOLE  500 mg Oral Q12H    NIFEdipine  90 mg Oral Daily    sevelamer carbonate  800 mg Oral TID WM     Continuous Infusions:  PRN Meds:  Current Facility-Administered Medications:     acetaminophen, 650 mg, Oral, Q4H PRN    aluminum-magnesium hydroxide-simethicone, 30 mL, Oral, QID PRN    dextromethorphan-guaiFENesin  mg/5 ml, 5 mL, Oral, Q6H PRN    dextrose 50%, 12.5 g, Intravenous, PRN    dextrose 50%, 25 g, Intravenous, PRN    diphenhydrAMINE, 25 mg, Oral, Q6H PRN    glucagon (human recombinant), 1 mg, Intramuscular, PRN    glucose, 16 g, Oral, PRN    glucose, 24 g, Oral, PRN    heparin (porcine), 1,000 Units, Intra-Catheter, PRN    insulin aspart U-100, 0-5 Units, Subcutaneous, QID (AC + HS) PRN    melatonin, 6 mg, Oral, Nightly PRN    morphine, 4 mg, Intravenous, Q4H PRN    naloxone, 0.02 mg, Intravenous, PRN    ondansetron, 4 mg, Intravenous, Q6H PRN    oxyCODONE-acetaminophen, 1 tablet, Oral, Q4H PRN    senna-docusate, 1 tablet, Oral, BID PRN    sodium chloride 0.9%, 250 mL, Intravenous, PRN    Review of patient's allergies indicates:  No Known Allergies     Past Medical History:   Diagnosis Date    Diabetes mellitus     Hypertension     Renal disorder      Past Surgical History:   Procedure Laterality Date    FOOT AMPUTATION Right     IRRIGATION AND DEBRIDEMENT Bilateral 4/1/2025    Procedure: IRRIGATION AND DEBRIDEMENT;  Surgeon: Bety Faria DPM;  Location: [facility];  Service: Podiatry;  Laterality: Bilateral;  need jamshidi needle available    IRRIGATION AND DEBRIDEMENT Bilateral 4/8/2025    Procedure:  IRRIGATION AND DEBRIDEMENT;  Surgeon: Bety Faria DPM;  Location: [facility] OR;  Service: Podiatry;  Laterality: Bilateral;     Family History    None       Tobacco Use    Smoking status: Never    Smokeless tobacco: Never   Substance and Sexual Activity    Alcohol use: Never    Drug use: Never    Sexual activity: Not on file     Review of Systems   Constitutional:  Negative for appetite change, chills, fatigue and fever.   Respiratory:  Positive for cough. Negative for shortness of breath.    Cardiovascular:  Negative for chest pain and leg swelling.   Gastrointestinal:  Negative for diarrhea, nausea and vomiting.   Musculoskeletal:  Positive for arthralgias and myalgias.   Skin:  Positive for color change and wound.   Neurological:  Positive for numbness. Negative for weakness.        + paresthesia      Objective:     Vital Signs (Most Recent):  Temp: 98.7 °F (37.1 °C) (04/12/25 1620)  Pulse: 93 (04/12/25 1620)  Resp: 18 (04/12/25 1620)  BP: 115/72 (04/12/25 1620)  SpO2: 95 % (04/12/25 1620) Vital Signs (24h Range):  Temp:  [98.1 °F (36.7 °C)-100.4 °F (38 °C)] 98.7 °F (37.1 °C)  Pulse:  [] 93  Resp:  [17-20] 18  SpO2:  [95 %-97 %] 95 %  BP: (115-140)/(72-84) 115/72     Weight: 104.3 kg (229 lb 15 oz)  Body mass index is 29.52 kg/m².    Physical Exam  Vitals and nursing note reviewed.   Constitutional:       General: He is not in acute distress.     Appearance: He is well-developed. He is not toxic-appearing or diaphoretic.      Comments: alert and oriented x 3.    Cardiovascular:      Pulses:           Dorsalis pedis pulses are 1+ on the right side and 1+ on the left side.        Posterior tibial pulses are 1+ on the left side.   Pulmonary:      Effort: No respiratory distress.   Musculoskeletal:      Right ankle: No tenderness. No lateral malleolus, medial malleolus, AITF ligament, CF ligament or posterior TF ligament tenderness.      Right Achilles Tendon: No defects. Clements's test negative.      Left  ankle: No tenderness. No lateral malleolus, medial malleolus, AITF ligament, CF ligament or posterior TF ligament tenderness.      Left Achilles Tendon: No defects. Clements's test negative.      Right foot: Swelling and tenderness present. No bony tenderness.      Left foot: Deformity (Appearance of short 4th metatarsal secondary to previous surgery.) and tenderness present. No bony tenderness.      Comments: Muscle strength is 5/5 in all groups bilaterally.              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 (midfoot amp)  Feet:      Right foot:      Skin integrity: Ulcer present.      Left foot:      Skin integrity: Ulcer present.   Lymphadenopathy:      Comments: No lymphatic streaking     Skin:     General: Skin is warm and dry.      Coloration: Skin is not pale.      Findings: No rash.      Nails: There is no clubbing.   Neurological:      Sensory: No sensory deficit.      Motor: No atrophy.      Comments: Light touch present     Psychiatric:         Attention and Perception: He is attentive.         Mood and Affect: Mood is not anxious. Affect is not inappropriate.         Speech: He is communicative. Speech is not slurred.         Behavior: Behavior is not combative.       4/12/25:    No masceration or drainage to R foot. Skin graft/Adaptic intact to L foot.                4/11/25:        4/10/2025          4/9/25:  Scant seropurulent drainage right medial foot incision         4/7/25:      04/04/2025   Stable ulceration sub 3rd metatarsophalangeal joint of the left foot without acute signs of infection but with deep probe to bone      Plantar right foot ulcer with significant purulent drainage particularly when milking from the medial midfoot.  Palpation to the medial midfoot is significantly tender.        Post wound debridement with opening of the tract between the 2 plantar wounds            4/2/25:  No purulent drainage noted.             04/01/2025    Right plantar lateral midfoot  "with periwound bulla formation and localized edema and erythema.  Exquisitely tender on palpation.  Fibro necrotic wound bed         Left sub 3rd metatarsophalangeal joint with deep probe to bone, fibrogranular wound bed with periwound callus.        Laboratory:  A1C: No results for input(s): "HGBA1C" in the last 4320 hours.  CBC:   Recent Labs   Lab 04/12/25  0453   WBC 13.59*   RBC 3.18*   HGB 9.0*   HCT 29.1*      MCV 92   MCH 28.3   MCHC 30.9*     CMP:   Recent Labs   Lab 04/12/25  0453   CALCIUM 9.3   ALBUMIN 2.4*   *   K 5.0   CO2 24   CL 97   BUN 43*   CREATININE 11.2*     CRP:   No results for input(s): "CRP" in the last 168 hours.    ESR:   No results for input(s): "SEDRATE" in the last 168 hours.    Microbiology Results (last 7 days)       Procedure Component Value Units Date/Time    Blood culture [[id number]]  (Normal) Collected: 04/11/25 1339    Order Status: Completed Specimen: Blood Updated: 04/12/25 1401     Blood Culture No Growth After 24 Hours    Culture, Anaerobic [[id number]] Collected: 04/08/25 1433    Order Status: Completed Specimen: Wound from Foot, Right Updated: 04/12/25 0654     Anaerobe Culture No Anaerobes Isolated    Culture, Respiratory with Gram Stain [[id number]]     Order Status: Sent Specimen: Respiratory     Aerobic culture [[id number]]  (Abnormal)  (Susceptibility) Collected: 04/08/25 1433    Order Status: Completed Specimen: Wound from Foot, Right Updated: 04/11/25 0748     CULTURE, AEROBIC Moderate Morganella morganii      Few Proteus mirabilis    Afb Culture Stain [[id number]] Collected: 04/08/25 1433    Order Status: Completed Specimen: Wound from Foot, Right Updated: 04/10/25 1321     ACID FAST STAIN  No acid fast bacilli seen    Afb Culture Stain [[id number]] Collected: 04/01/25 1557    Order Status: Completed Specimen: Wound from Foot, Right Updated: 04/09/25 1121     ACID FAST STAIN  No acid fast bacilli seen    Fungus culture [[id number]]  (Normal) " Collected: 04/01/25 1557    Order Status: Completed Specimen: Wound from Foot, Right Updated: 04/08/25 2002     Fungal Culture No Fungus Isolated at 1 Week    Fungus culture [[id number]]  (Normal) Collected: 04/01/25 1621    Order Status: Completed Specimen: Biopsy from Foot, Left Updated: 04/08/25 2002     Fungal Culture No Fungus Isolated at 1 Week    Gram stain [[id number]] Collected: 04/08/25 1433    Order Status: Completed Specimen: Wound from Foot, Right Updated: 04/08/25 1846     GRAM STAIN Rare WBC seen      No organisms seen    Fungus culture [[id number]] Collected: 04/08/25 1433    Order Status: Sent Specimen: Wound from Foot, Right Updated: 04/08/25 1528    AFB Culture & Smear [[id number]] Collected: 04/08/25 1433    Order Status: Resulted Specimen: Wound from Foot, Right Updated: 04/08/25 1528    Aerobic culture [[id number]] Collected: 04/01/25 1621    Order Status: Completed Specimen: Biopsy from Foot, Left Updated: 04/07/25 0647     CULTURE, AEROBIC No Growth    Blood Culture #1 **CANNOT BE ORDERED STAT** [[id number]]  (Normal) Collected: 03/31/25 1827    Order Status: Completed Specimen: Blood from Peripheral, Antecubital, Left Updated: 04/05/25 2002     Blood Culture No Growth After 5 Days    Blood Culture #2 **CANNOT BE ORDERED STAT** [[id number]]  (Normal) Collected: 03/31/25 1845    Order Status: Completed Specimen: Blood from Peripheral, Hand, Left Updated: 04/05/25 2002     Blood Culture No Growth After 5 Days            Diagnostic Results:  Imaging Results               MRI Hindfoot WO Contrast LT (Final result)  Result time 04/01/25 07:47:56      Final result by Seamus Hays MD (04/01/25 07:47:56)                   Impression:      1. Plantar soft tissue ulcer at the level of the 3rd MTP joint with associated osteomyelitis of the distal half of the metatarsal and base of the proximal phalanx.  No abscess.  2. Sequela of chronic osteomyelitis involving the 1st, 2nd and 5th metatarsals  and proximal phalanges.  3. Postoperative change of transmetatarsal amputation of the 4th digit.  This report was flagged in Epic as abnormal.      Electronically signed by: Seamus Hays MD  Date:    04/01/2025  Time:    07:47               Narrative:    EXAMINATION:  MRI HINDFOOT WO CONTRAST LT; MRI FOREFOOT WO CONTRAST LT    CLINICAL HISTORY:  b/l ulcers concern for osteo;; b/l ulcers. Concern for osteo;    TECHNIQUE:  Routine MRI evaluation of the left ankle and forefoot performed without contrast.    COMPARISON:  Radiographs 09/01/2024.    FINDINGS:  Examination degraded by patient motion artifact.    BONES: Postsurgical change of transmetatarsal amputation of the 4th digit.  Osseous erosive changes of the 3rd metatarsal head and proximal phalanx base with associated confluent marrow edema involving the distal half of the metatarsal and proximal half of the proximal phalanx.  There is corresponding T1 medullary hypointensity throughout the distal half of the metatarsal and base of the proximal phalanx.  Chronic osseous erosive changes of the 1st, 2nd and 5th metatarsal heads and proximal phalanges with grossly preserved marrow signal intensity.  Solid periosteal reaction throughout the 2nd metatarsal shaft.  Linear area of edema signal at the posterior subchondral region of the tibial plafond, favored to be degenerative in nature.  No definite acute fractures.  Remote healed fracture of the lateral malleolus.  No avascular necrosis.  No marrow infiltrative process.    JOINTS: 3rd MTP complex joint effusion with surrounding synovitis.  No dislocation.    LIGAMENTS: Chronic sprains of the anterior talofibular, superficial deltoid and deep deltoid ligaments.  Lisfranc ligament appears intact.    TENDONS: Ulceration and disruption of the 3rd flexor tendon at the level of the MTP joint.  Chronic ulceration of the 1st, 2nd and 5th flexor tendons at the level of the MTP joints.  Postoperative changes of the 4th  flexor tendon.  Remaining tendons appear grossly intact.    MUSCLES: Edema and severe fatty degeneration of the visualized musculature.  No intramuscular fluid collection.    MISCELLANEOUS: Plantar soft tissue ulcer at the level of the 3rd MTP joint with adjacent fluid and surrounding subcutaneous edema.  No focal fluid collection.  Probable adventitial bursa at the plantar aspect of the hallux MTP joint.  Focal area of signal hypointensity at the level of the heel pad, possibly sequela of scarring.                                        MRI Forefoot WO Contrast LT (Final result)  Result time 04/01/25 07:47:56   Procedure changed from MRI Foot Toes WO Contrast DANY     Final result by Seamus Hays MD (04/01/25 07:47:56)                   Impression:      1. Plantar soft tissue ulcer at the level of the 3rd MTP joint with associated osteomyelitis of the distal half of the metatarsal and base of the proximal phalanx.  No abscess.  2. Sequela of chronic osteomyelitis involving the 1st, 2nd and 5th metatarsals and proximal phalanges.  3. Postoperative change of transmetatarsal amputation of the 4th digit.  This report was flagged in Epic as abnormal.      Electronically signed by: Seamus Hays MD  Date:    04/01/2025  Time:    07:47               Narrative:    EXAMINATION:  MRI HINDFOOT WO CONTRAST LT; MRI FOREFOOT WO CONTRAST LT    CLINICAL HISTORY:  b/l ulcers concern for osteo;; b/l ulcers. Concern for osteo;    TECHNIQUE:  Routine MRI evaluation of the left ankle and forefoot performed without contrast.    COMPARISON:  Radiographs 09/01/2024.    FINDINGS:  Examination degraded by patient motion artifact.    BONES: Postsurgical change of transmetatarsal amputation of the 4th digit.  Osseous erosive changes of the 3rd metatarsal head and proximal phalanx base with associated confluent marrow edema involving the distal half of the metatarsal and proximal half of the proximal phalanx.  There is corresponding T1  medullary hypointensity throughout the distal half of the metatarsal and base of the proximal phalanx.  Chronic osseous erosive changes of the 1st, 2nd and 5th metatarsal heads and proximal phalanges with grossly preserved marrow signal intensity.  Solid periosteal reaction throughout the 2nd metatarsal shaft.  Linear area of edema signal at the posterior subchondral region of the tibial plafond, favored to be degenerative in nature.  No definite acute fractures.  Remote healed fracture of the lateral malleolus.  No avascular necrosis.  No marrow infiltrative process.    JOINTS: 3rd MTP complex joint effusion with surrounding synovitis.  No dislocation.    LIGAMENTS: Chronic sprains of the anterior talofibular, superficial deltoid and deep deltoid ligaments.  Lisfranc ligament appears intact.    TENDONS: Ulceration and disruption of the 3rd flexor tendon at the level of the MTP joint.  Chronic ulceration of the 1st, 2nd and 5th flexor tendons at the level of the MTP joints.  Postoperative changes of the 4th flexor tendon.  Remaining tendons appear grossly intact.    MUSCLES: Edema and severe fatty degeneration of the visualized musculature.  No intramuscular fluid collection.    MISCELLANEOUS: Plantar soft tissue ulcer at the level of the 3rd MTP joint with adjacent fluid and surrounding subcutaneous edema.  No focal fluid collection.  Probable adventitial bursa at the plantar aspect of the hallux MTP joint.  Focal area of signal hypointensity at the level of the heel pad, possibly sequela of scarring.                                       MRI Hindfoot WO Contrast RT (Final result)  Result time 04/01/25 07:19:00      Final result by Seamus Hays MD (04/01/25 07:19:00)                   Impression:      1. Plantar soft tissue ulcer at the level of the calcaneocuboid joint with surrounding subcutaneous edema and adjacent blistering.  No osteomyelitis.  No abscess.  2. Advanced neuropathic arthropathy of the  tibiotalar, subtalar and residual midtarsal joints.      Electronically signed by: Seamus Hays MD  Date:    04/01/2025  Time:    07:19               Narrative:    EXAMINATION:  MRI HINDFOOT WO CONTRAST RT    CLINICAL HISTORY:  Foot swelling, diabetic, osteomyelitis suspected, xray done;    TECHNIQUE:  Routine MRI evaluation of the right ankle performed without contrast.    COMPARISON:  Radiographs 03/31/2025.    FINDINGS:  BONES/JOINTS: Postsurgical changes of midfoot amputation.  Chronic osseous erosive changes centered about the tibiotalar, subtalar and residual midtarsal joints, likely sequela of chronic neuropathic arthropathy.  Intraosseous cyst at the lateral aspect of the distal tibia.  Circumferential solid periosteal reaction about the distal tibia and distal fibula.  No marrow signal abnormality to suggest an infiltrative process.  No significant joint effusion.  No dislocation.    TENDONS: Achilles tendon demonstrates normal morphology and signal intensity.  Residual posterior tibial, flexor digitorum longus, flexor hallucis longus, peroneus longus, peroneus brevis and extensor tendons are intact.    MUSCLES: Edema and fatty degeneration of the visualized musculature.  No intramuscular fluid collection.    MISCELLANEOUS: Plantar soft tissue ulcer at the level of the calcaneocuboid joint with involvement of the plantar aponeurosis, surrounding subcutaneous edema and adjacent blistering.  No fluid collection.                                       US Lower Extremity Arteries Right (Final result)  Result time 03/31/25 21:32:36      Final result by Michelle Leon MD (03/31/25 21:32:36)                   Impression:      Normal triphasic waveforms throughout the right lower extremity.  No occlusion or stenosis detected.      Electronically signed by: Michelle Leon  Date:    03/31/2025  Time:    21:32               Narrative:    EXAMINATION:  US LOWER EXTREMITY ARTERIES RIGHT    CLINICAL  HISTORY:  Unspecified open wound, right foot, subsequent encounter    TECHNIQUE:  Duplex and color flow Doppler evaluation and graded compression of the right lower extremity arteries was performed.    COMPARISON:  None    FINDINGS:  Peak systolic velocities in the right lower extremity arteries (cm/sec):    CFA: 127, triphasic    DFA: 74, triphasic    Proximal SFA: 103, triphasic    Mid SFA: 120, triphasic    Distal SFA: 109, triphasic    Proximal pop: 84, triphasic    Distal pop: 98, triphasic    LAUREL: 80, triphasic    PTA: 86, triphasic    Peroneal: 111, triphasic    DPA: 75, triphasic                                       X-Ray Foot Complete Right (Final result)  Result time 03/31/25 13:14:02      Final result by Alli Henriquez MD (03/31/25 13:14:02)                   Impression:      Please see above.      Electronically signed by: Alli Henriquez  Date:    03/31/2025  Time:    13:14               Narrative:    EXAMINATION:  XR FOOT COMPLETE 3 VIEW RIGHT    CLINICAL HISTORY:  . Pain in unspecified foot    TECHNIQUE:  AP, lateral, and oblique views of the right foot were performed.    COMPARISON:  None.    FINDINGS:  Extensive postoperative change including amputation to the level of the midfoot structures.  Remainder of the visualized osseous structures of the mid and hindfoot demonstrate diffuse degenerative change in osteopenia with surrounding diffuse soft tissue swelling.  Additional diffuse osseous destruction and degenerative change about the partially visualized ankle.  Scattered vascular calcification as well as plantar calcaneal spur.  No obvious significant osseous erosive change, noting limitations from postoperative change and surrounding soft tissue swelling.  If there is continued clinical concern for osteomyelitis, consider MRI of the foot for further evaluation.                                       X-Ray Chest AP Portable (Final result)  Result time 03/31/25 13:15:24      Final result by Alli Henriquez  MD AL (03/31/25 13:15:24)                   Impression:      Please see above.      Electronically signed by: Alli Newet  Date:    03/31/2025  Time:    13:15               Narrative:    EXAMINATION:  XR CHEST AP PORTABLE    CLINICAL HISTORY:  Chronic cough    TECHNIQUE:  Single frontal view of the chest was performed.    COMPARISON:  Chest radiograph 09/01/2024    FINDINGS:  Left-sided central venous catheter with tip overlying the in expected region of the cavoatrial junction.  Cardiomediastinal silhouette is unchanged in size.  Patient is rotated, limiting evaluation of the mediastinal structures.    Chronic right effusion with probable superimposed volume loss about the right lung base.  Superimposed infectious or non infectious inflammatory infiltrate cannot be excluded.  Remainder of the aerated portions of the lungs are clear.  No pneumothorax.    Osseous structures demonstrate no evidence for acute fracture or osseous destructive lesion.  Soft tissues are unremarkable.                                      Assessment/Plan:     Active Diagnoses:    Diagnosis Date Noted POA    PRINCIPAL PROBLEM:  Infected ulcer of skin [L98.499, L08.9] 03/31/2025 Yes    Leukocytosis [D72.829] 04/11/2025 Yes    Pyogenic inflammation of bone [M86.9] 04/03/2025 Yes    Foot infection [L08.9] 04/02/2025 Yes    Foot ulcer with fat layer exposed, unspecified laterality [L97.502] 09/01/2024 Yes    Type 2 diabetes mellitus with foot ulcer [E11.621, L97.509] 06/25/2024 Yes    End stage renal disease [N18.6] 03/21/2024 Yes    Hyperkalemia [E87.5] 07/19/2021 Yes     Chronic    Essential hypertension [I10] 01/27/2021 Yes      Problems Resolved During this Admission:       Education about the prevention of limb loss.      S/p  repeat irrigation and debridement of both feet DOS: 4/1/25, 4/8/25    Discussed wound healing cycle, skin integrity, ways to care for skin.Counseled patient on the effects of biomechanical pressure and deformity as well  as blood glucose on healing. He verbalizes understanding that it can increase the chances of delayed healing and this prolonged exposure leads to infection or progression of infection which subsequently can result in loss of limb.    No purulent drainage noted today, no longer painful with palpation     PT pulse lavage ordered.     DSD applied to right foot    Abx per ID    F/u WB wound care upon discharge.  Recommend twice weekly HH dressing changes on discharge    We will continue to follow and work in partnership with treatment team on how to best treat patient concern and diagnosis.      Ileana Perez DPM  Podiatry  [facility] - [place] - Emergency Dept

## 2025-04-12 NOTE — ASSESSMENT & PLAN NOTE
Patient's blood pressure range in the last 24 hours was: BP  Min: 125/77  Max: 140/79.The patient's inpatient anti-hypertensive regimen is listed below:  Current Antihypertensives  , Daily, Oral  NIFEdipine 24 hr tablet 90 mg, Daily, Oral  carvediloL tablet 6.25 mg, 2 times daily, Oral    Plan  - BP is controlled, no changes needed to their regimen  - lisinopril held due to hyperkalemia.  Will restart when clinically indicated

## 2025-04-12 NOTE — ASSESSMENT & PLAN NOTE
Hyperkalemia is likely due to ESRD.The patients most recent potassium results are listed below.  Recent Labs     04/11/25  0423 04/12/25  0453   K 5.2* 5.0   He got some doses of Lokelma;will order another dose today  Nephrology consulted for HD.  Continue monitoring

## 2025-04-12 NOTE — PROGRESS NOTES
[facility] Medicine  Progress Note    Patient Name: Emmanuel Morgan  MRN: [id number]  Patient Class: IP- Inpatient   Admission Date: 3/31/2025  Length of Stay: 12 days  Attending Physician: Tony Sainz MD  Primary Care Provider: Laurence, Primary Doctor        Subjective     Principal Problem:Infected ulcer of skin        HPI:  39-year-old male with ESRD on HD, diabetes not on insulin, prior osteomyelitis requiring right midfoot amputation with a long standing left foot ulcer (months) and a right foot ulcer of his stump which has been present for weeks who was sent to the ER by home health due to progression of wounds with skin and soft tissue infections of possibly both ulcers and possible osteomyelitis, he has been followed up by outpatient wound care agency, and was sent here by home health nurse due to nonhealing of the wound, noted with worsening erythema and purulent discharge.  Missed hemodialysis session on the day of presentation due to presentation in the emergency room.    Overview/Hospital Course:  39-year-old male with ESRD on HD, DM, prior osteomyelitis requiring right midfoot amputation with a long standing left foot ulcer (months) and a right foot ulcer of his stump which has been present for weeks who was sent to the ER by home health due to progression of wounds with skin and soft tissue infections of possibly both ulcers and possible osteomyelitis of either.  MRI of left foot showing plantar soft tissue ulcer at the level of the 3rd MTP joint with associated osteomyelitis of the distal half of the metatarsal and base of the proximal phalanx.  On ABX's and Podiatry consulted. Patient underwent irrigation and debridement of bilateral LE's.  Currently  is coordinating antibiotics with HD unit.  Still having some purulent discharge and needed repeat I&D in the OR on 4/8 with some improvement in wound.  Pulsavac was ordered for 4/11 and 4/12.  With noted improvement in  wound.  Patient to be discharged on IV antibiotics and outpatient wound care      Interval History:  No acute event overnight.  Course of improved.  Blood cultures obtained yesterday NTD.  Anticipated DC in 1-2 days with a prolonged antibiotics.    Review of Systems  Objective:     Vital Signs (Most Recent):  Temp: 98.1 °F (36.7 °C) (04/12/25 1126)  Pulse: 84 (04/12/25 1126)  Resp: 18 (04/12/25 1126)  BP: 129/78 (04/12/25 1126)  SpO2: 95 % (04/12/25 1126) Vital Signs (24h Range):  Temp:  [98.1 °F (36.7 °C)-100.4 °F (38 °C)] 98.1 °F (36.7 °C)  Pulse:  [] 84  Resp:  [17-20] 18  SpO2:  [94 %-97 %] 95 %  BP: (125-140)/(77-84) 129/78     Weight: 104.3 kg (229 lb 15 oz)  Body mass index is 29.52 kg/m².    Intake/Output Summary (Last 24 hours) at 4/12/2025 1323  Last data filed at 4/12/2025 0107  Gross per 24 hour   Intake 360 ml   Output 0 ml   Net 360 ml         Physical Exam  Constitutional:       General: He is not in acute distress.     Appearance: He is not ill-appearing, toxic-appearing or diaphoretic.   Cardiovascular:      Rate and Rhythm: Normal rate and regular rhythm.      Pulses: Normal pulses.      Heart sounds: Normal heart sounds. No murmur heard.  Pulmonary:      Effort: Pulmonary effort is normal. No respiratory distress.      Breath sounds: Normal breath sounds. Stridor present.   Abdominal:      General: There is no distension.      Palpations: There is no mass.   Musculoskeletal:      Comments: Clean dressing BL lower extremities   Neurological:      General: No focal deficit present.   Psychiatric:         Mood and Affect: Mood normal.               Significant Labs: All pertinent labs within the past 24 hours have been reviewed.    Significant Imaging: I have reviewed all pertinent imaging results/findings within the past 24 hours.      Assessment & Plan  Infected ulcer of skin  Patient presents with ulcers to left foot and right foot stump.  MRI ordered.  No evidence of osteo on right foot  stump.  Left foot MRI showing plantar soft tissue ulcer at the level of the 3rd MTP joint with associated osteomyelitis of the distal half of the metatarsal and base of the proximal phalanx.   Started on ABX's  Wound cultures positive for Proteus mirabilis  S/p Underwent bilateral irrigation and debridement by Podiatry  ID following; input appreciated.  Recommending IV Cefepime with HD and oral Flagyl until 5/13.  Will need outpatient ABX's with HD arranged.  Discussed with Podiatry.  Still having some purulent drainage; repeat I and D on 04/08 in the OR        End stage renal disease  Creatine stable for now. BMP reviewed- noted Estimated Creatinine Clearance: 11.3 mL/min (A) (based on SCr of 11.2 mg/dL (H)). according to latest data. Based on current GFR, CKD stage is end stage.  Monitor UOP and serial BMP and adjust therapy as needed. Renally dose meds. Avoid nephrotoxic medications and procedures.  Nephrology consulted for HD.  Continue Monday, Wednesday, and Friday hemodialysis schedule.    Essential hypertension  Patient's blood pressure range in the last 24 hours was: BP  Min: 125/77  Max: 140/79.The patient's inpatient anti-hypertensive regimen is listed below:  Current Antihypertensives  , Daily, Oral  NIFEdipine 24 hr tablet 90 mg, Daily, Oral  carvediloL tablet 6.25 mg, 2 times daily, Oral    Plan  - BP is controlled, no changes needed to their regimen  - lisinopril held due to hyperkalemia.  Will restart when clinically indicated  Hyperkalemia  Hyperkalemia is likely due to ESRD.The patients most recent potassium results are listed below.  Recent Labs     04/11/25  0423 04/12/25  0453   K 5.2* 5.0   He got some doses of Lokelma;will order another dose today  Nephrology consulted for HD.  Continue monitoring          Type 2 diabetes mellitus with foot ulcer  Patient's FSGs are controlled on current medication regimen.  Last A1c reviewed-   Lab Results   Component Value Date    HGBA1C 4.9 08/20/2024      Most recent fingerstick glucose reviewed-   Recent Labs   Lab 04/11/25  1715 04/11/25  2109 04/12/25  0834 04/12/25  1127   POCTGLUCOSE 111* 115* 104 96     Current correctional scale  Low  Maintain anti-hyperglycemic dose as follows-   Antihyperglycemics (From admission, onward)      Start     Stop Route Frequency Ordered    03/31/25 2317  insulin aspart U-100 pen 0-5 Units         -- SubQ Before meals & nightly PRN 03/31/25 2218          Hold Oral hypoglycemics while patient is in the hospital.  Foot ulcer with fat layer exposed, unspecified laterality  Podiatry consulted, appreciated recs  Continue current antibiotics, ID consulted.  Continue wound care per podiatrist  Foot infection  See note above    Pyogenic inflammation of bone  Continue antibiotics as described above.    Leukocytosis  Persistent leukocytosis noted over the past 2 days  In his unknown at this time whether this is reactive or as a result of some occult infectious process  Repeat blood culture ordered    VTE Risk Mitigation (From admission, onward)           Ordered     heparin (porcine) injection 1,000 Units  As needed (PRN)         04/01/25 1827     IP VTE LOW RISK PATIENT  Once         03/31/25 2218     Place sequential compression device  Until discontinued         03/31/25 2218                    Discharge Planning   LULÚ: 4/12/2025     Code Status: Full Code   Medical Readiness for Discharge Date:   Discharge Plan A: Home   Discharge Delays: None known at this time                    Tony Sainz MD  Department of Hospital Medicine   [facility]

## 2025-04-12 NOTE — CLINICAL REVIEW
Nephrology Chart Review      Intake/Output Summary (Last 24 hours) at 4/12/2025 1200  Last data filed at 4/12/2025 0107  Gross per 24 hour   Intake 360 ml   Output 0 ml   Net 360 ml       Vitals:    04/11/25 2301 04/12/25 0401 04/12/25 0730 04/12/25 1126   BP: 125/77 (!) 140/79 132/83 129/78   BP Location: Left arm Left arm     Patient Position: Lying Lying     Pulse: 90 96 89 84   Resp: 17 20 18 18   Temp: 99 °F (37.2 °C) 99.3 °F (37.4 °C) 98.1 °F (36.7 °C) 98.1 °F (36.7 °C)   TempSrc: Oral Oral Oral Oral   SpO2: 96% 95% 95% 95%   Weight:       Height:           Recent Labs   Lab 04/09/25  0451 04/11/25  0423 04/12/25  0453   * 134* 133*   K 5.2* 5.2* 5.0   CL 99 100 97   CO2 24 22* 24   BUN 57* 53* 43*   CREATININE 14.1* 12.8* 11.2*   GLUCOSE 96 93 94   CALCIUM 9.5 9.5 9.3   PHOS 3.3 4.0 3.9     HD 4/14/      No other related issues identified. Please call Nephrology as needed; We will continue to follow.      Everett Hirsch MD  Nephrology [facility] - [place]

## 2025-04-12 NOTE — SUBJECTIVE & OBJECTIVE
Interval History:  No acute event overnight.  Course of improved.  Blood cultures obtained yesterday NTD.  Anticipated DC in 1-2 days with a prolonged antibiotics.    Review of Systems  Objective:     Vital Signs (Most Recent):  Temp: 98.1 °F (36.7 °C) (04/12/25 1126)  Pulse: 84 (04/12/25 1126)  Resp: 18 (04/12/25 1126)  BP: 129/78 (04/12/25 1126)  SpO2: 95 % (04/12/25 1126) Vital Signs (24h Range):  Temp:  [98.1 °F (36.7 °C)-100.4 °F (38 °C)] 98.1 °F (36.7 °C)  Pulse:  [] 84  Resp:  [17-20] 18  SpO2:  [94 %-97 %] 95 %  BP: (125-140)/(77-84) 129/78     Weight: 104.3 kg (229 lb 15 oz)  Body mass index is 29.52 kg/m².    Intake/Output Summary (Last 24 hours) at 4/12/2025 1323  Last data filed at 4/12/2025 0107  Gross per 24 hour   Intake 360 ml   Output 0 ml   Net 360 ml         Physical Exam  Constitutional:       General: He is not in acute distress.     Appearance: He is not ill-appearing, toxic-appearing or diaphoretic.   Cardiovascular:      Rate and Rhythm: Normal rate and regular rhythm.      Pulses: Normal pulses.      Heart sounds: Normal heart sounds. No murmur heard.  Pulmonary:      Effort: Pulmonary effort is normal. No respiratory distress.      Breath sounds: Normal breath sounds. Stridor present.   Abdominal:      General: There is no distension.      Palpations: There is no mass.   Musculoskeletal:      Comments: Clean dressing BL lower extremities   Neurological:      General: No focal deficit present.   Psychiatric:         Mood and Affect: Mood normal.               Significant Labs: All pertinent labs within the past 24 hours have been reviewed.    Significant Imaging: I have reviewed all pertinent imaging results/findings within the past 24 hours.

## 2025-04-13 PROBLEM — R05.3 CHRONIC COUGH: Status: ACTIVE | Noted: 2025-04-13

## 2025-04-13 LAB
ABSOLUTE EOSINOPHIL (OHS): 0.26 K/UL
ABSOLUTE MONOCYTE (OHS): 3.08 K/UL (ref 0.3–1)
ABSOLUTE NEUTROPHIL COUNT (OHS): 8.93 K/UL (ref 1.8–7.7)
ALBUMIN SERPL BCP-MCNC: 2.4 G/DL (ref 3.5–5.2)
ANION GAP (OHS): 13 MMOL/L (ref 8–16)
BASOPHILS # BLD AUTO: 0.07 K/UL
BASOPHILS NFR BLD AUTO: 0.5 %
BUN SERPL-MCNC: 57 MG/DL (ref 6–20)
CALCIUM SERPL-MCNC: 9.4 MG/DL (ref 8.7–10.5)
CHLORIDE SERPL-SCNC: 98 MMOL/L (ref 95–110)
CO2 SERPL-SCNC: 25 MMOL/L (ref 23–29)
CREAT SERPL-MCNC: 13.8 MG/DL (ref 0.5–1.4)
ERYTHROCYTE [DISTWIDTH] IN BLOOD BY AUTOMATED COUNT: 17.1 % (ref 11.5–14.5)
GFR SERPLBLD CREATININE-BSD FMLA CKD-EPI: 4 ML/MIN/1.73/M2
GLUCOSE SERPL-MCNC: 98 MG/DL (ref 70–110)
HCT VFR BLD AUTO: 31.2 % (ref 40–54)
HGB BLD-MCNC: 9.4 GM/DL (ref 14–18)
IMM GRANULOCYTES # BLD AUTO: 0.17 K/UL (ref 0–0.04)
IMM GRANULOCYTES NFR BLD AUTO: 1.1 % (ref 0–0.5)
LYMPHOCYTES # BLD AUTO: 2.87 K/UL (ref 1–4.8)
MCH RBC QN AUTO: 28.5 PG (ref 27–31)
MCHC RBC AUTO-ENTMCNC: 30.1 G/DL (ref 32–36)
MCV RBC AUTO: 95 FL (ref 82–98)
NUCLEATED RBC (/100WBC) (OHS): 0 /100 WBC
PHOSPHATE SERPL-MCNC: 3.6 MG/DL (ref 2.7–4.5)
PLATELET # BLD AUTO: 315 K/UL (ref 150–450)
PMV BLD AUTO: 10.6 FL (ref 9.2–12.9)
POCT GLUCOSE: 120 MG/DL (ref 70–110)
POCT GLUCOSE: 95 MG/DL (ref 70–110)
POCT GLUCOSE: 98 MG/DL (ref 70–110)
POCT GLUCOSE: 99 MG/DL (ref 70–110)
POTASSIUM SERPL-SCNC: 5.6 MMOL/L (ref 3.5–5.1)
RBC # BLD AUTO: 3.3 M/UL (ref 4.6–6.2)
RELATIVE EOSINOPHIL (OHS): 1.7 %
RELATIVE LYMPHOCYTE (OHS): 18.7 % (ref 18–48)
RELATIVE MONOCYTE (OHS): 20 % (ref 4–15)
RELATIVE NEUTROPHIL (OHS): 58 % (ref 38–73)
SODIUM SERPL-SCNC: 136 MMOL/L (ref 136–145)
WBC # BLD AUTO: 15.38 K/UL (ref 3.9–12.7)

## 2025-04-13 PROCEDURE — 97597 DBRDMT OPN WND 1ST 20 CM/<: CPT

## 2025-04-13 PROCEDURE — 80069 RENAL FUNCTION PANEL: CPT

## 2025-04-13 PROCEDURE — 63600175 PHARM REV CODE 636 W HCPCS: Performed by: PODIATRIST

## 2025-04-13 PROCEDURE — 85025 COMPLETE CBC W/AUTO DIFF WBC: CPT

## 2025-04-13 PROCEDURE — 25000003 PHARM REV CODE 250

## 2025-04-13 PROCEDURE — 25000003 PHARM REV CODE 250: Performed by: STUDENT IN AN ORGANIZED HEALTH CARE EDUCATION/TRAINING PROGRAM

## 2025-04-13 PROCEDURE — 25000003 PHARM REV CODE 250: Performed by: PODIATRIST

## 2025-04-13 PROCEDURE — 11000001 HC ACUTE MED/SURG PRIVATE ROOM

## 2025-04-13 PROCEDURE — 36415 COLL VENOUS BLD VENIPUNCTURE: CPT

## 2025-04-13 RX ORDER — FLUTICASONE PROPIONATE 50 MCG
2 SPRAY, SUSPENSION (ML) NASAL DAILY
Status: DISCONTINUED | OUTPATIENT
Start: 2025-04-14 | End: 2025-04-15 | Stop reason: HOSPADM

## 2025-04-13 RX ADMIN — SODIUM ZIRCONIUM CYCLOSILICATE 10 G: 10 POWDER, FOR SUSPENSION ORAL at 03:04

## 2025-04-13 RX ADMIN — BENZONATATE 100 MG: 100 CAPSULE ORAL at 03:04

## 2025-04-13 RX ADMIN — CEFEPIME 1 G: 1 INJECTION, POWDER, FOR SOLUTION INTRAMUSCULAR; INTRAVENOUS at 04:04

## 2025-04-13 RX ADMIN — BENZONATATE 100 MG: 100 CAPSULE ORAL at 06:04

## 2025-04-13 RX ADMIN — Medication 6 MG: at 09:04

## 2025-04-13 RX ADMIN — GUAIFENESIN 600 MG: 600 TABLET, EXTENDED RELEASE ORAL at 08:04

## 2025-04-13 RX ADMIN — SEVELAMER CARBONATE 800 MG: 800 TABLET, FILM COATED ORAL at 11:04

## 2025-04-13 RX ADMIN — METRONIDAZOLE 500 MG: 500 TABLET ORAL at 08:04

## 2025-04-13 RX ADMIN — BENZONATATE 100 MG: 100 CAPSULE ORAL at 09:04

## 2025-04-13 RX ADMIN — SEVELAMER CARBONATE 800 MG: 800 TABLET, FILM COATED ORAL at 04:04

## 2025-04-13 RX ADMIN — SODIUM ZIRCONIUM CYCLOSILICATE 10 G: 10 POWDER, FOR SUSPENSION ORAL at 09:04

## 2025-04-13 RX ADMIN — CARVEDILOL 6.25 MG: 6.25 TABLET, FILM COATED ORAL at 08:04

## 2025-04-13 RX ADMIN — OXYCODONE AND ACETAMINOPHEN 1 TABLET: 10; 325 TABLET ORAL at 09:04

## 2025-04-13 RX ADMIN — METRONIDAZOLE 500 MG: 500 TABLET ORAL at 09:04

## 2025-04-13 RX ADMIN — SEVELAMER CARBONATE 800 MG: 800 TABLET, FILM COATED ORAL at 08:04

## 2025-04-13 RX ADMIN — GUAIFENESIN 600 MG: 600 TABLET, EXTENDED RELEASE ORAL at 09:04

## 2025-04-13 RX ADMIN — CARVEDILOL 6.25 MG: 6.25 TABLET, FILM COATED ORAL at 09:04

## 2025-04-13 NOTE — ASSESSMENT & PLAN NOTE
Hyperkalemia is likely due to ESRD.The patients most recent potassium results are listed below.  Recent Labs     04/11/25  0423 04/12/25  0453 04/13/25  0357   K 5.2* 5.0 5.6*   He got some doses of Lokelma;will order another dose today  Nephrology consulted for HD.  Continue monitoring

## 2025-04-13 NOTE — PROGRESS NOTES
[facility] Medicine  Progress Note    Patient Name: Emmanuel Morgan  MRN: [id number]  Patient Class: IP- Inpatient   Admission Date: 3/31/2025  Length of Stay: 13 days  Attending Physician: Tony Sainz MD  Primary Care Provider: Laurence, Primary Doctor        Subjective     Principal Problem:Infected ulcer of skin        HPI:  39-year-old male with ESRD on HD, diabetes not on insulin, prior osteomyelitis requiring right midfoot amputation with a long standing left foot ulcer (months) and a right foot ulcer of his stump which has been present for weeks who was sent to the ER by home health due to progression of wounds with skin and soft tissue infections of possibly both ulcers and possible osteomyelitis, he has been followed up by outpatient wound care agency, and was sent here by home health nurse due to nonhealing of the wound, noted with worsening erythema and purulent discharge.  Missed hemodialysis session on the day of presentation due to presentation in the emergency room.    Overview/Hospital Course:  39-year-old male with ESRD on HD, DM, prior osteomyelitis requiring right midfoot amputation with a long standing left foot ulcer (months) and a right foot ulcer of his stump which has been present for weeks who was sent to the ER by home health due to progression of wounds with skin and soft tissue infections of possibly both ulcers and possible osteomyelitis of either.  MRI of left foot showing plantar soft tissue ulcer at the level of the 3rd MTP joint with associated osteomyelitis of the distal half of the metatarsal and base of the proximal phalanx.  On ABX's and Podiatry consulted. Patient underwent irrigation and debridement of bilateral LE's.  Currently  is coordinating antibiotics with HD unit.  Still having some purulent discharge and needed repeat I&D in the OR on 4/8 with some improvement in wound.  Pulsavac was ordered for 4/11 and 4/12.  With noted improvement in  wound.  Patient to be discharged on IV antibiotics and outpatient wound care      Interval History:  No acute event overnight.  Patient tolerating Pulsavac by PT.  Cultures NTD; anticipated DC 4/14.     Review of Systems  Objective:     Vital Signs (Most Recent):  Temp: 98.6 °F (37 °C) (04/13/25 1201)  Pulse: 91 (04/13/25 1201)  Resp: 18 (04/13/25 1201)  BP: 112/73 (04/13/25 1201)  SpO2: (!) 92 % (04/13/25 1201) Vital Signs (24h Range):  Temp:  [98.1 °F (36.7 °C)-98.7 °F (37.1 °C)] 98.6 °F (37 °C)  Pulse:  [91-99] 91  Resp:  [18-20] 18  SpO2:  [92 %-96 %] 92 %  BP: (112-143)/(72-84) 112/73     Weight: 104.3 kg (229 lb 15 oz)  Body mass index is 29.52 kg/m².    Intake/Output Summary (Last 24 hours) at 4/13/2025 1510  Last data filed at 4/13/2025 0938  Gross per 24 hour   Intake 240 ml   Output --   Net 240 ml         Physical Exam  Constitutional:       General: He is not in acute distress.     Appearance: He is not ill-appearing or toxic-appearing.   Cardiovascular:      Rate and Rhythm: Normal rate and regular rhythm.      Pulses: Normal pulses.      Heart sounds: Normal heart sounds. No murmur heard.  Pulmonary:      Effort: Pulmonary effort is normal.      Breath sounds: Normal breath sounds. No stridor.   Abdominal:      General: Bowel sounds are normal. There is no distension.      Palpations: Abdomen is soft. There is mass.   Musculoskeletal:      Comments: Clean surgical dressing bilateral lower extremities               Significant Labs: All pertinent labs within the past 24 hours have been reviewed.    Significant Imaging: I have reviewed all pertinent imaging results/findings within the past 24 hours.      Assessment & Plan  Infected ulcer of skin  Patient presents with ulcers to left foot and right foot stump.  MRI ordered.  No evidence of osteo on right foot stump.  Left foot MRI showing plantar soft tissue ulcer at the level of the 3rd MTP joint with associated osteomyelitis of the distal half of the  metatarsal and base of the proximal phalanx.   Started on ABX's  Wound cultures positive for Proteus mirabilis  S/p Underwent bilateral irrigation and debridement by Podiatry  ID following; input appreciated.  Recommending IV Cefepime with HD and oral Flagyl until 5/13.  Will need outpatient ABX's with HD arranged.  Discussed with Podiatry.  Still having some purulent drainage; repeat I and D on 04/08 in the OR        End stage renal disease  Creatine stable for now. BMP reviewed- noted Estimated Creatinine Clearance: 9.2 mL/min (A) (based on SCr of 13.8 mg/dL (H)). according to latest data. Based on current GFR, CKD stage is end stage.  Monitor UOP and serial BMP and adjust therapy as needed. Renally dose meds. Avoid nephrotoxic medications and procedures.  Nephrology consulted for HD.  Continue Monday, Wednesday, and Friday hemodialysis schedule.    Essential hypertension  Patient's blood pressure range in the last 24 hours was: BP  Min: 112/73  Max: 143/84.The patient's inpatient anti-hypertensive regimen is listed below:  Current Antihypertensives  , Daily, Oral  NIFEdipine 24 hr tablet 90 mg, Daily, Oral  carvediloL tablet 6.25 mg, 2 times daily, Oral    Plan  - BP is controlled, no changes needed to their regimen  - lisinopril held due to hyperkalemia.  Will restart when clinically indicated  Hyperkalemia  Hyperkalemia is likely due to ESRD.The patients most recent potassium results are listed below.  Recent Labs     04/11/25  0423 04/12/25  0453 04/13/25  0357   K 5.2* 5.0 5.6*   He got some doses of Lokelma;will order another dose today  Nephrology consulted for HD.  Continue monitoring          Type 2 diabetes mellitus with foot ulcer  Patient's FSGs are controlled on current medication regimen.  Last A1c reviewed-   Lab Results   Component Value Date    HGBA1C 4.9 08/20/2024     Most recent fingerstick glucose reviewed-   Recent Labs   Lab 04/12/25  1622 04/13/25  0756 04/13/25  1203   POCTGLUCOSE 121* 99  120*     Current correctional scale  Low  Maintain anti-hyperglycemic dose as follows-   Antihyperglycemics (From admission, onward)      Start     Stop Route Frequency Ordered    03/31/25 2317  insulin aspart U-100 pen 0-5 Units         -- SubQ Before meals & nightly PRN 03/31/25 2218          Hold Oral hypoglycemics while patient is in the hospital.  Foot ulcer with fat layer exposed, unspecified laterality  Podiatry consulted, appreciated recs  Continue current antibiotics, ID consulted.  Continue wound care per podiatrist  Foot infection  See note above    Pyogenic inflammation of bone  Continue antibiotics as described above.    Leukocytosis  Persistent leukocytosis noted over the past 2 days  In his unknown at this time whether this is reactive or as a result of some occult infectious process  Repeat blood culture ordered    Chronic cough? Post-nasal drip vs reactive airway disease  Patient complaining of persistent cough predating hospital admission  Chest x-ray nonfocal; infectious workup negative  Trial of Flonase for possible nasal drip  Outpatient evaluation pulmonology if symptoms remain persistent    VTE Risk Mitigation (From admission, onward)           Ordered     heparin (porcine) injection 1,000 Units  As needed (PRN)         04/01/25 1827     IP VTE LOW RISK PATIENT  Once         03/31/25 2218     Place sequential compression device  Until discontinued         03/31/25 2218                    Discharge Planning   LULÚ: 4/12/2025     Code Status: Full Code   Medical Readiness for Discharge Date:   Discharge Plan A: Home   Discharge Delays: None known at this time                    Tony Sainz MD  Department of Hospital Medicine   [facility]

## 2025-04-13 NOTE — CLINICAL REVIEW
Nephrology Chart Review    No intake or output data in the 24 hours ending 04/13/25 0922      Vitals:    04/12/25 2052 04/12/25 2358 04/13/25 0534 04/13/25 0754   BP:  122/73 (!) 143/84 128/78   BP Location:  Left arm Left arm    Patient Position:  Lying Lying    Pulse:  99 97 94   Resp: 19 19 19 20   Temp:  98.4 °F (36.9 °C) 98.3 °F (36.8 °C) 98.1 °F (36.7 °C)   TempSrc:  Oral Oral    SpO2:  (!) 94% 95% (!) 92%   Weight:       Height:           Recent Labs   Lab 04/11/25  0423 04/12/25  0453 04/13/25  0357   * 133* 136   K 5.2* 5.0 5.6*    97 98   CO2 22* 24 25   BUN 53* 43* 57*   CREATININE 12.8* 11.2* 13.8*   GLUCOSE 93 94 98   CALCIUM 9.5 9.3 9.4   PHOS 4.0 3.9 3.6     HD 4/14  Add loklema 10 TID    No other related issues identified. Please call Nephrology as needed; We will continue to follow.      Everett Hirsch MD  Nephrology [facility] [place]

## 2025-04-13 NOTE — ASSESSMENT & PLAN NOTE
Patient's blood pressure range in the last 24 hours was: BP  Min: 112/73  Max: 143/84.The patient's inpatient anti-hypertensive regimen is listed below:  Current Antihypertensives  , Daily, Oral  NIFEdipine 24 hr tablet 90 mg, Daily, Oral  carvediloL tablet 6.25 mg, 2 times daily, Oral    Plan  - BP is controlled, no changes needed to their regimen  - lisinopril held due to hyperkalemia.  Will restart when clinically indicated

## 2025-04-13 NOTE — ASSESSMENT & PLAN NOTE
Patient complaining of persistent cough predating hospital admission  Chest x-ray nonfocal; infectious workup negative  Trial of Flonase for possible nasal drip  Outpatient evaluation pulmonology if symptoms remain persistent

## 2025-04-13 NOTE — PT/OT/SLP PROGRESS
"[facility] Wound Care Progress Note    Emmanuel Morgan  [id number]  4/13/2025     Diagnosis:Pt admitted with R plantar midfoot wounds s/p I+D in OR on 4/1/25, R foot bedside debridement on 4/4/25, and R foot I+D again in OR on 4/8/25 by Podiatry.         Precautions: Standard and Diabetes, weight bearing R Heel WB in CAM walking boot     Allergies as of 03/31/2025    (No Known Allergies)        Pre-medication:  No pain medication taken by patient prior to treatment    Subjective: "I might go home tomorrow."    Patient reports pain level in wound: 0/10    Objective: Pt received pulsavac wound care to R plantar foot wound and R foot medial incision with ~500 mL NS.  PT attempted to press around R foot medial incision, no purulent drainage noted.  R plantar foot wound and periwound cleansed with Vashe moistened 4x4 gauze.  R foot patted dry.  Periwound applied with Cavilon no sting film barrier.  R plantar foot wound covered with Vashe moistened 4x4 gauze, then covered with dry 4x4 gauze, and abdominal pad.  R foot wrapped with cast padding, kerlix, and ace wrap, than secured with tape.  Shoe cover applied to R foot.  Clean technique maintained throughout treatment.               Assessment/Need for Treatment:  Patient tolerated today's treatment without any adverse effects.  Dressings removed with minimal bloody drainage.  No purulent drainage noted when pressing on periwound around R foot medial linear incision.  R foot plantar wound with increased red tissues and scant yellow slough, no maceration or malodor.  Periwound with callus and dry flaky skin.  Patient tolerated today's treatment without any adverse effects.  Goals remain appropriate.        Goals remain appropriate.      Plan:  Continue with Plan of Care. Continue with daily treatment       "

## 2025-04-13 NOTE — ASSESSMENT & PLAN NOTE
Patient's FSGs are controlled on current medication regimen.  Last A1c reviewed-   Lab Results   Component Value Date    HGBA1C 4.9 08/20/2024     Most recent fingerstick glucose reviewed-   Recent Labs   Lab 04/12/25  1622 04/13/25  0756 04/13/25  1203   POCTGLUCOSE 121* 99 120*     Current correctional scale  Low  Maintain anti-hyperglycemic dose as follows-   Antihyperglycemics (From admission, onward)      Start     Stop Route Frequency Ordered    03/31/25 2317  insulin aspart U-100 pen 0-5 Units         -- SubQ Before meals & nightly PRN 03/31/25 2218          Hold Oral hypoglycemics while patient is in the hospital.

## 2025-04-13 NOTE — SUBJECTIVE & OBJECTIVE
Interval History:  No acute event overnight.  Patient tolerating Pulsavac by PT.  Cultures NTD; anticipated DC 4/14.     Review of Systems  Objective:     Vital Signs (Most Recent):  Temp: 98.6 °F (37 °C) (04/13/25 1201)  Pulse: 91 (04/13/25 1201)  Resp: 18 (04/13/25 1201)  BP: 112/73 (04/13/25 1201)  SpO2: (!) 92 % (04/13/25 1201) Vital Signs (24h Range):  Temp:  [98.1 °F (36.7 °C)-98.7 °F (37.1 °C)] 98.6 °F (37 °C)  Pulse:  [91-99] 91  Resp:  [18-20] 18  SpO2:  [92 %-96 %] 92 %  BP: (112-143)/(72-84) 112/73     Weight: 104.3 kg (229 lb 15 oz)  Body mass index is 29.52 kg/m².    Intake/Output Summary (Last 24 hours) at 4/13/2025 1510  Last data filed at 4/13/2025 0938  Gross per 24 hour   Intake 240 ml   Output --   Net 240 ml         Physical Exam  Constitutional:       General: He is not in acute distress.     Appearance: He is not ill-appearing or toxic-appearing.   Cardiovascular:      Rate and Rhythm: Normal rate and regular rhythm.      Pulses: Normal pulses.      Heart sounds: Normal heart sounds. No murmur heard.  Pulmonary:      Effort: Pulmonary effort is normal.      Breath sounds: Normal breath sounds. No stridor.   Abdominal:      General: Bowel sounds are normal. There is no distension.      Palpations: Abdomen is soft. There is mass.   Musculoskeletal:      Comments: Clean surgical dressing bilateral lower extremities               Significant Labs: All pertinent labs within the past 24 hours have been reviewed.    Significant Imaging: I have reviewed all pertinent imaging results/findings within the past 24 hours.

## 2025-04-13 NOTE — ASSESSMENT & PLAN NOTE
Creatine stable for now. BMP reviewed- noted Estimated Creatinine Clearance: 9.2 mL/min (A) (based on SCr of 13.8 mg/dL (H)). according to latest data. Based on current GFR, CKD stage is end stage.  Monitor UOP and serial BMP and adjust therapy as needed. Renally dose meds. Avoid nephrotoxic medications and procedures.  Nephrology consulted for HD.  Continue Monday, Wednesday, and Friday hemodialysis schedule.

## 2025-04-13 NOTE — NURSING
[facility], [facility] [place]  Nurses Note -- 4 Eyes      4/13/2025       Skin assessed on: Q Shift      [x] No Pressure Injuries Present    [x]Prevention Measures Documented    [] Yes LDA  for Pressure Injury Previously documented     [] Yes New Pressure Injury Discovered   [] LDA for New Pressure Injury Added      Attending RN:  Gabbie Robb, RN     Second RN:  Nirali MEDINA LPN

## 2025-04-14 LAB
ALBUMIN SERPL BCP-MCNC: 2.4 G/DL (ref 3.5–5.2)
ANION GAP (OHS): 14 MMOL/L (ref 8–16)
BUN SERPL-MCNC: 71 MG/DL (ref 6–20)
CALCIUM SERPL-MCNC: 9.6 MG/DL (ref 8.7–10.5)
CHLORIDE SERPL-SCNC: 94 MMOL/L (ref 95–110)
CO2 SERPL-SCNC: 26 MMOL/L (ref 23–29)
CREAT SERPL-MCNC: 16.8 MG/DL (ref 0.5–1.4)
FUNGUS SPEC CULT: NORMAL
GFR SERPLBLD CREATININE-BSD FMLA CKD-EPI: 3 ML/MIN/1.73/M2
GLUCOSE SERPL-MCNC: 85 MG/DL (ref 70–110)
MYCOBACTERIUM SPEC QL CULT: NORMAL
PHOSPHATE SERPL-MCNC: 5.1 MG/DL (ref 2.7–4.5)
POCT GLUCOSE: 113 MG/DL (ref 70–110)
POCT GLUCOSE: 114 MG/DL (ref 70–110)
POCT GLUCOSE: 93 MG/DL (ref 70–110)
POTASSIUM SERPL-SCNC: 5.5 MMOL/L (ref 3.5–5.1)
SODIUM SERPL-SCNC: 134 MMOL/L (ref 136–145)

## 2025-04-14 PROCEDURE — 82947 ASSAY GLUCOSE BLOOD QUANT: CPT | Performed by: PHYSICIAN ASSISTANT

## 2025-04-14 PROCEDURE — 63600175 PHARM REV CODE 636 W HCPCS: Performed by: NURSE PRACTITIONER

## 2025-04-14 PROCEDURE — 36415 COLL VENOUS BLD VENIPUNCTURE: CPT | Performed by: PHYSICIAN ASSISTANT

## 2025-04-14 PROCEDURE — 63600175 PHARM REV CODE 636 W HCPCS: Performed by: PODIATRIST

## 2025-04-14 PROCEDURE — 25000003 PHARM REV CODE 250: Performed by: PODIATRIST

## 2025-04-14 PROCEDURE — 25000242 PHARM REV CODE 250 ALT 637 W/ HCPCS

## 2025-04-14 PROCEDURE — 80100016 HC MAINTENANCE HEMODIALYSIS

## 2025-04-14 PROCEDURE — 25000003 PHARM REV CODE 250

## 2025-04-14 PROCEDURE — 99232 SBSQ HOSP IP/OBS MODERATE 35: CPT | Mod: ,,, | Performed by: PODIATRIST

## 2025-04-14 PROCEDURE — 99232 SBSQ HOSP IP/OBS MODERATE 35: CPT | Mod: ,,, | Performed by: PHYSICIAN ASSISTANT

## 2025-04-14 PROCEDURE — 11000001 HC ACUTE MED/SURG PRIVATE ROOM

## 2025-04-14 PROCEDURE — 97597 DBRDMT OPN WND 1ST 20 CM/<: CPT

## 2025-04-14 PROCEDURE — 25000003 PHARM REV CODE 250: Performed by: STUDENT IN AN ORGANIZED HEALTH CARE EDUCATION/TRAINING PROGRAM

## 2025-04-14 PROCEDURE — 05PYX3Z REMOVAL OF INFUSION DEVICE FROM UPPER VEIN, EXTERNAL APPROACH: ICD-10-PCS | Performed by: INTERNAL MEDICINE

## 2025-04-14 RX ORDER — LIDOCAINE HYDROCHLORIDE 10 MG/ML
INJECTION, SOLUTION INFILTRATION; PERINEURAL
Status: COMPLETED | OUTPATIENT
Start: 2025-04-14 | End: 2025-04-14

## 2025-04-14 RX ORDER — FLUTICASONE PROPIONATE 50 MCG
2 SPRAY, SUSPENSION (ML) NASAL DAILY
Qty: 1 ML | Refills: 1 | Status: SHIPPED | OUTPATIENT
Start: 2025-04-15 | End: 2025-05-15

## 2025-04-14 RX ORDER — BENZONATATE 100 MG/1
100 CAPSULE ORAL EVERY 8 HOURS
Qty: 15 CAPSULE | Refills: 0 | Status: SHIPPED | OUTPATIENT
Start: 2025-04-14 | End: 2025-04-19

## 2025-04-14 RX ORDER — GUAIFENESIN AND DEXTROMETHORPHAN HYDROBROMIDE 10; 100 MG/5ML; MG/5ML
5 SYRUP ORAL EVERY 6 HOURS PRN
Qty: 20 ML | Refills: 0 | Status: SHIPPED | OUTPATIENT
Start: 2025-04-14 | End: 2025-04-19

## 2025-04-14 RX ORDER — CARVEDILOL 6.25 MG/1
6.25 TABLET ORAL 2 TIMES DAILY
Qty: 180 TABLET | Refills: 3 | Status: SHIPPED | OUTPATIENT
Start: 2025-04-14 | End: 2026-04-14

## 2025-04-14 RX ADMIN — METRONIDAZOLE 500 MG: 500 TABLET ORAL at 09:04

## 2025-04-14 RX ADMIN — ONDANSETRON 4 MG: 2 INJECTION INTRAMUSCULAR; INTRAVENOUS at 03:04

## 2025-04-14 RX ADMIN — BENZONATATE 100 MG: 100 CAPSULE ORAL at 06:04

## 2025-04-14 RX ADMIN — GUAIFENESIN 600 MG: 600 TABLET, EXTENDED RELEASE ORAL at 08:04

## 2025-04-14 RX ADMIN — METRONIDAZOLE 500 MG: 500 TABLET ORAL at 08:04

## 2025-04-14 RX ADMIN — BENZONATATE 100 MG: 100 CAPSULE ORAL at 09:04

## 2025-04-14 RX ADMIN — SEVELAMER CARBONATE 800 MG: 800 TABLET, FILM COATED ORAL at 08:04

## 2025-04-14 RX ADMIN — BENZONATATE 100 MG: 100 CAPSULE ORAL at 03:04

## 2025-04-14 RX ADMIN — SEVELAMER CARBONATE 800 MG: 800 TABLET, FILM COATED ORAL at 04:04

## 2025-04-14 RX ADMIN — CEFEPIME 1 G: 1 INJECTION, POWDER, FOR SOLUTION INTRAMUSCULAR; INTRAVENOUS at 04:04

## 2025-04-14 RX ADMIN — GUAIFENESIN 600 MG: 600 TABLET, EXTENDED RELEASE ORAL at 09:04

## 2025-04-14 RX ADMIN — LIDOCAINE HYDROCHLORIDE 7 ML: 10 INJECTION, SOLUTION INFILTRATION; PERINEURAL at 03:04

## 2025-04-14 RX ADMIN — CARVEDILOL 6.25 MG: 6.25 TABLET, FILM COATED ORAL at 09:04

## 2025-04-14 RX ADMIN — Medication 6 MG: at 09:04

## 2025-04-14 RX ADMIN — FLUTICASONE PROPIONATE 100 MCG: 50 SPRAY, METERED NASAL at 08:04

## 2025-04-14 RX ADMIN — OXYCODONE AND ACETAMINOPHEN 1 TABLET: 10; 325 TABLET ORAL at 09:04

## 2025-04-14 RX ADMIN — SODIUM ZIRCONIUM CYCLOSILICATE 10 G: 10 POWDER, FOR SUSPENSION ORAL at 08:04

## 2025-04-14 RX ADMIN — GUAIFENESIN AND DEXTROMETHORPHAN 5 ML: 100; 10 SYRUP ORAL at 03:04

## 2025-04-14 NOTE — ASSESSMENT & PLAN NOTE
Hyperkalemia is likely due to ESRD.The patients most recent potassium results are listed below.  Recent Labs     04/12/25  0453 04/13/25  0357 04/14/25  0919   K 5.0 5.6* 5.5*   He got some doses of Lokelma;will order another dose today  Nephrology consulted for HD.  Continue monitoring

## 2025-04-14 NOTE — PLAN OF CARE
04/14/25 1117   Medicare Message   Important Message from Medicare regarding Discharge Appeal Rights Given to patient/caregiver;Explained to patient/caregiver;Signed/date by patient/caregiver   Date IMM was signed 04/14/25   Time IMM was signed [id number]

## 2025-04-14 NOTE — NURSING
Patient back on the floor from dialysis. AVS fistula with gauze and tape. Dressing c/d/I. VSS. Bed locked in lowest position, call light in reach, side rails up x2.

## 2025-04-14 NOTE — PROGRESS NOTES
[facility]  Nephrology  Progress Note    Patient Name: Emmanuel Morgan  MRN: [id number]  Admission Date: 3/31/2025  Hospital Length of Stay: 14 days  Attending Provider: Tony Sainz MD   Primary Care Physician: Laurence, Primary Doctor  Principal Problem:Infected ulcer of skin    Subjective:     HPI: 39 year old man with a history of ESRD on HD, DM, hx of osteomyelitis following with wound care presents to the ED at wound care request for evaluation for possible debridement. Podiatry consulted.    Outpatient ESRD  Hemodialysis  Outpatient nephrologist: Dr. Wade  Outpatient center:  [facility]  Dialysis schedule: MWF  Last treatment: 3/28/2025  Dry weight: 99kg  Access: right forearm AVF      Interval History: NAEON. Pt seen during HD , tolerating well. Normal appetite, no complaints.    Review of patient's allergies indicates:  No Known Allergies  Current Facility-Administered Medications   Medication Frequency    acetaminophen tablet 650 mg Q4H PRN    aluminum-magnesium hydroxide-simethicone 200-200-20 mg/5 mL suspension 30 mL QID PRN    benzonatate capsule 100 mg Q8H    carvediloL tablet 6.25 mg BID    ceFEPIme injection 1 g Daily    dextromethorphan-guaiFENesin  mg/5 ml liquid 5 mL Q6H PRN    dextrose 50% injection 12.5 g PRN    dextrose 50% injection 25 g PRN    diphenhydrAMINE capsule 25 mg Q6H PRN    epoetin beto-epbx injection 5,000 Units Every Mon, Wed, Fri    fluticasone propionate 50 mcg/actuation nasal spray 100 mcg Daily    glucagon (human recombinant) injection 1 mg PRN    glucose chewable tablet 16 g PRN    glucose chewable tablet 24 g PRN    guaiFENesin 12 hr tablet 600 mg BID    heparin (porcine) injection 1,000 Units PRN    insulin aspart U-100 pen 0-5 Units QID (AC + HS) PRN    melatonin tablet 6 mg Nightly PRN    metroNIDAZOLE tablet 500 mg Q12H    morphine injection 4 mg Q4H PRN    naloxone 0.4 mg/mL injection 0.02 mg PRN    NIFEdipine 24 hr tablet 90 mg Daily    ondansetron  injection 4 mg Q6H PRN    oxyCODONE-acetaminophen  mg per tablet 1 tablet Q4H PRN    senna-docusate 8.6-50 mg per tablet 1 tablet BID PRN    sevelamer carbonate tablet 800 mg TID WM    sodium chloride 0.9% bolus 250 mL 250 mL PRN       Objective:     Vital Signs (Most Recent):  Temp: 98.4 °F (36.9 °C) (04/14/25 0838)  Pulse: 92 (04/14/25 0838)  Resp: 18 (04/14/25 0838)  BP: 128/82 (04/14/25 0838)  SpO2: 95 % (04/14/25 0838) Vital Signs (24h Range):  Temp:  [98.3 °F (36.8 °C)-99.2 °F (37.3 °C)] 98.4 °F (36.9 °C)  Pulse:  [91-97] 92  Resp:  [17-20] 18  SpO2:  [92 %-96 %] 95 %  BP: (105-147)/(72-83) 128/82     Weight: 104.3 kg (229 lb 15 oz) (04/10/25 1644)  Body mass index is 29.52 kg/m².  Body surface area is 2.33 meters squared.    I/O last 3 completed shifts:  In: 720 [P.O.:720]  Out: -      Physical Exam  Vitals and nursing note reviewed.   Constitutional:       Appearance: Normal appearance.   HENT:      Head: Normocephalic.      Mouth/Throat:      Mouth: Mucous membranes are moist.   Eyes:      Extraocular Movements: Extraocular movements intact.      Conjunctiva/sclera: Conjunctivae normal.      Pupils: Pupils are equal, round, and reactive to light.   Cardiovascular:      Rate and Rhythm: Normal rate and regular rhythm.   Pulmonary:      Effort: Pulmonary effort is normal.      Breath sounds: Normal breath sounds.   Abdominal:      Palpations: Abdomen is soft.   Musculoskeletal:      Cervical back: Normal range of motion.   Skin:     General: Skin is warm.   Neurological:      General: No focal deficit present.      Mental Status: He is alert.   Psychiatric:         Mood and Affect: Mood normal.          Significant Labs:  CMP:   Recent Labs   Lab 04/14/25  0919   CALCIUM 9.6   ALBUMIN 2.4*   *   K 5.5*   CO2 26   CL 94*   BUN 71*   CREATININE 16.8*     All labs within the past 24 hours have been reviewed.     Significant Imaging:  Labs: Reviewed    Assessment/Plan:     Renal/  End stage renal  disease  ESRD on HD    Plan/Recommendation  HD today per his usual schedule MWF  -Keep MAP > 65  -Keep hemoglobin > 7  -Strict ins and outs  -Avoid nephrotoxic agents if possible and renally dose medications  -Avoid drastic hemodynamic changes if possible    #Anemia  HGB   Date Value Ref Range Status   04/13/2025 9.4 (L) 14.0 - 18.0 gm/dL Final   Obtain iron and ferritin levels  Hgb below goal range  Epo with HD    #Secondary hyperparathyroidism  Calcium   Date Value Ref Range Status   04/14/2025 9.6 8.7 - 10.5 mg/dL Final     Phosphorus Level   Date Value Ref Range Status   04/14/2025 5.1 (H) 2.7 - 4.5 mg/dL Final     PTH, Intact   Date Value Ref Range Status   03/10/2025 322 (H) 16 - 80 pg/mL Final   Renal diet when eating, low K and low phos  Renvella with meals 800 TID      Hyperkalemia  K 5.5  HD above    Thank you for your consult. I will follow-up with patient. Please contact us if you have any additional questions.    DUANE Del Real  Nephrology  [facility] - [place] - Telemetry

## 2025-04-14 NOTE — NURSING
[facility], [facility] - [place]  Nurses Note -- 4 Eyes      4/14/2025       Skin assessed on: Q Shift      [x] No Pressure Injuries Present    []Prevention Measures Documented    [] Yes LDA  for Pressure Injury Previously documented     [] Yes New Pressure Injury Discovered   [] LDA for New Pressure Injury Added      Attending RN:  Umm Brown RN     Second RN:  ARNOLD Campoverde

## 2025-04-14 NOTE — ASSESSMENT & PLAN NOTE
Patient's blood pressure range in the last 24 hours was: BP  Min: 114/78  Max: 147/83.The patient's inpatient anti-hypertensive regimen is listed below:  Current Antihypertensives  NIFEdipine 24 hr tablet 90 mg, Daily, Oral  carvediloL tablet 6.25 mg, 2 times daily, Oral  carvedilol (COREG) tablet, 2 times daily, Oral    Plan  - BP is controlled, no changes needed to their regimen  - lisinopril held due to hyperkalemia.  Will restart when clinically indicated

## 2025-04-14 NOTE — PLAN OF CARE
Patient RA oxygen saturation 88%-89%, NP notified, pt placed on 4LNC with oxygen saturation of 91%. Patient denies SOB

## 2025-04-14 NOTE — ASSESSMENT & PLAN NOTE
Patient's FSGs are controlled on current medication regimen.  Last A1c reviewed-   Lab Results   Component Value Date    HGBA1C 4.9 08/20/2024     Most recent fingerstick glucose reviewed-   Recent Labs   Lab 04/13/25 2049 04/14/25  0835 04/14/25  1651   POCTGLUCOSE 95 93 113*     Current correctional scale  Low  Maintain anti-hyperglycemic dose as follows-   Antihyperglycemics (From admission, onward)      Start     Stop Route Frequency Ordered    03/31/25 2317  insulin aspart U-100 pen 0-5 Units         -- SubQ Before meals & nightly PRN 03/31/25 2218          Hold Oral hypoglycemics while patient is in the hospital.

## 2025-04-14 NOTE — PT/OT/SLP PROGRESS
Physical Therapy      Patient Name:  Emmanuel Morgan   MRN:  [id number]    1453 Pt just came back from dialysis, currently eating lunch. Will follow-up.    1526 Patient not seen at this time for pulsavac wound care secondary to (Pt off the floor to Interventional Radiology for removal of L IJ THDC.). Will follow-up as able.

## 2025-04-14 NOTE — ASSESSMENT & PLAN NOTE
ESRD on HD    Plan/Recommendation  HD today per his usual schedule MWF  -Keep MAP > 65  -Keep hemoglobin > 7  -Strict ins and outs  -Avoid nephrotoxic agents if possible and renally dose medications  -Avoid drastic hemodynamic changes if possible    #Anemia  HGB   Date Value Ref Range Status   04/13/2025 9.4 (L) 14.0 - 18.0 gm/dL Final   Obtain iron and ferritin levels  Hgb below goal range  Epo with HD    #Secondary hyperparathyroidism  Calcium   Date Value Ref Range Status   04/14/2025 9.6 8.7 - 10.5 mg/dL Final     Phosphorus Level   Date Value Ref Range Status   04/14/2025 5.1 (H) 2.7 - 4.5 mg/dL Final     PTH, Intact   Date Value Ref Range Status   03/10/2025 322 (H) 16 - 80 pg/mL Final   Renal diet when eating, low K and low phos  Renvella with meals 800 TID

## 2025-04-14 NOTE — SUBJECTIVE & OBJECTIVE
Interval History: NAEON. Pt seen during HD , tolerating well. Normal appetite, no complaints.    Review of patient's allergies indicates:  No Known Allergies  Current Facility-Administered Medications   Medication Frequency    acetaminophen tablet 650 mg Q4H PRN    aluminum-magnesium hydroxide-simethicone 200-200-20 mg/5 mL suspension 30 mL QID PRN    benzonatate capsule 100 mg Q8H    carvediloL tablet 6.25 mg BID    ceFEPIme injection 1 g Daily    dextromethorphan-guaiFENesin  mg/5 ml liquid 5 mL Q6H PRN    dextrose 50% injection 12.5 g PRN    dextrose 50% injection 25 g PRN    diphenhydrAMINE capsule 25 mg Q6H PRN    epoetin beto-epbx injection 5,000 Units Every Mon, Wed, Fri    fluticasone propionate 50 mcg/actuation nasal spray 100 mcg Daily    glucagon (human recombinant) injection 1 mg PRN    glucose chewable tablet 16 g PRN    glucose chewable tablet 24 g PRN    guaiFENesin 12 hr tablet 600 mg BID    heparin (porcine) injection 1,000 Units PRN    insulin aspart U-100 pen 0-5 Units QID (AC + HS) PRN    melatonin tablet 6 mg Nightly PRN    metroNIDAZOLE tablet 500 mg Q12H    morphine injection 4 mg Q4H PRN    naloxone 0.4 mg/mL injection 0.02 mg PRN    NIFEdipine 24 hr tablet 90 mg Daily    ondansetron injection 4 mg Q6H PRN    oxyCODONE-acetaminophen  mg per tablet 1 tablet Q4H PRN    senna-docusate 8.6-50 mg per tablet 1 tablet BID PRN    sevelamer carbonate tablet 800 mg TID WM    sodium chloride 0.9% bolus 250 mL 250 mL PRN       Objective:     Vital Signs (Most Recent):  Temp: 98.4 °F (36.9 °C) (04/14/25 0838)  Pulse: 92 (04/14/25 0838)  Resp: 18 (04/14/25 0838)  BP: 128/82 (04/14/25 0838)  SpO2: 95 % (04/14/25 0838) Vital Signs (24h Range):  Temp:  [98.3 °F (36.8 °C)-99.2 °F (37.3 °C)] 98.4 °F (36.9 °C)  Pulse:  [91-97] 92  Resp:  [17-20] 18  SpO2:  [92 %-96 %] 95 %  BP: (105-147)/(72-83) 128/82     Weight: 104.3 kg (229 lb 15 oz) (04/10/25 1644)  Body mass index is 29.52 kg/m².  Body surface  area is 2.33 meters squared.    I/O last 3 completed shifts:  In: 720 [P.O.:720]  Out: -      Physical Exam  Vitals and nursing note reviewed.   Constitutional:       Appearance: Normal appearance.   HENT:      Head: Normocephalic.      Mouth/Throat:      Mouth: Mucous membranes are moist.   Eyes:      Extraocular Movements: Extraocular movements intact.      Conjunctiva/sclera: Conjunctivae normal.      Pupils: Pupils are equal, round, and reactive to light.   Cardiovascular:      Rate and Rhythm: Normal rate and regular rhythm.   Pulmonary:      Effort: Pulmonary effort is normal.      Breath sounds: Normal breath sounds.   Abdominal:      Palpations: Abdomen is soft.   Musculoskeletal:      Cervical back: Normal range of motion.   Skin:     General: Skin is warm.   Neurological:      General: No focal deficit present.      Mental Status: He is alert.   Psychiatric:         Mood and Affect: Mood normal.          Significant Labs:  CMP:   Recent Labs   Lab 04/14/25  0919   CALCIUM 9.6   ALBUMIN 2.4*   *   K 5.5*   CO2 26   CL 94*   BUN 71*   CREATININE 16.8*     All labs within the past 24 hours have been reviewed.     Significant Imaging:  Labs: Reviewed

## 2025-04-14 NOTE — CONSULTS
Interventional Radiology   Consult/History & Physical      Date: 4/14/2025   Primary team: Networked reference to record Emeli DUNLAP Muftah N, MD   Room/bed: W339/W339 A    Inpatient consult to Interventional Radiology  Consult performed by: Lindsay Santos NP  Consult ordered by: Tony Sainz MD           Reason for Consult:   THDC no longer needed, request for removal.     History of Present Illness:  mEmanuel Morgan is a 39 y.o. male with a history of ESRD on HD. Pt previously rec'd HD via L IJ 14.5 Fr 28 cm THDC, placed 4/8/24 by IR at [facility].   Patient has since switched to getting HD via right forearm AVG. Pt states HD has been well tolerated via AVF and that within the last week he has been told by nursing staff that they believe the THDC is clotted since it won't aspirate or inject.     Interventional Radiology has been consulted for removal of L IJ THDC.     Past Medical History:  Past Medical History:   Diagnosis Date    Diabetes mellitus     Hypertension     Renal disorder        Past Surgical History:  Past Surgical History:   Procedure Laterality Date    FOOT AMPUTATION Right     IRRIGATION AND DEBRIDEMENT Bilateral 4/1/2025    Procedure: IRRIGATION AND DEBRIDEMENT;  Surgeon: Bety Faria DPM;  Location: [facility] OR;  Service: Podiatry;  Laterality: Bilateral;  need jamshidi needle available    IRRIGATION AND DEBRIDEMENT Bilateral 4/8/2025    Procedure: IRRIGATION AND DEBRIDEMENT;  Surgeon: Bety Faria DPM;  Location: [facility] OR;  Service: Podiatry;  Laterality: Bilateral;        Sedation History:    No known adverse reactions.     Social History:  Social History[1]     Home Medications:   Prior to Admission medications    Medication Sig Start Date End Date Taking? Authorizing Provider   benzonatate (TESSALON) 100 MG capsule Take 1 capsule (100 mg total) by mouth every 8 (eight) hours. for 5 days 4/14/25 4/19/25  Tony Sainz MD   carvediloL (COREG) 6.25 MG tablet Take  1 tablet (6.25 mg total) by mouth 2 (two) times daily. 4/14/25 4/14/26  Tony Sainz MD   ceFEPIme (MAXIPIME) 2 gram injection Cefepime 2g IV every Monday, Wednesday and Friday after dialysis. 4/7/25 5/13/25  Eliecer Posey MD   dextromethorphan-guaiFENesin  mg/5 ml (ROBITUSSIN-DM)  mg/5 mL liquid Take 5 mLs by mouth every 6 (six) hours as needed. 4/14/25 4/19/25  Tony Sainz MD   fluticasone propionate (FLONASE) 50 mcg/actuation nasal spray 2 sprays (100 mcg total) by Each Nostril route once daily. 4/15/25 5/15/25  Tony Sainz MD   metroNIDAZOLE (FLAGYL) 500 MG tablet Take 1 tablet (500 mg total) by mouth every 12 (twelve) hours. 4/7/25 5/13/25  Eliecer Posey MD   NIFEdipine (PROCARDIA-XL) 90 MG (OSM) 24 hr tablet Take 90 mg by mouth once daily. 7/30/24   Provider, Historical   carvediloL (COREG) 25 MG tablet Take 1 tablet by mouth 2 (two) times daily. 4/25/24 4/14/25  Provider, Historical   hydrALAZINE (APRESOLINE) 50 MG tablet Take 1 tablet by mouth 3 (three) times daily. 2/1/24 4/14/25  Provider, Historical   lisinopriL (PRINIVIL,ZESTRIL) 40 MG tablet Take 1 tablet by mouth once daily. 12/8/24 4/14/25  Provider, Historical       Inpatient Medications:  Current Medications[2]     Anticoagulants/Antiplatelets:   No anticoagulation    Allergies:   Review of patient's allergies indicates:  No Known Allergies    Review of Systems:   As documented in primary provider H&P.    Vital Signs:  Temp: 98.5 °F (36.9 °C) (04/14/25 1451)  Pulse: 104 (04/14/25 1451)  Resp: 18 (04/14/25 1451)  BP: 125/77 (04/14/25 1451)  SpO2: 100 % (04/14/25 1451)    Temp:  [98.1 °F (36.7 °C)-99.2 °F (37.3 °C)]   Pulse:  []   Resp:  [17-20]   BP: (105-147)/(72-85)   SpO2:  [94 %-100 %]      Physical Exam:  No acute distress, laying comfortably in bed, pleasant and cooperative  Regular rate   Breathing unlabored  Abdomen benign  Extremities warm and well perfused    Sedation Exam:  ASA: III - Patient  appears to have severe systemic disease not posing a constant threat to life  Mallampati score: n/a    Laboratory:  Lab Results   Component Value Date    INR 1.0 09/01/2024       Lab Results   Component Value Date    WBC 15.38 (H) 04/13/2025    HGB 9.4 (L) 04/13/2025    HCT 31.2 (L) 04/13/2025    MCV 95 04/13/2025     04/13/2025      Lab Results   Component Value Date    GLU 83 09/02/2024     (L) 04/14/2025    K 5.5 (H) 04/14/2025    CL 94 (L) 04/14/2025    CO2 26 04/14/2025    BUN 71 (H) 04/14/2025    CREATININE 16.8 (H) 04/14/2025    CALCIUM 9.6 04/14/2025    MG 2.0 04/03/2025    ALT 9 (L) 04/03/2025    AST 12 04/03/2025    ALBUMIN 2.4 (L) 04/14/2025    BILITOT 0.3 04/03/2025       Imaging:   Cxry 4/11/25 Reviewed.      ASSESSMENT/PLAN/RECOMMENDATIONS:   38 yo M with ESRD on HD, previously via L IJ THDC, since switched to R forearm AVF.     IR consulted for removal of THDC    Procedure, risks/benefits discussed with patient who wishes to proceed.     Sedation Plan: local anesthetic only  Patient will undergo: image guided removal of L IJ 14.5 Fr 28 cm THDC      Thank you for this consult. Please contact via Epic secure chat with questions.     Lindsay Santos NP  Interventional Radiology           [1]   Social History  Tobacco Use    Smoking status: Never    Smokeless tobacco: Never   Substance Use Topics    Alcohol use: Never    Drug use: Never   [2]   Current Facility-Administered Medications:     acetaminophen tablet 650 mg, 650 mg, Oral, Q4H PRN, Giana, Bety O., DPM, 650 mg at 04/08/25 2051    aluminum-magnesium hydroxide-simethicone 200-200-20 mg/5 mL suspension 30 mL, 30 mL, Oral, QID PRN, Giana, Bety O., DPM    benzonatate capsule 100 mg, 100 mg, Oral, Q8H, Tony Sainz MD, 100 mg at 04/14/25 0627    carvediloL tablet 6.25 mg, 6.25 mg, Oral, BID, Giana, Bety O., DPM, 6.25 mg at 04/13/25 2133    ceFEPIme injection 1 g, 1 g, Intravenous, Daily, Giana, Bety O., DPM, 1 g at 04/13/25 9707     dextromethorphan-guaiFENesin  mg/5 ml liquid 5 mL, 5 mL, Oral, Q6H PRN, Giana, Bety O., DPM, 5 mL at 04/03/25 1809    dextrose 50% injection 12.5 g, 12.5 g, Intravenous, PRN, Giana, Bety O., DPM    dextrose 50% injection 25 g, 25 g, Intravenous, PRN, Giana, Bety O., DPM    diphenhydrAMINE capsule 25 mg, 25 mg, Oral, Q6H PRN, Giana, Bety O., DPM, 25 mg at 04/09/25 2114    epoetin beto-epbx injection 5,000 Units, 5,000 Units, Intravenous, Every Mon, Wed, Fri, Laurie Root MD    fluticasone propionate 50 mcg/actuation nasal spray 100 mcg, 2 spray, Each Nostril, Daily, Tony Sainz MD, 100 mcg at 04/14/25 0826    glucagon (human recombinant) injection 1 mg, 1 mg, Intramuscular, PRN, Giana, Bety O., DPM    glucose chewable tablet 16 g, 16 g, Oral, PRN, Giana, Bety O., DPM    glucose chewable tablet 24 g, 24 g, Oral, PRN, Giana, Bety O., DPM    guaiFENesin 12 hr tablet 600 mg, 600 mg, Oral, BID, Tony Sainz MD, 600 mg at 04/14/25 0826    heparin (porcine) injection 1,000 Units, 1,000 Units, Intra-Catheter, PRN, Giana, Bety O., DPM, 1,000 Units at 04/09/25 1840    insulin aspart U-100 pen 0-5 Units, 0-5 Units, Subcutaneous, QID (AC + HS) PRN, Giana, Bety O., DPM    melatonin tablet 6 mg, 6 mg, Oral, Nightly PRN, Giana, Bety O., DPM, 6 mg at 04/13/25 2133    metroNIDAZOLE tablet 500 mg, 500 mg, Oral, Q12H, Giana, Bety O., DPM, 500 mg at 04/14/25 0826    morphine injection 4 mg, 4 mg, Intravenous, Q4H PRN, Giana, Bety O., DPM, 4 mg at 04/08/25 2212    naloxone 0.4 mg/mL injection 0.02 mg, 0.02 mg, Intravenous, PRN, Giana, Bety O., DPM    NIFEdipine 24 hr tablet 90 mg, 90 mg, Oral, Daily, Giana, Bety O., DPM, 90 mg at 04/12/25 0824    ondansetron injection 4 mg, 4 mg, Intravenous, Q6H PRN, Giana, Bety O., DPM, 4 mg at 04/11/25 2109    oxyCODONE-acetaminophen  mg per tablet 1 tablet, 1 tablet, Oral, Q4H PRN, Giana, Bety O., DPM, 1 tablet at 04/13/25 2133    senna-docusate 8.6-50 mg per tablet 1  tablet, 1 tablet, Oral, BID PRN, Giana, Bety O., DPM    sevelamer carbonate tablet 800 mg, 800 mg, Oral, TID WM, Giana, Bety O., DPM, 800 mg at 04/14/25 0847    sodium chloride 0.9% bolus 250 mL 250 mL, 250 mL, Intravenous, PRN, Giana, Bety O., DPM, New Bag at 04/08/25 4884

## 2025-04-14 NOTE — PLAN OF CARE
CM sent home health referrals via [employer].    Patient has declined to select a preferred provider and elects placement with the first accepting in network provider that is available to provide services as ordered by the physician.     Informed patient and family that placement is based on medical acceptance, insurance authorization and  staff availability.       04/14/25 2893   Post-Acute Status   Post-Acute Authorization Home Health   Home Health Status Pending post-acute provider review/more information requested   Coverage Medicare AB   Patient choice form signed by patient/caregiver List from System Post-Acute Care   Discharge Delays (!) Post-Acute Set-up   Discharge Plan   Discharge Plan A Home   Discharge Plan B Home Health

## 2025-04-14 NOTE — NURSING
[facility], [facility]  Nurses Note -- 4 Eyes      4/13/2025       Skin assessed on: Q Shift      [x] No Pressure Injuries Present    [x]Prevention Measures Documented    [] Yes LDA  for Pressure Injury Previously documented     [] Yes New Pressure Injury Discovered   [] LDA for New Pressure Injury Added      Attending RN:  Nirali Barrientos LPN     Second RN:  Gabbie RobbRN

## 2025-04-14 NOTE — PROCEDURES
Radiology Post-Procedure Note    Pre Op Diagnosis: THDC no longer needed  Post Op Diagnosis: Same    Procedure: removal of L IJ 14.5 Fr 28 cm dual lumen THDC    Procedure performed by: Lindsay Santos NP    Written Informed Consent Obtained: Yes  Specimen Removed: YES 4.5 Fr 28 cm dual lumen THDC  Estimated Blood Loss: Minimal    Findings:   Successful removal of L IJ 14.5 Fr 28 cm dual lumen THDC using traction and dissection     Patient tolerated procedure well.    Lindsay Santos NP  Interventional Radiology

## 2025-04-14 NOTE — PT/OT/SLP PROGRESS
Rehab Services Wound Care Progress Note     Emmanuel Morgan  [id number]  4/14/2025 [id number] (25 min - 1 wound care)     Diagnosis: Pt admitted with R plantar midfoot wounds s/p I+D in OR on 4/1/25, R foot bedside debridement on 4/4/25, and R foot I+D again in OR on 4/8/25 by Podiatry.     Precautions: Standard, Diabetes, and Weightbearing (R heel WB with Cam walker boot)           Allergies as of 03/31/2025    (No Known Allergies)         Pre-medication: No pain medication taken by patient prior to treatment     Subjective:  Pt reported no R foot pain before, during, and after pulsavac wound care.     Objective:   Pt received pulsavac wound care to R plantar foot wound with ~500 mL NS.  R foot medial incision healing with no purulent drainage noted.  R plantar foot wound and periwound cleansed with Vashe moistened 4x4 gauze.  R foot patted dry.  Periwound applied with Cavilon no sting film barrier.  R plantar foot wound covered with Vashe moistened 4x4 gauze, then covered with dry 4x4 gauze and abdominal pad.  R foot wrapped with cast padding, kerlix, and ace wrap, than secured with tape.  Shoe cover applied to R foot.  Clean technique maintained throughout treatment.       Assessment:  Podiatry examined R foot wound earlier today, dressings removed with scant bloody drainage.  No purulent drainage noted around R foot medial linear incision.  R foot plantar wound with increased red tissues and scant yellow slough noted to superior wound, no maceration or malodor.  Periwound with callus and dry flaky skin.  Patient tolerated today's treatment without any adverse effects.  Goals remain appropriate.     Photodocumentation: R plantar foot wound        R foot medial/plantar view         R foot plantar/latera view       Plan:  The following goals were discussed with the patient and he agrees.  Frequency of treatment at this time will be daily.

## 2025-04-14 NOTE — DISCHARGE INSTRUCTIONS
Our goal at [facility] is to always give you outstanding care and exceptional service. You may receive a survey from [employer] by mail, text or e-mail in the next 24-48 hours asking about the care you received with us. The survey should only take 5-10 minutes to complete and is very important to us.     Your feedback provides us with a way to recognize our staff who work tirelessly to provide the best care! Also, your responses help us learn how to improve when your experience was below our aspiration of excellence. We are always looking for ways to improve your stay. We WILL use your feedback to continue making improvements to help us provide the highest quality care. We keep your personal information and feedback confidential. We appreciate your time completing this survey and can't wait to hear from you!!!    We look forward to your continued care with us! Thanks so much for choosing [facility] for your healthcare needs!

## 2025-04-14 NOTE — PLAN OF CARE
"Changes in medical condition or discharge plan: Per MD, discharge cancelled     Does patient need new DME? Wheel chair    Follow up appts needed: podiatry, PCP, Infectious Disease    Medically stable: Yes     Estimated Discharge Date:  4/14/25    Pt concerned about discharging home because he doesn't have assistance and he was declining wheel chair. CM reiterated the importance of the wheel chair because he is non weight bearing. Pt stated " Ok, I will get the wheel chair".  MD notified and cancelled discharge.     04/14/25 4212   Discharge Reassessment   Assessment Type Discharge Planning Assessment   Did the patient's condition or plan change since previous assessment? Yes   Discharge Plan discussed with: Patient   Communicated LULÚ with patient/caregiver Yes   Discharge Plan A Home   Discharge Plan B Home Health   DME Needed Upon Discharge  wheelchair   Transition of Care Barriers Mobility   Why the patient remains in the hospital Requires continued medical care   Post-Acute Status   Coverage Medicare AB   Hospital Resources/Appts/Education Provided Appointments scheduled and added to AVS   Patient choice form signed by patient/caregiver List from System Post-Acute Care   Discharge Delays (!) Patient and Family Barriers       "

## 2025-04-14 NOTE — PLAN OF CARE
Problem: Fall Injury Risk  Goal: Absence of Fall and Fall-Related Injury  Outcome: Progressing  Intervention: Promote Injury-Free Environment  Flowsheets (Taken 4/14/2025 1800)  Safety Promotion/Fall Prevention:   assistive device/personal item within reach   commode/urinal/bedpan at bedside   bed alarm set   Fall Risk reviewed with patient/family   instructed to call staff for mobility   room near unit station   side rails raised x 2

## 2025-04-14 NOTE — PROGRESS NOTES
[facility] Medicine  Progress Note    Patient Name: Emmanuel Morgan  MRN: [id number]  Patient Class: IP- Inpatient   Admission Date: 3/31/2025  Length of Stay: 14 days  Attending Physician: Tony Sainz MD  Primary Care Provider: Bebeto Hill PA-C        Subjective     Principal Problem:Infected ulcer of skin        HPI:  39-year-old male with ESRD on HD, diabetes not on insulin, prior osteomyelitis requiring right midfoot amputation with a long standing left foot ulcer (months) and a right foot ulcer of his stump which has been present for weeks who was sent to the ER by home health due to progression of wounds with skin and soft tissue infections of possibly both ulcers and possible osteomyelitis, he has been followed up by outpatient wound care agency, and was sent here by home health nurse due to nonhealing of the wound, noted with worsening erythema and purulent discharge.  Missed hemodialysis session on the day of presentation due to presentation in the emergency room.    Overview/Hospital Course:  39-year-old male with ESRD on HD, DM, prior osteomyelitis requiring right midfoot amputation with a long standing left foot ulcer (months) and a right foot ulcer of his stump which has been present for weeks who was sent to the ER by home health due to progression of wounds with skin and soft tissue infections of possibly both ulcers and possible osteomyelitis of either.  MRI of left foot showing plantar soft tissue ulcer at the level of the 3rd MTP joint with associated osteomyelitis of the distal half of the metatarsal and base of the proximal phalanx.  On ABX's and Podiatry consulted. Patient underwent irrigation and debridement of bilateral LE's.  Currently  is coordinating antibiotics with HD unit.  Still having some purulent discharge and needed repeat I&D in the OR on 4/8 with some improvement in wound.  Pulsavac was ordered for 4/11 and 4/12.  With noted improvement  in wound.  Patient to be discharged on IV antibiotics and outpatient wound care      Interval History:  No acute event overnight.  TDC removed, discharge plans deferred today on account of DME logistics.     Review of Systems  Objective:     Vital Signs (Most Recent):  Temp: 99.4 °F (37.4 °C) (04/14/25 1653)  Pulse: 106 (04/14/25 1653)  Resp: 18 (04/14/25 1653)  BP: (!) 141/89 (04/14/25 1653)  SpO2: 98 % (04/14/25 1653) Vital Signs (24h Range):  Temp:  [98.1 °F (36.7 °C)-99.4 °F (37.4 °C)] 99.4 °F (37.4 °C)  Pulse:  [] 106  Resp:  [17-20] 18  SpO2:  [89 %-100 %] 98 %  BP: (114-147)/(72-89) 141/89     Weight: 104.3 kg (229 lb 15 oz)  Body mass index is 29.52 kg/m².    Intake/Output Summary (Last 24 hours) at 4/14/2025 1705  Last data filed at 4/14/2025 1534  Gross per 24 hour   Intake 980 ml   Output 2120 ml   Net -1140 ml         Physical Exam  Constitutional:       General: He is not in acute distress.     Appearance: He is not ill-appearing, toxic-appearing or diaphoretic.   Cardiovascular:      Rate and Rhythm: Normal rate and regular rhythm.      Pulses: Normal pulses.      Heart sounds: Normal heart sounds. No murmur heard.  Pulmonary:      Effort: Pulmonary effort is normal.      Breath sounds: Normal breath sounds.   Abdominal:      General: Bowel sounds are normal. There is no distension.      Palpations: Abdomen is soft. There is no mass.   Musculoskeletal:      Comments: Surgical dressing bilateral lower extremities               Significant Labs: All pertinent labs within the past 24 hours have been reviewed.    Significant Imaging: I have reviewed all pertinent imaging results/findings within the past 24 hours.      Assessment & Plan  Infected ulcer of skin  Patient presents with ulcers to left foot and right foot stump.  MRI ordered.  No evidence of osteo on right foot stump.  Left foot MRI showing plantar soft tissue ulcer at the level of the 3rd MTP joint with associated osteomyelitis of the  distal half of the metatarsal and base of the proximal phalanx.   Started on ABX's  Wound cultures positive for Proteus mirabilis  S/p Underwent bilateral irrigation and debridement by Podiatry  ID following; input appreciated.  Recommending IV Cefepime with HD and oral Flagyl until 5/13.  Will need outpatient ABX's with HD arranged.  Discussed with Podiatry.  Still having some purulent drainage; repeat I and D on 04/08 in the OR        End stage renal disease  Creatine stable for now. BMP reviewed- noted Estimated Creatinine Clearance: 7.5 mL/min (A) (based on SCr of 16.8 mg/dL (H)). according to latest data. Based on current GFR, CKD stage is end stage.  Monitor UOP and serial BMP and adjust therapy as needed. Renally dose meds. Avoid nephrotoxic medications and procedures.  Nephrology consulted for HD.  Continue Monday, Wednesday, and Friday hemodialysis schedule.    Essential hypertension  Patient's blood pressure range in the last 24 hours was: BP  Min: 114/78  Max: 147/83.The patient's inpatient anti-hypertensive regimen is listed below:  Current Antihypertensives  NIFEdipine 24 hr tablet 90 mg, Daily, Oral  carvediloL tablet 6.25 mg, 2 times daily, Oral  carvedilol (COREG) tablet, 2 times daily, Oral    Plan  - BP is controlled, no changes needed to their regimen  - lisinopril held due to hyperkalemia.  Will restart when clinically indicated  Hyperkalemia  Hyperkalemia is likely due to ESRD.The patients most recent potassium results are listed below.  Recent Labs     04/12/25  0453 04/13/25  0357 04/14/25  0919   K 5.0 5.6* 5.5*   He got some doses of Lokelma;will order another dose today  Nephrology consulted for HD.  Continue monitoring          Type 2 diabetes mellitus with foot ulcer  Patient's FSGs are controlled on current medication regimen.  Last A1c reviewed-   Lab Results   Component Value Date    HGBA1C 4.9 08/20/2024     Most recent fingerstick glucose reviewed-   Recent Labs   Lab 04/13/25 2049  04/14/25  0835 04/14/25  1651   POCTGLUCOSE 95 93 113*     Current correctional scale  Low  Maintain anti-hyperglycemic dose as follows-   Antihyperglycemics (From admission, onward)      Start     Stop Route Frequency Ordered    03/31/25 2317  insulin aspart U-100 pen 0-5 Units         -- SubQ Before meals & nightly PRN 03/31/25 2218          Hold Oral hypoglycemics while patient is in the hospital.  Foot ulcer with fat layer exposed, unspecified laterality  Podiatry consulted, appreciated recs  Continue current antibiotics, ID consulted.  Continue wound care per podiatrist  Foot infection  See note above    Pyogenic inflammation of bone  Continue antibiotics as described above.    Leukocytosis  Persistent leukocytosis noted over the past 2 days  In his unknown at this time whether this is reactive or as a result of some occult infectious process  Repeat blood culture ordered    Chronic cough? Post-nasal drip vs reactive airway disease  Patient complaining of persistent cough predating hospital admission  Chest x-ray nonfocal; infectious workup negative  Trial of Flonase for possible nasal drip  Outpatient evaluation pulmonology if symptoms remain persistent    VTE Risk Mitigation (From admission, onward)           Ordered     heparin (porcine) injection 1,000 Units  As needed (PRN)         04/01/25 1827     IP VTE LOW RISK PATIENT  Once         03/31/25 2218     Place sequential compression device  Until discontinued         03/31/25 2218                    Discharge Planning   LULÚ: 4/14/2025     Code Status: Full Code   Medical Readiness for Discharge Date: 4/14/2025  Discharge Plan A: Home   Discharge Delays: None known at this time                    Tony Sainz MD  Department of Hospital Medicine   [facility]

## 2025-04-14 NOTE — SUBJECTIVE & OBJECTIVE
Interval History:  No acute event overnight.  TDC removed, discharge plans deferred today on account of [facility] logistics.     Review of Systems  Objective:     Vital Signs (Most Recent):  Temp: 99.4 °F (37.4 °C) (04/14/25 1653)  Pulse: 106 (04/14/25 1653)  Resp: 18 (04/14/25 1653)  BP: (!) 141/89 (04/14/25 1653)  SpO2: 98 % (04/14/25 1653) Vital Signs (24h Range):  Temp:  [98.1 °F (36.7 °C)-99.4 °F (37.4 °C)] 99.4 °F (37.4 °C)  Pulse:  [] 106  Resp:  [17-20] 18  SpO2:  [89 %-100 %] 98 %  BP: (114-147)/(72-89) 141/89     Weight: 104.3 kg (229 lb 15 oz)  Body mass index is 29.52 kg/m².    Intake/Output Summary (Last 24 hours) at 4/14/2025 1705  Last data filed at 4/14/2025 1534  Gross per 24 hour   Intake 980 ml   Output 2120 ml   Net -1140 ml         Physical Exam  Constitutional:       General: He is not in acute distress.     Appearance: He is not ill-appearing, toxic-appearing or diaphoretic.   Cardiovascular:      Rate and Rhythm: Normal rate and regular rhythm.      Pulses: Normal pulses.      Heart sounds: Normal heart sounds. No murmur heard.  Pulmonary:      Effort: Pulmonary effort is normal.      Breath sounds: Normal breath sounds.   Abdominal:      General: Bowel sounds are normal. There is no distension.      Palpations: Abdomen is soft. There is no mass.   Musculoskeletal:      Comments: Surgical dressing bilateral lower extremities               Significant Labs: All pertinent labs within the past 24 hours have been reviewed.    Significant Imaging: I have reviewed all pertinent imaging results/findings within the past 24 hours.

## 2025-04-14 NOTE — PLAN OF CARE
[facility] - Telemetry      HOME HEALTH ORDERS  FACE TO FACE ENCOUNTER    Patient Name: Emmanuel Morgan  YOB: 1985    PCP: No, Primary Doctor   PCP Address: None  PCP Phone Number: None  PCP Fax: None    Encounter Date: 3/31/25    Admit to Home Health    Diagnoses:  Active Hospital Problems    Diagnosis  POA    *Infected ulcer of skin [L98.499, L08.9]  Yes    Chronic cough? Post-nasal drip vs reactive airway disease [R05.3]  Unknown    Leukocytosis [D72.829]  Yes    Pyogenic inflammation of bone [M86.9]  Yes    Foot infection [L08.9]  Yes    Foot ulcer with fat layer exposed, unspecified laterality [L97.502]  Yes    Type 2 diabetes mellitus with foot ulcer [E11.621, L97.509]  Yes    End stage renal disease [N18.6]  Yes    Hyperkalemia [E87.5]  Yes     Chronic    Essential hypertension [I10]  Yes      Resolved Hospital Problems   No resolved problems to display.       Follow Up Appointments:  Future Appointments   Date Time Provider Department Center   4/17/2025 10:00 AM Bety Faria DPM [facility] POD [place]   5/14/2025  9:00 AM INFECTIOUS DISEASE, [facility] INFECT[facility]       Allergies:Review of patient's allergies indicates:  No Known Allergies    Medications: Review discharge medications with patient and family and provide education.    Current Medications[1]     Medication List        START taking these medications      benzonatate 100 MG capsule  Commonly known as: TESSALON  Take 1 capsule (100 mg total) by mouth every 8 (eight) hours. for 5 days     ceFEPIme 2 gram injection  Commonly known as: MAXIPIME  Cefepime 2g IV every Monday, Wednesday and Friday after dialysis.     dextromethorphan-guaiFENesin  mg/5 ml  mg/5 mL liquid  Commonly known as: ROBITUSSIN-DM  Take 5 mLs by mouth every 6 (six) hours as needed.     fluticasone propionate 50 mcg/actuation nasal spray  Commonly known as: FLONASE  2 sprays (100 mcg total) by Each Nostril route once daily.  Start taking on: April  15, 2025     metroNIDAZOLE 500 MG tablet  Commonly known as: FLAGYL  Take 1 tablet (500 mg total) by mouth every 12 (twelve) hours.            CHANGE how you take these medications      carvediloL 6.25 MG tablet  Commonly known as: COREG  Take 1 tablet (6.25 mg total) by mouth 2 (two) times daily.  What changed:   medication strength  how much to take            CONTINUE taking these medications      NIFEdipine 90 MG (OSM) 24 hr tablet  Commonly known as: PROCARDIA-XL  Take 90 mg by mouth once daily.            STOP taking these medications      hydrALAZINE 50 MG tablet  Commonly known as: APRESOLINE     lisinopriL 40 MG tablet  Commonly known as: PRINIVIL,ZESTRIL                I have seen and examined this patient within the last 30 days. My clinical findings that support the need for the home health skilled services and home bound status are the following:no   Weakness/numbness causing balance and gait disturbance due to Weakness/Debility and Surgery making it taxing to leave home.  Medical restrictions requiring assistance of another human to leave home due to  IV infusion Needs and Wound care needs.     Diet:   cardiac diet, diabetic diet 2000 calorie, and renal diet    Labs:  CMP, CRP and CBC   Q( Weekly from 4/18) Result to PCP and Infectious disease specialist    Referrals/ Consults  Physical Therapy to evaluate and treat. Evaluate for home safety and equipment needs; Establish/upgrade home exercise program. Perform / instruct on therapeutic exercises, gait training, transfer training, and Range of Motion.  Occupational Therapy to evaluate and treat. Evaluate home environment for safety and equipment needs. Perform/Instruct on transfers, ADL training, ROM, and therapeutic exercises.    Activities:   activity as tolerated    Nursing:   Agency to admit patient within 24 hours of hospital discharge unless specified on physician order or at patient request    SN to complete comprehensive assessment including  routine vital signs. Instruct on disease process and s/s of complications to report to MD. Review/verify medication list sent home with the patient at time of discharge  and instruct patient/caregiver as needed. Frequency may be adjusted depending on start of care date.     Skilled nurse to perform up to 3 visits PRN for symptoms related to diagnosis    Notify MD if SBP > 160 or < 90; DBP > 90 or < 50; HR > 120 or < 50; Temp > 101; O2 < 88%; Other:       Ok to schedule additional visits based on staff availability and patient request on consecutive days within the home health episode.    When multiple disciplines ordered:    Start of Care occurs on Sunday - Wednesday schedule remaining discipline evaluations as ordered on separate consecutive days following the start of care.    Thursday SOC -schedule subsequent evaluations Friday and Monday the following week.     Friday - Saturday SOC - schedule subsequent discipline evaluations on consecutive days starting Monday of the following week.    For all post-discharge communication and subsequent orders please contact patient's primary care physician. If unable to reach primary care physician or do not receive response within 30 minutes, please contact  for clinical staff order clarification    Miscellaneous   IV Cefepime 1g MWF to be dosed with dialysis. Last dose given:4/14/2025; Next Dose 4/16/2025  End Date: 5/13    Home Health Aide:  Physical Therapy Three times weekly and Occupational Therapy Three times weekly    Wound Care Orders  yes:  Per wound care clinic recommendation    I certify that this patient is confined to his home and needs physical therapy and occupational therapy.               [1]   Current Facility-Administered Medications   Medication Dose Route Frequency Provider Last Rate Last Admin    acetaminophen tablet 650 mg  650 mg Oral Q4H PRN Bety Faria DPM   650 mg at 04/08/25 2051    aluminum-magnesium hydroxide-simethicone 200-200-20 mg/5 mL  suspension 30 mL  30 mL Oral QID PRN Giana, Bety O., DPM        benzonatate capsule 100 mg  100 mg Oral Q8H Tony Sainz MD   100 mg at 04/14/25 0627    carvediloL tablet 6.25 mg  6.25 mg Oral BID Giana, Bety O., DPM   6.25 mg at 04/13/25 2133    ceFEPIme injection 1 g  1 g Intravenous Daily Giana, Bety O., DPM   1 g at 04/13/25 1659    dextromethorphan-guaiFENesin  mg/5 ml liquid 5 mL  5 mL Oral Q6H PRN Giana, Bety O., DPM   5 mL at 04/03/25 1809    dextrose 50% injection 12.5 g  12.5 g Intravenous PRN Giana, Bety O., DPM        dextrose 50% injection 25 g  25 g Intravenous PRN Giana, Bety O., DPM        diphenhydrAMINE capsule 25 mg  25 mg Oral Q6H PRN Giana, Bety O., DPM   25 mg at 04/09/25 2114    epoetin beto-epbx injection 5,000 Units  5,000 Units Intravenous Every Mon, Wed, Fri Laurie Root MD        fluticasone propionate 50 mcg/actuation nasal spray 100 mcg  2 spray Each Nostril Daily Tony Sainz MD   100 mcg at 04/14/25 0826    glucagon (human recombinant) injection 1 mg  1 mg Intramuscular PRN Giana, Bety O., DPM        glucose chewable tablet 16 g  16 g Oral PRN Giana, Bety O., DPM        glucose chewable tablet 24 g  24 g Oral PRN Giana, Bety O., DPM        guaiFENesin 12 hr tablet 600 mg  600 mg Oral BID Tony Sainz MD   600 mg at 04/14/25 0826    heparin (porcine) injection 1,000 Units  1,000 Units Intra-Catheter PRN Giana, Bety O., DPM   1,000 Units at 04/09/25 1840    insulin aspart U-100 pen 0-5 Units  0-5 Units Subcutaneous QID (AC + HS) PRN Giana, Bety O., DPM        melatonin tablet 6 mg  6 mg Oral Nightly PRN Giana, Bety O., DPM   6 mg at 04/13/25 2133    metroNIDAZOLE tablet 500 mg  500 mg Oral Q12H Giana, Bety O., DPM   500 mg at 04/14/25 0826    morphine injection 4 mg  4 mg Intravenous Q4H PRN Giana, Bety O., DPM   4 mg at 04/08/25 2212    naloxone 0.4 mg/mL injection 0.02 mg  0.02 mg Intravenous PRN Giana, Bety O., DPM        NIFEdipine 24 hr tablet 90 mg  90 mg  Oral Daily Giana, Bety O., DPM   90 mg at 04/12/25 0824    ondansetron injection 4 mg  4 mg Intravenous Q6H PRN Giana, Bety O., DPM   4 mg at 04/11/25 2109    oxyCODONE-acetaminophen  mg per tablet 1 tablet  1 tablet Oral Q4H PRN Giana, Bety O., DPM   1 tablet at 04/13/25 2133    senna-docusate 8.6-50 mg per tablet 1 tablet  1 tablet Oral BID PRN Giana, Bety O., DPM        sevelamer carbonate tablet 800 mg  800 mg Oral TID WM Giana, Bety O., DPM   800 mg at 04/14/25 0826    sodium chloride 0.9% bolus 250 mL 250 mL  250 mL Intravenous PRN Giana, Bety O., DPM   New Bag at 04/08/25 8334

## 2025-04-15 VITALS
WEIGHT: 229.94 LBS | OXYGEN SATURATION: 95 % | SYSTOLIC BLOOD PRESSURE: 133 MMHG | BODY MASS INDEX: 29.51 KG/M2 | TEMPERATURE: 98 F | RESPIRATION RATE: 18 BRPM | HEART RATE: 90 BPM | HEIGHT: 74 IN | DIASTOLIC BLOOD PRESSURE: 78 MMHG

## 2025-04-15 LAB
FUNGUS SPEC CULT: NORMAL
FUNGUS SPEC CULT: NORMAL
POCT GLUCOSE: 93 MG/DL (ref 70–110)
POCT GLUCOSE: 99 MG/DL (ref 70–110)

## 2025-04-15 PROCEDURE — 25000003 PHARM REV CODE 250: Performed by: PODIATRIST

## 2025-04-15 PROCEDURE — 25000003 PHARM REV CODE 250

## 2025-04-15 RX ADMIN — GUAIFENESIN 600 MG: 600 TABLET, EXTENDED RELEASE ORAL at 08:04

## 2025-04-15 RX ADMIN — BENZONATATE 100 MG: 100 CAPSULE ORAL at 01:04

## 2025-04-15 RX ADMIN — METRONIDAZOLE 500 MG: 500 TABLET ORAL at 08:04

## 2025-04-15 RX ADMIN — BENZONATATE 100 MG: 100 CAPSULE ORAL at 05:04

## 2025-04-15 RX ADMIN — FLUTICASONE PROPIONATE 100 MCG: 50 SPRAY, METERED NASAL at 08:04

## 2025-04-15 RX ADMIN — CARVEDILOL 6.25 MG: 6.25 TABLET, FILM COATED ORAL at 08:04

## 2025-04-15 RX ADMIN — SEVELAMER CARBONATE 800 MG: 800 TABLET, FILM COATED ORAL at 12:04

## 2025-04-15 RX ADMIN — SEVELAMER CARBONATE 800 MG: 800 TABLET, FILM COATED ORAL at 08:04

## 2025-04-15 NOTE — ASSESSMENT & PLAN NOTE
Creatine stable for now. BMP reviewed- noted Estimated Creatinine Clearance: 7.5 mL/min (A) (based on SCr of 16.8 mg/dL (H)). according to latest data. Based on current GFR, CKD stage is end stage.  Monitor UOP and serial BMP and adjust therapy as needed. Renally dose meds. Avoid nephrotoxic medications and procedures.  Nephrology consulted for HD.  Continue Monday, Wednesday, and Friday hemodialysis schedule.

## 2025-04-15 NOTE — ASSESSMENT & PLAN NOTE
Patient's blood pressure range in the last 24 hours was: BP  Min: 113/65  Max: 141/89.The patient's inpatient anti-hypertensive regimen is listed below:  Current Antihypertensives  NIFEdipine 24 hr tablet 90 mg, Daily, Oral  carvediloL tablet 6.25 mg, 2 times daily, Oral  carvedilol (COREG) tablet, 2 times daily, Oral    Plan  - BP is controlled, no changes needed to their regimen  - lisinopril held due to hyperkalemia.  Will restart when clinically indicated

## 2025-04-15 NOTE — PLAN OF CARE
Problem: Adult Inpatient Plan of Care  Goal: Plan of Care Review  Outcome: Met  Goal: Patient-Specific Goal (Individualized)  Outcome: Met  Goal: Absence of Hospital-Acquired Illness or Injury  Outcome: Met  Goal: Optimal Comfort and Wellbeing  Outcome: Met  Goal: Readiness for Transition of Care  Outcome: Met     Problem: Hemodialysis  Goal: Safe, Effective Therapy Delivery  Outcome: Met  Goal: Effective Tissue Perfusion  Outcome: Met  Goal: Absence of Infection Signs and Symptoms  Outcome: Met     Problem: Wound  Goal: Optimal Coping  Outcome: Met  Goal: Optimal Functional Ability  Outcome: Met  Goal: Absence of Infection Signs and Symptoms  Outcome: Met  Goal: Improved Oral Intake  Outcome: Met  Goal: Skin Health and Integrity  Outcome: Met  Goal: Optimal Wound Healing  Outcome: Met     Problem: Infection  Goal: Absence of Infection Signs and Symptoms  Outcome: Met     Problem: Skin Injury Risk Increased  Goal: Skin Health and Integrity  Outcome: Met     Problem: Pain Acute  Goal: Optimal Pain Control and Function  Outcome: Met     Problem: Fall Injury Risk  Goal: Absence of Fall and Fall-Related Injury  Outcome: Met

## 2025-04-15 NOTE — NURSING
Date of Surgery Update:  Sharmila Hong was seen and examined. History and physical has been reviewed. The patient has been examined.  There have been no significant clinical changes since the completion of the originally dated History and Physical.    Signed By: Sheryle Mirza, MD     June 25, 2019 7:23 AM [facility] [place]  Nurses Note -- 4 Eyes      4/14/2025       Skin assessed on: Q Shift      [x] No Pressure Injuries Present    [x]Prevention Measures Documented    [] Yes LDA  for Pressure Injury Previously documented     [] Yes New Pressure Injury Discovered   [] LDA for New Pressure Injury Added      Attending RN:  Kay Holbrook LPN     Second RN:  Umm CUNHA

## 2025-04-15 NOTE — NURSING
AVS provided to the patient. PIV removed. WC delivered to the bedside. All belongings in patient's possession. VN nurse notifed that patient is ready for teaching.

## 2025-04-15 NOTE — PLAN OF CARE
Problem: Adult Inpatient Plan of Care  Goal: Plan of Care Review  Outcome: Progressing  Goal: Patient-Specific Goal (Individualized)  Outcome: Progressing  Goal: Absence of Hospital-Acquired Illness or Injury  Outcome: Progressing  Goal: Optimal Comfort and Wellbeing  Outcome: Progressing  Goal: Readiness for Transition of Care  Outcome: Progressing     Problem: Hemodialysis  Goal: Safe, Effective Therapy Delivery  Outcome: Progressing  Goal: Effective Tissue Perfusion  Outcome: Progressing  Goal: Absence of Infection Signs and Symptoms  Outcome: Progressing     Problem: Wound  Goal: Optimal Coping  Outcome: Progressing  Goal: Optimal Functional Ability  Outcome: Progressing  Goal: Absence of Infection Signs and Symptoms  Outcome: Progressing  Goal: Improved Oral Intake  Outcome: Progressing  Goal: Skin Health and Integrity  Outcome: Progressing  Goal: Optimal Wound Healing  Outcome: Progressing     Problem: Infection  Goal: Absence of Infection Signs and Symptoms  Outcome: Progressing     Problem: Skin Injury Risk Increased  Goal: Skin Health and Integrity  Outcome: Progressing     Problem: Pain Acute  Goal: Optimal Pain Control and Function  Outcome: Progressing     Problem: Fall Injury Risk  Goal: Absence of Fall and Fall-Related Injury  Outcome: Progressing

## 2025-04-15 NOTE — CLINICAL REVIEW
Sepsis Screen (most recent)       Sepsis Screen (IP) - 04/15/25 0927       Is the patient's history or complaint suggestive of a possible infection? Yes  -    Are there at least two of the following signs and symptoms present? Yes  -    Sepsis signs/symptoms - Tachycardia Tachycardia     >90  -    Sepsis signs/symptoms - WBC WBC < 4,000 or WBC > 12,000  -    Are any of the following organ dysfunction criteria present and not considered to be due to a chronic condition? Yes   ESRD -    Organ Dysfunction Criteria - O2 O2 Saturation < 95% on room air  -    Initiate Sepsis Protocol No  -    Reason sepsis not considered Pt. receiving appropriate management  -              User Key  (r) = Recorded By, (t) = Taken By, (c) = Cosigned By      Initials Name    Grace Ruiz RN

## 2025-04-15 NOTE — PT/OT/SLP PROGRESS
Physical Therapy      Patient Name:  Emmanuel Morgan   MRN:  [id number]    [id number] (4 min) Patient not seen today for pulsavac wound care to R foot secondary to (Pt being D/C'ed home. Pt is dressed and ride is here.). Pt sitting on EOB, discharge information already received and w/c delivered to room. Pt asking for assistance with donning of RLE cam walker boot. PT assisted pt with donning of RLE cam walker boot. Pt re-educated on B heel WB with RLE in cam walker boot and LLE in post-op shoe. Pt verbalized good understanding.

## 2025-04-15 NOTE — PROGRESS NOTES
[facility] - Podiatry  Progress Note    Patient Name: Emmanuel Morgan  MRN: [id number]  Admission Date: 3/31/2025  Hospital Length of Stay: 15 days  Attending Physician: Tony Sainz MD  Primary Care Provider: Bebeto Hill PA-C     Subjective:     History of Present Illness: Emmanuel Morgan is a 39 y.o. male with  has a past medical history of Diabetes mellitus, Hypertension, and Renal disorder.  Consult to Podiatry for evaluation and treatment of bilateral foot wounds, most significant to the right Lisfranc stump.  He relates that the left foot wound is chronic in nature and the ulceration to the right foot occurred several weeks ago likely secondary to trauma versus ambulation is inappropriate shoe.  All foot surgeries surgeries done in outside facilities.  He presented to the emergency department on the advice of his home health nurse due to progression of wounds and suspicion of osteomyelitis.      4/2/25: S/p one day I&D B/L per Dr. Faria. Bandage intact no strikethrough noted.    04/04/2025 patient seen at bedside resting comfortably.  By the end of our encounter, his nephew was also present at bedside.  Patient relates that he has occasional discomfort to the right foot especially in the medial midfoot with palpation or any pressure.  He denies any pain to the left foot.  He denies nausea, vomiting, fever, chills.  Patient relates that he is concerned about his chronic but now more persistent cough and would like to discuss this concern further with the primary team.      4/7/25: Patient seen in dialysis. Bandage intact B/L     04/08/2025 patient seen at bedside resting comfortably.  No new pedal complaints     4/9/25: Patient seen bedside. B/L debridement.     4/10/2025 patient seen at bedside.  Resting comfortably.  Significant decrease in pain.  Still complaining about his cough    4/11/25: Patient seen in dialysis.Bandage intact B/L     4/15/25: Patient seen in dialysis. Bandage intact B/L.      Chief Complaint   Patient presents with    Leg Pain     Sent from [facility] for increased pain to right foot     Scheduled Meds:   benzonatate  100 mg Oral Q8H    carvediloL  6.25 mg Oral BID    ceFEPime IV (PEDS and ADULTS)  1 g Intravenous Daily    epoetin beto-epbx  5,000 Units Intravenous Every Mon, Wed, Fri    fluticasone propionate  2 spray Each Nostril Daily    guaiFENesin  600 mg Oral BID    metroNIDAZOLE  500 mg Oral Q12H    NIFEdipine  90 mg Oral Daily    sevelamer carbonate  800 mg Oral TID WM     Continuous Infusions:  PRN Meds:  Current Facility-Administered Medications:     acetaminophen, 650 mg, Oral, Q4H PRN    aluminum-magnesium hydroxide-simethicone, 30 mL, Oral, QID PRN    dextromethorphan-guaiFENesin  mg/5 ml, 5 mL, Oral, Q6H PRN    dextrose 50%, 12.5 g, Intravenous, PRN    dextrose 50%, 25 g, Intravenous, PRN    diphenhydrAMINE, 25 mg, Oral, Q6H PRN    glucagon (human recombinant), 1 mg, Intramuscular, PRN    glucose, 16 g, Oral, PRN    glucose, 24 g, Oral, PRN    heparin (porcine), 1,000 Units, Intra-Catheter, PRN    insulin aspart U-100, 0-5 Units, Subcutaneous, QID (AC + HS) PRN    melatonin, 6 mg, Oral, Nightly PRN    morphine, 4 mg, Intravenous, Q4H PRN    naloxone, 0.02 mg, Intravenous, PRN    ondansetron, 4 mg, Intravenous, Q6H PRN    oxyCODONE-acetaminophen, 1 tablet, Oral, Q4H PRN    senna-docusate, 1 tablet, Oral, BID PRN    sodium chloride 0.9%, 250 mL, Intravenous, PRN    Review of patient's allergies indicates:  No Known Allergies     Past Medical History:   Diagnosis Date    Diabetes mellitus     Hypertension     Renal disorder      Past Surgical History:   Procedure Laterality Date    FOOT AMPUTATION Right     IRRIGATION AND DEBRIDEMENT Bilateral 4/1/2025    Procedure: IRRIGATION AND DEBRIDEMENT;  Surgeon: Bety Faria DPM;  Location: [facility];  Service: Podiatry;  Laterality: Bilateral;  need jamshidi needle available    IRRIGATION AND DEBRIDEMENT Bilateral 4/8/2025     Procedure: IRRIGATION AND DEBRIDEMENT;  Surgeon: Bety Faria DPM;  Location: [facility];  Service: Podiatry;  Laterality: Bilateral;       Family History    None       Tobacco Use    Smoking status: Never    Smokeless tobacco: Never   Substance and Sexual Activity    Alcohol use: Never    Drug use: Never    Sexual activity: Not on file     Review of Systems   Constitutional:  Negative for appetite change, chills, fatigue and fever.   Respiratory:  Positive for cough. Negative for shortness of breath.    Cardiovascular:  Negative for chest pain and leg swelling.   Gastrointestinal:  Negative for diarrhea, nausea and vomiting.   Musculoskeletal:  Positive for arthralgias and myalgias.   Skin:  Positive for color change and wound.   Neurological:  Positive for numbness. Negative for weakness.        + paresthesia      Objective:     Vital Signs (Most Recent):  Temp: 98.8 °F (37.1 °C) (04/15/25 0520)  Pulse: 102 (04/15/25 0520)  Resp: 18 (04/15/25 0520)  BP: 121/71 (04/15/25 0520)  SpO2: (!) 92 % (04/15/25 0520) Vital Signs (24h Range):  Temp:  [98.1 °F (36.7 °C)-99.7 °F (37.6 °C)] 98.8 °F (37.1 °C)  Pulse:  [] 102  Resp:  [18-20] 18  SpO2:  [89 %-100 %] 92 %  BP: (113-142)/(65-89) 121/71     Weight: 104.3 kg (229 lb 15 oz)  Body mass index is 29.52 kg/m².    Physical Exam  Vitals and nursing note reviewed.   Constitutional:       General: He is not in acute distress.     Appearance: He is well-developed. He is not toxic-appearing or diaphoretic.      Comments: alert and oriented x 3.    Cardiovascular:      Pulses:           Dorsalis pedis pulses are 1+ on the right side and 1+ on the left side.        Posterior tibial pulses are 1+ on the left side.   Pulmonary:      Effort: No respiratory distress.   Musculoskeletal:      Right ankle: No tenderness. No lateral malleolus, medial malleolus, AITF ligament, CF ligament or posterior TF ligament tenderness.      Right Achilles Tendon: No defects. Clements's test  negative.      Left ankle: No tenderness. No lateral malleolus, medial malleolus, AITF ligament, CF ligament or posterior TF ligament tenderness.      Left Achilles Tendon: No defects. Clements's test negative.      Right foot: Swelling and tenderness present. No bony tenderness.      Left foot: Deformity (Appearance of short 4th metatarsal secondary to previous surgery.) and tenderness present. No bony tenderness.      Comments: Muscle strength is 5/5 in all groups bilaterally.              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 (midfoot amp)  Feet:      Right foot:      Skin integrity: Ulcer present.      Left foot:      Skin integrity: Ulcer present.   Lymphadenopathy:      Comments: No lymphatic streaking     Skin:     General: Skin is warm and dry.      Coloration: Skin is not pale.      Findings: No rash.      Nails: There is no clubbing.   Neurological:      Sensory: No sensory deficit.      Motor: No atrophy.      Comments: Light touch present     Psychiatric:         Attention and Perception: He is attentive.         Mood and Affect: Mood is not anxious. Affect is not inappropriate.         Speech: He is communicative. Speech is not slurred.         Behavior: Behavior is not combative.       4/15/25:        4/11/25:            4/10/2025                  4/9/25:  Scant seropurulent drainage right medial foot incision             4/7/25:        04/04/2025     Stable ulceration sub 3rd metatarsophalangeal joint of the left foot without acute signs of infection but with deep probe to bone      Plantar right foot ulcer with significant purulent drainage particularly when milking from the medial midfoot.  Palpation to the medial midfoot is significantly tender.        Post wound debridement with opening of the tract between the 2 plantar wounds            4/2/25:  No purulent drainage noted.             04/01/2025    Right plantar lateral midfoot with periwound bulla formation and localized  "edema and erythema.  Exquisitely tender on palpation.  Fibro necrotic wound bed         Left sub 3rd metatarsophalangeal joint with deep probe to bone, fibrogranular wound bed with periwound callus.          Laboratory:  A1C: No results for input(s): "HGBA1C" in the last 4320 hours.  CBC:   Recent Labs   Lab 04/13/25  0357   WBC 15.38*   RBC 3.30*   HGB 9.4*   HCT 31.2*      MCV 95   MCH 28.5   MCHC 30.1*     CMP:   Recent Labs   Lab 04/14/25  0919   CALCIUM 9.6   ALBUMIN 2.4*   *   K 5.5*   CO2 26   CL 94*   BUN 71*   CREATININE 16.8*     CRP:   No results for input(s): "CRP" in the last 168 hours.    ESR:   No results for input(s): "SEDRATE" in the last 168 hours.    Microbiology Results (last 7 days)       Procedure Component Value Units Date/Time    AFB Culture & Smear [[id number]] Collected: 04/01/25 1621    Order Status: Completed Specimen: Biopsy from Foot, Left Updated: 04/14/25 1501     CULTURE, AFB  Culture In Progress    Blood culture [[id number]]  (Normal) Collected: 04/11/25 1339    Order Status: Completed Specimen: Blood Updated: 04/14/25 1401     Blood Culture No Growth After 72 Hours    Fungus culture [[id number]]  (Normal) Collected: 04/08/25 1433    Order Status: Completed Specimen: Wound from Foot, Right Updated: 04/14/25 1048     Fungal Culture Culture In Progress    Culture, Anaerobic [[id number]] Collected: 04/08/25 1433    Order Status: Completed Specimen: Wound from Foot, Right Updated: 04/12/25 0654     Anaerobe Culture No Anaerobes Isolated    Culture, Respiratory with Gram Stain [[id number]]     Order Status: Sent Specimen: Respiratory     Aerobic culture [[id number]]  (Abnormal)  (Susceptibility) Collected: 04/08/25 1433    Order Status: Completed Specimen: Wound from Foot, Right Updated: 04/11/25 0748     CULTURE, AEROBIC Moderate Morganella morganii      Few Proteus mirabilis    Afb Culture Stain [[id number]] Collected: 04/08/25 1433    Order Status: Completed Specimen: " Wound from Foot, Right Updated: 04/10/25 1321     ACID FAST STAIN  No acid fast bacilli seen    Afb Culture Stain [[id number]] Collected: 04/01/25 1557    Order Status: Completed Specimen: Wound from Foot, Right Updated: 04/09/25 1121     ACID FAST STAIN  No acid fast bacilli seen    Fungus culture [[id number]]  (Normal) Collected: 04/01/25 1557    Order Status: Completed Specimen: Wound from Foot, Right Updated: 04/08/25 2002     Fungal Culture No Fungus Isolated at 1 Week    Fungus culture [[id number]]  (Normal) Collected: 04/01/25 1621    Order Status: Completed Specimen: Biopsy from Foot, Left Updated: 04/08/25 2002     Fungal Culture No Fungus Isolated at 1 Week    Gram stain [[id number]] Collected: 04/08/25 1433    Order Status: Completed Specimen: Wound from Foot, Right Updated: 04/08/25 1846     GRAM STAIN Rare WBC seen      No organisms seen    AFB Culture & Smear [[id number]] Collected: 04/08/25 1433    Order Status: Resulted Specimen: Wound from Foot, Right Updated: 04/08/25 1528            Diagnostic Results:  Imaging Results               MRI Hindfoot WO Contrast LT (Final result)  Result time 04/01/25 07:47:56      Final result by Seamus Hays MD (04/01/25 07:47:56)                   Impression:      1. Plantar soft tissue ulcer at the level of the 3rd MTP joint with associated osteomyelitis of the distal half of the metatarsal and base of the proximal phalanx.  No abscess.  2. Sequela of chronic osteomyelitis involving the 1st, 2nd and 5th metatarsals and proximal phalanges.  3. Postoperative change of transmetatarsal amputation of the 4th digit.  This report was flagged in Epic as abnormal.      Electronically signed by: Seamus Hays MD  Date:    04/01/2025  Time:    07:47               Narrative:    EXAMINATION:  MRI HINDFOOT WO CONTRAST LT; MRI FOREFOOT WO CONTRAST LT    CLINICAL HISTORY:  b/l ulcers concern for osteo;; b/l ulcers. Concern for osteo;    TECHNIQUE:  Routine MRI evaluation  of the left ankle and forefoot performed without contrast.    COMPARISON:  Radiographs 09/01/2024.    FINDINGS:  Examination degraded by patient motion artifact.    BONES: Postsurgical change of transmetatarsal amputation of the 4th digit.  Osseous erosive changes of the 3rd metatarsal head and proximal phalanx base with associated confluent marrow edema involving the distal half of the metatarsal and proximal half of the proximal phalanx.  There is corresponding T1 medullary hypointensity throughout the distal half of the metatarsal and base of the proximal phalanx.  Chronic osseous erosive changes of the 1st, 2nd and 5th metatarsal heads and proximal phalanges with grossly preserved marrow signal intensity.  Solid periosteal reaction throughout the 2nd metatarsal shaft.  Linear area of edema signal at the posterior subchondral region of the tibial plafond, favored to be degenerative in nature.  No definite acute fractures.  Remote healed fracture of the lateral malleolus.  No avascular necrosis.  No marrow infiltrative process.    JOINTS: 3rd MTP complex joint effusion with surrounding synovitis.  No dislocation.    LIGAMENTS: Chronic sprains of the anterior talofibular, superficial deltoid and deep deltoid ligaments.  Lisfranc ligament appears intact.    TENDONS: Ulceration and disruption of the 3rd flexor tendon at the level of the MTP joint.  Chronic ulceration of the 1st, 2nd and 5th flexor tendons at the level of the MTP joints.  Postoperative changes of the 4th flexor tendon.  Remaining tendons appear grossly intact.    MUSCLES: Edema and severe fatty degeneration of the visualized musculature.  No intramuscular fluid collection.    MISCELLANEOUS: Plantar soft tissue ulcer at the level of the 3rd MTP joint with adjacent fluid and surrounding subcutaneous edema.  No focal fluid collection.  Probable adventitial bursa at the plantar aspect of the hallux MTP joint.  Focal area of signal hypointensity at the  level of the heel pad, possibly sequela of scarring.                                        MRI Forefoot WO Contrast LT (Final result)  Result time 04/01/25 07:47:56   Procedure changed from MRI Foot Toes WO Contrast DANY     Final result by Seamus Hays MD (04/01/25 07:47:56)                   Impression:      1. Plantar soft tissue ulcer at the level of the 3rd MTP joint with associated osteomyelitis of the distal half of the metatarsal and base of the proximal phalanx.  No abscess.  2. Sequela of chronic osteomyelitis involving the 1st, 2nd and 5th metatarsals and proximal phalanges.  3. Postoperative change of transmetatarsal amputation of the 4th digit.  This report was flagged in Epic as abnormal.      Electronically signed by: Seamus Hays MD  Date:    04/01/2025  Time:    07:47               Narrative:    EXAMINATION:  MRI HINDFOOT WO CONTRAST LT; MRI FOREFOOT WO CONTRAST LT    CLINICAL HISTORY:  b/l ulcers concern for osteo;; b/l ulcers. Concern for osteo;    TECHNIQUE:  Routine MRI evaluation of the left ankle and forefoot performed without contrast.    COMPARISON:  Radiographs 09/01/2024.    FINDINGS:  Examination degraded by patient motion artifact.    BONES: Postsurgical change of transmetatarsal amputation of the 4th digit.  Osseous erosive changes of the 3rd metatarsal head and proximal phalanx base with associated confluent marrow edema involving the distal half of the metatarsal and proximal half of the proximal phalanx.  There is corresponding T1 medullary hypointensity throughout the distal half of the metatarsal and base of the proximal phalanx.  Chronic osseous erosive changes of the 1st, 2nd and 5th metatarsal heads and proximal phalanges with grossly preserved marrow signal intensity.  Solid periosteal reaction throughout the 2nd metatarsal shaft.  Linear area of edema signal at the posterior subchondral region of the tibial plafond, favored to be degenerative in nature.  No definite  acute fractures.  Remote healed fracture of the lateral malleolus.  No avascular necrosis.  No marrow infiltrative process.    JOINTS: 3rd MTP complex joint effusion with surrounding synovitis.  No dislocation.    LIGAMENTS: Chronic sprains of the anterior talofibular, superficial deltoid and deep deltoid ligaments.  Lisfranc ligament appears intact.    TENDONS: Ulceration and disruption of the 3rd flexor tendon at the level of the MTP joint.  Chronic ulceration of the 1st, 2nd and 5th flexor tendons at the level of the MTP joints.  Postoperative changes of the 4th flexor tendon.  Remaining tendons appear grossly intact.    MUSCLES: Edema and severe fatty degeneration of the visualized musculature.  No intramuscular fluid collection.    MISCELLANEOUS: Plantar soft tissue ulcer at the level of the 3rd MTP joint with adjacent fluid and surrounding subcutaneous edema.  No focal fluid collection.  Probable adventitial bursa at the plantar aspect of the hallux MTP joint.  Focal area of signal hypointensity at the level of the heel pad, possibly sequela of scarring.                                       MRI Hindfoot WO Contrast RT (Final result)  Result time 04/01/25 07:19:00      Final result by Seamus Hays MD (04/01/25 07:19:00)                   Impression:      1. Plantar soft tissue ulcer at the level of the calcaneocuboid joint with surrounding subcutaneous edema and adjacent blistering.  No osteomyelitis.  No abscess.  2. Advanced neuropathic arthropathy of the tibiotalar, subtalar and residual midtarsal joints.      Electronically signed by: Seamus Hays MD  Date:    04/01/2025  Time:    07:19               Narrative:    EXAMINATION:  MRI HINDFOOT WO CONTRAST RT    CLINICAL HISTORY:  Foot swelling, diabetic, osteomyelitis suspected, xray done;    TECHNIQUE:  Routine MRI evaluation of the right ankle performed without contrast.    COMPARISON:  Radiographs 03/31/2025.    FINDINGS:  BONES/JOINTS:  Postsurgical changes of midfoot amputation.  Chronic osseous erosive changes centered about the tibiotalar, subtalar and residual midtarsal joints, likely sequela of chronic neuropathic arthropathy.  Intraosseous cyst at the lateral aspect of the distal tibia.  Circumferential solid periosteal reaction about the distal tibia and distal fibula.  No marrow signal abnormality to suggest an infiltrative process.  No significant joint effusion.  No dislocation.    TENDONS: Achilles tendon demonstrates normal morphology and signal intensity.  Residual posterior tibial, flexor digitorum longus, flexor hallucis longus, peroneus longus, peroneus brevis and extensor tendons are intact.    MUSCLES: Edema and fatty degeneration of the visualized musculature.  No intramuscular fluid collection.    MISCELLANEOUS: Plantar soft tissue ulcer at the level of the calcaneocuboid joint with involvement of the plantar aponeurosis, surrounding subcutaneous edema and adjacent blistering.  No fluid collection.                                       US Lower Extremity Arteries Right (Final result)  Result time 03/31/25 21:32:36      Final result by Michelle Leon MD (03/31/25 21:32:36)                   Impression:      Normal triphasic waveforms throughout the right lower extremity.  No occlusion or stenosis detected.      Electronically signed by: Michelle Leon  Date:    03/31/2025  Time:    21:32               Narrative:    EXAMINATION:  US LOWER EXTREMITY ARTERIES RIGHT    CLINICAL HISTORY:  Unspecified open wound, right foot, subsequent encounter    TECHNIQUE:  Duplex and color flow Doppler evaluation and graded compression of the right lower extremity arteries was performed.    COMPARISON:  None    FINDINGS:  Peak systolic velocities in the right lower extremity arteries (cm/sec):    CFA: 127, triphasic    DFA: 74, triphasic    Proximal SFA: 103, triphasic    Mid SFA: 120, triphasic    Distal SFA: 109, triphasic    Proximal pop:  84, triphasic    Distal pop: 98, triphasic    LAUREL: 80, triphasic    PTA: 86, triphasic    Peroneal: 111, triphasic    DPA: 75, triphasic                                       X-Ray Foot Complete Right (Final result)  Result time 03/31/25 13:14:02      Final result by Alli Henriquez MD (03/31/25 13:14:02)                   Impression:      Please see above.      Electronically signed by: Alli Henriquez  Date:    03/31/2025  Time:    13:14               Narrative:    EXAMINATION:  XR FOOT COMPLETE 3 VIEW RIGHT    CLINICAL HISTORY:  . Pain in unspecified foot    TECHNIQUE:  AP, lateral, and oblique views of the right foot were performed.    COMPARISON:  None.    FINDINGS:  Extensive postoperative change including amputation to the level of the midfoot structures.  Remainder of the visualized osseous structures of the mid and hindfoot demonstrate diffuse degenerative change in osteopenia with surrounding diffuse soft tissue swelling.  Additional diffuse osseous destruction and degenerative change about the partially visualized ankle.  Scattered vascular calcification as well as plantar calcaneal spur.  No obvious significant osseous erosive change, noting limitations from postoperative change and surrounding soft tissue swelling.  If there is continued clinical concern for osteomyelitis, consider MRI of the foot for further evaluation.                                       X-Ray Chest AP Portable (Final result)  Result time 03/31/25 13:15:24      Final result by Alli Henriquez MD (03/31/25 13:15:24)                   Impression:      Please see above.      Electronically signed by: Alli Henriquez  Date:    03/31/2025  Time:    13:15               Narrative:    EXAMINATION:  XR CHEST AP PORTABLE    CLINICAL HISTORY:  Chronic cough    TECHNIQUE:  Single frontal view of the chest was performed.    COMPARISON:  Chest radiograph 09/01/2024    FINDINGS:  Left-sided central venous catheter with tip overlying the in expected region of  the cavoatrial junction.  Cardiomediastinal silhouette is unchanged in size.  Patient is rotated, limiting evaluation of the mediastinal structures.    Chronic right effusion with probable superimposed volume loss about the right lung base.  Superimposed infectious or non infectious inflammatory infiltrate cannot be excluded.  Remainder of the aerated portions of the lungs are clear.  No pneumothorax.    Osseous structures demonstrate no evidence for acute fracture or osseous destructive lesion.  Soft tissues are unremarkable.                                      Assessment/Plan:     Active Diagnoses:    Diagnosis Date Noted POA    PRINCIPAL PROBLEM:  Infected ulcer of skin [L98.499, L08.9] 03/31/2025 Yes    Chronic cough? Post-nasal drip vs reactive airway disease [R05.3] 04/13/2025 Unknown    Leukocytosis [D72.829] 04/11/2025 Yes    Pyogenic inflammation of bone [M86.9] 04/03/2025 Yes    Foot infection [L08.9] 04/02/2025 Yes    Foot ulcer with fat layer exposed, unspecified laterality [L97.502] 09/01/2024 Yes    Type 2 diabetes mellitus with foot ulcer [E11.621, L97.509] 06/25/2024 Yes    End stage renal disease [N18.6] 03/21/2024 Yes    Hyperkalemia [E87.5] 07/19/2021 Yes     Chronic    Essential hypertension [I10] 01/27/2021 Yes      Problems Resolved During this Admission:       Education about the prevention of limb loss.      S/p  repeat irrigation and debridement of both feet DOS: 4/1/25, 4/8/25    Discussed wound healing cycle, skin integrity, ways to care for skin.Counseled patient on the effects of biomechanical pressure and deformity as well as blood glucose on healing. He verbalizes understanding that it can increase the chances of delayed healing and this prolonged exposure leads to infection or progression of infection which subsequently can result in loss of limb.    No purulent drainage noted, no longer painful with palpation     PT pulse lavage ordered.     DSD applied to right foot    Abx per  ID    F/u WB wound care upon discharge.  Recommend twice weekly HH dressing changes on discharge    We will continue to follow and work in partnership with treatment team on how to best treat patient concern and diagnosis.      Michelle Brown DPM  Podiatry  [facility] - Emergency Dept

## 2025-04-15 NOTE — DISCHARGE SUMMARY
[facility] Medicine  Discharge Summary      Patient Name: Emmanuel Morgan  MRN: [id number]  CLAIRE: [id number]  Patient Class: IP- Inpatient  Admission Date: 3/31/2025  Hospital Length of Stay: 15 days  Discharge Date and Time: No discharge date for patient encounter.  Attending Physician: Tony Sainz MD   Discharging Provider: Tony Sainz MD  Primary Care Provider: Bebeto Hill PA-C    Primary Care Team: Networked reference to record PCT     HPI:   39-year-old male with ESRD on HD, diabetes not on insulin, prior osteomyelitis requiring right midfoot amputation with a long standing left foot ulcer (months) and a right foot ulcer of his stump which has been present for weeks who was sent to the ER by home health due to progression of wounds with skin and soft tissue infections of possibly both ulcers and possible osteomyelitis, he has been followed up by outpatient wound care agency, and was sent here by home health nurse due to nonhealing of the wound, noted with worsening erythema and purulent discharge.  Missed hemodialysis session on the day of presentation due to presentation in the emergency room.    Procedure(s) (LRB):  IRRIGATION AND DEBRIDEMENT (Bilateral)      Hospital Course:   39-year-old male with ESRD on HD, DM, prior osteomyelitis requiring right midfoot amputation with a long standing left foot ulcer (months) and a right foot ulcer of his stump which has been present for weeks who was sent to the ER by home health due to progression of wounds with skin and soft tissue infections of possibly both ulcers and possible osteomyelitis of either.  MRI of left foot showing plantar soft tissue ulcer at the level of the 3rd MTP joint with associated osteomyelitis of the distal half of the metatarsal and base of the proximal phalanx.  On ABX's and Podiatry consulted. Patient underwent irrigation and debridement of bilateral LE's.  Nephrology was consulted for HD while inpatient.   Infectious Disease was consulted with antibiotic recommendation.  Currently  is coordinating antibiotics with HD unit.  Still having some purulent discharge and needed repeat I&D in the OR on 4/8 with some improvement in wound.  Repeat blood cultures Gram-negative.  Pulsavac was ordered for 4/12 to 4/14.  With noted improvement in wound.  Patient was discharged on IV Cefepime to be scheduled with HD till 5/13 and oral metronidazole and outpatient wound care;PT and OT.  All patient's questions were answered to his satisfaction and he verbalized understanding. Patient was asked to follow up with ID and primary care upon discharge; he was also asked to return to the hospital in the event of increased pain, fever or lethargy.  Other chronic medical conditions remained stable throughout the course of hospitalization.       Goals of Care Treatment Preferences:  Code Status: Full Code      SDOH Screening:  The patient was screened for utility difficulties, food insecurity, transport difficulties, housing insecurity, and interpersonal safety and there were no concerns identified this admission.     Consults:   Consults (From admission, onward)          Status Ordering Provider     Inpatient consult to Interventional Radiology  Once        Provider:  Lindsay Santos NP    Completed VISHAL MARQUEZ     ENROLL PATIENT IN OPAT  Once        Provider:  (Not yet assigned)    Acknowledged ROSIO RYAN     Inpatient consult to Infectious Diseases  Once        Provider:  Neeru Brand MD    Completed MARÍA ELENA ALEJANDRO     Inpatient consult to Podiatry  Once        Provider:  Rosio Ryan DPM    Completed GULSHAN OHARA     Inpatient consult to Nephrology  Once        Provider:  Aby Nelson PA    Completed WAQAS ALMEIDA            Assessment & Plan  Infected ulcer of skin  Patient presents with ulcers to left foot and right foot stump.  MRI ordered.  No evidence of osteo on right foot  stump.  Left foot MRI showing plantar soft tissue ulcer at the level of the 3rd MTP joint with associated osteomyelitis of the distal half of the metatarsal and base of the proximal phalanx.   Started on ABX's  Wound cultures positive for Proteus mirabilis  S/p Underwent bilateral irrigation and debridement by Podiatry  ID following; input appreciated.  Recommending IV Cefepime with HD and oral Flagyl until 5/13.  Will need outpatient ABX's with HD arranged.  Discussed with Podiatry.  Still having some purulent drainage; repeat I and D on 04/08 in the OR        End stage renal disease  Creatine stable for now. BMP reviewed- noted Estimated Creatinine Clearance: 7.5 mL/min (A) (based on SCr of 16.8 mg/dL (H)). according to latest data. Based on current GFR, CKD stage is end stage.  Monitor UOP and serial BMP and adjust therapy as needed. Renally dose meds. Avoid nephrotoxic medications and procedures.  Nephrology consulted for HD.  Continue Monday, Wednesday, and Friday hemodialysis schedule.    Essential hypertension  Patient's blood pressure range in the last 24 hours was: BP  Min: 113/65  Max: 141/89.The patient's inpatient anti-hypertensive regimen is listed below:  Current Antihypertensives  NIFEdipine 24 hr tablet 90 mg, Daily, Oral  carvediloL tablet 6.25 mg, 2 times daily, Oral  carvedilol (COREG) tablet, 2 times daily, Oral    Plan  - BP is controlled, no changes needed to their regimen  - lisinopril held due to hyperkalemia.  Will restart when clinically indicated  Hyperkalemia  Hyperkalemia is likely due to ESRD.The patients most recent potassium results are listed below.  Recent Labs     04/13/25  0357 04/14/25  0919   K 5.6* 5.5*   He got some doses of Lokelma;will order another dose today  Nephrology consulted for HD.  Continue monitoring          Type 2 diabetes mellitus with foot ulcer  Patient's FSGs are controlled on current medication regimen.  Last A1c reviewed-   Lab Results   Component Value  Date    HGBA1C 4.9 08/20/2024     Most recent fingerstick glucose reviewed-   Recent Labs   Lab 04/14/25  1651 04/14/25  2030 04/15/25  0827 04/15/25  1119   POCTGLUCOSE 113* 114* 93 99     Current correctional scale  Low  Maintain anti-hyperglycemic dose as follows-   Antihyperglycemics (From admission, onward)      Start     Stop Route Frequency Ordered    03/31/25 2317  insulin aspart U-100 pen 0-5 Units         -- SubQ Before meals & nightly PRN 03/31/25 2218          Hold Oral hypoglycemics while patient is in the hospital.  Foot ulcer with fat layer exposed, unspecified laterality  Podiatry consulted, appreciated recs  Continue current antibiotics, ID consulted.  Continue wound care per podiatrist  Foot infection  See note above    Pyogenic inflammation of bone  Continue antibiotics as described above.    Leukocytosis  Persistent leukocytosis noted over the past 2 days  In his unknown at this time whether this is reactive or as a result of some occult infectious process  Repeat blood culture ordered    Chronic cough? Post-nasal drip vs reactive airway disease  Patient complaining of persistent cough predating hospital admission  Chest x-ray nonfocal; infectious workup negative  Trial of Flonase for possible nasal drip  Outpatient evaluation pulmonology if symptoms remain persistent    Final Active Diagnoses:    Diagnosis Date Noted POA    PRINCIPAL PROBLEM:  Infected ulcer of skin [L98.499, L08.9] 03/31/2025 Yes    Chronic cough? Post-nasal drip vs reactive airway disease [R05.3] 04/13/2025 Unknown    Leukocytosis [D72.829] 04/11/2025 Yes    Pyogenic inflammation of bone [M86.9] 04/03/2025 Yes    Foot infection [L08.9] 04/02/2025 Yes    Foot ulcer with fat layer exposed, unspecified laterality [L97.502] 09/01/2024 Yes    Type 2 diabetes mellitus with foot ulcer [E11.621, L97.509] 06/25/2024 Yes    End stage renal disease [N18.6] 03/21/2024 Yes    Hyperkalemia [E87.5] 07/19/2021 Yes     Chronic    Essential  "hypertension [I10] 01/27/2021 Yes      Problems Resolved During this Admission:       Discharged Condition: stable    Disposition: Home or Self Care    Follow Up:   Contact information for follow-up providers       [facility] Follow up.    Specialty: Dialysis Center  Why: Resume dialysis  Contact information:  [address]  [place] [address]  [phone]                       Contact information for after-discharge care       Home Medical Care       [facility] Follow up.    Service: Home Nursing  Why: Office will call patient with a date and time.  Contact information:  [address]  [facility] [address]  [phone]                                 Patient Instructions:      WHEELCHAIR FOR HOME USE     Order Specific Question Answer Comments   Hours in W/C per day: 8    Type of Wheelchair: Standard    Size(Width): 22"    Seat Frame: Yes    Leg Support: STD footrests    Lap Belt: Velcro    Accessories: Anti-tippers    Cushion: Basic    Reclining Back No    Height: 6' 2" (1.88 m)    Weight: 104.3 kg (229 lb 15 oz)    Does patient have medical equipment at home? none    Length of need (1-99 months): 99    Please check all that apply: Patient's upper body strength is sufficient for propulsion.    Please check all that apply: The patient requires the use of a w/c for activities of daily living within the Home.    Please check all that apply: Patient mobility limitations cannot be sufficiently resolved by the use of other ambulatory therapies.      WHEELCHAIR FOR HOME USE     Order Specific Question Answer Comments   Hours in W/C per day: 8    Type of Wheelchair: Standard    Size(Width): 20"    Leg Support: STD footrests    Lap Belt: Velcro    Accessories: Anti-tippers    Cushion: Basic    Reclining Back No    Height: 6' 2" (1.88 m)    Weight: 104.3 kg (229 lb 15 oz)    Does patient have medical equipment at home? none    Length of need (1-99 months): 99  "   Please check all that apply: Patient's upper body strength is sufficient for propulsion.    Please check all that apply: The patient requires the use of a w/c for activities of daily living within the Home.    Please check all that apply: Patient mobility limitations cannot be sufficiently resolved by the use of other ambulatory therapies.      Ambulatory referral/consult to Infectious Disease   Standing Status: Future   Referral Priority: Routine Referral Type: Consultation   Referral Reason: Specialty Services Required   Requested Specialty: Infectious Diseases   Number of Visits Requested: 1     Ambulatory referral/consult to Wound Clinic   Standing Status: Future   Referral Priority: Routine Referral Type: Consultation   Referral Reason: Specialty Services Required   Requested Specialty: Wound Care   Number of Visits Requested: 1     Ambulatory referral/consult to Podiatry   Standing Status: Future   Referral Priority: Routine Referral Type: Consultation   Referral Reason: Specialty Services Required   Requested Specialty: Podiatry   Number of Visits Requested: 1     Notify your health care provider if you experience any of the following:  temperature >100.4     Notify your health care provider if you experience any of the following:  increased confusion or weakness     Notify your health care provider if you experience any of the following:  severe uncontrolled pain     Activity as tolerated     Weight bearing restrictions (specify):       Significant Diagnostic Studies: N/A    Pending Diagnostic Studies:       None           Medications:  Reconciled Home Medications:      Medication List        START taking these medications      benzonatate 100 MG capsule  Commonly known as: TESSALON  Take 1 capsule (100 mg total) by mouth every 8 (eight) hours. for 5 days     ceFEPIme 2 gram injection  Commonly known as: MAXIPIME  Cefepime 2g IV every Monday, Wednesday and Friday after dialysis.      dextromethorphan-guaiFENesin  mg/5 ml  mg/5 mL liquid  Commonly known as: ROBITUSSIN-DM  Take 5 mLs by mouth every 6 (six) hours as needed.     fluticasone propionate 50 mcg/actuation nasal spray  Commonly known as: FLONASE  2 sprays (100 mcg total) by Each Nostril route once daily.     metroNIDAZOLE 500 MG tablet  Commonly known as: FLAGYL  Take 1 tablet (500 mg total) by mouth every 12 (twelve) hours.            CHANGE how you take these medications      carvediloL 6.25 MG tablet  Commonly known as: COREG  Take 1 tablet (6.25 mg total) by mouth 2 (two) times daily.  What changed:   medication strength  how much to take            CONTINUE taking these medications      NIFEdipine 90 MG (OSM) 24 hr tablet  Commonly known as: PROCARDIA-XL  Take 90 mg by mouth once daily.            STOP taking these medications      hydrALAZINE 50 MG tablet  Commonly known as: APRESOLINE     lisinopriL 40 MG tablet  Commonly known as: PRINIVIL,ZESTRIL              Indwelling Lines/Drains at time of discharge:   Lines/Drains/Airways       Drain  Duration                  Hemodialysis AV Fistula 01/16/25 89 days                    Time spent on the discharge of patient: more than 35 minutes         Tony Sainz MD  Department of Hospital Medicine  [facility] [place] [facility]

## 2025-04-15 NOTE — ASSESSMENT & PLAN NOTE
Patient's FSGs are controlled on current medication regimen.  Last A1c reviewed-   Lab Results   Component Value Date    HGBA1C 4.9 08/20/2024     Most recent fingerstick glucose reviewed-   Recent Labs   Lab 04/14/25  1651 04/14/25  2030 04/15/25  0827 04/15/25  1119   POCTGLUCOSE 113* 114* 93 99     Current correctional scale  Low  Maintain anti-hyperglycemic dose as follows-   Antihyperglycemics (From admission, onward)      Start     Stop Route Frequency Ordered    03/31/25 2317  insulin aspart U-100 pen 0-5 Units         -- SubQ Before meals & nightly PRN 03/31/25 2218          Hold Oral hypoglycemics while patient is in the hospital.

## 2025-04-15 NOTE — CLINICAL REVIEW
Nephrology Chart Review      Intake/Output Summary (Last 24 hours) at 4/15/2025 1217  Last data filed at 4/15/2025 0900  Gross per 24 hour   Intake 1100 ml   Output 2120 ml   Net -1020 ml       Vitals:    04/14/25 2351 04/15/25 0520 04/15/25 0828 04/15/25 1122   BP: 122/73 121/71 121/74 133/78   BP Location: Right arm Right arm Left arm Left arm   Patient Position: Lying Lying Lying Lying   Pulse: 105 102 98 90   Resp: 18 18 18 18   Temp: 99.1 °F (37.3 °C) 98.8 °F (37.1 °C) 98.5 °F (36.9 °C) 98.3 °F (36.8 °C)   TempSrc: Oral Oral Oral Oral   SpO2: (!) 94% (!) 92% (!) 94% 95%   Weight:       Height:           Recent Labs   Lab 04/12/25  0453 04/13/25  0357 04/14/25  0919   * 136 134*   K 5.0 5.6* 5.5*   CL 97 98 94*   CO2 24 25 26   BUN 43* 57* 71*   CREATININE 11.2* 13.8* 16.8*   GLUCOSE 94 98 85   CALCIUM 9.3 9.4 9.6   PHOS 3.9 3.6 5.1*       No labs today, next HD 4/16/2025    No other related issues identified. Please call Nephrology as needed; We will continue to follow.      Everett Hirsch MD  Nephrology [facility] [place]

## 2025-04-15 NOTE — PLAN OF CARE
Case Management Final Discharge Note    Discharge Disposition: [facility]    New DME ordered / company name: [facility]    Relevant SDOH / Transition of Care Barriers:  mobility    Person available to provide assistance at home when needed and their contact information: João champagne [phone]    Scheduled followup appointment: PCP, Infectious Disease, and podiatry scheduled.    Referrals placed: Infectious disease and podiatry    Transportation: Friends or family    [facility] Wendy with [employer] has received the fax. Pt will resume dialysis on Wednesday.    Patient and family educated on discharge services and updated on DC plan. Bedside RN notified, patient clear to discharge from Case Management Perspective.       04/15/25 1200   Final Note   Assessment Type Final Discharge Note   Anticipated Discharge Disposition Home-Health   What phone number can be called within the next 1-3 days to see how you are doing after discharge? [phone]   Hospital Resources/Appts/Education Provided Appointments scheduled and added to AVS   Post-Acute Status   Post-Acute Authorization Home Health   Home Health Status Set-up Complete/Auth obtained   Coverage [employer]   Patient choice form signed by patient/caregiver List from System Post-Acute Care   Discharge Delays None known at this time

## 2025-04-15 NOTE — ASSESSMENT & PLAN NOTE
Hyperkalemia is likely due to ESRD.The patients most recent potassium results are listed below.  Recent Labs     04/13/25  0357 04/14/25  0919   K 5.6* 5.5*   He got some doses of Lokelma;will order another dose today  Nephrology consulted for HD.  Continue monitoring

## 2025-04-15 NOTE — NURSING
[facility]  Nurses Note -- 4 Eyes      4/15/2025       Skin assessed on: Q Shift      [x] No Pressure Injuries Present    []Prevention Measures Documented    [] Yes LDA  for Pressure Injury Previously documented     [] Yes New Pressure Injury Discovered   [] LDA for New Pressure Injury Added      Attending RN:  Umm Brown RN     Second RN:  ARNOLD Villanueva

## 2025-04-16 LAB — BACTERIA BLD CULT: NORMAL

## 2025-04-17 ENCOUNTER — TELEPHONE (OUTPATIENT)
Dept: PODIATRY | Facility: CLINIC | Age: 40
End: 2025-04-17
Payer: MEDICARE

## 2025-04-17 ENCOUNTER — OFFICE VISIT (OUTPATIENT)
Dept: PODIATRY | Facility: CLINIC | Age: 40
End: 2025-04-17
Payer: MEDICARE

## 2025-04-17 VITALS
DIASTOLIC BLOOD PRESSURE: 99 MMHG | SYSTOLIC BLOOD PRESSURE: 174 MMHG | HEIGHT: 74 IN | BODY MASS INDEX: 29.51 KG/M2 | WEIGHT: 229.94 LBS

## 2025-04-17 DIAGNOSIS — Z89.439: ICD-10-CM

## 2025-04-17 DIAGNOSIS — L08.9 FOOT INFECTION: ICD-10-CM

## 2025-04-17 DIAGNOSIS — E11.49 TYPE II DIABETES MELLITUS WITH NEUROLOGICAL MANIFESTATIONS: ICD-10-CM

## 2025-04-17 DIAGNOSIS — L97.509 ULCER OF FOOT, UNSPECIFIED LATERALITY, UNSPECIFIED ULCER STAGE: Primary | ICD-10-CM

## 2025-04-17 PROCEDURE — 1111F DSCHRG MED/CURRENT MED MERGE: CPT | Mod: CPTII,S$GLB,, | Performed by: PODIATRIST

## 2025-04-17 PROCEDURE — 1159F MED LIST DOCD IN RCRD: CPT | Mod: CPTII,S$GLB,, | Performed by: PODIATRIST

## 2025-04-17 PROCEDURE — 3008F BODY MASS INDEX DOCD: CPT | Mod: CPTII,S$GLB,, | Performed by: PODIATRIST

## 2025-04-17 PROCEDURE — 1160F RVW MEDS BY RX/DR IN RCRD: CPT | Mod: CPTII,S$GLB,, | Performed by: PODIATRIST

## 2025-04-17 PROCEDURE — 3080F DIAST BP >= 90 MM HG: CPT | Mod: CPTII,S$GLB,, | Performed by: PODIATRIST

## 2025-04-17 PROCEDURE — 3077F SYST BP >= 140 MM HG: CPT | Mod: CPTII,S$GLB,, | Performed by: PODIATRIST

## 2025-04-17 PROCEDURE — 99999 PR PBB SHADOW E&M-EST. PATIENT-LVL IV: CPT | Mod: PBBFAC,,, | Performed by: PODIATRIST

## 2025-04-17 PROCEDURE — 4010F ACE/ARB THERAPY RXD/TAKEN: CPT | Mod: CPTII,S$GLB,, | Performed by: PODIATRIST

## 2025-04-17 PROCEDURE — 3066F NEPHROPATHY DOC TX: CPT | Mod: CPTII,S$GLB,, | Performed by: PODIATRIST

## 2025-04-17 PROCEDURE — 99214 OFFICE O/P EST MOD 30 MIN: CPT | Mod: S$GLB,,, | Performed by: PODIATRIST

## 2025-04-17 NOTE — TELEPHONE ENCOUNTER
----- Message from Vincent sent at 4/17/2025  4:15 PM CDT -----  Regarding: [facility]  Type: Patient Call Back What is the request in detail: please call back with updated wound care orders from todays visit Can the clinic reply by [facility]? No Would the patient rather a call back or a response via [facility]? Call [facility] call back number: [phone]   fax # [phone] Information:Thank you.

## 2025-04-17 NOTE — PROGRESS NOTES
[facility] [place]    Patient Name: Emmanuel Morgan  MRN: [id number]  Admission Date: (Not on file)  Hospital Length of Stay: 0 days  Attending Physician: No att. providers found  Primary Care Provider: Bebeto Hill PA-C     Subjective:     History of Present Illness: Emmanuel Morgan is a 39 y.o. male with  has a past medical history of Diabetes mellitus, Hypertension, and Renal disorder.  Consult to Podiatry for evaluation and treatment of bilateral foot wounds, most significant to the right Lisfranc stump.  He relates that the left foot wound is chronic in nature and the ulceration to the right foot occurred several weeks ago likely secondary to trauma versus ambulation is inappropriate shoe.  All foot surgeries surgeries done in outside facilities.  He presented to the emergency department on the advice of his home health nurse due to progression of wounds and suspicion of osteomyelitis.      4/2/25: S/p one day I&D B/L per Dr. Faria. Bandage intact no strikethrough noted.    04/04/2025 patient seen at bedside resting comfortably.  By the end of our encounter, his nephew was also present at bedside.  Patient relates that he has occasional discomfort to the right foot especially in the medial midfoot with palpation or any pressure.  He denies any pain to the left foot.  He denies nausea, vomiting, fever, chills.  Patient relates that he is concerned about his chronic but now more persistent cough and would like to discuss this concern further with the primary team.      4/7/25: Patient seen in dialysis. Bandage intact B/L     04/08/2025 patient seen at bedside resting comfortably.  No new pedal complaints     4/9/25: Patient seen bedside. B/L debridement.     4/10/2025 patient seen at bedside.  Resting comfortably.  Significant decrease in pain.  Still complaining about his cough    4/11/25: Patient seen in dialysis.Bandage intact B/L     04/17/2025 patient presents to podiatry clinic status  "post hospital discharge.  He has kept dressings clean, dry, intact.  He has been having home health come to the home.  He relates improvement in pain.  No new pedal complaints.    Chief Complaint   Patient presents with    Post-op Evaluation    Diabetes Mellitus     Dr Sainz 4/14/25     Scheduled Meds:      Continuous Infusions:  PRN Meds:    Review of patient's allergies indicates:  No Known Allergies     Past Medical History:   Diagnosis Date    Diabetes mellitus     Hypertension     Renal disorder      Past Surgical History:   Procedure Laterality Date    FOOT AMPUTATION Right     IRRIGATION AND DEBRIDEMENT Bilateral 4/1/2025    Procedure: IRRIGATION AND DEBRIDEMENT;  Surgeon: Bety Faria DPM;  Location: [facility] OR;  Service: Podiatry;  Laterality: Bilateral;  need jamshidi needle available    IRRIGATION AND DEBRIDEMENT Bilateral 4/8/2025    Procedure: IRRIGATION AND DEBRIDEMENT;  Surgeon: Bety Faria DPM;  Location: [facility] OR;  Service: Podiatry;  Laterality: Bilateral;       Family History    None       Tobacco Use    Smoking status: Never    Smokeless tobacco: Never   Substance and Sexual Activity    Alcohol use: Never    Drug use: Never    Sexual activity: Not on file     Review of Systems   Constitutional:  Negative for appetite change, chills, fatigue and fever.   Respiratory:  Positive for cough. Negative for shortness of breath.    Cardiovascular:  Negative for chest pain and leg swelling.   Gastrointestinal:  Negative for diarrhea, nausea and vomiting.   Musculoskeletal:  Positive for arthralgias and myalgias.   Skin:  Positive for color change and wound.   Neurological:  Positive for numbness. Negative for weakness.        + paresthesia      Objective:     Vitals:    04/17/25 1004   BP: (!) 174/99   Weight: 104.3 kg (229 lb 15 oz)   Height: 6' 2" (1.88 m)   PainSc: 0-No pain       Physical Exam  Vitals and nursing note reviewed.   Constitutional:       General: He is not in acute distress.     " Appearance: He is well-developed. He is not toxic-appearing or diaphoretic.      Comments: alert and oriented x 3.    Cardiovascular:      Pulses:           Dorsalis pedis pulses are 1+ on the right side and 1+ on the left side.        Posterior tibial pulses are 1+ on the left side.   Pulmonary:      Effort: No respiratory distress.   Musculoskeletal:      Right ankle: No tenderness. No lateral malleolus, medial malleolus, AITF ligament, CF ligament or posterior TF ligament tenderness.      Right Achilles Tendon: No defects. Clements's test negative.      Left ankle: No tenderness. No lateral malleolus, medial malleolus, AITF ligament, CF ligament or posterior TF ligament tenderness.      Left Achilles Tendon: No defects. Clements's test negative.      Right foot: Swelling and tenderness present. No bony tenderness.      Left foot: Deformity (Appearance of short 4th metatarsal secondary to previous surgery.) and tenderness present. No bony tenderness.      Comments: Muscle strength is 5/5 in all groups bilaterally.              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 (midfoot amp)  Feet:      Right foot:      Skin integrity: Ulcer present.      Left foot:      Skin integrity: Ulcer present.   Lymphadenopathy:      Comments: No lymphatic streaking     Skin:     General: Skin is warm and dry.      Coloration: Skin is not pale.      Findings: No rash.      Nails: There is no clubbing.   Neurological:      Sensory: No sensory deficit.      Motor: No atrophy.      Comments: Light touch present     Psychiatric:         Attention and Perception: He is attentive.         Mood and Affect: Mood is not anxious. Affect is not inappropriate.         Speech: He is communicative. Speech is not slurred.         Behavior: Behavior is not combative.       04/17/2025 4/11/25:            4/10/2025                  4/9/25:  Scant seropurulent drainage right medial foot incision              4/7/25:        04/04/2025     Stable ulceration sub 3rd metatarsophalangeal joint of the left foot without acute signs of infection but with deep probe to bone      Plantar right foot ulcer with significant purulent drainage particularly when milking from the medial midfoot.  Palpation to the medial midfoot is significantly tender.        Post wound debridement with opening of the tract between the 2 plantar wounds            4/2/25:  No purulent drainage noted.             04/01/2025    Right plantar lateral midfoot with periwound bulla formation and localized edema and erythema.  Exquisitely tender on palpation.  Fibro necrotic wound bed         Left sub 3rd metatarsophalangeal joint with deep probe to bone, fibrogranular wound bed with periwound callus.            Assessment/Plan:     1. Ulcer of foot, unspecified laterality, unspecified ulcer stage  SUBSEQUENT HOME HEALTH ORDERS      2. Type II diabetes mellitus with neurological manifestations  Ambulatory referral/consult to Podiatry      3. Foot infection  Ambulatory referral/consult to Podiatry    SUBSEQUENT HOME HEALTH ORDERS      4. History of Chopart amputation  Ambulatory referral/consult to Podiatry          Education about the prevention of limb loss.    S/p  repeat irrigation and debridement of both feet DOS: 4/1/25, 4/8/25    Discussed wound healing cycle, skin integrity, ways to care for skin.Counseled patient on the effects of biomechanical pressure and deformity as well as blood glucose on healing. He verbalizes understanding that it can increase the chances of delayed healing and this prolonged exposure leads to infection or progression of infection which subsequently can result in loss of limb.    Abx per ID    DSD applied bilateral  Subsequent Home health wound care orders placed    Return to clinic in 1-3 weeks, sooner PRN    We will continue to follow and work in partnership with treatment team on how to best treat patient concern  and diagnosis.      Bety Faria, BILLM  [place]  [facility] [place]

## 2025-04-18 LAB — POCT GLUCOSE: 119 MG/DL (ref 70–110)

## 2025-04-21 ENCOUNTER — OFFICE VISIT (OUTPATIENT)
Dept: FAMILY MEDICINE | Facility: CLINIC | Age: 40
End: 2025-04-21
Payer: MEDICARE

## 2025-04-21 VITALS
BODY MASS INDEX: 28.05 KG/M2 | OXYGEN SATURATION: 99 % | HEIGHT: 74 IN | TEMPERATURE: 98 F | HEART RATE: 98 BPM | WEIGHT: 218.56 LBS | DIASTOLIC BLOOD PRESSURE: 90 MMHG | SYSTOLIC BLOOD PRESSURE: 150 MMHG

## 2025-04-21 DIAGNOSIS — Z09 HOSPITAL DISCHARGE FOLLOW-UP: Primary | ICD-10-CM

## 2025-04-21 DIAGNOSIS — Z11.4 ENCOUNTER FOR SCREENING FOR HIV: ICD-10-CM

## 2025-04-21 DIAGNOSIS — M86.272 SUBACUTE OSTEOMYELITIS OF LEFT FOOT: ICD-10-CM

## 2025-04-21 DIAGNOSIS — N18.6 END STAGE RENAL DISEASE: ICD-10-CM

## 2025-04-21 DIAGNOSIS — L97.509 TYPE 2 DIABETES MELLITUS WITH FOOT ULCER, WITHOUT LONG-TERM CURRENT USE OF INSULIN: ICD-10-CM

## 2025-04-21 DIAGNOSIS — I10 ESSENTIAL HYPERTENSION: ICD-10-CM

## 2025-04-21 DIAGNOSIS — R05.3 CHRONIC COUGH: ICD-10-CM

## 2025-04-21 DIAGNOSIS — E11.621 TYPE 2 DIABETES MELLITUS WITH FOOT ULCER, WITHOUT LONG-TERM CURRENT USE OF INSULIN: ICD-10-CM

## 2025-04-21 DIAGNOSIS — L98.499 INFECTED ULCER OF SKIN, UNSPECIFIED ULCER STAGE: ICD-10-CM

## 2025-04-21 DIAGNOSIS — L08.9 INFECTED ULCER OF SKIN, UNSPECIFIED ULCER STAGE: ICD-10-CM

## 2025-04-21 PROBLEM — L97.502: Status: RESOLVED | Noted: 2024-09-01 | Resolved: 2025-04-21

## 2025-04-21 PROBLEM — D72.829 LEUKOCYTOSIS: Status: RESOLVED | Noted: 2025-04-11 | Resolved: 2025-04-21

## 2025-04-21 PROCEDURE — 3066F NEPHROPATHY DOC TX: CPT | Mod: CPTII,S$GLB,,

## 2025-04-21 PROCEDURE — G2211 COMPLEX E/M VISIT ADD ON: HCPCS | Mod: S$GLB,,,

## 2025-04-21 PROCEDURE — 3008F BODY MASS INDEX DOCD: CPT | Mod: CPTII,S$GLB,,

## 2025-04-21 PROCEDURE — 3077F SYST BP >= 140 MM HG: CPT | Mod: CPTII,S$GLB,,

## 2025-04-21 PROCEDURE — 4010F ACE/ARB THERAPY RXD/TAKEN: CPT | Mod: CPTII,S$GLB,,

## 2025-04-21 PROCEDURE — 3080F DIAST BP >= 90 MM HG: CPT | Mod: CPTII,S$GLB,,

## 2025-04-21 PROCEDURE — 1111F DSCHRG MED/CURRENT MED MERGE: CPT | Mod: CPTII,S$GLB,,

## 2025-04-21 PROCEDURE — 1160F RVW MEDS BY RX/DR IN RCRD: CPT | Mod: CPTII,S$GLB,,

## 2025-04-21 PROCEDURE — 99214 OFFICE O/P EST MOD 30 MIN: CPT | Mod: S$GLB,,,

## 2025-04-21 PROCEDURE — 1159F MED LIST DOCD IN RCRD: CPT | Mod: CPTII,S$GLB,,

## 2025-04-21 PROCEDURE — 99999 PR PBB SHADOW E&M-EST. PATIENT-LVL IV: CPT | Mod: PBBFAC,,,

## 2025-04-21 RX ORDER — BENZONATATE 100 MG/1
100 CAPSULE ORAL EVERY 8 HOURS
Qty: 21 CAPSULE | Refills: 0 | Status: SHIPPED | OUTPATIENT
Start: 2025-04-21

## 2025-04-21 NOTE — PROGRESS NOTES
Assessment & Plan     Hospital discharge follow-up  Subacute osteomyelitis of left foot  Infected ulcer of skin, unspecified ulcer stage  Patient recently hospitalized for osteomyelitis of the left foot as per HPI, presents today with bilateral boots and bilateral thoroughly bandaged feet.  Has been changing his bandages at home - on exam, these are clean-appearing without drainage.   Denies any fever or chills since discharge.    Plan:  Will refrain from unwrapping bandages today as he is afebrile and has close f/u with podiatry - encouraged follow-up with podiatry as scheduled 4/24/2025    Type 2 diabetes mellitus with foot ulcer, without long-term current use of insulin  Lab Results   Component Value Date    HGBA1C 4.9 08/20/2024   -- current regimen includes: none  -- A1c 8.0 for years ago, but this has been WNL since that time  Orders:  -     Hemoglobin A1C; Future; Expected date: 04/21/2025  -     Lipid Panel; Future; Expected date: 04/21/2025      End stage renal disease  MWF, headed for dialysis after appointment today.   No pitting edema on exam  Will continue to monitor      Essential hypertension  Patient's blood pressure is 150/90, heart rate 98.  Already taking nifedipine 90, carvedilol 6.25 b.i.d.    -- did consider increasing carvedilol to better control heart rate and blood pressure, but patient's blood pressure is intermittently normotensive and I do not want to trigger hypotension in this patient with ESRD.      Chronic cough  Reports chronic nonproductive cough without any specific triggers  Satting 99% on RA, afebrile  Denies GERD, rhinorrhea, seasonal allergies  Plan:  Prescribed tessalon pearls as well as benzocaine lozenges PRN for chronic cough.    If these do not improve his symptoms, asked patient to reach out and we will initiate additional workup, including PFTs to r/o RAD.  Orders:  -     benzonatate (TESSALON) 100 MG capsule; Take 1 capsule (100 mg total) by mouth every 8 (eight) hours.   Dispense: 21 capsule; Refill: 0  -     benzocaine-menthoL 15-3.6 mg Lozg; 1 lozenge by Mucous Membrane route 3 (three) times daily as needed (cough).  Dispense: 36 lozenge; Refill: 3      Encounter for screening for HIV  -     HIV 1/2 Ag/Ab (4th Gen); Future; Expected date: 04/21/2025        HPI     Chief Complaint:  Hospital follow up    Emmanuel Morgan is a 39 y.o. male with multiple medical diagnoses as listed in the medical history and problem list that presents for hospital follow up.        Recent hospital encounter: Patient presented to [facility] 3/31/2025, admitted for left foot osteomyelitis.  See below Discharge Summary from 4/15/2025:  Admission Date: 3/31/2025  Hospital Length of Stay: 15 days  Discharge Date and Time: No discharge date for patient encounter.  Attending Physician: Tony Sainz MD   Discharging Provider: Tony Sainz MD  Primary Care Provider: Bebeto Hill PA-C     Primary Care Team: Networked reference to record PCT      HPI:   39-year-old male with ESRD on HD, diabetes not on insulin, prior osteomyelitis requiring right midfoot amputation with a long standing left foot ulcer (months) and a right foot ulcer of his stump which has been present for weeks who was sent to the ER by home health due to progression of wounds with skin and soft tissue infections of possibly both ulcers and possible osteomyelitis, he has been followed up by outpatient wound care agency, and was sent here by home health nurse due to nonhealing of the wound, noted with worsening erythema and purulent discharge.  Missed hemodialysis session on the day of presentation due to presentation in the emergency room.     Procedure(s) (LRB):  IRRIGATION AND DEBRIDEMENT (Bilateral)       Hospital Course:   39-year-old male with ESRD on HD, DM, prior osteomyelitis requiring right midfoot amputation with a long standing left foot ulcer (months) and a right foot ulcer of his stump which has been present for weeks  who was sent to the ER by home health due to progression of wounds with skin and soft tissue infections of possibly both ulcers and possible osteomyelitis of either.  MRI of left foot showing plantar soft tissue ulcer at the level of the 3rd MTP joint with associated osteomyelitis of the distal half of the metatarsal and base of the proximal phalanx.  On ABX's and Podiatry consulted. Patient underwent irrigation and debridement of bilateral LE's.  Nephrology was consulted for HD while inpatient.  Infectious Disease was consulted with antibiotic recommendation.  Currently  is coordinating antibiotics with HD unit.  Still having some purulent discharge and needed repeat I&D in the OR on 4/8 with some improvement in wound.  Repeat blood cultures Gram-negative.  Pulsavac was ordered for 4/12 to 4/14.  With noted improvement in wound.  Patient was discharged on IV Cefepime to be scheduled with HD till 5/13 and oral metronidazole and outpatient wound care;PT and OT.  All patient's questions were answered to his satisfaction and he verbalized understanding. Patient was asked to follow up with ID and primary care upon discharge; he was also asked to return to the hospital in the event of increased pain, fever or lethargy.  Other chronic medical conditions remained stable throughout the course of hospitalization.    ROS:  As above    Follow Up: With Podiatry as scheduled, and with Dr. Everett in 6 months to establish care      Discussed condition, and treatment.   Education sent to patient portal/included in after visit summary.  ED precautions given.   Notify provider if symptoms do not resolve or increase in severity.   Patient verbalizes understanding and agrees with plan of care.      Family and/or Caretaker present at visit? No   Medication Reconciliation:  Completed  New Prescriptions filled after discharge: Yes  Hospital encounter notes, objective/subjective data, diagnostics, and plan of care from recent  "hospital encounter reviewed: Yes  Discharge summary reviewed:  Yes  Disease/illness education: completed  Follow up appointments scheduled:  No  Follow up labs/tests ordered: No  Home Health ordered on discharge: Patient has home health established at [facility]  Establishment or re-establishment of referral orders for community resources: No  DME ordered at discharge: No  How patient is feeling since discharge from the hospital?  Reports symptoms have improved    Discussion with other health care providers: No     --------------------------------------------    Health maintenance reviewed  Health Maintenance         Date Due Completion Date    Lipid Panel Never done ---    HIV Screening Never done ---    COVID-19 Vaccine (3 - 2024-25 season) 09/01/2024 5/7/2021    Hemoglobin A1c 02/20/2025 8/20/2024    Diabetic Eye Exam 04/14/2026 4/14/2025 (Done)    Override on 4/14/2025: Done ("[facility]")    Foot Exam 04/17/2026 4/17/2025    Override on 4/17/2025: Done    TETANUS VACCINE 09/04/2034 9/4/2024    RSV Vaccine (Age 60+ and Pregnant patients) (1 - 1-dose 75+ series) 05/20/2059 ---                 Physical Exam     Vital signs reviewed.   Body mass index is 28.06 kg/m².  General:  Well-developed, well-nourished, appears stated age. NAD.  Skin:  Warm, dry. No rashes or lesions noted. Nailbeds w/o spooning, cyanosis or clubbing.  Cap refill <2s bilaterally  Head:  NC/AT   Eyes:  Conjunctivae w/o exudates or hemorrhage.  Non-icteric sclerae.  EOMI.  Ears:  External ears w/o swelling or erythema.  Nose:  Nares are patent bilaterally w/o rhinorrhea or epistaxis  Neck:  Supple w/o adenopathy or nuchal rigidity.  Trachea is midline.   Heart:  Normal S1 & S2.  No extra heart sounds.    Lungs:  CTAB without rales, rhonchi or wheezing.  Breathing comfortably on RA.  Abdomen:  Symmetric, non-distended, non-tender  Extremities:    + bilateral boots and bilateral thoroughly bandaged feet.  Has been changing his " bandages at home - on exam, these are clean-appearing without drainage. Will refrain from unwrapping bandages today as he is afebrile and has close f/u with podiatry  Neuro:  A&O4.  No obvious focal deficits.   strength 5/5 bilaterally.  Sensation to crude touch intact in bilat UE, LE.    Psychiatric:  Appropriate affect.        History     Past Medical History:  Past Medical History:   Diagnosis Date    Diabetes mellitus     Hypertension     Renal disorder        Past Surgical History:  Past Surgical History:   Procedure Laterality Date    FOOT AMPUTATION Right     IRRIGATION AND DEBRIDEMENT Bilateral 4/1/2025    Procedure: IRRIGATION AND DEBRIDEMENT;  Surgeon: Bety Faria DPM;  Location: [facility] OR;  Service: Podiatry;  Laterality: Bilateral;  need jamshidi needle available    IRRIGATION AND DEBRIDEMENT Bilateral 4/8/2025    Procedure: IRRIGATION AND DEBRIDEMENT;  Surgeon: Bety Faria DPM;  Location: [facility] OR;  Service: Podiatry;  Laterality: Bilateral;       Social History:  Social History[1]    Family History:  No family history on file.    Allergies and Medications: (updated and reviewed)  Review of patient's allergies indicates:  No Known Allergies  Current Medications[2]    Patient Care Team:  Bebeto Hill PA-C as PCP - General (Internal Medicine)  [facility] -         - The patient is given an After Visit Summary that lists all medications with directions, allergies, education, orders placed during this encounter and follow-up instructions.      - I have reviewed the patient's medical information including past medical, family, and social history sections including the medications and allergies.      - We discussed the patient's current medications.     This note was created by combination of typed  and MModal dictation.  Transcription errors may be present.  If there are any questions, please contact me.                          [1]   Social History  Socioeconomic  History    Marital status: Unknown   Tobacco Use    Smoking status: Never    Smokeless tobacco: Never   Substance and Sexual Activity    Alcohol use: Never    Drug use: Never     Social Drivers of Health     Financial Resource Strain: Low Risk  (4/1/2025)    Overall Financial Resource Strain (CARDIA)     Difficulty of Paying Living Expenses: Not hard at all   Food Insecurity: No Food Insecurity (4/1/2025)    Hunger Vital Sign     Worried About Running Out of Food in the Last Year: Never true     Ran Out of Food in the Last Year: Never true   Transportation Needs: No Transportation Needs (4/1/2025)    PRAPARE - Transportation     Lack of Transportation (Medical): No     Lack of Transportation (Non-Medical): No   Physical Activity: Sufficiently Active (9/1/2024)    Exercise Vital Sign     Days of Exercise per Week: 3 days     Minutes of Exercise per Session: 60 min   Stress: No Stress Concern Present (4/1/2025)    [employer] - Occupational Stress Questionnaire     Feeling of Stress : Not at all   Housing Stability: Low Risk  (4/1/2025)    Housing Stability Vital Sign     Unable to Pay for Housing in the Last Year: No     Number of Times Moved in the Last Year: 0     Homeless in the Last Year: No   [2]   Current Outpatient Medications   Medication Sig Dispense Refill    carvediloL (COREG) 6.25 MG tablet Take 1 tablet (6.25 mg total) by mouth 2 (two) times daily. 180 tablet 3    ceFEPIme (MAXIPIME) 2 gram injection Cefepime 2g IV every Monday, Wednesday and Friday after dialysis.      fluticasone propionate (FLONASE) 50 mcg/actuation nasal spray 2 sprays (100 mcg total) by Each Nostril route once daily. 1 mL 1    metroNIDAZOLE (FLAGYL) 500 MG tablet Take 1 tablet (500 mg total) by mouth every 12 (twelve) hours. 72 tablet 0    NIFEdipine (PROCARDIA-XL) 90 MG (OSM) 24 hr tablet Take 90 mg by mouth once daily.      benzocaine-menthoL 15-3.6 mg Lozg 1 lozenge by Mucous Membrane route 3 (three)  times daily as needed (cough). 36 lozenge 3    benzonatate (TESSALON) 100 MG capsule Take 1 capsule (100 mg total) by mouth every 8 (eight) hours. 21 capsule 0     No current facility-administered medications for this visit.

## 2025-04-21 NOTE — ASSESSMENT & PLAN NOTE
Patient's blood pressure is 150/90, heart rate 98.  Already taking nifedipine 90, carvedilol 6.25 b.i.d.    -- did consider increasing carvedilol to better control heart rate and blood pressure, but patient's blood pressure is intermittently normotensive and I do not want to trigger hypotension in this patient with ESRD.

## 2025-04-21 NOTE — ASSESSMENT & PLAN NOTE
Reports chronic nonproductive cough without any specific triggers  Satting 99% on RA, afebrile  Denies GERD, rhinorrhea, seasonal allergies  Plan:  Prescribed tessalon pearls as well as benzocaine lozenges PRN for chronic cough.    If these do not improve his symptoms, asked patient to reach out and we will initiate additional workup, including PFTs to r/o RAD.

## 2025-04-21 NOTE — ASSESSMENT & PLAN NOTE
Lab Results   Component Value Date    HGBA1C 4.9 08/20/2024   -- current regimen includes: none  -- A1c 8.0 for years ago, but this has been WNL since that time

## 2025-04-21 NOTE — PATIENT INSTRUCTIONS
Thank you for seeing me today.    Acute Bronchitis  -- May use Cepacol (benzocaine) lozenges as needed  -- A spoon of honey as needed may also alleviate sore throat, cough  -- Tessalon pearls for cough suppression  -- Please contact the office or seek immediate care if you become febrile, start producing profuse or bloody sputum, or experience any chest pain, shortness of breath or difficulty breathing.

## 2025-04-24 ENCOUNTER — TELEPHONE (OUTPATIENT)
Dept: HOME HEALTH SERVICES | Facility: CLINIC | Age: 40
End: 2025-04-24
Payer: MEDICARE

## 2025-04-24 DIAGNOSIS — N18.6 END STAGE RENAL DISEASE: ICD-10-CM

## 2025-04-24 DIAGNOSIS — L08.9 FOOT INFECTION: ICD-10-CM

## 2025-04-24 DIAGNOSIS — I10 ESSENTIAL HYPERTENSION: ICD-10-CM

## 2025-04-28 ENCOUNTER — TELEPHONE (OUTPATIENT)
Dept: HOME HEALTH SERVICES | Facility: CLINIC | Age: 40
End: 2025-04-28
Payer: MEDICARE

## 2025-04-28 ENCOUNTER — OFFICE VISIT (OUTPATIENT)
Dept: PODIATRY | Facility: CLINIC | Age: 40
End: 2025-04-28
Payer: MEDICARE

## 2025-04-28 VITALS — HEIGHT: 74 IN | BODY MASS INDEX: 28.07 KG/M2 | WEIGHT: 218.69 LBS

## 2025-04-28 DIAGNOSIS — L97.509 ULCER OF FOOT, UNSPECIFIED LATERALITY, UNSPECIFIED ULCER STAGE: Primary | ICD-10-CM

## 2025-04-28 PROCEDURE — 4010F ACE/ARB THERAPY RXD/TAKEN: CPT | Mod: CPTII,S$GLB,, | Performed by: PODIATRIST

## 2025-04-28 PROCEDURE — 3008F BODY MASS INDEX DOCD: CPT | Mod: CPTII,S$GLB,, | Performed by: PODIATRIST

## 2025-04-28 PROCEDURE — 1159F MED LIST DOCD IN RCRD: CPT | Mod: CPTII,S$GLB,, | Performed by: PODIATRIST

## 2025-04-28 PROCEDURE — 3066F NEPHROPATHY DOC TX: CPT | Mod: CPTII,S$GLB,, | Performed by: PODIATRIST

## 2025-04-28 PROCEDURE — 99213 OFFICE O/P EST LOW 20 MIN: CPT | Mod: S$GLB,,, | Performed by: PODIATRIST

## 2025-04-28 PROCEDURE — 1111F DSCHRG MED/CURRENT MED MERGE: CPT | Mod: CPTII,S$GLB,, | Performed by: PODIATRIST

## 2025-04-28 PROCEDURE — 99999 PR PBB SHADOW E&M-EST. PATIENT-LVL III: CPT | Mod: PBBFAC,,, | Performed by: PODIATRIST

## 2025-04-28 NOTE — PROGRESS NOTES
[facility] - [place] - Podiatry  Progress Note    Patient Name: Emmanuel Morgan  MRN: [id number]  Admission Date: (Not on file)  Hospital Length of Stay: 0 days  Attending Physician: No att. providers found  Primary Care Provider: Bebeto Hill PA-C     Subjective:     History of Present Illness: Emmanuel Morgan is a 39 y.o. male with  has a past medical history of Diabetes mellitus, Hypertension, and Renal disorder.  Consult to Podiatry for evaluation and treatment of bilateral foot wounds, most significant to the right Lisfranc stump.  He relates that the left foot wound is chronic in nature and the ulceration to the right foot occurred several weeks ago likely secondary to trauma versus ambulation is inappropriate shoe.  All foot surgeries surgeries done in outside facilities.  He presented to the emergency department on the advice of his home health nurse due to progression of wounds and suspicion of osteomyelitis.      4/2/25: S/p one day I&D B/L per Dr. Faria. Bandage intact no strikethrough noted.    04/04/2025 patient seen at bedside resting comfortably.  By the end of our encounter, his nephew was also present at bedside.  Patient relates that he has occasional discomfort to the right foot especially in the medial midfoot with palpation or any pressure.  He denies any pain to the left foot.  He denies nausea, vomiting, fever, chills.  Patient relates that he is concerned about his chronic but now more persistent cough and would like to discuss this concern further with the primary team.      4/7/25: Patient seen in dialysis. Bandage intact B/L     04/08/2025 patient seen at bedside resting comfortably.  No new pedal complaints     4/9/25: Patient seen bedside. B/L debridement.     4/10/2025 patient seen at bedside.  Resting comfortably.  Significant decrease in pain.  Still complaining about his cough    4/11/25: Patient seen in dialysis.Bandage intact B/L     04/17/2025 patient presents to podiatry clinic status  "post hospital discharge.  He has kept dressings clean, dry, intact.  He has been having home health come to the home.  He relates improvement in pain.  No new pedal complaints.    4/28/25: F/u B/L wounds. Reports HH coming out to change dressings.     Chief Complaint   Patient presents with    Wound Check    Diabetes Mellitus     4/21/25 Dr Hill     Scheduled Meds:      Continuous Infusions:  PRN Meds:    Review of patient's allergies indicates:  No Known Allergies     Past Medical History:   Diagnosis Date    Diabetes mellitus     Hypertension     Renal disorder      Past Surgical History:   Procedure Laterality Date    FOOT AMPUTATION Right     IRRIGATION AND DEBRIDEMENT Bilateral 4/1/2025    Procedure: IRRIGATION AND DEBRIDEMENT;  Surgeon: Bety Faria DPM;  Location: [facility] OR;  Service: Podiatry;  Laterality: Bilateral;  need jamshidi needle available    IRRIGATION AND DEBRIDEMENT Bilateral 4/8/2025    Procedure: IRRIGATION AND DEBRIDEMENT;  Surgeon: Bety Faria DPM;  Location: [facility] OR;  Service: Podiatry;  Laterality: Bilateral;       Family History    None       Tobacco Use    Smoking status: Never    Smokeless tobacco: Never   Substance and Sexual Activity    Alcohol use: Never    Drug use: Never    Sexual activity: Not on file     Review of Systems   Constitutional:  Negative for appetite change, chills, fatigue and fever.   Respiratory:  Positive for cough. Negative for shortness of breath.    Cardiovascular:  Negative for chest pain and leg swelling.   Gastrointestinal:  Negative for diarrhea, nausea and vomiting.   Musculoskeletal:  Positive for arthralgias and myalgias.   Skin:  Positive for color change and wound.   Neurological:  Positive for numbness. Negative for weakness.        + paresthesia      Objective:     Vitals:    04/28/25 0748   Weight: 99.2 kg (218 lb 11.1 oz)   Height: 6' 2" (1.88 m)   PainSc: 0-No pain       Physical Exam  Vitals and nursing note reviewed.   Constitutional:     "   General: He is not in acute distress.     Appearance: He is well-developed. He is not toxic-appearing or diaphoretic.      Comments: alert and oriented x 3.    Cardiovascular:      Pulses:           Dorsalis pedis pulses are 1+ on the right side and 1+ on the left side.        Posterior tibial pulses are 1+ on the left side.   Pulmonary:      Effort: No respiratory distress.   Musculoskeletal:      Right ankle: No tenderness. No lateral malleolus, medial malleolus, AITF ligament, CF ligament or posterior TF ligament tenderness.      Right Achilles Tendon: No defects. Clements's test negative.      Left ankle: No tenderness. No lateral malleolus, medial malleolus, AITF ligament, CF ligament or posterior TF ligament tenderness.      Left Achilles Tendon: No defects. Clements's test negative.      Right foot: Swelling and tenderness present. No bony tenderness.      Left foot: Deformity (Appearance of short 4th metatarsal secondary to previous surgery.) and tenderness present. No bony tenderness.      Comments: Muscle strength is 5/5 in all groups bilaterally.              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 (midfoot amp)  Feet:      Right foot:      Skin integrity: Ulcer present.      Left foot:      Skin integrity: Ulcer present.   Lymphadenopathy:      Comments: No lymphatic streaking     Skin:     General: Skin is warm and dry.      Coloration: Skin is not pale.      Findings: No rash.      Nails: There is no clubbing.   Neurological:      Sensory: No sensory deficit.      Motor: No atrophy.      Comments: Light touch present     Psychiatric:         Attention and Perception: He is attentive.         Mood and Affect: Mood is not anxious. Affect is not inappropriate.         Speech: He is communicative. Speech is not slurred.         Behavior: Behavior is not combative.       04/17/2025 4/11/25:            4/10/2025                  4/9/25:  Scant seropurulent drainage right  medial foot incision             4/7/25:        04/04/2025     Stable ulceration sub 3rd metatarsophalangeal joint of the left foot without acute signs of infection but with deep probe to bone      Plantar right foot ulcer with significant purulent drainage particularly when milking from the medial midfoot.  Palpation to the medial midfoot is significantly tender.        Post wound debridement with opening of the tract between the 2 plantar wounds            4/2/25:  No purulent drainage noted.             04/01/2025    Right plantar lateral midfoot with periwound bulla formation and localized edema and erythema.  Exquisitely tender on palpation.  Fibro necrotic wound bed         Left sub 3rd metatarsophalangeal joint with deep probe to bone, fibrogranular wound bed with periwound callus.            Assessment/Plan:     1. Ulcer of foot, unspecified laterality, unspecified ulcer stage  Ambulatory referral/consult to Wound Clinic          Education about the prevention of limb loss.    S/p  repeat irrigation and debridement of both feet DOS: 4/1/25, 4/8/25    Discussed wound healing cycle, skin integrity, ways to care for skin.Counseled patient on the effects of biomechanical pressure and deformity as well as blood glucose on healing. He verbalizes understanding that it can increase the chances of delayed healing and this prolonged exposure leads to infection or progression of infection which subsequently can result in loss of limb.    Abx per ID    DSD applied bilateral    Sutures removed Left foot.     Return to clinic in 1 week, sooner PRN    Referral placed to wound care.     We will continue to follow and work in partnership with treatment team on how to best treat patient concern and diagnosis.      Michelle Brown DPM  Podiatry  [facility] - [place], [place]

## 2025-04-30 ENCOUNTER — TELEPHONE (OUTPATIENT)
Dept: INFECTIOUS DISEASES | Facility: CLINIC | Age: 40
End: 2025-04-30
Payer: MEDICARE

## 2025-04-30 ENCOUNTER — LAB REQUISITION (OUTPATIENT)
Dept: LAB | Facility: HOSPITAL | Age: 40
End: 2025-04-30
Payer: MEDICARE

## 2025-04-30 DIAGNOSIS — M86.9 OSTEOMYELITIS, UNSPECIFIED: ICD-10-CM

## 2025-04-30 LAB
ABSOLUTE EOSINOPHIL (OHS): 0.4 K/UL
ABSOLUTE MONOCYTE (OHS): 1.62 K/UL (ref 0.3–1)
ABSOLUTE NEUTROPHIL COUNT (OHS): 5.18 K/UL (ref 1.8–7.7)
ALBUMIN SERPL BCP-MCNC: 2.9 G/DL (ref 3.5–5.2)
ALP SERPL-CCNC: 72 UNIT/L (ref 40–150)
ALT SERPL W/O P-5'-P-CCNC: 8 UNIT/L (ref 10–44)
ANION GAP (OHS): 10 MMOL/L (ref 8–16)
AST SERPL-CCNC: 11 UNIT/L (ref 11–45)
BASOPHILS # BLD AUTO: 0.07 K/UL
BASOPHILS NFR BLD AUTO: 0.7 %
BILIRUB SERPL-MCNC: 0.5 MG/DL (ref 0.1–1)
BUN SERPL-MCNC: 55 MG/DL (ref 6–20)
CALCIUM SERPL-MCNC: 10.5 MG/DL (ref 8.7–10.5)
CHLORIDE SERPL-SCNC: 98 MMOL/L (ref 95–110)
CO2 SERPL-SCNC: 28 MMOL/L (ref 23–29)
CREAT SERPL-MCNC: 11.5 MG/DL (ref 0.5–1.4)
CRP SERPL-MCNC: 11.7 MG/L
ERYTHROCYTE [DISTWIDTH] IN BLOOD BY AUTOMATED COUNT: 17.2 % (ref 11.5–14.5)
GFR SERPLBLD CREATININE-BSD FMLA CKD-EPI: 5 ML/MIN/1.73/M2
GLUCOSE SERPL-MCNC: 83 MG/DL (ref 70–110)
HCT VFR BLD AUTO: 33.7 % (ref 40–54)
HGB BLD-MCNC: 10.2 GM/DL (ref 14–18)
IMM GRANULOCYTES # BLD AUTO: 0.13 K/UL (ref 0–0.04)
IMM GRANULOCYTES NFR BLD AUTO: 1.2 % (ref 0–0.5)
LYMPHOCYTES # BLD AUTO: 3.14 K/UL (ref 1–4.8)
MCH RBC QN AUTO: 27.9 PG (ref 27–31)
MCHC RBC AUTO-ENTMCNC: 30.3 G/DL (ref 32–36)
MCV RBC AUTO: 92 FL (ref 82–98)
NUCLEATED RBC (/100WBC) (OHS): 0 /100 WBC
PLATELET # BLD AUTO: 361 K/UL (ref 150–450)
PMV BLD AUTO: 10.3 FL (ref 9.2–12.9)
POTASSIUM SERPL-SCNC: 6.5 MMOL/L (ref 3.5–5.1)
PROT SERPL-MCNC: 9.3 GM/DL (ref 6–8.4)
RBC # BLD AUTO: 3.66 M/UL (ref 4.6–6.2)
RELATIVE EOSINOPHIL (OHS): 3.8 %
RELATIVE LYMPHOCYTE (OHS): 29.8 % (ref 18–48)
RELATIVE MONOCYTE (OHS): 15.4 % (ref 4–15)
RELATIVE NEUTROPHIL (OHS): 49.1 % (ref 38–73)
SODIUM SERPL-SCNC: 136 MMOL/L (ref 136–145)
WBC # BLD AUTO: 10.54 K/UL (ref 3.9–12.7)

## 2025-04-30 PROCEDURE — 86140 C-REACTIVE PROTEIN: CPT | Performed by: INTERNAL MEDICINE

## 2025-04-30 PROCEDURE — 85025 COMPLETE CBC W/AUTO DIFF WBC: CPT | Performed by: INTERNAL MEDICINE

## 2025-04-30 PROCEDURE — 80053 COMPREHEN METABOLIC PANEL: CPT | Performed by: INTERNAL MEDICINE

## 2025-04-30 NOTE — TELEPHONE ENCOUNTER
Called pt regarding elevated K 6.5. He is asymptomatic. States he is about to go to HD. Recommend he let them know about elevated K, so they can repeat labs. If unable to make it to HD pt knows to go to emergency room.

## 2025-05-01 ENCOUNTER — TELEPHONE (OUTPATIENT)
Dept: HOME HEALTH SERVICES | Facility: CLINIC | Age: 40
End: 2025-05-01

## 2025-05-01 NOTE — TELEPHONE ENCOUNTER
Attempted to contact patient to confirm medical home visit with Leigha Alcantara NP on today. NO answer. NP is looking to arrive by 1:30p as the lastest arrival time.     Left voice message for patient to call back to confirm.

## 2025-05-06 ENCOUNTER — HOSPITAL ENCOUNTER (OUTPATIENT)
Dept: WOUND CARE | Facility: HOSPITAL | Age: 40
Discharge: HOME | End: 2025-05-06
Attending: FAMILY MEDICINE
Payer: MEDICARE

## 2025-05-06 VITALS — TEMPERATURE: 97 F | HEART RATE: 102 BPM | DIASTOLIC BLOOD PRESSURE: 94 MMHG | SYSTOLIC BLOOD PRESSURE: 155 MMHG

## 2025-05-06 DIAGNOSIS — L97.509 ULCER OF FOOT, UNSPECIFIED LATERALITY, UNSPECIFIED ULCER STAGE: ICD-10-CM

## 2025-05-06 PROCEDURE — 99213 OFFICE O/P EST LOW 20 MIN: CPT | Performed by: FAMILY MEDICINE

## 2025-05-06 NOTE — PROGRESS NOTES
[facility]                Progress Note    Subjective:       Patient ID: Emmanuel Morgan is a 39 y.o. male.    Chief Complaint: Wound Check    Admit wound care visit for bilateral plantar foot wounds.Patient wearing darco shoe to the left foot and prosthetic to the right foot. Patient  presents with extensive medial history of DM-2, Hypertension, End Stage Renal Disease with dialysis three times a week on Monday, Wednesday, and Friday. Patient reports he does not smoke. Patient reports right foot toes amputation  in 2017. Patient reports about 6 weeks ago he was hospitalized for non healing wounds. Patient reports he had surgery on the right foot and skin graft applied on the left foot wound. Patient ambulated unaided to [facility] with nurse at side. Patient denies fever, chills, nausea, vomiting or diarrhea at present. Patient denies pain to wound beds at present. Patient reports having any issues with dressing  since last visit. Patient reports the dressing was coming off to the right foot there fore he made adjustment to the dressing. Dressing appears without strike through drainage. Dressing removed & wound cleaned by nurse. Dressing  was discarded. Topical Lidocaine 5% ointment applied to wound bed and allowed to absorb for 15 minutes for patient comfort. Dressing change order received; applied per MD orders. Patient reports the dressings will come off  when he is sleeping; he request coban. Light coban applied to bilateral feet with MD's approval. Patient will return to [facility] in 1 week. AVS printed and given to patient with print out of all future scheduled appointments.     MD note:  patient presenting today for bilateral plantar DFU's.  He reports he has been receiving care from Dr. Faria until she went out on leave.  He states that he is trying to offload his wounds as much as possible, but he does have to work.  He is also on dialysis three days a week.  Patient  "reports that his blood sugars have been doing well.  No fevers, chills, or sweats.  He states that the right foot was draining in the past, but hasn't for over a month now.  There has been no significant drainage from the left foot that he is aware of.  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  He denies any odor from the wounds.        Wound Check      Review of Systems   Constitutional: Negative.    HENT: Negative.     Eyes: Negative.    Respiratory: Negative.     Cardiovascular:  Positive for leg swelling. Negative for chest pain and palpitations.   Gastrointestinal: Negative.    Musculoskeletal: Negative.    Skin:  Positive for wound.         Objective:        Physical Exam  Constitutional:       Appearance: Normal appearance.   HENT:      Head: Normocephalic and atraumatic.      Right Ear: External ear normal.      Left Ear: External ear normal.      Mouth/Throat:      Mouth: Mucous membranes are moist.      Pharynx: Oropharynx is clear.   Eyes:      Extraocular Movements: Extraocular movements intact.      Conjunctiva/sclera: Conjunctivae normal.   Pulmonary:      Effort: Pulmonary effort is normal. No respiratory distress.   Abdominal:      General: There is no distension.      Palpations: Abdomen is soft.   Skin:     General: Skin is warm.      Comments: +bilateral plantar DFU with excess callous and slough requiring debridement; no periwound erythema; no active drainage on exam   Neurological:      Mental Status: He is alert.         Vitals:    05/06/25 1317   BP: (!) 155/94   Pulse: 102   Temp: 96.8 °F (36 °C)     Debridement    Date/Time: 5/6/2025 1:00 PM    Performed by: Ayaan Everett MD  Authorized by: Ayaan Everett MD    Time out: Immediately prior to procedure a "time out" was called to verify the correct patient, procedure, equipment, support staff and site/side marked as required.    Consent Done?:  Yes (Written)  Local anesthesia used?: No      Wound Details:    Location:  Right " foot    Location:  Right Plantar    Type of Debridement:  Excisional       Length (cm):  3       Width (cm):  1.7       Depth (cm):  0.1       Area (sq cm):  4.01       Percent Debrided (%):  100       Total Area Debrided (sq cm):  4.01    Depth of debridement:  Subcutaneous tissue    Tissue debrided:  Dermis, Epidermis and Subcutaneous    Devitalized tissue debrided:  Biofilm, Fibrin and Slough    Instruments:  Curette  Bleeding:  Minimal  Hemostasis Achieved: Yes  Method Used:  Pressure    Additional wounds:  1    2nd Wound Details:     Location:  Left foot    Location:  Left Plantar    Location:  Left Plantar    Type of Debridement:  Excisional       Length (cm):  1.1       Area (sq cm):  0.6       Width (cm):  0.7       Percent Debrided (%):  100       Depth (cm):  0.4       Total Area Debrided (sq cm):  0.6    Depth of debridement:  Subcutaneous tissue    Tissue debrided:  Dermis, Epidermis and Subcutaneous    Devitalized tissue debrided:  Callus, Biofilm and Slough    Instruments:  Curette  Bleeding:  Minimal  Hemostasis Achieved: Yes    Method Used:  Pressure  Patient tolerance:  Patient tolerated the procedure well with no immediate complications      Assessment:           ICD-10-CM ICD-9-CM   1. Ulcer of foot, unspecified laterality, unspecified ulcer stage  L97.509 707.15            Wound 04/01/25 1600 Ulceration Right Foot (Active)   04/01/25 1600 Foot   Present on Original Admission: Y   Primary Wound Type: Ulceration   Side: Right   Orientation:    Wound Approximate Age at First Assessment (Weeks):    Wound Number:    Is this injury device related?:    Incision Type:    Closure Method:    Wound Description (Comments):    Type:    Additional Comments: betadine soaked adaptic, 4x4s, cast padding, curlex, coban   Ankle-Brachial Index:    Pulses:    Removal Indication and Assessment:    Wound Outcome:    Wound Image   05/06/25 1356   Dressing Appearance Intact;Moist drainage 05/06/25 1356   Drainage Amount  Large 05/06/25 1356   Drainage Characteristics/Odor Serosanguineous;No odor 05/06/25 1356   Appearance Pink;Red;Slough;Fibrin;Moist 05/06/25 1356   Tissue loss description Partial thickness 05/06/25 1356   Black (%), Wound Tissue Color 0 % 05/06/25 1356   Red (%), Wound Tissue Color 90 % 05/06/25 1356   Yellow (%), Wound Tissue Color 10 % 05/06/25 1356   Periwound Area Moist 05/06/25 1356   Wound Edges Defined 05/06/25 1356   Wound Length (cm) 3 cm 05/06/25 1356   Wound Width (cm) 1.7 cm 05/06/25 1356   Wound Depth (cm) 0.1 cm 05/06/25 1356   Wound Volume (cm^3) 0.267 cm^3 05/06/25 1356   Wound Surface Area (cm^2) 4.01 cm^2 05/06/25 1356   Tunneling (depth (cm)/location) 0 05/06/25 1356   Undermining (depth (cm)/location) 0 05/06/25 1356   Care Cleansed with:;Antimicrobial agent;Sterile normal saline 05/06/25 1356   Dressing Applied 05/06/25 1356   Periwound Care Skin barrier film applied 05/06/25 1356   Off Loading Football dressing;Off loading shoe 05/06/25 1356   Dressing Change Due 05/13/25 05/06/25 1356            Wound 04/08/25 1414 Ulceration Left Foot (Active)   04/08/25 1414 Foot   Present on Original Admission: Y   Primary Wound Type: Ulceration   Side: Left   Orientation:    Wound Approximate Age at First Assessment (Weeks):    Wound Number:    Is this injury device related?:    Incision Type:    Closure Method: Sutures;Other (see comments)   Wound Description (Comments):    Type:    Additional Comments: 1 open woundxGRAFT, ADAPITIC, MASTISOL, STERI STRIPS, 4X4, CAST PADDING,KERLEX, COBAN   Ankle-Brachial Index:    Pulses:    Removal Indication and Assessment:    Wound Outcome:    Wound Image   05/06/25 1356   Dressing Appearance Intact;Moist drainage 05/06/25 1356   Drainage Amount Small 05/06/25 1356   Drainage Characteristics/Odor Serosanguineous;No odor 05/06/25 1356   Appearance Pink;Red;Moist 05/06/25 1356   Tissue loss description Full thickness 05/06/25 1356   Black (%), Wound Tissue Color 0 %  "05/06/25 1356   Red (%), Wound Tissue Color 100 % 05/06/25 1356   Yellow (%), Wound Tissue Color 0 % 05/06/25 1356   Periwound Area Moist 05/06/25 1356   Wound Edges Callused 05/06/25 1356   Wound Length (cm) 1.1 cm 05/06/25 1356   Wound Width (cm) 0.7 cm 05/06/25 1356   Wound Depth (cm) 0.4 cm 05/06/25 1356   Wound Volume (cm^3) 0.161 cm^3 05/06/25 1356   Wound Surface Area (cm^2) 0.6 cm^2 05/06/25 1356   Undermining (depth (cm)/location) 7-12 o'clock 0.5 cm 05/06/25 1356   Care Antimicrobial agent;Sterile normal saline 05/06/25 1356   Dressing Applied 05/06/25 1356   Periwound Care Skin barrier film applied 05/06/25 1356   Off Loading Football dressing;Off loading shoe 05/06/25 1356   Dressing Change Due 05/13/25 05/06/25 1356           Plan:              Tissue pathology and/or culture taken     [] Yes      [x] No  Sharp debridement performed                   [x] Yes       [] No  Labs ordered     [] Yes       [x] No  Imaging ordered    [] Yes      [x] No    Orders Placed This Encounter   Procedures    Debridement     This order was created via procedure documentation     Standing Status:   Standing     Number of Occurrences:   1    Ambulatory referral/consult to Wound Clinic     Standing Status:   Standing     Number of Occurrences:   1     Referral Priority:   Routine     Referral Type:   Consultation     Referral Reason:   Specialty Services Required     Requested Specialty:   Wound Care     Number of Visits Requested:   1    Change dressing     Wound Dressing Orders    Dressing change frequency once a week and prn Nurse Visits  Remove old dressing  Cleanse or irrigate with: Normal Saline  Protect periwound with:  periwound: Cavilon     Right and Left Plantar Foot Wounds  Primary dressing: WOUND BASE: Endoform  Secondary dressing: Drawtex - size used: 4" x 4" ( use 1),then Mextra,Football ( cast padding x 2)   Off-load: CAM walker boot to right foot, Darco shoe to left foot.  Compression: Light Coban ( per " patient's request )    Return to clinic in 1 week.        Follow up in about 1 week (around 5/13/2025) for Wound Care.     Ayaan Everett MD

## 2025-05-08 ENCOUNTER — LAB REQUISITION (OUTPATIENT)
Dept: LAB | Facility: HOSPITAL | Age: 40
End: 2025-05-08
Payer: MEDICARE

## 2025-05-08 DIAGNOSIS — N18.6 END STAGE RENAL DISEASE: ICD-10-CM

## 2025-05-08 LAB
ABSOLUTE EOSINOPHIL (OHS): 0.3 K/UL
ABSOLUTE MONOCYTE (OHS): 1.1 K/UL (ref 0.3–1)
ABSOLUTE NEUTROPHIL COUNT (OHS): 5.76 K/UL (ref 1.8–7.7)
ALBUMIN SERPL BCP-MCNC: 3.1 G/DL (ref 3.5–5.2)
ALP SERPL-CCNC: 70 UNIT/L (ref 40–150)
ALT SERPL W/O P-5'-P-CCNC: 7 UNIT/L (ref 10–44)
ANION GAP (OHS): 12 MMOL/L (ref 8–16)
AST SERPL-CCNC: 10 UNIT/L (ref 11–45)
BASOPHILS # BLD AUTO: 0.03 K/UL
BASOPHILS NFR BLD AUTO: 0.3 %
BILIRUB SERPL-MCNC: 0.5 MG/DL (ref 0.1–1)
BUN SERPL-MCNC: 46 MG/DL (ref 6–20)
CALCIUM SERPL-MCNC: 9.6 MG/DL (ref 8.7–10.5)
CHLORIDE SERPL-SCNC: 100 MMOL/L (ref 95–110)
CO2 SERPL-SCNC: 26 MMOL/L (ref 23–29)
CREAT SERPL-MCNC: 8.6 MG/DL (ref 0.5–1.4)
CRP SERPL-MCNC: 19.8 MG/L
ERYTHROCYTE [DISTWIDTH] IN BLOOD BY AUTOMATED COUNT: 18 % (ref 11.5–14.5)
GFR SERPLBLD CREATININE-BSD FMLA CKD-EPI: 7 ML/MIN/1.73/M2
GLUCOSE SERPL-MCNC: 185 MG/DL (ref 70–110)
HCT VFR BLD AUTO: 33.1 % (ref 40–54)
HGB BLD-MCNC: 10.3 GM/DL (ref 14–18)
IMM GRANULOCYTES # BLD AUTO: 0.07 K/UL (ref 0–0.04)
IMM GRANULOCYTES NFR BLD AUTO: 0.7 % (ref 0–0.5)
LYMPHOCYTES # BLD AUTO: 2.14 K/UL (ref 1–4.8)
MCH RBC QN AUTO: 28.8 PG (ref 27–31)
MCHC RBC AUTO-ENTMCNC: 31.1 G/DL (ref 32–36)
MCV RBC AUTO: 93 FL (ref 82–98)
NUCLEATED RBC (/100WBC) (OHS): 0 /100 WBC
PLATELET # BLD AUTO: 257 K/UL (ref 150–450)
PMV BLD AUTO: 10 FL (ref 9.2–12.9)
POTASSIUM SERPL-SCNC: 5.1 MMOL/L (ref 3.5–5.1)
PROT SERPL-MCNC: 9.2 GM/DL (ref 6–8.4)
RBC # BLD AUTO: 3.58 M/UL (ref 4.6–6.2)
RELATIVE EOSINOPHIL (OHS): 3.2 %
RELATIVE LYMPHOCYTE (OHS): 22.8 % (ref 18–48)
RELATIVE MONOCYTE (OHS): 11.7 % (ref 4–15)
RELATIVE NEUTROPHIL (OHS): 61.3 % (ref 38–73)
SODIUM SERPL-SCNC: 138 MMOL/L (ref 136–145)
WBC # BLD AUTO: 9.4 K/UL (ref 3.9–12.7)

## 2025-05-08 PROCEDURE — 86140 C-REACTIVE PROTEIN: CPT | Performed by: INTERNAL MEDICINE

## 2025-05-08 PROCEDURE — 84450 TRANSFERASE (AST) (SGOT): CPT | Performed by: INTERNAL MEDICINE

## 2025-05-08 PROCEDURE — 85025 COMPLETE CBC W/AUTO DIFF WBC: CPT | Performed by: INTERNAL MEDICINE

## 2025-05-13 ENCOUNTER — HOSPITAL ENCOUNTER (OUTPATIENT)
Dept: WOUND CARE | Facility: HOSPITAL | Age: 40
Discharge: HOME | End: 2025-05-13
Attending: FAMILY MEDICINE
Payer: MEDICARE

## 2025-05-13 ENCOUNTER — PATIENT MESSAGE (OUTPATIENT)
Dept: WOUND CARE | Facility: HOSPITAL | Age: 40
End: 2025-05-13
Payer: MEDICARE

## 2025-05-13 ENCOUNTER — HOSPITAL ENCOUNTER (OUTPATIENT)
Dept: RADIOLOGY | Facility: HOSPITAL | Age: 40
Discharge: HOME | End: 2025-05-13
Attending: FAMILY MEDICINE
Payer: MEDICARE

## 2025-05-13 VITALS
TEMPERATURE: 98 F | RESPIRATION RATE: 18 BRPM | SYSTOLIC BLOOD PRESSURE: 128 MMHG | HEART RATE: 120 BPM | DIASTOLIC BLOOD PRESSURE: 80 MMHG

## 2025-05-13 DIAGNOSIS — E11.621 TYPE 2 DIABETES MELLITUS WITH FOOT ULCER: ICD-10-CM

## 2025-05-13 DIAGNOSIS — R05.9 COUGH, UNSPECIFIED TYPE: ICD-10-CM

## 2025-05-13 DIAGNOSIS — R05.9 COUGH, UNSPECIFIED TYPE: Primary | ICD-10-CM

## 2025-05-13 DIAGNOSIS — L97.509 TYPE 2 DIABETES MELLITUS WITH FOOT ULCER: ICD-10-CM

## 2025-05-13 DIAGNOSIS — L08.9 FOOT INFECTION: ICD-10-CM

## 2025-05-13 DIAGNOSIS — M86.272 SUBACUTE OSTEOMYELITIS OF LEFT FOOT: ICD-10-CM

## 2025-05-13 DIAGNOSIS — J18.9 COMMUNITY ACQUIRED PNEUMONIA, UNSPECIFIED LATERALITY: Primary | ICD-10-CM

## 2025-05-13 PROCEDURE — 71046 X-RAY EXAM CHEST 2 VIEWS: CPT | Mod: 26,,, | Performed by: RADIOLOGY

## 2025-05-13 PROCEDURE — 99215 OFFICE O/P EST HI 40 MIN: CPT | Mod: ,,, | Performed by: FAMILY MEDICINE

## 2025-05-13 PROCEDURE — 71046 X-RAY EXAM CHEST 2 VIEWS: CPT | Mod: TC,FY

## 2025-05-13 PROCEDURE — 99213 OFFICE O/P EST LOW 20 MIN: CPT | Performed by: FAMILY MEDICINE

## 2025-05-13 RX ORDER — LEVOFLOXACIN 250 MG/1
TABLET, FILM COATED ORAL
Qty: 15 TABLET | Refills: 0 | Status: SHIPPED | OUTPATIENT
Start: 2025-05-13

## 2025-05-13 NOTE — PROGRESS NOTES
"[facility]                Progress Note    Subjective:       Patient ID: Emmanuel Morgan is a 39 y.o. male.    Chief Complaint: Wound Care    Pt arrived to [facility] ambulating with Cam walking boot to Rt TMA and Darco to Lt foot. Pt denies any fever, chills, or flu-like symptoms; denies pain/discomfort at current time. Pt reports not having any issues with drsg since last visit. While doing initial assessment pt begins coughing; asked how long he had this cough; pt states 'a while now'. Pt states he's been to 'doctors and ERs and they can't figure out what's wrong with me'; also states 'I've been like this for about a month' and 'because of my insurance I can afford the medications they want me to take'. Drsg removed & wound cleaned by RN. During removal of dressing pt cough aggressively and vomiting into emesis bag; roughly 100ml of tan colored emesis. Notified MD of findings. Changes made to drsg order; RN applied per MD orders. MD also ordering CXR for pt to complete today. Pt will return to [facility] in 1 wk. AVS printed for pt.    MD note:  patient presenting today for follow up of bilateral DFU.  There has been no fevers, but he reports a cough that has been going on for "a while".  His cough is severe and he is inducing emesis.  No blood in emesis.  He states that he feels ok overall, but appears ill.  He states that he has kept the dressings in place, clean ,and dry.        Review of Systems   Constitutional:  Positive for fatigue.   HENT:  Negative for congestion, facial swelling and sneezing.    Eyes: Negative.    Respiratory:  Positive for cough. Negative for shortness of breath.    Cardiovascular: Negative.    Gastrointestinal:  Positive for vomiting.   Skin:  Positive for wound.         Objective:        Physical Exam  Constitutional:       Appearance: Normal appearance. He is ill-appearing.   HENT:      Head: Normocephalic and atraumatic.      Mouth/Throat:      " Mouth: Mucous membranes are moist.      Pharynx: Oropharynx is clear.   Cardiovascular:      Rate and Rhythm: Regular rhythm. Tachycardia present.   Pulmonary:      Effort: Pulmonary effort is normal. No respiratory distress.      Breath sounds: No rhonchi.   Skin:     General: Skin is warm.      Comments: +bilateral DFU's with excess callous; no excessive meaceration; no active drainage on exam   Neurological:      Mental Status: He is oriented to person, place, and time. Mental status is at baseline.         Vitals:    05/13/25 0856   BP: 128/80   Pulse: (!) 120   Resp: 18   Temp: 98.3 °F (36.8 °C)       Assessment:           ICD-10-CM ICD-9-CM   1. Cough, unspecified type  R05.9 786.2   2. Type 2 diabetes mellitus with foot ulcer  E11.621 250.80    L97.509 707.15   3. Subacute osteomyelitis of left foot  M86.272 730.07   4. Foot infection  L08.9 686.9            Wound 04/01/25 1600 Ulceration Right plantar Foot (Active)   04/01/25 1600 Foot   Present on Original Admission: Y   Primary Wound Type: Ulceration   Side: Right   Orientation: plantar   Wound Approximate Age at First Assessment (Weeks):    Wound Number:    Is this injury device related?:    Incision Type:    Closure Method:    Wound Description (Comments):    Type:    Additional Comments: betadine soaked adaptic, 4x4s, cast padding, curlex, coban   Ankle-Brachial Index:    Pulses:    Removal Indication and Assessment:    Wound Outcome:    Wound Image   05/13/25 0938   Dressing Appearance Moist drainage 05/13/25 0938   Drainage Amount Moderate 05/13/25 0938   Drainage Characteristics/Odor Serosanguineous 05/13/25 0938   Appearance Pink;Red 05/13/25 0938   Tissue loss description Full thickness 05/13/25 0938   Black (%), Wound Tissue Color 0 % 05/13/25 0938   Red (%), Wound Tissue Color 100 % 05/13/25 0938   Yellow (%), Wound Tissue Color 0 % 05/13/25 0938   Periwound Area Intact 05/13/25 0938   Wound Edges Callused 05/13/25 0938   Wound Length (cm) 3 cm  05/13/25 0938   Wound Width (cm) 1.5 cm 05/13/25 0938   Wound Depth (cm) 0.2 cm 05/13/25 0938   Wound Volume (cm^3) 0.471 cm^3 05/13/25 0938   Wound Surface Area (cm^2) 3.53 cm^2 05/13/25 0938   Tunneling (depth (cm)/location) 0 05/13/25 0938   Undermining (depth (cm)/location) 0 05/13/25 0938   Care Cleansed with:;Sterile normal saline 05/13/25 0938   Dressing Applied 05/13/25 0938   Periwound Care Moisture barrier applied 05/13/25 0938   Off Loading Football dressing 05/13/25 0938            Wound 04/08/25 1414 Ulceration Left plantar Foot (Active)   04/08/25 1414 Foot   Present on Original Admission: Y   Primary Wound Type: Ulceration   Side: Left   Orientation: plantar   Wound Approximate Age at First Assessment (Weeks):    Wound Number:    Is this injury device related?:    Incision Type:    Closure Method: Sutures;Other (see comments)   Wound Description (Comments):    Type:    Additional Comments: 1 open woundxGRAFT, ADAPITIC, MASTISOL, STERI STRIPS, 4X4, CAST PADDING,KERLEX, COBAN   Ankle-Brachial Index:    Pulses:    Removal Indication and Assessment:    Wound Outcome:    Wound Image   05/13/25 0938   Dressing Appearance Moist drainage 05/13/25 0938   Drainage Amount Moderate 05/13/25 0938   Drainage Characteristics/Odor Serosanguineous 05/13/25 0938   Appearance Red;Pink 05/13/25 0938   Tissue loss description Full thickness 05/13/25 0938   Black (%), Wound Tissue Color 0 % 05/13/25 0938   Red (%), Wound Tissue Color 100 % 05/13/25 0938   Yellow (%), Wound Tissue Color 0 % 05/13/25 0938   Periwound Area Intact;Macerated 05/13/25 0938   Wound Edges Callused 05/13/25 0938   Wound Length (cm) 1 cm 05/13/25 0938   Wound Width (cm) 0.8 cm 05/13/25 0938   Wound Depth (cm) 0.4 cm 05/13/25 0938   Wound Volume (cm^3) 0.168 cm^3 05/13/25 0938   Wound Surface Area (cm^2) 0.63 cm^2 05/13/25 0938   Tunneling (depth (cm)/location) 0 05/13/25 0938   Undermining (depth (cm)/location) 0 05/13/25 0938   Care Cleansed  with:;Sterile normal saline 05/13/25 0938   Dressing Applied 05/13/25 0938   Periwound Care Moisture barrier applied 05/13/25 0938   Off Loading Football dressing 05/13/25 0938           Plan:              Tissue pathology and/or culture taken     [] Yes      [x] No  Sharp debridement performed                   [] Yes       [x] No  Labs ordered     [] Yes       [x] No  Imaging ordered    [x] Yes      [] No    Orders Placed This Encounter   Procedures    X-Ray Chest PA And Lateral     Standing Status:   Future     Number of Occurrences:   1     Expected Date:   5/13/2025     Expiration Date:   5/13/2026     Reason for Exam::   cough induced emesis    Change dressing     WOUND CARE ORDERS    Leave dressing in place for 1 week; ok for PRN nurse visit if needed.     To Rt Foot & Lt Foot wound:     Clean wound with NS. Clean with Vashe soaked gauze.   Periwound: Cavilon   Dressing: Endoform to wound bed, then Hydrofera Blue TRANSFER, back with Mextra.   Offloading: Foam x2 in T shape fashion, overlapping over wound beds. Football drsg (cast padding x3, coban). Darco to Lt Foot & Cam walking boot to Rt Foot.    Pt will return to [facility] in 1 wk.   Please contact [facility] ([phone]) for any acute changes.  If after clinic hours or over the weekend, please go to the nearest ER for evaluation.     Xrays done today showed possible bilateral pneumonia  Levaquin sent in to treat based renal dosing  He was sent a message about getting antibiotics and to go to the ED should symptoms worsen  Dressings placed and he is to call with any concerns   Follow up in about 1 week (around 5/20/2025) for Wound Care.     Ayaan Everett MD

## 2025-05-16 ENCOUNTER — OFFICE VISIT (OUTPATIENT)
Dept: URGENT CARE | Facility: CLINIC | Age: 40
End: 2025-05-16
Payer: MEDICARE

## 2025-05-16 VITALS
DIASTOLIC BLOOD PRESSURE: 67 MMHG | SYSTOLIC BLOOD PRESSURE: 93 MMHG | TEMPERATURE: 99 F | WEIGHT: 218 LBS | HEIGHT: 74 IN | OXYGEN SATURATION: 99 % | RESPIRATION RATE: 19 BRPM | BODY MASS INDEX: 27.98 KG/M2 | HEART RATE: 106 BPM

## 2025-05-16 DIAGNOSIS — R10.13 EPIGASTRIC PAIN: Primary | ICD-10-CM

## 2025-05-16 DIAGNOSIS — K21.9 GASTROESOPHAGEAL REFLUX DISEASE, UNSPECIFIED WHETHER ESOPHAGITIS PRESENT: ICD-10-CM

## 2025-05-16 LAB
OHS QRS DURATION: 88 MS
OHS QTC CALCULATION: 488 MS

## 2025-05-16 PROCEDURE — 93005 ELECTROCARDIOGRAM TRACING: CPT | Mod: S$GLB,,,

## 2025-05-16 PROCEDURE — 71046 X-RAY EXAM CHEST 2 VIEWS: CPT | Mod: S$GLB,,, | Performed by: RADIOLOGY

## 2025-05-16 PROCEDURE — 93010 ELECTROCARDIOGRAM REPORT: CPT | Mod: S$GLB,,, | Performed by: INTERNAL MEDICINE

## 2025-05-16 RX ORDER — ALUMINUM HYDROXIDE, MAGNESIUM HYDROXIDE, AND SIMETHICONE 1200; 120; 1200 MG/30ML; MG/30ML; MG/30ML
30 SUSPENSION ORAL
Status: COMPLETED | OUTPATIENT
Start: 2025-05-16 | End: 2025-05-16

## 2025-05-16 RX ORDER — SUCRALFATE 1 G/10ML
1 SUSPENSION ORAL 3 TIMES DAILY
Qty: 414 ML | Refills: 0 | Status: SHIPPED | OUTPATIENT
Start: 2025-05-16

## 2025-05-16 RX ORDER — LIDOCAINE HYDROCHLORIDE 20 MG/ML
10 SOLUTION OROPHARYNGEAL
Status: COMPLETED | OUTPATIENT
Start: 2025-05-16 | End: 2025-05-16

## 2025-05-16 RX ORDER — PANTOPRAZOLE SODIUM 40 MG/1
40 TABLET, DELAYED RELEASE ORAL DAILY
Qty: 30 TABLET | Refills: 0 | Status: SHIPPED | OUTPATIENT
Start: 2025-05-16 | End: 2025-06-15

## 2025-05-16 RX ADMIN — LIDOCAINE HYDROCHLORIDE 10 ML: 20 SOLUTION OROPHARYNGEAL at 09:05

## 2025-05-16 RX ADMIN — ALUMINUM HYDROXIDE, MAGNESIUM HYDROXIDE, AND SIMETHICONE 30 ML: 1200; 120; 1200 SUSPENSION ORAL at 09:05

## 2025-05-16 NOTE — PROGRESS NOTES
"Subjective:      Patient ID: Emmanuel Morgan is a 39 y.o. male.    Vitals:  height is 6' 2" (1.88 m) and weight is 98.9 kg (218 lb). His oral temperature is 98.9 °F (37.2 °C). His blood pressure is 93/67 and his pulse is 106. His respiration is 19 and oxygen saturation is 99%.     Chief Complaint: Gastroesophageal Reflux    39-year-old male with history of end-stage renal disease (HD M/W/F), hypertension, type 2 diabetes mellitus  here today for chest pain for the past 3 days.  Pain is constant.  It is worse after eating.  It feels like heartburn.  He was recently admitted to the hospital on 05/13/2025 for cough.  Workup revealed bilateral pleural effusion and he underwent hemodialysis.  States that on the day he was discharged, he began having heartburn symptoms.  He has been taking over-the-counter Nexium without relief.  Denies fever, chills, shortness of breath, hemoptysis, leg swelling, palpitations.    Gastroesophageal Reflux  He complains of chest pain and heartburn. He reports no abdominal pain, no coughing, no nausea or no sore throat. hiccup, indegestion. This is a new problem. The current episode started in the past 7 days. The problem occurs constantly. The problem has been gradually worsening. The symptoms are aggravated by certain foods. The treatment provided no relief.       Constitution: Negative for chills and fever.   HENT:  Negative for ear pain, congestion and sore throat.    Neck: Negative for neck pain and neck stiffness.   Cardiovascular:  Positive for chest pain. Negative for leg swelling, palpitations and sob on exertion.   Respiratory:  Negative for cough and shortness of breath.    Gastrointestinal:  Positive for heartburn. Negative for abdominal pain, nausea and vomiting.   Genitourinary:  Negative for dysuria.   Allergic/Immunologic: Negative for sneezing.   Neurological:  Negative for dizziness and headaches.      Objective:     Physical Exam   Constitutional: He is oriented to " person, place, and time. He appears well-developed.   HENT:   Head: Normocephalic and atraumatic.   Ears:   Right Ear: External ear normal.   Left Ear: External ear normal.   Nose: Nose normal.   Mouth/Throat: Oropharynx is clear and moist. Mucous membranes are moist. Oropharynx is clear.   Eyes: Conjunctivae, EOM and lids are normal. Pupils are equal, round, and reactive to light.   Neck: Trachea normal and phonation normal. Neck supple.   Cardiovascular: Normal rate, regular rhythm, normal heart sounds and normal pulses.   Pulmonary/Chest: Effort normal and breath sounds normal. No respiratory distress.   Musculoskeletal: Normal range of motion.         General: Normal range of motion.   Neurological: He is alert and oriented to person, place, and time.   Skin: Skin is warm, dry and intact.   Psychiatric: His speech is normal and behavior is normal. Judgment and thought content normal.   Nursing note and vitals reviewed.      XR CHEST PA AND LATERAL  Result Date: 5/16/2025  EXAMINATION: XR CHEST PA AND LATERAL CLINICAL HISTORY: Chest pain, unspecified TECHNIQUE: PA and lateral views of the chest were performed. COMPARISON: Chest radiograph performed 05/13/2025, 09:06 hours. FINDINGS: Grossly unchanged cardiac and mediastinal contours.  As before, there is rightward deviation of mediastinal contents and tracheal airway. Interstitial coarsening, as before.  Patchy opacities in the mid and lower lung zones appear similar to slightly improved compared to 05/13/2025, 09:06 hours examination.  Similar at least moderately sized bilateral pleural effusions.  No definite pneumothorax. No acute findings in the visualized abdomen.  Osseous and soft tissue structures appear without definite acute change.     Please see discussion above. Electronically signed by: John Jara Date:    05/16/2025 Time:    09:56    X-Ray Chest 1 View  Result Date: 5/13/2025  [facility] [place] 1 [place]W [place] on 5/13/2025 11:04 CDT Clinical history:  COUGH Comparison: Chest radiograph 1/25/2021 Findings: LINES: None. LUNGS: Diffuse interstitial thickening and central vascular congestion accentuated by low lung volumes. Moderate left and small right pleural effusions. Overall low lung volumes.  No pneumothorax. MEDIASTINUM: The cardiomediastinal structures are accentuated by portable technique. OSSEOUS STRUCTURES: No acute osseous abnormality. UPPER ABDOMEN: No acute abnormality. OTHER: None.    Moderate left pleural effusion, small right pleural effusion, and interstitial edema pattern. Acute findings are accentuated by low lung volumes and portable technique. Electronically Signed By: Kamar Gonzalez MD, 5/13/2025 11:28 CDT    X-Ray Chest PA And Lateral  Result Date: 5/13/2025  EXAMINATION: XR CHEST PA AND LATERAL CLINICAL HISTORY: cough induced emesis; Cough, unspecified TECHNIQUE: PA and lateral views of the chest were performed. FINDINGS: There are bilateral mid and lower lung zone opacities with bilateral pleural fluid collections.  There has been interval decrease in size of right-sided pleural fluid collection compared to previous 04/11/2025.  There is no pneumothorax.  The cardiac silhouette is not enlarged.  There are coarse mediastinal calcifications consistent with prior granulomatous disease.  The osseous structures are unremarkable.     As above. Electronically signed by: Félix Chavez MD Date:    05/13/2025 Time:    10:15    EKG: , sinus tachycardia, TWI lateral leads present on previous EKG, no ST elevation    Assessment:     1. Epigastric pain    2. Gastroesophageal reflux disease, unspecified whether esophagitis present      Plan:     Epigastric pain  -     XR CHEST PA AND LATERAL; Future; Expected date: 05/16/2025  -     EKG 12-lead; Future  -     aluminum-magnesium hydroxide-simethicone 200-200-20 mg/5 mL suspension 30 mL  -     LIDOcaine viscous HCl 2% oral solution 10 mL    Gastroesophageal reflux disease, unspecified whether  "esophagitis present  -     pantoprazole (PROTONIX) 40 MG tablet; Take 1 tablet (40 mg total) by mouth once daily.  Dispense: 30 tablet; Refill: 0  -     sucralfate (CARAFATE) 100 mg/mL suspension; Take 10 mLs (1 g total) by mouth 3 (three) times daily.  Dispense: 414 mL; Refill: 0    39-year-old male with history of end-stage renal disease (HD M/W/F), hypertension, type 2 diabetes mellitus  here today for chest pain for the past 3 days.  Pain is constant.  It is worse after eating.  It feels like heartburn.  He was recently admitted to the hospital on 05/13/2025 for cough.  Workup revealed bilateral pleural effusion and he underwent hemodialysis.  States that on the day he was discharged, he began having heartburn symptoms.  He has been taking over-the-counter Nexium without relief.  Denies fever, chills, shortness of breath, hemoptysis, leg swelling, palpitations. Mildly tachycardic to 106. Other VS are wnl. Lungs CTAB. No respiratory distress. No chest tenderness. Ddx includes GERD, pneumonia, pleural effusion, CHF exacerbation, ACS, PE. CXR with "Patchy opacities in the mid and lower lung zones appear similar to slightly improved compared to 05/13/2025, 09:06 hours examination.  Similar at least moderately sized bilateral pleural effusions." EKG with sinus tachycardia, TWI lateral leads, no ST elevation. I did discuss with pt that there may be other causes for his pain that we cannot fully rule out in the urgent care. Pt reports he felt pain-free following GI cocktail. Discussed with strict ED precautions for continued or worsening symptoms. PT verbalized understanding and agrees with plan.          Patient Instructions   Take the Protonix and Protonix as prescribed for symptoms of heartburn.        - Follow up with your PCP or specialty clinic as directed in the next 1-2 weeks if not improved or as needed.  You can call [phone] to schedule an appointment with the appropriate provider.    - Go to the ER or " seek medical attention immediately if you develop new or worsening symptoms, such as trouble breathing, or recurring or worsening chest pain    - You must understand that you have received an Urgent Care treatment only and that you may be released before all of your medical problems are known or treated.   - You, the patient, will arrange for follow up care as instructed.   - If your condition worsens or fails to improve we recommend that you receive another evaluation at the ER immediately or contact your PCP to discuss your concerns or return here.

## 2025-05-16 NOTE — PATIENT INSTRUCTIONS
Take the Protonix and Protonix as prescribed for symptoms of heartburn.        - Follow up with your PCP or specialty clinic as directed in the next 1-2 weeks if not improved or as needed.  You can call [phone] to schedule an appointment with the appropriate provider.    - Go to the ER or seek medical attention immediately if you develop new or worsening symptoms, such as trouble breathing, or recurring or worsening chest pain    - You must understand that you have received an Urgent Care treatment only and that you may be released before all of your medical problems are known or treated.   - You, the patient, will arrange for follow up care as instructed.   - If your condition worsens or fails to improve we recommend that you receive another evaluation at the ER immediately or contact your PCP to discuss your concerns or return here.

## 2025-05-17 ENCOUNTER — OFFICE VISIT (OUTPATIENT)
Dept: URGENT CARE | Facility: CLINIC | Age: 40
End: 2025-05-17
Payer: MEDICARE

## 2025-05-17 VITALS
TEMPERATURE: 98 F | DIASTOLIC BLOOD PRESSURE: 87 MMHG | RESPIRATION RATE: 20 BRPM | HEIGHT: 74 IN | WEIGHT: 218 LBS | BODY MASS INDEX: 27.98 KG/M2 | OXYGEN SATURATION: 97 % | HEART RATE: 110 BPM | SYSTOLIC BLOOD PRESSURE: 123 MMHG

## 2025-05-17 DIAGNOSIS — R05.9 COUGH, UNSPECIFIED TYPE: ICD-10-CM

## 2025-05-17 DIAGNOSIS — K21.9 GASTROESOPHAGEAL REFLUX DISEASE, UNSPECIFIED WHETHER ESOPHAGITIS PRESENT: Primary | ICD-10-CM

## 2025-05-17 DIAGNOSIS — R10.13 EPIGASTRIC PAIN: ICD-10-CM

## 2025-05-17 PROCEDURE — 99214 OFFICE O/P EST MOD 30 MIN: CPT | Mod: S$GLB,,,

## 2025-05-17 RX ORDER — LIDOCAINE HYDROCHLORIDE 20 MG/ML
15 SOLUTION OROPHARYNGEAL
Status: COMPLETED | OUTPATIENT
Start: 2025-05-17 | End: 2025-05-17

## 2025-05-17 RX ORDER — ACETAMINOPHEN 10 MG/ML
650 INJECTION, SOLUTION INTRAVENOUS
COMMUNITY
Start: 2025-04-18

## 2025-05-17 RX ORDER — ALUMINUM HYDROXIDE, MAGNESIUM HYDROXIDE, AND SIMETHICONE 1200; 120; 1200 MG/30ML; MG/30ML; MG/30ML
30 SUSPENSION ORAL
Status: COMPLETED | OUTPATIENT
Start: 2025-05-17 | End: 2025-05-17

## 2025-05-17 RX ADMIN — ALUMINUM HYDROXIDE, MAGNESIUM HYDROXIDE, AND SIMETHICONE 30 ML: 1200; 120; 1200 SUSPENSION ORAL at 03:05

## 2025-05-17 RX ADMIN — LIDOCAINE HYDROCHLORIDE 15 ML: 20 SOLUTION OROPHARYNGEAL at 03:05

## 2025-05-17 NOTE — PROGRESS NOTES
"Subjective:      Patient ID: Emmanuel Morgan is a 39 y.o. male.    Vitals:  height is 6' 2" (1.88 m) and weight is 98.9 kg (218 lb). His oral temperature is 98.3 °F (36.8 °C). His blood pressure is 123/87 and his pulse is 110. His respiration is 20 and oxygen saturation is 97%.     Chief Complaint: GI Problem    Patient is a 39-year-old male with complaint of acid reflux.  Patient was seen in this clinic yesterday for the same complaints, obtained a chest x-ray and an EKG at that time, and given a GI cocktail which he states he got relief from.  It was suggested to the patient by the provider yesterday to be seen in the emergency room if the symptoms persisted.  Patient states he did go to the emergency room, but on chart review it would appear he left without being seen.  When asked today if he had had any progression of his symptoms or any changes, patient states no.  Patient is requesting another GI cocktail as he states that is the only medicine that helped him.  He states he has not taken the medicine that was prescribed yesterday for him by the urgent care provider.  Denies any fever, shortness a breath, chest pain, nausea, vomiting or diarrhea.  He does state he was recently admitted to hospital for pleural effusions and has been getting dialysis.    GI Problem  Primary symptoms do not include fever or abdominal pain.     Constitution: Negative for fever and generalized weakness.   HENT:  Negative for ear pain, sinus pain and sore throat.    Neck: Negative for neck pain.   Cardiovascular:  Negative for chest pain.   Respiratory:  Negative for cough and shortness of breath.    Gastrointestinal:  Positive for heartburn. Negative for abdominal pain.   Neurological:  Negative for headaches.      Objective:     Physical Exam   Constitutional: He is oriented to person, place, and time. He appears well-developed. He is cooperative.  Non-toxic appearance. He does not appear ill. No distress.      Comments:Patient is " awake and alert, however patient is restless and has a frequent rocking movement while sitting in exam chair.     HENT:   Head: Normocephalic and atraumatic.   Ears:   Right Ear: Hearing, tympanic membrane, external ear and ear canal normal.   Left Ear: Hearing, tympanic membrane, external ear and ear canal normal.   Nose: Nose normal. No mucosal edema, rhinorrhea or nasal deformity. No epistaxis. Right sinus exhibits no maxillary sinus tenderness and no frontal sinus tenderness. Left sinus exhibits no maxillary sinus tenderness and no frontal sinus tenderness.   Mouth/Throat: Uvula is midline, oropharynx is clear and moist and mucous membranes are normal. No trismus in the jaw. Normal dentition. No uvula swelling. No oropharyngeal exudate, posterior oropharyngeal edema or posterior oropharyngeal erythema.   Eyes: Conjunctivae and lids are normal. No scleral icterus.   Neck: Trachea normal and phonation normal. Neck supple. No edema present. No erythema present. No neck rigidity present.   Cardiovascular: Normal rate, regular rhythm, normal heart sounds and normal pulses.   Pulmonary/Chest: Effort normal and breath sounds normal. No respiratory distress. He has no decreased breath sounds. He has no rhonchi.   Abdominal: Normal appearance. flat abdomen There is abdominal tenderness in the epigastric area. There is no rebound, no guarding, no left CVA tenderness and no right CVA tenderness.   Musculoskeletal: Normal range of motion.         General: No deformity. Normal range of motion.   Neurological: He is alert and oriented to person, place, and time. He exhibits normal muscle tone. Coordination normal.   Skin: Skin is warm, dry, intact, not diaphoretic and not pale.   Psychiatric: His speech is normal and behavior is normal. Judgment and thought content normal.   Nursing note and vitals reviewed.      Assessment:     1. Gastroesophageal reflux disease, unspecified whether esophagitis present    2. Epigastric pain     3. Cough, unspecified type        Plan:   Physical exam as charted above.  Patient does have some mild epigastric pain to palpation.  I explained to patient that GI cocktails are not a long-term treatment for his symptoms, he would benefit from a full evaluation in the emergency room especially as he was recently admitted for pleural effusions and is currently undergoing dialysis.  Patient refuses to go to the emergency room of any hospital, whether it be [facility] or [facility].  I will put in a GI referral for the patient for follow up, and encouraged patient to  his prescribed medications that he received yesterday to help his symptoms.  I will give the patient a GI cocktail in clinic, however I did reiterate that as stated before this is not a long-term treatment option.  Patient declined any chest x-ray or EKG today, as he states those with the tests that were done yesterday.  I did explain to the patient the limitations of being evaluated in urgent care and again suggested that he follow up with his PCP or the ER.  Patient acknowledged my suggestions and said he would follow up as needed.    Gastroesophageal reflux disease, unspecified whether esophagitis present  -     Ambulatory referral/consult to Gastroenterology    Epigastric pain    Cough, unspecified type    Other orders  -     aluminum-magnesium hydroxide-simethicone 200-200-20 mg/5 mL suspension 30 mL  -     LIDOcaine viscous HCl 2% oral solution 15 mL

## 2025-05-17 NOTE — PATIENT INSTRUCTIONS
Continue to take the previously prescribed medication for heartburn that you have received.    My suggestion is if this pain continues and you are still having symptoms she should go to the emergency room for further evaluation and imaging.  As explained, the treatment that can be performed urgent care is very limited, the emergency room we will give you a much better evaluation.    Call to make an appointment with [facility] at [phone].     - You must understand that you have received an Urgent Care treatment only and that you may be released before all of your medical problems are known or treated.   - You, the patient, will arrange for follow up care as instructed.   - If your condition worsens or fails to improve we recommend that you receive another evaluation at the ER immediately or contact your PCP to discuss your concerns or return here.   - Follow up with your PCP or specialty clinic as directed in the next 1-2 weeks if not improved or as needed.  You can call [phone] to schedule an appointment with the appropriate provider.      If your symptoms do not improve or worsen, go to the emergency room immediately.

## 2025-05-18 ENCOUNTER — HOSPITAL ENCOUNTER (EMERGENCY)
Facility: OTHER | Age: 40
Discharge: HOME | End: 2025-05-18
Attending: EMERGENCY MEDICINE
Payer: MEDICARE

## 2025-05-18 VITALS
DIASTOLIC BLOOD PRESSURE: 96 MMHG | TEMPERATURE: 99 F | HEART RATE: 109 BPM | OXYGEN SATURATION: 96 % | HEIGHT: 74 IN | RESPIRATION RATE: 24 BRPM | SYSTOLIC BLOOD PRESSURE: 146 MMHG | WEIGHT: 218 LBS | BODY MASS INDEX: 27.98 KG/M2

## 2025-05-18 DIAGNOSIS — R05.9 COUGH: ICD-10-CM

## 2025-05-18 DIAGNOSIS — N18.6 ESRD ON DIALYSIS: ICD-10-CM

## 2025-05-18 DIAGNOSIS — R10.13 EPIGASTRIC PAIN: ICD-10-CM

## 2025-05-18 DIAGNOSIS — Z99.2 ESRD ON DIALYSIS: ICD-10-CM

## 2025-05-18 DIAGNOSIS — K21.9 GASTROESOPHAGEAL REFLUX DISEASE, UNSPECIFIED WHETHER ESOPHAGITIS PRESENT: ICD-10-CM

## 2025-05-18 DIAGNOSIS — R06.6 HICCUPS: Primary | ICD-10-CM

## 2025-05-18 LAB
ALBUMIN SERPL BCP-MCNC: 3.2 G/DL (ref 3.5–5.2)
ALP SERPL-CCNC: 58 UNIT/L (ref 40–150)
ALT SERPL W/O P-5'-P-CCNC: 7 UNIT/L (ref 10–44)
ANION GAP (OHS): 24 MMOL/L (ref 8–16)
AST SERPL-CCNC: 8 UNIT/L (ref 11–45)
BILIRUB SERPL-MCNC: 0.6 MG/DL (ref 0.1–1)
BUN SERPL-MCNC: 112 MG/DL (ref 6–20)
CALCIUM SERPL-MCNC: 10.6 MG/DL (ref 8.7–10.5)
CHLORIDE SERPL-SCNC: 87 MMOL/L (ref 95–110)
CO2 SERPL-SCNC: 26 MMOL/L (ref 23–29)
CREAT SERPL-MCNC: 18.2 MG/DL (ref 0.5–1.4)
ERYTHROCYTE [DISTWIDTH] IN BLOOD BY AUTOMATED COUNT: 16.8 % (ref 11.5–14.5)
GFR SERPLBLD CREATININE-BSD FMLA CKD-EPI: 3 ML/MIN/1.73/M2
GLUCOSE SERPL-MCNC: 167 MG/DL (ref 70–110)
HCT VFR BLD AUTO: 33.7 % (ref 40–54)
HGB BLD-MCNC: 10.9 GM/DL (ref 14–18)
LIPASE SERPL-CCNC: 33 U/L (ref 4–60)
MCH RBC QN AUTO: 28.8 PG (ref 27–31)
MCHC RBC AUTO-ENTMCNC: 32.3 G/DL (ref 32–36)
MCV RBC AUTO: 89 FL (ref 82–98)
OHS QRS DURATION: 86 MS
OHS QTC CALCULATION: 502 MS
PLATELET # BLD AUTO: 423 K/UL (ref 150–450)
PMV BLD AUTO: 10 FL (ref 9.2–12.9)
POTASSIUM SERPL-SCNC: 5 MMOL/L (ref 3.5–5.1)
PROT SERPL-MCNC: 9.6 GM/DL (ref 6–8.4)
RBC # BLD AUTO: 3.78 M/UL (ref 4.6–6.2)
SODIUM SERPL-SCNC: 137 MMOL/L (ref 136–145)
TROPONIN I SERPL DL<=0.01 NG/ML-MCNC: 0.05 NG/ML
TROPONIN I SERPL DL<=0.01 NG/ML-MCNC: 0.06 NG/ML
WBC # BLD AUTO: 8.7 K/UL (ref 3.9–12.7)

## 2025-05-18 PROCEDURE — 96374 THER/PROPH/DIAG INJ IV PUSH: CPT

## 2025-05-18 PROCEDURE — 96372 THER/PROPH/DIAG INJ SC/IM: CPT | Performed by: EMERGENCY MEDICINE

## 2025-05-18 PROCEDURE — 83690 ASSAY OF LIPASE: CPT | Performed by: EMERGENCY MEDICINE

## 2025-05-18 PROCEDURE — 96375 TX/PRO/DX INJ NEW DRUG ADDON: CPT

## 2025-05-18 PROCEDURE — 84484 ASSAY OF TROPONIN QUANT: CPT | Performed by: EMERGENCY MEDICINE

## 2025-05-18 PROCEDURE — 85027 COMPLETE CBC AUTOMATED: CPT | Performed by: EMERGENCY MEDICINE

## 2025-05-18 PROCEDURE — 25000003 PHARM REV CODE 250: Performed by: EMERGENCY MEDICINE

## 2025-05-18 PROCEDURE — 99285 EMERGENCY DEPT VISIT HI MDM: CPT | Mod: 25

## 2025-05-18 PROCEDURE — 93010 ELECTROCARDIOGRAM REPORT: CPT | Mod: ,,, | Performed by: INTERNAL MEDICINE

## 2025-05-18 PROCEDURE — 93005 ELECTROCARDIOGRAM TRACING: CPT

## 2025-05-18 PROCEDURE — 80053 COMPREHEN METABOLIC PANEL: CPT | Performed by: EMERGENCY MEDICINE

## 2025-05-18 PROCEDURE — 63600175 PHARM REV CODE 636 W HCPCS: Performed by: EMERGENCY MEDICINE

## 2025-05-18 RX ORDER — CHLORPROMAZINE HCI 25 MG/ML
25 INJECTION INTRAMUSCULAR
Status: COMPLETED | OUTPATIENT
Start: 2025-05-18 | End: 2025-05-18

## 2025-05-18 RX ORDER — DICYCLOMINE HYDROCHLORIDE 10 MG/1
20 CAPSULE ORAL
Status: COMPLETED | OUTPATIENT
Start: 2025-05-18 | End: 2025-05-18

## 2025-05-18 RX ORDER — LIDOCAINE HYDROCHLORIDE 20 MG/ML
5 SOLUTION OROPHARYNGEAL
Status: COMPLETED | OUTPATIENT
Start: 2025-05-18 | End: 2025-05-18

## 2025-05-18 RX ORDER — FAMOTIDINE 10 MG/ML
20 INJECTION, SOLUTION INTRAVENOUS
Status: COMPLETED | OUTPATIENT
Start: 2025-05-18 | End: 2025-05-18

## 2025-05-18 RX ORDER — MORPHINE SULFATE 4 MG/ML
4 INJECTION, SOLUTION INTRAMUSCULAR; INTRAVENOUS
Refills: 0 | Status: COMPLETED | OUTPATIENT
Start: 2025-05-18 | End: 2025-05-18

## 2025-05-18 RX ORDER — ALUMINUM HYDROXIDE, MAGNESIUM HYDROXIDE, AND SIMETHICONE 1200; 120; 1200 MG/30ML; MG/30ML; MG/30ML
5 SUSPENSION ORAL
Status: COMPLETED | OUTPATIENT
Start: 2025-05-18 | End: 2025-05-18

## 2025-05-18 RX ADMIN — MORPHINE SULFATE 4 MG: 4 INJECTION INTRAVENOUS at 11:05

## 2025-05-18 RX ADMIN — ALUMINUM HYDROXIDE, MAGNESIUM HYDROXIDE, AND DIMETHICONE 5 ML: 200; 20; 200 SUSPENSION ORAL at 09:05

## 2025-05-18 RX ADMIN — LIDOCAINE HYDROCHLORIDE 5 ML: 20 SOLUTION ORAL at 09:05

## 2025-05-18 RX ADMIN — DICYCLOMINE HYDROCHLORIDE 20 MG: 10 CAPSULE ORAL at 09:05

## 2025-05-18 RX ADMIN — FAMOTIDINE 20 MG: 10 INJECTION, SOLUTION INTRAVENOUS at 11:05

## 2025-05-18 RX ADMIN — CHLORPROMAZINE HYDROCHLORIDE 25 MG: 25 INJECTION INTRAMUSCULAR at 01:05

## 2025-05-18 NOTE — ED NOTES
Presents w/ 4 days of reflux, reports unable to keep anything down, also reports he is MWF dialysis, last dialysis last Wednesday, right forearm access with no thrill, awaiting battery for doppler

## 2025-05-18 NOTE — ED NOTES
Provider notified of pt elevated BP. Pt denies HA, blurred vision, N/V. Provider states to monitor and notify if elevation continues.

## 2025-05-18 NOTE — ED PROVIDER NOTES
Emergency Department Encounter  Provider Note    Emmanuel Morgan  [id number]  5/18/2025    Evaluation:    History Acquisition:     Chief Complaint   Patient presents with    Cough     Pt seen multiple times in multiple different UC and ERs over the last week for same complaint of reflux and cough. Pt states he's not getting better, he has not followed up with PCP and states he just goes to whatever hospital is closest to treat his symptoms. Last dialysis on Thursday. Pt is unkempt with soiled clothing.        History of Present Illness:  Emmanuel Morgan is a 39 y.o. male who has a past medical history of Diabetes mellitus, Hypertension, and Renal disorder.    The patient presents to the ED due to epigastric abdominal pain.  He describes a burning sensation when he tries to eat or drink anything.  He has been taking sucralfate without improvement.  He states he takes 2 other medications but does not know the name of them.      He also has history of ESRD on dialysis. His last HD session was on Wednesday. He usually receives dialysis on Monday/Wednesday/Friday, but he was in the hospital last week and received his dialysis on Thursday while inpatient.  He is scheduled for dialysis again tomorrow. He denies any current CP, SOB, or fever.    Additional historians utilized:  EMS report - patient with cough. Vitals stable en route.    Prior medical records were reviewed:   Urgent care visit 05/17 for acid reflux.  ED visit 05/16 for epigastric pain.  Urgent care visit 05/16 for epigastric pain, GERD.  Treated with GI cocktail.  Prescribed Protonix.  Visit 05/13 for cough.    The patient's list of active medical history, family/social history, medications, and allergies as documented has been reviewed.     Past Medical History:   Diagnosis Date    Diabetes mellitus     Hypertension     Renal disorder      Past Surgical History:   Procedure Laterality Date    FOOT AMPUTATION Right     IRRIGATION AND DEBRIDEMENT Bilateral  4/1/2025    Procedure: IRRIGATION AND DEBRIDEMENT;  Surgeon: Bety Faria DPM;  Location: [facility] OR;  Service: Podiatry;  Laterality: Bilateral;  need jamshidi needle available    IRRIGATION AND DEBRIDEMENT Bilateral 4/8/2025    Procedure: IRRIGATION AND DEBRIDEMENT;  Surgeon: Bety Faria DPM;  Location: [facility] OR;  Service: Podiatry;  Laterality: Bilateral;     No family history on file.  Social History     Socioeconomic History    Marital status: Single   Tobacco Use    Smoking status: Never    Smokeless tobacco: Never   Substance and Sexual Activity    Alcohol use: Never    Drug use: Never     Social Drivers of Health     Financial Resource Strain: Low Risk  (4/1/2025)    Overall Financial Resource Strain (CARDIA)     Difficulty of Paying Living Expenses: Not hard at all   Food Insecurity: No Food Insecurity (5/13/2025)    Received from [facility]    Hunger Vital Sign     Worried About Running Out of Food in the Last Year: Never true     Ran Out of Food in the Last Year: Never true   Transportation Needs: No Transportation Needs (5/13/2025)    Received from [facility]    PRAPARE - Transportation     Lack of Transportation (Medical): No     Lack of Transportation (Non-Medical): No   Physical Activity: Sufficiently Active (9/1/2024)    Exercise Vital Sign     Days of Exercise per Week: 3 days     Minutes of Exercise per Session: 60 min   Stress: No Stress Concern Present (4/1/2025)    [employer] [place] [employer] - Occupational Stress Questionnaire     Feeling of Stress : Not at all   Housing Stability: Low Risk  (5/13/2025)    Received from [facility]    Housing Stability Vital Sign     Unable to Pay for Housing in the Last Year: No     Number of Times Moved in the Last Year: 0     Homeless in the Last Year: No       Medications:  Discharge Medication List as of 5/18/2025  2:52 PM        CONTINUE these medications which have NOT CHANGED    Details   ACETAMINOPHEN 1,000 mg/100 mL (10 mg/mL) Soln  Take 650 mg by mouth., Starting Fri 4/18/2025, Historical Med      benzocaine-menthoL 15-3.6 mg Lozg 1 lozenge by Mucous Membrane route 3 (three) times daily as needed (cough)., Starting Mon 4/21/2025, Normal      benzonatate (TESSALON) 100 MG capsule Take 1 capsule (100 mg total) by mouth every 8 (eight) hours., Starting Mon 4/21/2025, Normal      carvediloL (COREG) 6.25 MG tablet Take 1 tablet (6.25 mg total) by mouth 2 (two) times daily., Starting Mon 4/14/2025, Until Tue 4/14/2026, Normal      levoFLOXacin (LEVAQUIN) 250 MG tablet Take 3 tablets day 1 and then 2 tabs every 48 hours for 6 doses, Normal      NIFEdipine (PROCARDIA-XL) 90 MG (OSM) 24 hr tablet Take 90 mg by mouth once daily., Starting Tue 7/30/2024, Historical Med      pantoprazole (PROTONIX) 40 MG tablet Take 1 tablet (40 mg total) by mouth once daily., Starting Fri 5/16/2025, Until Sun 6/15/2025, Normal      sucralfate (CARAFATE) 100 mg/mL suspension Take 10 mLs (1 g total) by mouth 3 (three) times daily., Starting Fri 5/16/2025, Normal             Allergies:  Review of patient's allergies indicates:  No Known Allergies      Physical Exam:     Initial Vitals [05/18/25 0829]   BP Pulse Resp Temp SpO2   103/67 (!) 118 18 98.7 °F (37.1 °C) 98 %      MAP       --         Physical Exam    Nursing note and vitals reviewed.  Constitutional: He appears well-developed and well-nourished. He is not diaphoretic. No distress.   HENT:   Head: Normocephalic and atraumatic. Mouth/Throat: Oropharynx is clear and moist.   Eyes: EOM are normal. Pupils are equal, round, and reactive to light.   Neck: No tracheal deviation present.   Cardiovascular:  Normal rate, regular rhythm, normal heart sounds and intact distal pulses.           Pulmonary/Chest: Breath sounds normal. No stridor. No respiratory distress.   Abdominal: Abdomen is soft. He exhibits no distension and no mass. There is no abdominal tenderness.   Musculoskeletal:         General: No edema. Normal range  of motion.     Neurological: He is alert and oriented to person, place, and time. No cranial nerve deficit or sensory deficit.   Skin: Skin is warm and dry. Capillary refill takes less than 2 seconds. No rash noted.   Psychiatric: He has a normal mood and affect. His behavior is normal. Thought content normal.         Differential Diagnoses:   Based on available information and initial assessment, Differential Diagnosis includes, but is not limited to:  AAA, aortic dissection, perforated viscous, MI/ACS, hiatal hernia, intussusception, cholecystitis, biliary colic, esophagitis, nephrolithiasis, pancreatitis, gastroenteritis, GERD, PUD, constipation, chest wall pain.      ED Management:   Procedures    Orders Placed This Encounter    X-Ray Chest AP Portable    US Hemodialysis Access    CT Abdomen Pelvis  Without Contrast    CBC Without Differential    Comprehensive metabolic panel    Troponin I    Lipase    Troponin I    EKG 12-lead    aluminum-magnesium hydroxide-simethicone 200-200-20 mg/5 mL suspension 5 mL    LIDOcaine viscous HCl 2% oral solution 5 mL    dicyclomine capsule 20 mg    morphine injection 4 mg    famotidine (PF) injection 20 mg    chlorproMAZINE injection 25 mg          EKG:   EKG interpretation by ED attending physician:  Sinus tach, rate 105, ST flattening with T-wave inversions inferior and lateral leads, no ST elevations or other signs of acute ischemia, prolonged QTC.  Compared with prior EKG dated 05/2025 and 03/2025, grossly stable without significant change.      Labs:     Labs Reviewed   CBC WITHOUT DIFFERENTIAL - Abnormal       Result Value    WBC 8.70      RBC 3.78 (*)     HGB 10.9 (*)     HCT 33.7 (*)     MCV 89      MCHC 32.3      RDW 16.8 (*)     Platelet Count 423      MCH 28.8      MPV 10.0     COMPREHENSIVE METABOLIC PANEL - Abnormal    Sodium 137      Potassium 5.0      Chloride 87 (*)     CO2 26      Glucose 167 (*)      (*)     Creatinine 18.2 (*)     Calcium 10.6 (*)      Protein Total 9.6 (*)     Albumin 3.2 (*)     Bilirubin Total 0.6      ALP 58      AST 8 (*)     ALT 7 (*)     Anion Gap 24 (*)     eGFR 3 (*)    TROPONIN I - Abnormal    Troponin-I 0.054 (*)    TROPONIN I - Abnormal    Troponin-I 0.058 (*)    LIPASE - Normal    Lipase Level 33       Independent review of the labs ordered include:   See ED course    Imaging:     Imaging Results              CT Abdomen Pelvis  Without Contrast (Final result)  Result time 05/18/25 12:41:49      Final result by Alban Lockwood MD (05/18/25 12:41:49)                   Impression:      Unchanged appearance of moderate bilateral pleural effusions with associated compressive atelectasis.    Atrophy appearance of both native kidneys, in keeping with medical renal disease.    Colonic diverticula without evidence of acute diverticulitis.    Additional findings as above.      Electronically signed by: Alban Lockwood MD  Date:    05/18/2025  Time:    12:41               Narrative:    EXAMINATION:  CT ABDOMEN PELVIS WITHOUT CONTRAST    CLINICAL HISTORY:  Nausea/vomiting;Epigastric pain;    TECHNIQUE:  Axial CT scan of the abdomen and pelvis was obtained from the level of the hemidiaphragms to the pubic symphysis without intravenous contrast. Coronal and sagittal reformats were obtained.    COMPARISON:  Chest x-ray dated 05/16/2025.    FINDINGS:  There are partially visualized moderate bilateral pleural effusions associated compressive atelectasis.  There is no evidence of a pneumothorax.    There is trace pericardial effusion.  The heart is not enlarged.  There is normal tapering of the abdominal aorta.  There are calcifications along the course of the abdominal aorta.    There are innumerable subcentimeter retroperitoneal lymph nodes.    The esophagus is unremarkable.  The stomach is within normal limits.  The duodenum is within normal limits.  The small bowel loops are unremarkable.  The appendix is unremarkable.  There is colonic diverticula  without evidence of acute diverticulitis.  There is no CT evidence of bowel obstruction.    There are punctate calcifications in the liver.  The liver is otherwise unremarkable.  The gallbladder is within normal limits.  The biliary tree is within normal limits.  There are calcifications in the spleen.  The pancreas is unremarkable.  There is nodular thickening of both adrenal glands.    Both kidneys are atrophic.  There is no evidence of nephrolithiasis.  The ureters and urinary bladder are unremarkable.  The prostate gland is within normal limits.    There is no evidence of free fluid in the abdomen or pelvis.  There is no evidence of free air.  There is no evidence of pneumatosis.  No portal venous air is identified.    There are changes of bilateral gynecomastia.  The psoas margins are unremarkable.  The abdominal wall is within normal limits.    There is sclerosis of the osseous structures.  There are multiple endplate irregularities in the lumbar spine.  No evidence of a fracture.                                       US Hemodialysis Access (Final result)  Result time 05/18/25 12:05:34      Final result by Jose Sebastian MD (05/18/25 12:05:34)                   Impression:      The arteriovenous fistula is patent but demonstrates minimal blood flow.      Electronically signed by: Jose Sebastian MD  Date:    05/18/2025  Time:    12:05               Narrative:    EXAMINATION:  US HEMODIALYSIS ACCESS    CLINICAL HISTORY:  RUE acess, decreased thrill;    TECHNIQUE:  Real-time grayscale, as well as color and spectral Doppler for sonography.    COMPARISON:  None    FINDINGS:  The right forearm dialysis access is patent but with severely diminished flow.  No thrombus identified, and weak spectral Doppler waveform patterns are noted throughout the fistula.                                       X-Ray Chest AP Portable (Final result)  Result time 05/18/25 09:07:01      Final result by Jose Sebastian MD (05/18/25  09:07:01)                   Impression:      Since May 16, 2025, increased small volume bilateral pleural effusions.  Associated increased bilateral lower lung zone opacities and volume loss.      Electronically signed by: Jose Sebastian MD  Date:    05/18/2025  Time:    09:07               Narrative:    EXAMINATION:  XR CHEST AP PORTABLE    CLINICAL HISTORY:  Cough, unspecified    TECHNIQUE:  Single frontal view of the chest was performed.    COMPARISON:  Prior exams dating back to 2024    FINDINGS:  Since May 16, 2025, increased small bilateral pleural effusions.  Increased bilateral lower lung zone streaky opacities and volume loss.    No pneumothorax.    Unchanged cardiomediastinal silhouette.    No acute bone abnormality.                                         Medications Given:     Medications   aluminum-magnesium hydroxide-simethicone 200-200-20 mg/5 mL suspension 5 mL (5 mLs Oral Given 5/18/25 0906)   LIDOcaine viscous HCl 2% oral solution 5 mL (5 mLs Oral Given 5/18/25 0906)   dicyclomine capsule 20 mg (20 mg Oral Given 5/18/25 0905)   morphine injection 4 mg (4 mg Intravenous Given 5/18/25 1118)   famotidine (PF) injection 20 mg (20 mg Intravenous Given 5/18/25 1118)   chlorproMAZINE injection 25 mg (25 mg Intramuscular Given 5/18/25 1349)        Medical Decision Making:    Additional Consideration:   Additional testing considered during clinical course: labs/imaging considered including:  - none    Social determinants of health considered during development of treatment plan include: poor access to care    Current co-morbidities considered which impacted clinical decision making: ESRD on HD M/W/F, anemia of chronic disease, HTN, DM, chronic cough    Case discussed with additional provider: none    ED Course as of 05/18/25 1530   Sun May 18, 2025   0933 SpO2: 98 % [SS]   0933 Resp: 18 [SS]   0933 Pulse: 102 [SS]   0933 Temp Source: Oral [SS]   0933 Temp: 98.7 °F (37.1 °C) [SS]   0933 BP: 103/67  Vitals  reassuring, afebrile [SS]   0934 Comprehensive metabolic panel(!)  Severely elevated creatinine, BUN elevated as well, consistent with ESRD.  K normal. [SS]   0934 Troponin I(!)  Troponin mildly elevated, appears chronically elevated and improved from prior values.  Likely due to ESRD. [SS]   0934 Lipase  Normal [SS]   1021 CBC Without Differential(!)  Chronic anemia, stable from prior [SS]   1022 X-Ray Chest AP Portable  CXR independently interpreted: bilateral pleural effusions and lower lung zone opacities, likely due to volume overload.  Agree with radiologist interpretation.    [SS]   1106 On reassessment, patient still endorsing heartburn symptoms, abdominal pain, and hiccups.  Will obtain CT A/P.  Will give morphine and Pepcid for pain and heartburn symptoms. [SS]   1323 CT Abdomen Pelvis  Without Contrast  CT A/P independently interpreted: no high-grade bowel obstruction or free air.  Agree with radiologist interpretation.    [SS]   1323 CT without acute process.  On reassessment, patient still reports burning in his chest and hiccups.  Will obtain delta troponin and give trial of Thorazine. [SS]   1432 Troponin I(!)  Troponin essentially flat [SS]   1449 Patient resting comfortably on reassessment.  He states his hiccups have resolved.  He states his heartburn comes and goes.  It seems as though he is currently on Protonix and sucralfate as prescribed 2 days ago.   No other emergent condition evident on today's visit.  His labs appear stable from his baseline.  His vitals are reassuring and is O2 sat is 97% on room air.  Discussed options including observation for Nephrology consultation and likely HD in AM vs DC with OP HD. Patient states he will be able to attend his dialysis appointment as scheduled tomorrow.  He was given strict return precautions and is comfortable with discharge at this time. [SS]      ED Course User Index  [SS] Alexander France MD            Medical Decision Making  Problems  Addressed:  Cough: acute illness or injury  Epigastric pain: acute illness or injury  ESRD on dialysis: chronic illness or injury with exacerbation, progression, or side effects of treatment  Gastroesophageal reflux disease, unspecified whether esophagitis present: acute illness or injury  Hiccups: acute illness or injury    Amount and/or Complexity of Data Reviewed  Independent Historian: EMS  External Data Reviewed: notes.  Labs: ordered. Decision-making details documented in ED Course.  Radiology: ordered and independent interpretation performed. Decision-making details documented in ED Course.  ECG/medicine tests: ordered and independent interpretation performed. Decision-making details documented in ED Course.    Risk  OTC drugs.  Prescription drug management.  Parenteral controlled substances.  Decision regarding hospitalization.  Diagnosis or treatment significantly limited by social determinants of health.        Clinical Impression:       ICD-10-CM ICD-9-CM   1. Hiccups  R06.6 786.8   2. ESRD on dialysis  N18.6 585.6    Z99.2 V45.11   3. Cough  R05.9 786.2   4. Epigastric pain  R10.13 789.06   5. Gastroesophageal reflux disease, unspecified whether esophagitis present  K21.9 530.81         Follow-up Information       Follow up With Specialties Details Why Contact Info    Your Primary Care Provider  Schedule an appointment as soon as possible for a visit   as soon as able             ED Disposition Condition    Discharge Stable              On re-evaluation, the patient's status has improved.  PCP/GI follow-up as soon as possible was recommended.    After taking into careful account the patient's history, physical exam findings, as well as empirical and objective data obtained throughout ED workup, I feel no emergent medical condition has been identified. No further evaluation or admission was felt to be required, and the patient is stable for discharge from the ED. The patient and any additional family  present were updated with test results, overall clinical impression, and recommended further plan of care, including discharge instructions as provided and outpatient follow-up for continued evaluation and management as needed. All questions were answered. The patient expressed understanding and agreed with current plan for discharge and follow-up plan of care. Strict ED return precautions were provided, including return/worsening of current symptoms, new symptoms, or any other concerns.       Alexander France MD  05/18/25 [id number]

## 2025-05-18 NOTE — DISCHARGE INSTRUCTIONS
Thank you for choosing [facility]!     Our goal in the Emergency Department is to always provide outstanding medical care. You may receive a survey by mail or e-mail in the next week regarding your experience today. We would greatly appreciate you completing and returning the survey. Your feedback provides us with a way to recognize our staff who provide very good care, and it helps us learn how to improve when your experience was below our aspiration of excellence.      It is important to remember that some problems are difficult to diagnose and may not be found during your first visit. Be sure to follow up with your primary care doctor and review any labs/imaging that was performed during your visit with them. If you do not have a primary care doctor, you may contact the one listed on your discharge paperwork, or you may also call the [facility] Appointment Desk at [phone] to schedule an appointment.     All medications may potentially have side effects and it is impossible to predict which medications may give you side effects. If you feel that you are having a negative effect of any medication you should immediately stop taking them and seek medical attention.  Do not drive or make any important decisions for 24 hours if you have received any pain medications, sedatives or mood altering drugs during your ER visit.    We appreciate you trusting us with your medical care. We will be happy to take care of you for all of your future medical needs. You may return to the ER at any time for any new/concerning symptoms, worsening condition, or failure to improve. We hope you feel better soon.     Sincerely,    Alexandre France Jr., MD  Board-Certified Emergency Medicine Physician  [facility]

## 2025-05-19 ENCOUNTER — TELEPHONE (OUTPATIENT)
Dept: PODIATRY | Facility: CLINIC | Age: 40
End: 2025-05-19
Payer: MEDICARE

## 2025-05-19 ENCOUNTER — OFFICE VISIT (OUTPATIENT)
Dept: GASTROENTEROLOGY | Facility: CLINIC | Age: 40
End: 2025-05-19
Payer: MEDICARE

## 2025-05-19 VITALS — HEIGHT: 74 IN | WEIGHT: 218 LBS | BODY MASS INDEX: 27.98 KG/M2

## 2025-05-19 DIAGNOSIS — K22.10 EROSIVE ESOPHAGITIS: Primary | ICD-10-CM

## 2025-05-19 DIAGNOSIS — K21.00 GASTROESOPHAGEAL REFLUX DISEASE WITH ESOPHAGITIS WITHOUT HEMORRHAGE: ICD-10-CM

## 2025-05-19 PROCEDURE — 3066F NEPHROPATHY DOC TX: CPT | Mod: CPTII,GC,S$GLB,

## 2025-05-19 PROCEDURE — 4010F ACE/ARB THERAPY RXD/TAKEN: CPT | Mod: CPTII,GC,S$GLB,

## 2025-05-19 PROCEDURE — 3008F BODY MASS INDEX DOCD: CPT | Mod: CPTII,GC,S$GLB,

## 2025-05-19 PROCEDURE — 99204 OFFICE O/P NEW MOD 45 MIN: CPT | Mod: GC,S$GLB,,

## 2025-05-19 PROCEDURE — 99999 PR PBB SHADOW E&M-EST. PATIENT-LVL III: CPT | Mod: PBBFAC,GC,,

## 2025-05-19 PROCEDURE — 1159F MED LIST DOCD IN RCRD: CPT | Mod: CPTII,GC,S$GLB,

## 2025-05-19 NOTE — TELEPHONE ENCOUNTER
Pt requesting rx for prosthetic shoe filler and if rx is approved pt would like it faxed to [phone]. Pt informed provider is out due to injury and to give time before receiving response.

## 2025-05-19 NOTE — PROGRESS NOTES
"[facility] NOTE    Name: Emmanuel Morgan  : 1985  Date of Service: 2025   PCP: No primary care provider on file.    Reason for visit:   Chief Complaint   Patient presents with    Referral       HPI:     The patient is a 39 year old male who presents to GI clinic for heartburn. He has a history of HTN, DM and ESRD on HD. He has been seen by several UC/ER's in the last week for reflux and cough (outlined below). He gets relief with GI cocktail and has been prescribed Protonix. He reports reflux symptoms starting approximately 2 weeks ago when he was hospitalized for fluid overload and underwent 3 days of HD. He had similar symptoms in the past (last year), he recalls having a colonoscopy in  but per chart review he had EGD and colonoscopy on 3/14/2024 at [facility]. EGD was remarkable for erosive esophagitis and a duodenal ulcer. He denies weight loss or red flag symptoms. He endorses intermittent compliance with medications including the PPI. He has tried tums with no relief. Denies NSAID use. No family history of GIT malignancies.     ER visit  for reflux/cough   Urgent care visit  for acid reflux.  ED visit  for epigastric pain.  Urgent care visit  for epigastric pain, GERD.  Treated with GI cocktail.  Prescribed Protonix.  Visit  for cough.    Review of Systems:   ROS - negative except for otherwise stated in HPI     Medications: Current Medications[1]     Past medical history, past surgical history, family history, allergies, and social history reviewed and updated in EMR.     Vitals:   Vitals:    25 1446   Weight: 98.9 kg (218 lb)   Height: 6' 2" (1.88 m)     Body mass index is 27.99 kg/m².    Physical Exam  Constitutional:  not in acute distress   HENT: Head: Normal, normocephalic.  Eyes: conjunctiva clear and sclera nonicteric  GI: soft, non-tender, non distended   Skin: normal color on visualized skin  Neurological: alert, oriented "       Assessment:   1. Erosive esophagitis        2. Gastroesophageal reflux disease with esophagitis without hemorrhage          Discussed need for EGD given his prior EGD findings and symptoms now, patient agreeable and expressed understanding. Reinforced use of PPI and avoidance of NSAIDs.     Plan:   - Request sent to schedulers for EGD   - Continue PPI daily until EGD given history of erosive esophagitis and PUD on prior EGD       Discussed with staff attending. Attestation to follow.     Aby Chi MD  Gastroenterology Fellow PGY-IV  [facility]         [1]   Current Outpatient Medications:     ACETAMINOPHEN 1,000 mg/100 mL (10 mg/mL) Soln, Take 650 mg by mouth., Disp: , Rfl:     benzocaine-menthoL 15-3.6 mg Lozg, 1 lozenge by Mucous Membrane route 3 (three) times daily as needed (cough)., Disp: 36 lozenge, Rfl: 3    benzonatate (TESSALON) 100 MG capsule, Take 1 capsule (100 mg total) by mouth every 8 (eight) hours., Disp: 21 capsule, Rfl: 0    carvediloL (COREG) 6.25 MG tablet, Take 1 tablet (6.25 mg total) by mouth 2 (two) times daily., Disp: 180 tablet, Rfl: 3    levoFLOXacin (LEVAQUIN) 250 MG tablet, Take 3 tablets day 1 and then 2 tabs every 48 hours for 6 doses, Disp: 15 tablet, Rfl: 0    pantoprazole (PROTONIX) 40 MG tablet, Take 1 tablet (40 mg total) by mouth once daily., Disp: 30 tablet, Rfl: 0    sucralfate (CARAFATE) 100 mg/mL suspension, Take 10 mLs (1 g total) by mouth 3 (three) times daily., Disp: 414 mL, Rfl: 0    NIFEdipine (PROCARDIA-XL) 90 MG (OSM) 24 hr tablet, Take 90 mg by mouth once daily. (Patient not taking: Reported on 5/19/2025), Disp: , Rfl:

## 2025-05-19 NOTE — TELEPHONE ENCOUNTER
----- Message from Med Assistant Sotomayor sent at 5/19/2025  8:11 AM CDT -----  Who called:Pt What is the request in detail:Discuss getting script for his prosthetic leg filled Can the clinic reply by [facility]? NoWould the patient rather a call back or a response via [facility]? Call backCallback Best call back number:[phone] Additional Information:Thank you.

## 2025-05-20 ENCOUNTER — TELEPHONE (OUTPATIENT)
Dept: WOUND CARE | Facility: HOSPITAL | Age: 40
End: 2025-05-20
Payer: MEDICARE

## 2025-05-20 NOTE — TELEPHONE ENCOUNTER
Call placed to Mr Beckett about missed appointment. No answer, left a message for patient to return call to wound clinic.

## 2025-05-21 DIAGNOSIS — K21.9 GASTROESOPHAGEAL REFLUX DISEASE, UNSPECIFIED WHETHER ESOPHAGITIS PRESENT: Primary | ICD-10-CM

## 2025-05-21 DIAGNOSIS — K27.9 PEPTIC ULCER DISEASE: ICD-10-CM

## 2025-05-22 ENCOUNTER — TELEPHONE (OUTPATIENT)
Dept: ENDOSCOPY | Facility: HOSPITAL | Age: 40
End: 2025-05-22

## 2025-05-22 ENCOUNTER — CLINICAL SUPPORT (OUTPATIENT)
Dept: ENDOSCOPY | Facility: HOSPITAL | Age: 40
End: 2025-05-22
Payer: MEDICARE

## 2025-05-22 DIAGNOSIS — K21.9 GASTROESOPHAGEAL REFLUX DISEASE, UNSPECIFIED WHETHER ESOPHAGITIS PRESENT: ICD-10-CM

## 2025-05-22 DIAGNOSIS — K27.9 PEPTIC ULCER DISEASE: ICD-10-CM

## 2025-05-27 ENCOUNTER — TELEPHONE (OUTPATIENT)
Dept: WOUND CARE | Facility: HOSPITAL | Age: 40
End: 2025-05-27
Payer: MEDICARE

## 2025-05-30 ENCOUNTER — CLINICAL SUPPORT (OUTPATIENT)
Dept: ENDOSCOPY | Facility: HOSPITAL | Age: 40
End: 2025-05-30
Payer: MEDICARE

## 2025-05-30 ENCOUNTER — TELEPHONE (OUTPATIENT)
Dept: ENDOSCOPY | Facility: HOSPITAL | Age: 40
End: 2025-05-30

## 2025-05-30 DIAGNOSIS — K21.9 GASTROESOPHAGEAL REFLUX DISEASE, UNSPECIFIED WHETHER ESOPHAGITIS PRESENT: ICD-10-CM

## 2025-05-30 DIAGNOSIS — K27.9 PEPTIC ULCER DISEASE: ICD-10-CM

## 2025-06-27 ENCOUNTER — EXTERNAL HOME HEALTH (OUTPATIENT)
Dept: HOME HEALTH SERVICES | Facility: HOSPITAL | Age: 40
End: 2025-06-27
Payer: MEDICARE

## (undated) DEVICE — PAD PREP CUFFED NS 24X48IN

## (undated) DEVICE — BLANKET WARMING UPPER BODY

## (undated) DEVICE — SYR ONLY LUER LOCK 20CC

## (undated) DEVICE — SOL NACL IRR 3000ML

## (undated) DEVICE — NDL HYPO STD REG BVL 18GX1.5IN

## (undated) DEVICE — SPONGE GAUZE 16PLY 4X4

## (undated) DEVICE — Device

## (undated) DEVICE — CLOSURE SKIN STERI STRIP 1/2X4

## (undated) DEVICE — SOL NACL IRR 1000ML BTL

## (undated) DEVICE — ADHESIVE MASTISOL VIAL 48/BX

## (undated) DEVICE — BANDAGE COBAN COLOR ASSORT 3X5

## (undated) DEVICE — TOURNIQUET SB QC DP 18X4IN

## (undated) DEVICE — PAD CAST SPECIALIST STRL 4

## (undated) DEVICE — DERM CURETTE 5MM DISP

## (undated) DEVICE — WALKER TRACKER EX LARGE

## (undated) DEVICE — CONTAINER SPECIMEN OR STER 4OZ

## (undated) DEVICE — PULSAVAC ZIMMER

## (undated) DEVICE — SPONGE LAP 18X18 PREWASHED

## (undated) DEVICE — NDL HYPO REG 25G X 1 1/2

## (undated) DEVICE — BANDAGE MATRIX HK LOOP 4IN 5YD

## (undated) DEVICE — BETADINE OPTHALMIC SOL 5% 30ML

## (undated) DEVICE — GOWN NONREINF SET-IN SLV XL

## (undated) DEVICE — PACK ARTHROSCOPY W/ISO BAC

## (undated) DEVICE — SPONGE COTTON TRAY 4X4IN

## (undated) DEVICE — CANISTER SUCTION RIGID 2000CC

## (undated) DEVICE — ELECTRODE REM PLYHSV RETURN 9

## (undated) DEVICE — COVER OVERHEAD SURG LT BLUE

## (undated) DEVICE — NDL 11GA X 6 JAMSHIDI

## (undated) DEVICE — TOWEL OR DISP STRL BLUE 4/PK

## (undated) DEVICE — SYR 10CC LUER LOCK

## (undated) DEVICE — DRESSING N ADH OIL EMUL 3X3

## (undated) DEVICE — SUT PROLENE 3-0 PS-2 BL 18IN